# Patient Record
Sex: MALE | Race: WHITE | NOT HISPANIC OR LATINO | URBAN - METROPOLITAN AREA
[De-identification: names, ages, dates, MRNs, and addresses within clinical notes are randomized per-mention and may not be internally consistent; named-entity substitution may affect disease eponyms.]

---

## 2017-05-30 ENCOUNTER — OUTPATIENT (OUTPATIENT)
Dept: OUTPATIENT SERVICES | Facility: HOSPITAL | Age: 82
LOS: 1 days | Discharge: HOME | End: 2017-05-30

## 2017-06-28 DIAGNOSIS — E56.9 VITAMIN DEFICIENCY, UNSPECIFIED: ICD-10-CM

## 2017-06-28 DIAGNOSIS — R73.9 HYPERGLYCEMIA, UNSPECIFIED: ICD-10-CM

## 2017-06-28 DIAGNOSIS — E03.9 HYPOTHYROIDISM, UNSPECIFIED: ICD-10-CM

## 2017-06-28 DIAGNOSIS — E55.9 VITAMIN D DEFICIENCY, UNSPECIFIED: ICD-10-CM

## 2017-06-28 DIAGNOSIS — N18.2 CHRONIC KIDNEY DISEASE, STAGE 2 (MILD): ICD-10-CM

## 2017-06-28 DIAGNOSIS — D50.9 IRON DEFICIENCY ANEMIA, UNSPECIFIED: ICD-10-CM

## 2017-06-28 DIAGNOSIS — E53.8 DEFICIENCY OF OTHER SPECIFIED B GROUP VITAMINS: ICD-10-CM

## 2017-10-05 ENCOUNTER — OUTPATIENT (OUTPATIENT)
Dept: OUTPATIENT SERVICES | Facility: HOSPITAL | Age: 82
LOS: 1 days | Discharge: HOME | End: 2017-10-05

## 2017-10-05 DIAGNOSIS — I48.91 UNSPECIFIED ATRIAL FIBRILLATION: ICD-10-CM

## 2017-10-05 DIAGNOSIS — C61 MALIGNANT NEOPLASM OF PROSTATE: ICD-10-CM
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
## 2017-10-05 DIAGNOSIS — D07.5 CARCINOMA IN SITU OF PROSTATE: ICD-10-CM

## 2017-10-05 DIAGNOSIS — I10 ESSENTIAL (PRIMARY) HYPERTENSION: ICD-10-CM

## 2017-10-16 ENCOUNTER — OUTPATIENT (OUTPATIENT)
Dept: OUTPATIENT SERVICES | Facility: HOSPITAL | Age: 82
LOS: 1 days | Discharge: HOME | End: 2017-10-16

## 2017-10-16 DIAGNOSIS — C61 MALIGNANT NEOPLASM OF PROSTATE: ICD-10-CM
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
## 2017-10-16 DIAGNOSIS — I10 ESSENTIAL (PRIMARY) HYPERTENSION: ICD-10-CM

## 2017-10-16 DIAGNOSIS — J43.9 EMPHYSEMA, UNSPECIFIED: ICD-10-CM

## 2017-10-16 DIAGNOSIS — D07.5 CARCINOMA IN SITU OF PROSTATE: ICD-10-CM

## 2017-10-16 DIAGNOSIS — I48.91 UNSPECIFIED ATRIAL FIBRILLATION: ICD-10-CM

## 2017-10-16 DIAGNOSIS — Z00.00 ENCOUNTER FOR GENERAL ADULT MEDICAL EXAMINATION WITHOUT ABNORMAL FINDINGS: ICD-10-CM

## 2017-10-30 ENCOUNTER — OUTPATIENT (OUTPATIENT)
Dept: OUTPATIENT SERVICES | Facility: HOSPITAL | Age: 82
LOS: 1 days | Discharge: HOME | End: 2017-10-30

## 2017-10-30 DIAGNOSIS — I10 ESSENTIAL (PRIMARY) HYPERTENSION: ICD-10-CM

## 2017-10-30 DIAGNOSIS — C61 MALIGNANT NEOPLASM OF PROSTATE: ICD-10-CM
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
## 2017-10-30 DIAGNOSIS — I48.91 UNSPECIFIED ATRIAL FIBRILLATION: ICD-10-CM

## 2017-10-30 DIAGNOSIS — D07.5 CARCINOMA IN SITU OF PROSTATE: ICD-10-CM

## 2017-11-13 ENCOUNTER — OUTPATIENT (OUTPATIENT)
Dept: OUTPATIENT SERVICES | Facility: HOSPITAL | Age: 82
LOS: 1 days | Discharge: HOME | End: 2017-11-13

## 2017-11-13 DIAGNOSIS — I10 ESSENTIAL (PRIMARY) HYPERTENSION: ICD-10-CM

## 2017-11-13 DIAGNOSIS — D07.5 CARCINOMA IN SITU OF PROSTATE: ICD-10-CM
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
## 2017-11-13 DIAGNOSIS — I48.91 UNSPECIFIED ATRIAL FIBRILLATION: ICD-10-CM

## 2017-11-13 DIAGNOSIS — C61 MALIGNANT NEOPLASM OF PROSTATE: ICD-10-CM

## 2017-11-27 ENCOUNTER — OUTPATIENT (OUTPATIENT)
Dept: OUTPATIENT SERVICES | Facility: HOSPITAL | Age: 82
LOS: 1 days | Discharge: HOME | End: 2017-11-27

## 2017-11-27 DIAGNOSIS — I10 ESSENTIAL (PRIMARY) HYPERTENSION: ICD-10-CM

## 2017-11-27 DIAGNOSIS — R79.89 OTHER SPECIFIED ABNORMAL FINDINGS OF BLOOD CHEMISTRY: ICD-10-CM
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
## 2017-11-27 DIAGNOSIS — C61 MALIGNANT NEOPLASM OF PROSTATE: ICD-10-CM

## 2017-11-27 DIAGNOSIS — D07.5 CARCINOMA IN SITU OF PROSTATE: ICD-10-CM

## 2017-12-11 ENCOUNTER — OUTPATIENT (OUTPATIENT)
Dept: OUTPATIENT SERVICES | Facility: HOSPITAL | Age: 82
LOS: 1 days | Discharge: HOME | End: 2017-12-11

## 2017-12-11 DIAGNOSIS — C61 MALIGNANT NEOPLASM OF PROSTATE: ICD-10-CM

## 2017-12-11 DIAGNOSIS — D07.5 CARCINOMA IN SITU OF PROSTATE: ICD-10-CM

## 2017-12-11 DIAGNOSIS — I10 ESSENTIAL (PRIMARY) HYPERTENSION: ICD-10-CM

## 2017-12-11 DIAGNOSIS — R79.89 OTHER SPECIFIED ABNORMAL FINDINGS OF BLOOD CHEMISTRY: ICD-10-CM
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
## 2017-12-26 ENCOUNTER — OUTPATIENT (OUTPATIENT)
Dept: OUTPATIENT SERVICES | Facility: HOSPITAL | Age: 82
LOS: 1 days | Discharge: HOME | End: 2017-12-26

## 2017-12-26 DIAGNOSIS — I10 ESSENTIAL (PRIMARY) HYPERTENSION: ICD-10-CM

## 2017-12-26 DIAGNOSIS — C61 MALIGNANT NEOPLASM OF PROSTATE: ICD-10-CM

## 2017-12-26 DIAGNOSIS — R79.89 OTHER SPECIFIED ABNORMAL FINDINGS OF BLOOD CHEMISTRY: ICD-10-CM
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
## 2017-12-26 DIAGNOSIS — D07.5 CARCINOMA IN SITU OF PROSTATE: ICD-10-CM

## 2018-01-08 ENCOUNTER — OUTPATIENT (OUTPATIENT)
Dept: OUTPATIENT SERVICES | Facility: HOSPITAL | Age: 83
LOS: 1 days | Discharge: HOME | End: 2018-01-08

## 2018-01-08 DIAGNOSIS — R79.89 OTHER SPECIFIED ABNORMAL FINDINGS OF BLOOD CHEMISTRY: ICD-10-CM

## 2018-01-08 DIAGNOSIS — I10 ESSENTIAL (PRIMARY) HYPERTENSION: ICD-10-CM
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
## 2018-01-08 DIAGNOSIS — D07.5 CARCINOMA IN SITU OF PROSTATE: ICD-10-CM

## 2018-01-08 DIAGNOSIS — C61 MALIGNANT NEOPLASM OF PROSTATE: ICD-10-CM

## 2018-01-22 ENCOUNTER — OUTPATIENT (OUTPATIENT)
Dept: OUTPATIENT SERVICES | Facility: HOSPITAL | Age: 83
LOS: 1 days | Discharge: HOME | End: 2018-01-22

## 2018-01-22 DIAGNOSIS — R79.89 OTHER SPECIFIED ABNORMAL FINDINGS OF BLOOD CHEMISTRY: ICD-10-CM

## 2018-02-04 DIAGNOSIS — C61 MALIGNANT NEOPLASM OF PROSTATE: ICD-10-CM

## 2018-02-04 DIAGNOSIS — I10 ESSENTIAL (PRIMARY) HYPERTENSION: ICD-10-CM

## 2018-02-04 DIAGNOSIS — D07.5 CARCINOMA IN SITU OF PROSTATE: ICD-10-CM
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
## 2018-02-05 ENCOUNTER — OUTPATIENT (OUTPATIENT)
Dept: OUTPATIENT SERVICES | Facility: HOSPITAL | Age: 83
LOS: 1 days | Discharge: HOME | End: 2018-02-05

## 2018-02-05 DIAGNOSIS — R79.89 OTHER SPECIFIED ABNORMAL FINDINGS OF BLOOD CHEMISTRY: ICD-10-CM

## 2018-02-19 ENCOUNTER — OUTPATIENT (OUTPATIENT)
Dept: OUTPATIENT SERVICES | Facility: HOSPITAL | Age: 83
LOS: 1 days | Discharge: HOME | End: 2018-02-19

## 2018-02-19 DIAGNOSIS — R79.89 OTHER SPECIFIED ABNORMAL FINDINGS OF BLOOD CHEMISTRY: ICD-10-CM

## 2018-03-05 ENCOUNTER — OUTPATIENT (OUTPATIENT)
Dept: OUTPATIENT SERVICES | Facility: HOSPITAL | Age: 83
LOS: 1 days | Discharge: HOME | End: 2018-03-05

## 2018-03-05 DIAGNOSIS — R79.89 OTHER SPECIFIED ABNORMAL FINDINGS OF BLOOD CHEMISTRY: ICD-10-CM

## 2018-03-19 ENCOUNTER — OUTPATIENT (OUTPATIENT)
Dept: OUTPATIENT SERVICES | Facility: HOSPITAL | Age: 83
LOS: 1 days | Discharge: HOME | End: 2018-03-19

## 2018-03-19 DIAGNOSIS — R79.89 OTHER SPECIFIED ABNORMAL FINDINGS OF BLOOD CHEMISTRY: ICD-10-CM

## 2018-03-20 ENCOUNTER — INPATIENT (INPATIENT)
Facility: HOSPITAL | Age: 83
LOS: 8 days | Discharge: NURSING, REHAB OR LONG-TERM CARE | End: 2018-03-29
Attending: INTERNAL MEDICINE | Admitting: INTERNAL MEDICINE

## 2018-03-20 VITALS
HEART RATE: 76 BPM | RESPIRATION RATE: 22 BRPM | SYSTOLIC BLOOD PRESSURE: 170 MMHG | OXYGEN SATURATION: 93 % | TEMPERATURE: 96 F | DIASTOLIC BLOOD PRESSURE: 85 MMHG

## 2018-03-20 DIAGNOSIS — Z98.890 OTHER SPECIFIED POSTPROCEDURAL STATES: Chronic | ICD-10-CM

## 2018-03-20 LAB
ALBUMIN SERPL ELPH-MCNC: 4.1 G/DL — SIGNIFICANT CHANGE UP (ref 3.5–5.2)
ALP SERPL-CCNC: 120 U/L — HIGH (ref 30–115)
ALT FLD-CCNC: 31 U/L — SIGNIFICANT CHANGE UP (ref 0–41)
ANION GAP SERPL CALC-SCNC: 19 MMOL/L — HIGH (ref 7–14)
APPEARANCE UR: CLEAR — SIGNIFICANT CHANGE UP
APTT BLD: 25.9 SEC — LOW (ref 27–39.2)
AST SERPL-CCNC: 30 U/L — SIGNIFICANT CHANGE UP (ref 0–41)
BASOPHILS # BLD AUTO: 0.02 K/UL — SIGNIFICANT CHANGE UP (ref 0–0.2)
BASOPHILS NFR BLD AUTO: 0.1 % — SIGNIFICANT CHANGE UP (ref 0–1)
BILIRUB SERPL-MCNC: 0.8 MG/DL — SIGNIFICANT CHANGE UP (ref 0.2–1.2)
BILIRUB UR-MCNC: NEGATIVE — SIGNIFICANT CHANGE UP
BUN SERPL-MCNC: 48 MG/DL — HIGH (ref 10–20)
CALCIUM SERPL-MCNC: 9 MG/DL — SIGNIFICANT CHANGE UP (ref 8.5–10.1)
CHLORIDE SERPL-SCNC: 103 MMOL/L — SIGNIFICANT CHANGE UP (ref 98–110)
CK MB CFR SERPL CALC: 3.6 NG/ML — SIGNIFICANT CHANGE UP (ref 0.6–6.3)
CK MB CFR SERPL CALC: 5.3 NG/ML — SIGNIFICANT CHANGE UP (ref 0.6–6.3)
CK SERPL-CCNC: 88 U/L — SIGNIFICANT CHANGE UP (ref 0–225)
CO2 SERPL-SCNC: 20 MMOL/L — SIGNIFICANT CHANGE UP (ref 17–32)
COLOR SPEC: YELLOW — SIGNIFICANT CHANGE UP
CREAT SERPL-MCNC: 1.5 MG/DL — SIGNIFICANT CHANGE UP (ref 0.7–1.5)
DIFF PNL FLD: NEGATIVE — SIGNIFICANT CHANGE UP
EOSINOPHIL # BLD AUTO: 0.02 K/UL — SIGNIFICANT CHANGE UP (ref 0–0.7)
EOSINOPHIL NFR BLD AUTO: 0.1 % — SIGNIFICANT CHANGE UP (ref 0–8)
GLUCOSE SERPL-MCNC: 113 MG/DL — HIGH (ref 70–110)
GLUCOSE UR QL: NEGATIVE MG/DL — SIGNIFICANT CHANGE UP
HCT VFR BLD CALC: 33.8 % — LOW (ref 42–52)
HGB BLD-MCNC: 10.3 G/DL — LOW (ref 14–18)
IMM GRANULOCYTES NFR BLD AUTO: 0.7 % — HIGH (ref 0.1–0.3)
INR BLD: 2.21 RATIO — HIGH (ref 0.65–1.3)
KETONES UR-MCNC: NEGATIVE — SIGNIFICANT CHANGE UP
LACTATE SERPL-SCNC: 1.9 MMOL/L — SIGNIFICANT CHANGE UP (ref 0.5–2.2)
LEUKOCYTE ESTERASE UR-ACNC: NEGATIVE — SIGNIFICANT CHANGE UP
LYMPHOCYTES # BLD AUTO: 1.37 K/UL — SIGNIFICANT CHANGE UP (ref 1.2–3.4)
LYMPHOCYTES # BLD AUTO: 9 % — LOW (ref 20.5–51.1)
MCHC RBC-ENTMCNC: 18.8 PG — LOW (ref 27–31)
MCHC RBC-ENTMCNC: 30.5 G/DL — LOW (ref 32–37)
MCV RBC AUTO: 61.6 FL — LOW (ref 80–94)
MONOCYTES # BLD AUTO: 1.35 K/UL — HIGH (ref 0.1–0.6)
MONOCYTES NFR BLD AUTO: 8.9 % — SIGNIFICANT CHANGE UP (ref 1.7–9.3)
NEUTROPHILS # BLD AUTO: 12.33 K/UL — HIGH (ref 1.4–6.5)
NEUTROPHILS NFR BLD AUTO: 81.2 % — HIGH (ref 42.2–75.2)
NITRITE UR-MCNC: NEGATIVE — SIGNIFICANT CHANGE UP
NT-PROBNP SERPL-SCNC: 6288 PG/ML — HIGH (ref 0–300)
PH UR: 6 — SIGNIFICANT CHANGE UP (ref 5–8)
PLATELET # BLD AUTO: 166 K/UL — SIGNIFICANT CHANGE UP (ref 130–400)
POTASSIUM SERPL-MCNC: 5.1 MMOL/L — HIGH (ref 3.5–5)
POTASSIUM SERPL-SCNC: 5.1 MMOL/L — HIGH (ref 3.5–5)
PROT SERPL-MCNC: 7 G/DL — SIGNIFICANT CHANGE UP (ref 6–8)
PROT UR-MCNC: (no result) MG/DL
PROTHROM AB SERPL-ACNC: 24.3 SEC — HIGH (ref 9.95–12.87)
RBC # BLD: 5.49 M/UL — SIGNIFICANT CHANGE UP (ref 4.7–6.1)
RBC # FLD: 17.7 % — HIGH (ref 11.5–14.5)
SODIUM SERPL-SCNC: 142 MMOL/L — SIGNIFICANT CHANGE UP (ref 135–146)
SP GR SPEC: 1.02 — SIGNIFICANT CHANGE UP (ref 1.01–1.03)
TROPONIN T SERPL-MCNC: 0.01 NG/ML — SIGNIFICANT CHANGE UP
TROPONIN T SERPL-MCNC: <0.01 NG/ML — SIGNIFICANT CHANGE UP
TYPE + AB SCN PNL BLD: SIGNIFICANT CHANGE UP
UROBILINOGEN FLD QL: 0.2 MG/DL — SIGNIFICANT CHANGE UP (ref 0.2–0.2)
WBC # BLD: 15.2 K/UL — HIGH (ref 4.8–10.8)
WBC # FLD AUTO: 15.2 K/UL — HIGH (ref 4.8–10.8)

## 2018-03-20 RX ORDER — ALBUTEROL 90 UG/1
2.5 AEROSOL, METERED ORAL EVERY 6 HOURS
Qty: 0 | Refills: 0 | Status: DISCONTINUED | OUTPATIENT
Start: 2018-03-20 | End: 2018-03-29

## 2018-03-20 RX ORDER — PANTOPRAZOLE SODIUM 20 MG/1
40 TABLET, DELAYED RELEASE ORAL
Qty: 0 | Refills: 0 | Status: DISCONTINUED | OUTPATIENT
Start: 2018-03-20 | End: 2018-03-29

## 2018-03-20 RX ORDER — ISOSORBIDE MONONITRATE 60 MG/1
30 TABLET, EXTENDED RELEASE ORAL DAILY
Qty: 0 | Refills: 0 | Status: DISCONTINUED | OUTPATIENT
Start: 2018-03-20 | End: 2018-03-29

## 2018-03-20 RX ORDER — ATORVASTATIN CALCIUM 80 MG/1
10 TABLET, FILM COATED ORAL AT BEDTIME
Qty: 0 | Refills: 0 | Status: DISCONTINUED | OUTPATIENT
Start: 2018-03-20 | End: 2018-03-29

## 2018-03-20 RX ORDER — FAMOTIDINE 10 MG/ML
0 INJECTION INTRAVENOUS
Qty: 90 | Refills: 0 | COMMUNITY

## 2018-03-20 RX ORDER — FUROSEMIDE 40 MG
40 TABLET ORAL DAILY
Qty: 0 | Refills: 0 | Status: DISCONTINUED | OUTPATIENT
Start: 2018-03-20 | End: 2018-03-23

## 2018-03-20 RX ORDER — METOPROLOL TARTRATE 50 MG
50 TABLET ORAL
Qty: 0 | Refills: 0 | Status: DISCONTINUED | OUTPATIENT
Start: 2018-03-20 | End: 2018-03-29

## 2018-03-20 RX ORDER — FUROSEMIDE 40 MG
40 TABLET ORAL ONCE
Qty: 0 | Refills: 0 | Status: COMPLETED | OUTPATIENT
Start: 2018-03-20 | End: 2018-03-20

## 2018-03-20 RX ORDER — ACETAMINOPHEN 500 MG
650 TABLET ORAL ONCE
Qty: 0 | Refills: 0 | Status: COMPLETED | OUTPATIENT
Start: 2018-03-20 | End: 2018-03-20

## 2018-03-20 RX ORDER — WARFARIN SODIUM 2.5 MG/1
2 TABLET ORAL ONCE
Qty: 0 | Refills: 0 | Status: COMPLETED | OUTPATIENT
Start: 2018-03-20 | End: 2018-03-20

## 2018-03-20 RX ADMIN — WARFARIN SODIUM 2 MILLIGRAM(S): 2.5 TABLET ORAL at 22:51

## 2018-03-20 RX ADMIN — Medication 50 MILLIGRAM(S): at 17:10

## 2018-03-20 RX ADMIN — ATORVASTATIN CALCIUM 10 MILLIGRAM(S): 80 TABLET, FILM COATED ORAL at 22:51

## 2018-03-20 RX ADMIN — Medication 650 MILLIGRAM(S): at 22:51

## 2018-03-20 RX ADMIN — ISOSORBIDE MONONITRATE 30 MILLIGRAM(S): 60 TABLET, EXTENDED RELEASE ORAL at 22:51

## 2018-03-20 RX ADMIN — Medication 40 MILLIGRAM(S): at 16:14

## 2018-03-20 RX ADMIN — ALBUTEROL 2.5 MILLIGRAM(S): 90 AEROSOL, METERED ORAL at 19:48

## 2018-03-20 NOTE — H&P ADULT - NSHPLABSRESULTS_GEN_ALL_CORE
Labs:  CAPILLARY BLOOD GLUCOSE                10.3   15.20 )-----------( 166      ( 20 Mar 2018 08:24 )             33.8           142  |  103  |  48<H>  ----------------------------<  113<H>  5.1<H>   |  20  |  1.5    Ca    9.0      20 Mar 2018 08:24    TPro  7.0  /  Alb  4.1  /  TBili  0.8  /  DBili  x   /  AST  30  /  ALT  31  /  AlkPhos  120<H>  03      PT/INR - ( 20 Mar 2018 08:24 )   PT: 24.30 sec;   INR: 2.21 ratio         PTT - ( 20 Mar 2018 08:24 )  PTT:25.9 sec  CARDIAC MARKERS ( 20 Mar 2018 08:24 )  x     / <0.01 ng/mL / 88 U/L / x     / 5.3 ng/mL      Urinalysis Basic - ( 20 Mar 2018 08:24 )    Color: Yellow / Appearance: Clear / S.020 / pH: x  Gluc: x / Ketone: Negative  / Bili: Negative / Urobili: 0.2 mg/dL   Blood: x / Protein: Trace mg/dL / Nitrite: Negative   Leuk Esterase: Negative / RBC: x / WBC 1-2 /HPF   Sq Epi: x / Non Sq Epi: x / Bacteria: Few /HPF    < from: CT Chest No Cont (18 @ 11:08) >    IMPRESSION:   1.  Incompletely imaged fracture of the right distal clavicle and/or   acromion. Dedicated shoulder radiograph is recommended for further   evaluation.    2.  Nondisplaced left posterior 8-10th rib fractures. No pneumothorax.    3.  No CT evidence of additional intrathoracic or abdominopelvic   traumatic abnormality on this limited noncontrast enhanced examination.    < end of copied text >    < from: CT Head No Cont (18 @ 11:04) >    Impression:      No mass effect or intracranial hemorrhage. Stable exam.    Chronic microvascular ischemic changes.    < end of copied text >    < from: CT Cervical Spine No Cont (18 @ 11:04) >    Impression:    Osteopenia without definite acute fracture or prevertebral soft tissue   swelling.    < end of copied text >

## 2018-03-20 NOTE — PROGRESS NOTE ADULT - SUBJECTIVE AND OBJECTIVE BOX
89 yo M with PMHx of Afib on coumadin, CHF, HTN, HLD, presents after a mechanical fall at home. Patient slipped in the bathroom, landed on right side, now c/o right shoulder and leg pain. Pt has difficulty remembering the fall, but denies lightheadedness and palpitations prior to fall and denies n/v/d, chest pain, LOC (, hitting his head, blurred vision, headache, dizziness. Pt walks with cane at baseline and has not fallen in a few years.  Pt was found to have a R clavicular fx and L ribs 8-10 fxs, but pt is able to pull in 1000cc on incentive spirometry.  Pt being admitted for complaint of chronic shortness of breath, for pat 2 years, worse with exertion.  Pt denies orthopnea, but has PND, and uses albuterol nebs PRN which improve his sxs.  Pt follows with Dr. Davis of cardiology.     Medical History:  Afib    CHF (congestive heart failure)    Hyperlipidemia    Hypertension    No Known Allergies  former smoker (60 years ago), no etoh/illicit drugs    physical exam:  T(C): 35.8 (03-20-18 @ 08:34), Max: 35.8 (03-20-18 @ 06:59)  HR: 62 (-20-18 @ 08:34) (62 - 76)  BP: 143/63 (03-20-18 @ 08:34) (143/63 - 170/85)  RR: 20 (-20-18 @ 08:34) (20 - 22)  SpO2: 96% (-20-18 @ 08:34) (93% - 96%)    PHYSICAL EXAM:  GENERAL: NAD, well-developed  HEAD:  Atraumatic, Normocephalic  EYES: EOMI, PERRLA, conjunctiva and sclera clear  NECK: Supple, No JVD  CHEST/LUNG: decreased breath sounds bilaterally.  no wheezing  HEART: irregular  ABDOMEN: Soft, Nontender, Nondistended; Bowel sounds present  EXTREMITIES:  R LE 1+ pitting edema, unable to move RUE due to shoulder pain  NEUROLOGY: AAOx2-3 non-focal    labs:  Labs:                         10.3<L>  15.20<H>   )-----------(   166      ( 20 Mar 2018 08:24 )              33.8<L>    hgb at baseline  MCV 61.6  Neutro%  81.2<H>   Lympho%  9.0<L>   Mono%    8.9     Bands    x            142  |  103  |  48<H>  ----------------------------<  113<H>  5.1<H>   |  20  |  1.5    sCr at baseline    Ca    9.0      20 Mar 2018 08:24    TPro  7.0  /  Alb  4.1  /  TBili  0.8  /  DBili  x   /  AST  30  /  ALT  31  /  AlkPhos  120<H>        PT/INR - ( 20 Mar 2018 08:24 )   PT: 24.30 sec;   INR: 2.21 ratio    PTT - ( 20 Mar 2018 08:24 )  PTT:25.9 sec  CARDIAC MARKERS ( 20 Mar 2018 08:24 )  x     / <0.01 ng/mL / 88 U/L / x     / 5.3 ng/mL      LIVER FUNCTIONS - ( 20 Mar 2018 08:24 )  Alb: 4.1 g/dL / Pro: 7.0 g/dL / ALK PHOS: 120 U/L / ALT: 31 U/L / AST: 30 U/L / GGT: x           Lactate, Blood: 1.9 mmol/L (18 @ 08:24)    Urinalysis Basic - ( 20 Mar 2018 08:24 )  Color: Yellow / Appearance: Clear / S.020 / pH: x  Gluc: x / Ketone: Negative  / Bili: Negative / Urobili: 0.2 mg/dL   Blood: x / Protein: Trace mg/dL / Nitrite: Negative   Leuk Esterase: Negative / RBC: x / WBC 1-2 /HPF   Sq Epi: x / Non Sq Epi: x / Bacteria: Few /HPF      Radiology:    EKG: Afib, 84 BPM, no acute ischemic changes  < from: CT Chest No Cont (18 @ 11:08) >  LUNGS/PLEURA/AIRWAYS: No lobar consolidations or pneumothorax.  Trace   bilateral pleural effusions. Bibasilar bronchiolar wall thickening and  bilateral mosaic attenuation likely related to small airways disease.   Bibasilar dependent atelectasis noted.  MEDIASTINUM/THORACIC NODES: No lymphadenopathy. Few scattered calcified   mediastinal lymph nodes.  HEART/GREAT VESSELS: Cardiomegaly. No pericardial effusion.   Atherosclerotic vascular calcifications of the thoracic aorta and   coronary arteries noted.  HEPATOBILIARY: Unremarkable unenhanced liver.  SPLEEN: Unremarkable.  PANCREAS: Atrophic.   ADRENAL GLANDS: Unremarkable.  KIDNEYS: Unchanged multiple bilateral renal cysts. No hydronephrosis.  ABDOMINOPELVIC NODES: No lymphadenopathy.  PELVIC ORGANS: Enlarged prostate gland.  PERITONEUM/MESENTERY/BOWEL: No bowel obstruction, ascites or   pneumoperitoneum. Unremarkable appendix. Scattered colonic diverticulosis   without evidence for acute diverticulitis.  BONES/SOFT TISSUES: Incompletely imaged fracture of the right distal   clavicle and/or acromion (series 5, image 1). Cortical buckling of the   left posterior 8-10th ribs consistent nondisplaced rib fractures. Diffuse   osteopenia. Post T9 vertebroplasty. Multiple anterior thin syndesmophytes   seen within the thoracic spine, consistent with ankylosing spondylitis.    OTHER: Diffuse atherosclerotic vascular calcifications. Unchanged   infrarenal abdominal aortic stent. Surgical clips again noted in the   right groin.    < from: CT Head No Cont (18 @ 11:04) >  No mass effect or intracranial hemorrhage. Stable exam.  Chronic microvascular ischemic changes.     TTE:  Left ventricle: Systolic function was moderately reduced. Ejection fraction   was estimated in the range of 35 % to 40 %. There was moderate diffuse   hypokinesis with regional variations.   Aorta, systemic arteries: The root exhibited normal size and mild   fibrocalcific change.   Mitral valve: There was mild to moderate regurgitation.   Left atrium: The atrium was dilated.   Pulmonary arteries: Systolic pressure was mildly to moderately increased.   Tricuspid valve: There was mild regurgitation.     Assessment:  Patient is a 89y old  Male who presents after a fall (likely mechanical, but pt unsure) with a R clavicular fx and L rib fxs (20 Mar 2018 13:21)    PAST MEDICAL & SURGICAL HISTORY:  Hyperlipidemia  CHF (congestive heart failure)  Afib  Hypertension  S/P AAA (abdominal aortic aneurysm) repair    Plan:    #Fall  -keep pt on tele for 24 hrs, check cardiac enzymes/ekgs  -Pt with HFrEF, with mild decompensation, check TTE  -RLE pitting edema - check LE venous duplex    #SOB  -continue nebs PRN (pt is at his baseline)  -increase lasix to 40mg po daily  -continue coumadin/beta blocker/statin/imdur  -get baseline chest x ray

## 2018-03-20 NOTE — ED PROVIDER NOTE - PHYSICAL EXAMINATION
AOx4, Elderly male, Non toxic appearing, increased respiratory effort, speaking in full sentences. GCS 15. Skin  warm and dry, no acute rash. Head normocephalic, atraumatic. PERRLA/EOMI, conjunctiva and sclera clear. MM moist, no nasal discharge.  Pharynx and TM's unremarkable.  No mastoid or temporal ttp. Neck supple nt, palpable deformity to the right distal clavicle with tenderness. Heart RRR s1s2 nl, no rub/murmur. Lungs- No retractions, mild crackles in lower lobes b/l,  Abdomen soft ntnd no r/g. Extremities- moves all, +equal distal pulses, brisk cap refill, sensation wnl, normal ROM. No LE edema, calves nttp b/l. No hip tenderness, no tenderness with right leg log rolling.

## 2018-03-20 NOTE — H&P ADULT - HISTORY OF PRESENT ILLNESS
89 year male on coumadin for atrial fibrillation, s/p fall from standing in bathroom, landed on right shoulder, -HT, -LOC, c/o right shoulder pain and right leg stiffness. Patient also c/o difficulty breathing

## 2018-03-20 NOTE — ED ADULT NURSE REASSESSMENT NOTE - NS ED NURSE REASSESS COMMENT FT1
Pt had run of vtach on cardiac monitor. Pt aymptomatic. Pacer pads applied to pt. Will continue to monitor and assess.

## 2018-03-20 NOTE — ED PROVIDER NOTE - NS ED ROS FT
Constitutional: See HPI.  Eyes: No visual changes, eye pain or discharge.  ENMT:  No neck  stiffness.  Cardiac: No chest pain. +edema. No chest pain with exertion.  Respiratory: No cough.   GI: No nausea, vomiting, diarrhea or abdominal pain.  : No dysuria, frequency or burning.  MS: No myalgia, muscle weakness, back pain.  Neuro: No headache or weakness. No LOC.  Skin: No skin rash.

## 2018-03-20 NOTE — H&P ADULT - ATTENDING COMMENTS
s/p fall on coumadin   clavicular and ribs Fx   admit to trauma for 24 hours   will consult medicine for transfer due to extensive medical condition   pain control   IS

## 2018-03-20 NOTE — ED PROVIDER NOTE - CARE PLAN
Principal Discharge DX:	CHF (congestive heart failure)  Secondary Diagnosis:	Right clavicle fracture  Secondary Diagnosis:	Multiple rib fractures

## 2018-03-20 NOTE — ED PROVIDER NOTE - OBJECTIVE STATEMENT
89 yo F with a hx of ABlue faye on coumadin, CHF, HTN, HLD, presents after a mechanical trip and fall at home. Patient slipped in the bathroom, landed on right side, now c/o right shoulder and leg pain. Having SOB at baseline for a while now. Denies LOC, hitting head, nausea, vomiting, blurred vision, dizziness, palpations, abd pain, CP.

## 2018-03-20 NOTE — ED ADULT TRIAGE NOTE - CHIEF COMPLAINT QUOTE
Pt s/p slip and fall in a bathroom, unwitnessed, denies LOC, denies head trauma, c/o right shoulder and right leg pain, pt is on coumadin.

## 2018-03-20 NOTE — H&P ADULT - ASSESSMENT
89 year male s/p fall, 3 left rib fractures, right clavicular fracture, acute exacerbation of CHF  1. Due to medical comorbidity will consult medical service for admission  2. Patient is pulling >1000 on incentive spirometer, does not have pain in left chest. Encourage incentive spirometry for rib fractures, pain control as needed with non-narcotic medications  3. Orthopedic consult for clavicle fracture, no operative intervention at this time.

## 2018-03-20 NOTE — CONSULT NOTE ADULT - ATTENDING COMMENTS
pt has lateral right distal clavicle fx    no surgery    sling for comfort    nwb right UE until comfortable

## 2018-03-20 NOTE — ED PROVIDER NOTE - PROGRESS NOTE DETAILS
Trauma consulted Attending note:  89 y/o M to ED with shoulder and hip pain s/p fall. He woke at 4:30 am to go to the bathroom as his is usual practice, walks with a cane at baseline and states he slipped in the bathroom and fell onto his R side. No head trauma or LOC but pain and bony tenderness to R shoulder and R hip and unable to ambulate after fall.   AVSS; exam as noted. CTAB. RRR. Abdomen soft NTND, (+) bowel sounds. Tenderness over R clavicle, painful ROM to R shoulder, painful flexion/extension of R hip. Neuro nonfocal. DAVIN Vera of Ortho. Aware of clavicle fx. DAVIN trauma Dr. Christine.  Patient pulling >1000 cc on IS. No further trauma intervention. Recommends medicine admission for CHF exacerbation.

## 2018-03-20 NOTE — ED ADULT NURSE REASSESSMENT NOTE - NS ED NURSE REASSESS COMMENT FT1
Pt also c/o SOB and b/l feet swelling. Placed on continuous cardiac monitoring and pulse ox. Will continue to monitor and assess.

## 2018-03-21 LAB
ANION GAP SERPL CALC-SCNC: 13 MMOL/L — SIGNIFICANT CHANGE UP (ref 7–14)
BUN SERPL-MCNC: 47 MG/DL — HIGH (ref 10–20)
CALCIUM SERPL-MCNC: 8.4 MG/DL — LOW (ref 8.5–10.1)
CHLORIDE SERPL-SCNC: 99 MMOL/L — SIGNIFICANT CHANGE UP (ref 98–110)
CK MB CFR SERPL CALC: 2.6 NG/ML — SIGNIFICANT CHANGE UP (ref 0.6–6.3)
CK SERPL-CCNC: 96 U/L — SIGNIFICANT CHANGE UP (ref 0–225)
CO2 SERPL-SCNC: 23 MMOL/L — SIGNIFICANT CHANGE UP (ref 17–32)
CREAT SERPL-MCNC: 1.8 MG/DL — HIGH (ref 0.7–1.5)
GLUCOSE SERPL-MCNC: 122 MG/DL — HIGH (ref 70–110)
INR BLD: 2.36 RATIO — HIGH (ref 0.65–1.3)
MAGNESIUM SERPL-MCNC: 1.5 MG/DL — LOW (ref 1.8–2.4)
POTASSIUM SERPL-MCNC: 4.4 MMOL/L — SIGNIFICANT CHANGE UP (ref 3.5–5)
POTASSIUM SERPL-SCNC: 4.4 MMOL/L — SIGNIFICANT CHANGE UP (ref 3.5–5)
PROTHROM AB SERPL-ACNC: 25.9 SEC — HIGH (ref 9.95–12.87)
SODIUM SERPL-SCNC: 135 MMOL/L — SIGNIFICANT CHANGE UP (ref 135–146)
TROPONIN T SERPL-MCNC: 0.02 NG/ML — HIGH
TROPONIN T SERPL-MCNC: 0.03 NG/ML — HIGH

## 2018-03-21 RX ORDER — ACETAMINOPHEN 500 MG
650 TABLET ORAL EVERY 6 HOURS
Qty: 0 | Refills: 0 | Status: DISCONTINUED | OUTPATIENT
Start: 2018-03-21 | End: 2018-03-21

## 2018-03-21 RX ORDER — ACETAMINOPHEN 500 MG
650 TABLET ORAL EVERY 6 HOURS
Qty: 0 | Refills: 0 | Status: DISCONTINUED | OUTPATIENT
Start: 2018-03-21 | End: 2018-03-27

## 2018-03-21 RX ORDER — WARFARIN SODIUM 2.5 MG/1
2 TABLET ORAL AT BEDTIME
Qty: 0 | Refills: 0 | Status: COMPLETED | OUTPATIENT
Start: 2018-03-21 | End: 2018-03-21

## 2018-03-21 RX ORDER — MAGNESIUM SULFATE 500 MG/ML
2 VIAL (ML) INJECTION ONCE
Qty: 0 | Refills: 0 | Status: COMPLETED | OUTPATIENT
Start: 2018-03-21 | End: 2018-03-21

## 2018-03-21 RX ADMIN — Medication 650 MILLIGRAM(S): at 06:56

## 2018-03-21 RX ADMIN — Medication 650 MILLIGRAM(S): at 00:00

## 2018-03-21 RX ADMIN — ALBUTEROL 2.5 MILLIGRAM(S): 90 AEROSOL, METERED ORAL at 13:52

## 2018-03-21 RX ADMIN — Medication 50 MILLIGRAM(S): at 17:37

## 2018-03-21 RX ADMIN — ALBUTEROL 2.5 MILLIGRAM(S): 90 AEROSOL, METERED ORAL at 19:28

## 2018-03-21 RX ADMIN — PANTOPRAZOLE SODIUM 40 MILLIGRAM(S): 20 TABLET, DELAYED RELEASE ORAL at 05:48

## 2018-03-21 RX ADMIN — ATORVASTATIN CALCIUM 10 MILLIGRAM(S): 80 TABLET, FILM COATED ORAL at 21:47

## 2018-03-21 RX ADMIN — ISOSORBIDE MONONITRATE 30 MILLIGRAM(S): 60 TABLET, EXTENDED RELEASE ORAL at 12:14

## 2018-03-21 RX ADMIN — Medication 50 GRAM(S): at 12:13

## 2018-03-21 RX ADMIN — Medication 50 MILLIGRAM(S): at 05:48

## 2018-03-21 RX ADMIN — WARFARIN SODIUM 2 MILLIGRAM(S): 2.5 TABLET ORAL at 21:47

## 2018-03-21 RX ADMIN — Medication 40 MILLIGRAM(S): at 05:48

## 2018-03-21 NOTE — PROGRESS NOTE ADULT - ASSESSMENT
89 y/m presented with mechanical fall:      # Mechanical Fall:    -2 sets of Cardiac enzymes -ve  - Trauma workup: Right distal clavicle fracture and non displaced left postr 8-10 rib #  - Ortho consult: non surgical management, sling for clavicle fracture  - Pain control  - Also complaining of Right leg pain, will f/u imaging  - PT rehab    # Congestive heart failure exacerbation:  2D echo pending  continue with Lasix 40  continue nebs PRN (pt is at his baseline)  continue /beta blocker/statin/imdur      # A fib:  f/u inr and dose coumadin    # DVT ppx  warfarin    # GI ppx   protonix 89 y/m presented with mechanical fall:      # Mechanical Fall:    -2 sets of Cardiac enzymes -ve  - Trauma workup: Right distal clavicle fracture and non displaced left postr 8-10 rib #  - Ortho consult: non surgical management, sling for clavicle fracture  - Pain control  - Also complaining of Right leg pain, will f/u imaging  - PT rehab    # Suspected Acute on Chronic Systolic Congestive heart failure exacerbation:  2D echo pending  continue with Lasix 40  continue nebs PRN (pt is at his baseline)  continue /beta blocker/statin/imdur      # A fib:  f/u inr and dose coumadin    #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

## 2018-03-21 NOTE — PROGRESS NOTE ADULT - SUBJECTIVE AND OBJECTIVE BOX
Patient is a 89y old  Male who presents with a chief complaint of s/p fall from standing (20 Mar 2018 13:21)      PAST MEDICAL & SURGICAL HISTORY:  Hyperlipidemia  CHF (congestive heart failure)  Afib  Hypertension  S/P AAA (abdominal aortic aneurysm) repair      MEDICATIONS  (STANDING):  ALBUTerol    0.083% 2.5 milliGRAM(s) Nebulizer every 6 hours  atorvastatin Oral Tab/Cap - Peds 10 milliGRAM(s) Oral at bedtime  furosemide    Tablet 40 milliGRAM(s) Oral daily  isosorbide   mononitrate ER Tablet (IMDUR) 30 milliGRAM(s) Oral daily  metoprolol  tartrate Oral Tab/Cap - Peds 50 milliGRAM(s) Oral two times a day  pantoprazole    Tablet 40 milliGRAM(s) Oral before breakfast  warfarin 2 milliGRAM(s) Oral at bedtime    MEDICATIONS  (PRN):  acetaminophen   Tablet. 650 milliGRAM(s) Oral every 6 hours PRN Moderate Pain (4 - 6)      Overnight events:  No acute events    Vital Signs Last 24 Hrs  T(C): 36.6 (21 Mar 2018 07:18), Max: 36.7 (20 Mar 2018 22:37)  T(F): 97.9 (21 Mar 2018 07:18), Max: 98 (20 Mar 2018 22:37)  HR: 74 (21 Mar 2018 07:18) (63 - 88)  BP: 112/60 (21 Mar 2018 07:18) (112/60 - 146/76)  BP(mean): --  RR: 19 (21 Mar 2018 07:18) (18 - 19)  SpO2: 96% (21 Mar 2018 05:52) (96% - 98%)  CAPILLARY BLOOD GLUCOSE        I&O's Summary    20 Mar 2018 07:  -  21 Mar 2018 07:00  --------------------------------------------------------  IN: 0 mL / OUT: 550 mL / NET: -550 mL    21 Mar 2018 07:01  -  21 Mar 2018 15:21  --------------------------------------------------------  IN: 410 mL / OUT: 200 mL / NET: 210 mL        Physical Exam:    -     General : sitting on chair, no acute distress    -      Cardiac: Irregular    -      Pulm: minimal crackles b/l bases    -      GI: soft non tender    -      Musculoskeletal: no edema, flexes right upper extremity at elbow    -      Neuro: non focal        Labs:                        10.3   15.20 )-----------( 166      ( 20 Mar 2018 08:24 )             33.8             03    135  |  99  |  47<H>  ----------------------------<  122<H>  4.4   |  23  |  1.8<H>    Ca    8.4<L>      21 Mar 2018 06:47  Mg     1.5         TPro  7.0  /  Alb  4.1  /  TBili  0.8  /  DBili  x   /  AST  30  /  ALT  31  /  AlkPhos  120<H>  20    LIVER FUNCTIONS - ( 20 Mar 2018 08:24 )  Alb: 4.1 g/dL / Pro: 7.0 g/dL / ALK PHOS: 120 U/L / ALT: 31 U/L / AST: 30 U/L / GGT: x                 PT/INR - ( 21 Mar 2018 06:47 )   PT: 25.90 sec;   INR: 2.36 ratio         PTT - ( 20 Mar 2018 08:24 )  PTT:25.9 sec  CARDIAC MARKERS ( 21 Mar 2018 06:47 )  x     / 0.03 ng/mL / x     / x     / x      CARDIAC MARKERS ( 21 Mar 2018 00:31 )  x     / 0.02 ng/mL / 96 U/L / x     / 2.6 ng/mL  CARDIAC MARKERS ( 20 Mar 2018 16:48 )  x     / 0.01 ng/mL / x     / x     / 3.6 ng/mL  CARDIAC MARKERS ( 20 Mar 2018 08:24 )  x     / <0.01 ng/mL / 88 U/L / x     / 5.3 ng/mL        Urinalysis Basic - ( 20 Mar 2018 08:24 )    Color: Yellow / Appearance: Clear / S.020 / pH: x  Gluc: x / Ketone: Negative  / Bili: Negative / Urobili: 0.2 mg/dL   Blood: x / Protein: Trace mg/dL / Nitrite: Negative   Leuk Esterase: Negative / RBC: x / WBC 1-2 /HPF   Sq Epi: x / Non Sq Epi: x / Bacteria: Few /HPF          Imaging:    ECG: Patient is a 89y old  Male who presents with a chief complaint of s/p fall from standing (20 Mar 2018 13:21)      PAST MEDICAL & SURGICAL HISTORY:  Hyperlipidemia  CHF (congestive heart failure), suspected chronic systolic  Afib  Hypertension  S/P AAA (abdominal aortic aneurysm) repair      MEDICATIONS  (STANDING):  ALBUTerol    0.083% 2.5 milliGRAM(s) Nebulizer every 6 hours  atorvastatin Oral Tab/Cap - Peds 10 milliGRAM(s) Oral at bedtime  furosemide    Tablet 40 milliGRAM(s) Oral daily  isosorbide   mononitrate ER Tablet (IMDUR) 30 milliGRAM(s) Oral daily  metoprolol  tartrate Oral Tab/Cap - Peds 50 milliGRAM(s) Oral two times a day  pantoprazole    Tablet 40 milliGRAM(s) Oral before breakfast  warfarin 2 milliGRAM(s) Oral at bedtime    MEDICATIONS  (PRN):  acetaminophen   Tablet. 650 milliGRAM(s) Oral every 6 hours PRN Moderate Pain (4 - 6)      Overnight events:  No acute events    Vital Signs Last 24 Hrs  T(C): 36.6 (21 Mar 2018 07:18), Max: 36.7 (20 Mar 2018 22:37)  T(F): 97.9 (21 Mar 2018 07:18), Max: 98 (20 Mar 2018 22:37)  HR: 74 (21 Mar 2018 07:18) (63 - 88)  BP: 112/60 (21 Mar 2018 07:18) (112/60 - 146/76)  BP(mean): --  RR: 19 (21 Mar 2018 07:18) (18 - 19)  SpO2: 96% (21 Mar 2018 05:52) (96% - 98%)  CAPILLARY BLOOD GLUCOSE        I&O's Summary    20 Mar 2018 07:01  -  21 Mar 2018 07:00  --------------------------------------------------------  IN: 0 mL / OUT: 550 mL / NET: -550 mL    21 Mar 2018 07:01  -  21 Mar 2018 15:21  --------------------------------------------------------  IN: 410 mL / OUT: 200 mL / NET: 210 mL        Physical Exam:    -     General : sitting on chair, no acute distress    -      Cardiac: Irregular    -      Pulm: minimal crackles b/l bases    -      GI: soft non tender    -      Musculoskeletal: no edema, flexes right upper extremity at elbow    -      Neuro: non focal        Labs:                        10.3   15.20 )-----------( 166      ( 20 Mar 2018 08:24 )             33.8             03-21    135  |  99  |  47<H>  ----------------------------<  122<H>  4.4   |  23  |  1.8<H>    Ca    8.4<L>      21 Mar 2018 06:47  Mg     1.5         TPro  7.0  /  Alb  4.1  /  TBili  0.8  /  DBili  x   /  AST  30  /  ALT  31  /  AlkPhos  120<H>  0320    LIVER FUNCTIONS - ( 20 Mar 2018 08:24 )  Alb: 4.1 g/dL / Pro: 7.0 g/dL / ALK PHOS: 120 U/L / ALT: 31 U/L / AST: 30 U/L / GGT: x                 PT/INR - ( 21 Mar 2018 06:47 )   PT: 25.90 sec;   INR: 2.36 ratio         PTT - ( 20 Mar 2018 08:24 )  PTT:25.9 sec  CARDIAC MARKERS ( 21 Mar 2018 06:47 )  x     / 0.03 ng/mL / x     / x     / x      CARDIAC MARKERS ( 21 Mar 2018 00:31 )  x     / 0.02 ng/mL / 96 U/L / x     / 2.6 ng/mL  CARDIAC MARKERS ( 20 Mar 2018 16:48 )  x     / 0.01 ng/mL / x     / x     / 3.6 ng/mL  CARDIAC MARKERS ( 20 Mar 2018 08:24 )  x     / <0.01 ng/mL / 88 U/L / x     / 5.3 ng/mL        Urinalysis Basic - ( 20 Mar 2018 08:24 )    Color: Yellow / Appearance: Clear / S.020 / pH: x  Gluc: x / Ketone: Negative  / Bili: Negative / Urobili: 0.2 mg/dL   Blood: x / Protein: Trace mg/dL / Nitrite: Negative   Leuk Esterase: Negative / RBC: x / WBC 1-2 /HPF   Sq Epi: x / Non Sq Epi: x / Bacteria: Few /HPF          Imaging:    ECG: a. flutter (3/20)

## 2018-03-22 LAB
ANION GAP SERPL CALC-SCNC: 11 MMOL/L — SIGNIFICANT CHANGE UP (ref 7–14)
BUN SERPL-MCNC: 46 MG/DL — HIGH (ref 10–20)
CALCIUM SERPL-MCNC: 8.5 MG/DL — SIGNIFICANT CHANGE UP (ref 8.5–10.1)
CHLORIDE SERPL-SCNC: 98 MMOL/L — SIGNIFICANT CHANGE UP (ref 98–110)
CO2 SERPL-SCNC: 25 MMOL/L — SIGNIFICANT CHANGE UP (ref 17–32)
CREAT SERPL-MCNC: 1.7 MG/DL — HIGH (ref 0.7–1.5)
GLUCOSE SERPL-MCNC: 128 MG/DL — HIGH (ref 70–110)
HCT VFR BLD CALC: 31.6 % — LOW (ref 42–52)
HGB BLD-MCNC: 9.8 G/DL — LOW (ref 14–18)
INR BLD: 2.81 RATIO — HIGH (ref 0.65–1.3)
MAGNESIUM SERPL-MCNC: 2 MG/DL — SIGNIFICANT CHANGE UP (ref 1.8–2.4)
MCHC RBC-ENTMCNC: 18.9 PG — LOW (ref 27–31)
MCHC RBC-ENTMCNC: 31 G/DL — LOW (ref 32–37)
MCV RBC AUTO: 60.9 FL — LOW (ref 80–94)
NRBC # BLD: 0 /100 WBCS — SIGNIFICANT CHANGE UP (ref 0–0)
PLATELET # BLD AUTO: 130 K/UL — SIGNIFICANT CHANGE UP (ref 130–400)
POTASSIUM SERPL-MCNC: 4.3 MMOL/L — SIGNIFICANT CHANGE UP (ref 3.5–5)
POTASSIUM SERPL-SCNC: 4.3 MMOL/L — SIGNIFICANT CHANGE UP (ref 3.5–5)
PROTHROM AB SERPL-ACNC: 31 SEC — HIGH (ref 9.95–12.87)
RBC # BLD: 5.19 M/UL — SIGNIFICANT CHANGE UP (ref 4.7–6.1)
RBC # FLD: 18.1 % — HIGH (ref 11.5–14.5)
SODIUM SERPL-SCNC: 134 MMOL/L — LOW (ref 135–146)
WBC # BLD: 11.57 K/UL — HIGH (ref 4.8–10.8)
WBC # FLD AUTO: 11.57 K/UL — HIGH (ref 4.8–10.8)

## 2018-03-22 RX ORDER — OXYCODONE AND ACETAMINOPHEN 5; 325 MG/1; MG/1
1 TABLET ORAL EVERY 6 HOURS
Qty: 0 | Refills: 0 | Status: DISCONTINUED | OUTPATIENT
Start: 2018-03-22 | End: 2018-03-23

## 2018-03-22 RX ORDER — WARFARIN SODIUM 2.5 MG/1
2 TABLET ORAL AT BEDTIME
Qty: 0 | Refills: 0 | Status: COMPLETED | OUTPATIENT
Start: 2018-03-22 | End: 2018-03-22

## 2018-03-22 RX ADMIN — Medication 40 MILLIGRAM(S): at 06:23

## 2018-03-22 RX ADMIN — PANTOPRAZOLE SODIUM 40 MILLIGRAM(S): 20 TABLET, DELAYED RELEASE ORAL at 06:23

## 2018-03-22 RX ADMIN — OXYCODONE AND ACETAMINOPHEN 1 TABLET(S): 5; 325 TABLET ORAL at 22:30

## 2018-03-22 RX ADMIN — ALBUTEROL 2.5 MILLIGRAM(S): 90 AEROSOL, METERED ORAL at 19:53

## 2018-03-22 RX ADMIN — ATORVASTATIN CALCIUM 10 MILLIGRAM(S): 80 TABLET, FILM COATED ORAL at 21:31

## 2018-03-22 RX ADMIN — Medication 50 MILLIGRAM(S): at 06:23

## 2018-03-22 RX ADMIN — OXYCODONE AND ACETAMINOPHEN 1 TABLET(S): 5; 325 TABLET ORAL at 21:30

## 2018-03-22 RX ADMIN — ISOSORBIDE MONONITRATE 30 MILLIGRAM(S): 60 TABLET, EXTENDED RELEASE ORAL at 13:11

## 2018-03-22 RX ADMIN — Medication 50 MILLIGRAM(S): at 17:35

## 2018-03-22 RX ADMIN — ALBUTEROL 2.5 MILLIGRAM(S): 90 AEROSOL, METERED ORAL at 07:54

## 2018-03-22 RX ADMIN — WARFARIN SODIUM 2 MILLIGRAM(S): 2.5 TABLET ORAL at 21:31

## 2018-03-22 NOTE — PROGRESS NOTE ADULT - SUBJECTIVE AND OBJECTIVE BOX
Patient is a 89y old  Male who presents with a chief complaint of s/p fall from standing (20 Mar 2018 13:21)      PAST MEDICAL & SURGICAL HISTORY:  Hyperlipidemia  CHF (congestive heart failure)  Afib  Hypertension  S/P AAA (abdominal aortic aneurysm) repair      MEDICATIONS  (STANDING):  ALBUTerol    0.083% 2.5 milliGRAM(s) Nebulizer every 6 hours  atorvastatin Oral Tab/Cap - Peds 10 milliGRAM(s) Oral at bedtime  furosemide    Tablet 40 milliGRAM(s) Oral daily  isosorbide   mononitrate ER Tablet (IMDUR) 30 milliGRAM(s) Oral daily  metoprolol  tartrate Oral Tab/Cap - Peds 50 milliGRAM(s) Oral two times a day  pantoprazole    Tablet 40 milliGRAM(s) Oral before breakfast    MEDICATIONS  (PRN):  acetaminophen   Tablet. 650 milliGRAM(s) Oral every 6 hours PRN Moderate Pain (4 - 6)  oxyCODONE    5 mG/acetaminophen 325 mG 1 Tablet(s) Oral every 6 hours PRN Severe Pain (7 - 10)      Overnight events:  No acute events overnight    Vital Signs Last 24 Hrs  T(C): 35.7 (22 Mar 2018 07:15), Max: 35.7 (21 Mar 2018 23:28)  T(F): 96.2 (22 Mar 2018 07:15), Max: 96.2 (21 Mar 2018 23:28)  HR: 75 (22 Mar 2018 07:15) (71 - 77)  BP: 129/65 (22 Mar 2018 07:15) (117/75 - 134/70)  BP(mean): --  RR: 18 (22 Mar 2018 07:15) (18 - 18)  SpO2: --  CAPILLARY BLOOD GLUCOSE        I&O's Summary    21 Mar 2018 07:01  -  22 Mar 2018 07:00  --------------------------------------------------------  IN: 410 mL / OUT: 950 mL / NET: -540 mL    22 Mar 2018 07:01  -  22 Mar 2018 13:03  --------------------------------------------------------  IN: 0 mL / OUT: 500 mL / NET: -500 mL        Physical Exam:    -     General : Lying on bed, complains of mild shoulder pain    -      Cardiac: Irregular    -      Pulm: b/l clear    -      GI: soft non tender    -      Musculoskeletal: no edema    -      Neuro: AO x 2, non focal        Labs:                        9.8    11.57 )-----------( 130      ( 22 Mar 2018 06:32 )             31.6             03-22    134<L>  |  98  |  46<H>  ----------------------------<  128<H>  4.3   |  25  |  1.7<H>    Ca    8.5      22 Mar 2018 06:32  Mg     2.0     03-22              PT/INR - ( 22 Mar 2018 06:32 )   PT: 31.00 sec;   INR: 2.81 ratio           CARDIAC MARKERS ( 21 Mar 2018 06:47 )  x     / 0.03 ng/mL / x     / x     / x      CARDIAC MARKERS ( 21 Mar 2018 00:31 )  x     / 0.02 ng/mL / 96 U/L / x     / 2.6 ng/mL  CARDIAC MARKERS ( 20 Mar 2018 16:48 )  x     / 0.01 ng/mL / x     / x     / 3.6 ng/mL            Culture - Blood (collected 21 Mar 2018 06:47)  Source: .Blood None  Preliminary Report (22 Mar 2018 10:01):    No growth to date.    Culture - Blood (collected 20 Mar 2018 16:48)  Source: .Blood None  Preliminary Report (22 Mar 2018 01:02):    No growth to date.        Imaging:  < from: Xray Chest 1 View-PORTABLE IMMEDIATE (03.21.18 @ 15:53) >    No focal consolidation or pleural effusion.   Hypoinflated lungs with mild pulmonary edema.       < end of copied text >    < from: Xray Shoulder 2 Views, Right (03.20.18 @ 13:50) >  ngs/  impression:  Bones are diffusely demineralized. There is a distal clavicular displaced   fracture with displacement of the lateral segment inferiorly. No evidence   of shoulder dislocation. Degenerative changes are noted.        < end of copied text >    < from: Xray Pelvis AP only (03.20.18 @ 09:22) >    The bones are diffusely demineralized. Age-indeterminate left superior   pubic ramus fracture is noted, not present in 2015. No evidence of   dislocation. Generative changes are noted.        < end of copied text >      ECG: Patient is a 89y old  Male who presents with a chief complaint of s/p fall from standing (20 Mar 2018 13:21)      PAST MEDICAL & SURGICAL HISTORY:  Hyperlipidemia  Chronic Systolic CHF (congestive heart failure)  Afib  Hypertension  S/P AAA (abdominal aortic aneurysm) repair      MEDICATIONS  (STANDING):  ALBUTerol    0.083% 2.5 milliGRAM(s) Nebulizer every 6 hours  atorvastatin Oral Tab/Cap - Peds 10 milliGRAM(s) Oral at bedtime  furosemide    Tablet 40 milliGRAM(s) Oral daily  isosorbide   mononitrate ER Tablet (IMDUR) 30 milliGRAM(s) Oral daily  metoprolol  tartrate Oral Tab/Cap - Peds 50 milliGRAM(s) Oral two times a day  pantoprazole    Tablet 40 milliGRAM(s) Oral before breakfast    MEDICATIONS  (PRN):  acetaminophen   Tablet. 650 milliGRAM(s) Oral every 6 hours PRN Moderate Pain (4 - 6)  oxyCODONE    5 mG/acetaminophen 325 mG 1 Tablet(s) Oral every 6 hours PRN Severe Pain (7 - 10)      Overnight events:  No acute events overnight    Vital Signs Last 24 Hrs  T(C): 35.7 (22 Mar 2018 07:15), Max: 35.7 (21 Mar 2018 23:28)  T(F): 96.2 (22 Mar 2018 07:15), Max: 96.2 (21 Mar 2018 23:28)  HR: 75 (22 Mar 2018 07:15) (71 - 77)  BP: 129/65 (22 Mar 2018 07:15) (117/75 - 134/70)  RR: 18 (22 Mar 2018 07:15) (18 - 18)          I&O's Summary    21 Mar 2018 07:01  -  22 Mar 2018 07:00  --------------------------------------------------------  IN: 410 mL / OUT: 950 mL / NET: -540 mL    22 Mar 2018 07:01  -  22 Mar 2018 13:03  --------------------------------------------------------  IN: 0 mL / OUT: 500 mL / NET: -500 mL        Physical Exam:    -     General : Lying on bed, complains of mild shoulder pain    -      Cardiac: Irregular    -      Pulm: b/l clear    -      GI: soft non tender    -      Musculoskeletal: no edema    -      Neuro: AO x 2, non focal        Labs:                        9.8    11.57 )-----------( 130      ( 22 Mar 2018 06:32 )             31.6             03-22    134<L>  |  98  |  46<H>  ----------------------------<  128<H>  4.3   |  25  |  1.7<H>    Ca    8.5      22 Mar 2018 06:32  Mg     2.0     03-22              PT/INR - ( 22 Mar 2018 06:32 )   PT: 31.00 sec;   INR: 2.81 ratio           CARDIAC MARKERS ( 21 Mar 2018 06:47 )  x     / 0.03 ng/mL / x     / x     / x      CARDIAC MARKERS ( 21 Mar 2018 00:31 )  x     / 0.02 ng/mL / 96 U/L / x     / 2.6 ng/mL  CARDIAC MARKERS ( 20 Mar 2018 16:48 )  x     / 0.01 ng/mL / x     / x     / 3.6 ng/mL            Culture - Blood (collected 21 Mar 2018 06:47)  Source: .Blood None  Preliminary Report (22 Mar 2018 10:01):    No growth to date.    Culture - Blood (collected 20 Mar 2018 16:48)  Source: .Blood None  Preliminary Report (22 Mar 2018 01:02):    No growth to date.        Imaging:  < from: Xray Chest 1 View-PORTABLE IMMEDIATE (03.21.18 @ 15:53) >    No focal consolidation or pleural effusion.   Hypoinflated lungs with mild pulmonary edema.       < end of copied text >    < from: Xray Shoulder 2 Views, Right (03.20.18 @ 13:50) >  ngs/  impression:  Bones are diffusely demineralized. There is a distal clavicular displaced   fracture with displacement of the lateral segment inferiorly. No evidence   of shoulder dislocation. Degenerative changes are noted.        < end of copied text >    < from: Xray Pelvis AP only (03.20.18 @ 09:22) >    The bones are diffusely demineralized. Age-indeterminate left superior   pubic ramus fracture is noted, not present in 2015. No evidence of   dislocation. Generative changes are noted.        < end of copied text >      ECG:

## 2018-03-22 NOTE — PROGRESS NOTE ADULT - ASSESSMENT
89 y/m presented with mechanical fall:      # Mechanical Fall:    -2 sets of Cardiac enzymes -ve  - Trauma workup: Right distal clavicle fracture and non displaced left postr 8-10 rib #  - Pelvis Age indeterminate fracture of left pubis fracture  - Ortho consult: non surgical management, sling for clavicle fracture  - Pain control  - PT rehab    # Suspected Acute on Chronic Systolic Congestive heart failure exacerbation:  2D echo: EF 50-55, mild to mod MR, mod TR, mod Pulm HTN  continue with Lasix 40  continue nebs PRN   continue /beta blocker/statin/imdur    # A fib:  f/u inr and dose coumadin    #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 89 y/m presented with mechanical fall:      # Mechanical Fall:    -2 sets of Cardiac enzymes -ve  - Trauma workup: Right distal clavicle fracture and non displaced left postr 8-10 rib #  - Pelvis Age indeterminate fracture of left pubis fracture  - Ortho consult: non surgical management, sling for clavicle fracture  - Pain control  - PT rehab    # Suspected Acute on Chronic Systolic Congestive heart failure exacerbation:  2D echo: EF 50-55, mild to mod MR, mod TR, mod Pulm HTN  continue with Lasix 40, on 2 liters nasal canula, will wean off oxygen  continue nebs PRN   continue /beta blocker/statin/imdur    # A fib:  f/u inr and dose coumadin    #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

## 2018-03-23 LAB
INR BLD: 2.61 RATIO — HIGH (ref 0.65–1.3)
PROTHROM AB SERPL-ACNC: 28.8 SEC — HIGH (ref 9.95–12.87)

## 2018-03-23 RX ORDER — FUROSEMIDE 40 MG
60 TABLET ORAL ONCE
Qty: 0 | Refills: 0 | Status: COMPLETED | OUTPATIENT
Start: 2018-03-23 | End: 2018-03-23

## 2018-03-23 RX ORDER — TRAMADOL HYDROCHLORIDE 50 MG/1
25 TABLET ORAL EVERY 4 HOURS
Qty: 0 | Refills: 0 | Status: DISCONTINUED | OUTPATIENT
Start: 2018-03-23 | End: 2018-03-29

## 2018-03-23 RX ORDER — WARFARIN SODIUM 2.5 MG/1
2 TABLET ORAL AT BEDTIME
Qty: 0 | Refills: 0 | Status: COMPLETED | OUTPATIENT
Start: 2018-03-23 | End: 2018-03-23

## 2018-03-23 RX ORDER — MORPHINE SULFATE 50 MG/1
2 CAPSULE, EXTENDED RELEASE ORAL ONCE
Qty: 0 | Refills: 0 | Status: DISCONTINUED | OUTPATIENT
Start: 2018-03-23 | End: 2018-03-23

## 2018-03-23 RX ORDER — FUROSEMIDE 40 MG
40 TABLET ORAL
Qty: 0 | Refills: 0 | Status: DISCONTINUED | OUTPATIENT
Start: 2018-03-23 | End: 2018-03-24

## 2018-03-23 RX ORDER — METOCLOPRAMIDE HCL 10 MG
5 TABLET ORAL ONCE
Qty: 0 | Refills: 0 | Status: COMPLETED | OUTPATIENT
Start: 2018-03-23 | End: 2018-03-23

## 2018-03-23 RX ORDER — MAGNESIUM OXIDE 400 MG ORAL TABLET 241.3 MG
400 TABLET ORAL
Qty: 0 | Refills: 0 | Status: DISCONTINUED | OUTPATIENT
Start: 2018-03-23 | End: 2018-03-26

## 2018-03-23 RX ORDER — FUROSEMIDE 40 MG
40 TABLET ORAL ONCE
Qty: 0 | Refills: 0 | Status: DISCONTINUED | OUTPATIENT
Start: 2018-03-23 | End: 2018-03-23

## 2018-03-23 RX ORDER — ONDANSETRON 8 MG/1
4 TABLET, FILM COATED ORAL ONCE
Qty: 0 | Refills: 0 | Status: COMPLETED | OUTPATIENT
Start: 2018-03-23 | End: 2018-03-23

## 2018-03-23 RX ADMIN — WARFARIN SODIUM 2 MILLIGRAM(S): 2.5 TABLET ORAL at 22:08

## 2018-03-23 RX ADMIN — Medication 50 MILLIGRAM(S): at 06:24

## 2018-03-23 RX ADMIN — Medication 60 MILLIGRAM(S): at 10:37

## 2018-03-23 RX ADMIN — Medication 50 MILLIGRAM(S): at 19:05

## 2018-03-23 RX ADMIN — PANTOPRAZOLE SODIUM 40 MILLIGRAM(S): 20 TABLET, DELAYED RELEASE ORAL at 06:24

## 2018-03-23 RX ADMIN — Medication 40 MILLIGRAM(S): at 06:24

## 2018-03-23 RX ADMIN — ATORVASTATIN CALCIUM 10 MILLIGRAM(S): 80 TABLET, FILM COATED ORAL at 22:08

## 2018-03-23 RX ADMIN — ALBUTEROL 2.5 MILLIGRAM(S): 90 AEROSOL, METERED ORAL at 08:38

## 2018-03-23 RX ADMIN — MAGNESIUM OXIDE 400 MG ORAL TABLET 400 MILLIGRAM(S): 241.3 TABLET ORAL at 19:05

## 2018-03-23 RX ADMIN — OXYCODONE AND ACETAMINOPHEN 1 TABLET(S): 5; 325 TABLET ORAL at 06:23

## 2018-03-23 RX ADMIN — ONDANSETRON 4 MILLIGRAM(S): 8 TABLET, FILM COATED ORAL at 10:44

## 2018-03-23 RX ADMIN — ISOSORBIDE MONONITRATE 30 MILLIGRAM(S): 60 TABLET, EXTENDED RELEASE ORAL at 12:21

## 2018-03-23 RX ADMIN — Medication 5 MILLIGRAM(S): at 15:49

## 2018-03-23 NOTE — OCCUPATIONAL THERAPY INITIAL EVALUATION ADULT - SPECIFY REASON(S)
as per MARIBELL Soto, pt c/o nausea and has increased BP at this time, hold therapy until pt medically appropriate

## 2018-03-23 NOTE — CONSULT NOTE ADULT - ASSESSMENT
IMPRESSION: Rehab of fall, multitrauma    PRECAUTIONS: [ xx   ] Cardiac  [    ] Respiratory  [    ] Seizures [    ] Contact Isolation  [    ] Droplet Isolation  [    ] Other    Weight Bearing Status: WBAT , Sling RUE for comfort    RECOMMENDATION:    Out of Bed to Chair     DVT/Decubiti Prophylaxis    REHAB PLAN:     [ x    ] Bedside P/T 3-5 times a week   [  x   ] Bedside O/T  2-3 times a week   [     ] No Rehab Therapy Indicated   [     ]  Speech Therapy   Conditioning/ROM                                 ADL  Bed Mobility                                            Conditioning/ROM  Transfers                                                  Bed Mobility  Sitting /Standing Balance                      Transfers                                        Gait Training                                            Sitting/Standing Balance  Stair Training [   ]Applicable                 Home equipment Eval                                                                     Splinting  [   ] Only      GOALS:   ADL   [x    ]   Independent         Transfers  [   x ] Independent            Ambulation  [  x   ] Independent     [   x  ] With device                            [    ]  CG                                               [    ]  CG                                                    [     ] CG                            [    ] Min A                                          [    ] Min A                                                [     ] Min  A          DISCHARGE PLAN:   [  x   ]  Good candidate for Intensive Rehabilitation/Hospital based-[facility]                                             Will tolerate 3hrs Intensive Rehab Daily                                       [      ]  Short Term Rehab in Skilled Nursing Facility                                       [      ]  Home with Outpatient or VN services                                         [      ]  Possible Candidate for Intensive Hospital based Rehab

## 2018-03-23 NOTE — PROGRESS NOTE ADULT - SUBJECTIVE AND OBJECTIVE BOX
Patient is a 89y old  Male who presents with a chief complaint of s/p fall from standing (20 Mar 2018 13:21)      PAST MEDICAL & SURGICAL HISTORY:  Hyperlipidemia  CHF (congestive heart failure)  Afib  Hypertension  S/P AAA (abdominal aortic aneurysm) repair      MEDICATIONS  (STANDING):  ALBUTerol    0.083% 2.5 milliGRAM(s) Nebulizer every 6 hours  atorvastatin Oral Tab/Cap - Peds 10 milliGRAM(s) Oral at bedtime  furosemide    Tablet 40 milliGRAM(s) Oral daily  isosorbide   mononitrate ER Tablet (IMDUR) 30 milliGRAM(s) Oral daily  metoprolol  tartrate Oral Tab/Cap - Peds 50 milliGRAM(s) Oral two times a day  pantoprazole    Tablet 40 milliGRAM(s) Oral before breakfast    MEDICATIONS  (PRN):  acetaminophen   Tablet. 650 milliGRAM(s) Oral every 6 hours PRN Moderate Pain (4 - 6)  traMADol 25 milliGRAM(s) Oral every 4 hours PRN Severe Pain (7 - 10)      Overnight events:  Patient was tachpneic, O2 94/95 on 3 liters, c/o pain right chest. B/l basilar crackles on examination.  Given 60 mg iv lasix    Vital Signs Last 24 Hrs  T(C): 36.1 (23 Mar 2018 09:46), Max: 36.8 (23 Mar 2018 07:10)  T(F): 96.9 (23 Mar 2018 09:46), Max: 98.2 (23 Mar 2018 07:10)  HR: 68 (23 Mar 2018 12:07) (68 - 92)  BP: 144/81 (23 Mar 2018 12:07) (106/64 - 186/78)  BP(mean): --  RR: 18 (23 Mar 2018 12:07) (18 - 20)  SpO2: 94% (23 Mar 2018 12:07) (94% - 95%)  CAPILLARY BLOOD GLUCOSE  182 (23 Mar 2018 09:46)        I&O's Summary    22 Mar 2018 07:01  -  23 Mar 2018 07:00  --------------------------------------------------------  IN: 0 mL / OUT: 800 mL / NET: -800 mL    23 Mar 2018 07:01  -  23 Mar 2018 13:29  --------------------------------------------------------  IN: 0 mL / OUT: 520 mL / NET: -520 mL        Physical Exam:    -     General : sleepy but arousable,     -      Cardiac: Irregular    -      Pulm: b/l basilar crackles    -      GI: soft non tender    -      Musculoskeletal: tenderness on right lateral hip    -      Neuro: AO x 2, non focal        Labs:                        9.8    11.57 )-----------( 130      ( 22 Mar 2018 06:32 )             31.6             03-22    134<L>  |  98  |  46<H>  ----------------------------<  128<H>  4.3   |  25  |  1.7<H>    Ca    8.5      22 Mar 2018 06:32  Mg     2.0     03-22              PT/INR - ( 23 Mar 2018 06:42 )   PT: 28.80 sec;   INR: 2.61 ratio                     Culture - Blood (collected 21 Mar 2018 06:47)  Source: .Blood None  Preliminary Report (22 Mar 2018 10:01):    No growth to date.    Culture - Blood (collected 20 Mar 2018 16:48)  Source: .Blood None  Preliminary Report (22 Mar 2018 01:02):    No growth to date.        Imaging:    ECG:

## 2018-03-23 NOTE — PROGRESS NOTE ADULT - ASSESSMENT
89 y/m presented with mechanical fall:      # Mechanical Fall:    -2 sets of Cardiac enzymes -ve  - Trauma workup: Right distal clavicle fracture and non displaced left postr 8-10 rib #  - Ortho consult: non surgical management, sling for clavicle fracture  - Xray -ve for right lower extremity but still c/o pain and tenderness mainly rt lateral hip, CT scan hip ordered will f/u  - Pain control: Tramadol started  - PT rehab d/t pain not well controlled    # Suspected Acute on Chronic Systolic Congestive heart failure exacerbation:  2D echo: EF 50-55, mild to mod MR, mod TR, mod Pulm HTN  continue /beta blocker/statin/imdur  Today patient was tachypneic and sat. 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 89 y/m presented with mechanical fall:      # Mechanical Fall:    -2 sets of Cardiac enzymes -ve  - Trauma workup: Right distal clavicle fracture and non displaced left postr 8-10 rib #  - Ortho consult: non surgical management, sling for clavicle fracture  - Xray -ve for right lower extremity but still c/o pain and tenderness mainly rt lateral hip, CT scan hip ordered will f/u  - Pain control: Tramadol started  - PT rehab d/t pain not well controlled    # Suspected Acute on Chronic Systolic Congestive heart failure exacerbation:  2D echo: EF 50-55, mild to mod MR, mod TR, mod Pulm HTN  continue /beta blocker/statin/imdur  Today patient was tachypneic and sat. 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

## 2018-03-24 LAB
ANION GAP SERPL CALC-SCNC: 17 MMOL/L — HIGH (ref 7–14)
BUN SERPL-MCNC: 60 MG/DL — HIGH (ref 10–20)
CALCIUM SERPL-MCNC: 8.5 MG/DL — SIGNIFICANT CHANGE UP (ref 8.5–10.1)
CHLORIDE SERPL-SCNC: 96 MMOL/L — LOW (ref 98–110)
CO2 SERPL-SCNC: 25 MMOL/L — SIGNIFICANT CHANGE UP (ref 17–32)
CREAT SERPL-MCNC: 1.8 MG/DL — HIGH (ref 0.7–1.5)
GLUCOSE SERPL-MCNC: 122 MG/DL — HIGH (ref 70–99)
INR BLD: 3.28 RATIO — HIGH (ref 0.65–1.3)
MAGNESIUM SERPL-MCNC: 1.9 MG/DL — SIGNIFICANT CHANGE UP (ref 1.8–2.4)
NT-PROBNP SERPL-SCNC: HIGH PG/ML (ref 0–300)
POTASSIUM SERPL-MCNC: 4.9 MMOL/L — SIGNIFICANT CHANGE UP (ref 3.5–5)
POTASSIUM SERPL-SCNC: 4.9 MMOL/L — SIGNIFICANT CHANGE UP (ref 3.5–5)
PROTHROM AB SERPL-ACNC: 36.3 SEC — HIGH (ref 9.95–12.87)
SODIUM SERPL-SCNC: 138 MMOL/L — SIGNIFICANT CHANGE UP (ref 135–146)

## 2018-03-24 RX ORDER — WARFARIN SODIUM 2.5 MG/1
2 TABLET ORAL AT BEDTIME
Qty: 0 | Refills: 0 | Status: DISCONTINUED | OUTPATIENT
Start: 2018-03-24 | End: 2018-03-24

## 2018-03-24 RX ORDER — FUROSEMIDE 40 MG
40 TABLET ORAL EVERY 24 HOURS
Qty: 0 | Refills: 0 | Status: DISCONTINUED | OUTPATIENT
Start: 2018-03-25 | End: 2018-03-25

## 2018-03-24 RX ADMIN — Medication 50 MILLIGRAM(S): at 06:44

## 2018-03-24 RX ADMIN — ALBUTEROL 2.5 MILLIGRAM(S): 90 AEROSOL, METERED ORAL at 08:58

## 2018-03-24 RX ADMIN — ATORVASTATIN CALCIUM 10 MILLIGRAM(S): 80 TABLET, FILM COATED ORAL at 21:32

## 2018-03-24 RX ADMIN — Medication 40 MILLIGRAM(S): at 06:45

## 2018-03-24 RX ADMIN — PANTOPRAZOLE SODIUM 40 MILLIGRAM(S): 20 TABLET, DELAYED RELEASE ORAL at 06:44

## 2018-03-24 RX ADMIN — Medication 50 MILLIGRAM(S): at 19:28

## 2018-03-24 RX ADMIN — MAGNESIUM OXIDE 400 MG ORAL TABLET 400 MILLIGRAM(S): 241.3 TABLET ORAL at 19:28

## 2018-03-24 RX ADMIN — ISOSORBIDE MONONITRATE 30 MILLIGRAM(S): 60 TABLET, EXTENDED RELEASE ORAL at 11:56

## 2018-03-24 RX ADMIN — Medication 650 MILLIGRAM(S): at 10:51

## 2018-03-24 RX ADMIN — MAGNESIUM OXIDE 400 MG ORAL TABLET 400 MILLIGRAM(S): 241.3 TABLET ORAL at 14:45

## 2018-03-24 RX ADMIN — ALBUTEROL 2.5 MILLIGRAM(S): 90 AEROSOL, METERED ORAL at 14:51

## 2018-03-24 NOTE — PROGRESS NOTE ADULT - SUBJECTIVE AND OBJECTIVE BOX
Patient is a 89y old  Male who presents with a chief complaint of s/p fall from standing (20 Mar 2018 13:21)      PAST MEDICAL & SURGICAL HISTORY:  Hyperlipidemia  CHF (congestive heart failure)  Afib  Hypertension  S/P AAA (abdominal aortic aneurysm) repair      MEDICATIONS  (STANDING):  ALBUTerol    0.083% 2.5 milliGRAM(s) Nebulizer every 6 hours  atorvastatin Oral Tab/Cap - Peds 10 milliGRAM(s) Oral at bedtime  furosemide   Injectable 40 milliGRAM(s) IV Push two times a day  isosorbide   mononitrate ER Tablet (IMDUR) 30 milliGRAM(s) Oral daily  magnesium oxide 400 milliGRAM(s) Oral three times a day with meals  metoprolol  tartrate Oral Tab/Cap - Peds 50 milliGRAM(s) Oral two times a day  pantoprazole    Tablet 40 milliGRAM(s) Oral before breakfast    MEDICATIONS  (PRN):  acetaminophen   Tablet. 650 milliGRAM(s) Oral every 6 hours PRN Moderate Pain (4 - 6)  traMADol 25 milliGRAM(s) Oral every 4 hours PRN Severe Pain (7 - 10)      Overnight events:    Vital Signs Last 24 Hrs  T(C): 36.1 (24 Mar 2018 07:23), Max: 36.8 (23 Mar 2018 23:41)  T(F): 97 (24 Mar 2018 07:23), Max: 98.3 (23 Mar 2018 23:41)  HR: 62 (24 Mar 2018 07:23) (62 - 85)  BP: 109/58 (24 Mar 2018 07:23) (92/55 - 186/78)  BP(mean): --  RR: 18 (24 Mar 2018 07:23) (18 - 20)  SpO2: 94% (23 Mar 2018 12:07) (94% - 95%)  CAPILLARY BLOOD GLUCOSE  182 (23 Mar 2018 09:46)        I&O's Summary    23 Mar 2018 07:01  -  24 Mar 2018 07:00  --------------------------------------------------------  IN: 0 mL / OUT: 1070 mL / NET: -1070 mL        Physical Exam:    -     General :     -      Cardiac:    -      Pulm:    -      GI:    -      Musculoskeletal:    -      Neuro:        Labs:                          PT/INR - ( 23 Mar 2018 06:42 )   PT: 28.80 sec;   INR: 2.61 ratio                       Imaging:    < from: CT Pelvis No Cont (03.23.18 @ 15:48) >    Diffuse osteopenia without definite acute osseous abnormality.    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      < end of copied text >      ECG: Patient is a 89y old  Male who presents with a chief complaint of s/p fall from standing (20 Mar 2018 13:21)      PAST MEDICAL & SURGICAL HISTORY:  Hyperlipidemia  CHF (congestive heart failure)  Afib  Hypertension  S/P AAA (abdominal aortic aneurysm) repair      MEDICATIONS  (STANDING):  ALBUTerol    0.083% 2.5 milliGRAM(s) Nebulizer every 6 hours  atorvastatin Oral Tab/Cap - Peds 10 milliGRAM(s) Oral at bedtime  furosemide   Injectable 40 milliGRAM(s) IV Push two times a day  isosorbide   mononitrate ER Tablet (IMDUR) 30 milliGRAM(s) Oral daily  magnesium oxide 400 milliGRAM(s) Oral three times a day with meals  metoprolol  tartrate Oral Tab/Cap - Peds 50 milliGRAM(s) Oral two times a day  pantoprazole    Tablet 40 milliGRAM(s) Oral before breakfast    MEDICATIONS  (PRN):  acetaminophen   Tablet. 650 milliGRAM(s) Oral every 6 hours PRN Moderate Pain (4 - 6)  traMADol 25 milliGRAM(s) Oral every 4 hours PRN Severe Pain (7 - 10)      Overnight events:  no acute events overnight    Vital Signs Last 24 Hrs  T(C): 36.1 (24 Mar 2018 07:23), Max: 36.8 (23 Mar 2018 23:41)  T(F): 97 (24 Mar 2018 07:23), Max: 98.3 (23 Mar 2018 23:41)  HR: 62 (24 Mar 2018 07:23) (62 - 85)  BP: 109/58 (24 Mar 2018 07:23) (92/55 - 186/78)  BP(mean): --  RR: 18 (24 Mar 2018 07:23) (18 - 20)  SpO2: 94% (23 Mar 2018 12:07) (94% - 95%)  CAPILLARY BLOOD GLUCOSE  182 (23 Mar 2018 09:46)        I&O's Summary    23 Mar 2018 07:01  -  24 Mar 2018 07:00  --------------------------------------------------------  IN: 0 mL / OUT: 1070 mL / NET: -1070 mL        Physical Exam:    -     General : lying in bed, no acute distress    -      Cardiac: Irregular    -      Pulm: b/l clear anteriorly    -      GI: soft non tender    -      Musculoskeletal: no edema    -      Neuro: AO x 3, non focal        Labs:              03-24    138  |  96<L>  |  60<H>  ----------------------------<  122<H>  4.9   |  25  |  1.8<H>    Ca    8.5      24 Mar 2018 09:45  Mg     1.9     03-24                  PT/INR - ( 23 Mar 2018 06:42 )   PT: 28.80 sec;   INR: 2.61 ratio                       Imaging:    < from: CT Pelvis No Cont (03.23.18 @ 15:48) >    Diffuse osteopenia without definite acute osseous abnormality.    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      < end of copied text >      ECG:

## 2018-03-24 NOTE — PROGRESS NOTE ADULT - ATTENDING COMMENTS
Patient seen and examined independently. I agree with the resident's note, physical exam, and plan except as below.    Pt c/o inability to put weight on RLE and difficulty to take a deep breath due to rib pain. Check CT pelvis to r/o pelvic, hip fracture. Incentive spirometer. Pt with atelectasis on CXR and pleural effusions. WOuld increase Lasix. Cont PT. Not sure if a pt a good candidate for long-term COumadin use due to falls.
I agree with the resident's exam/assessment after my independent exam/assessment that I performed earlier this morning with my documented modifications.
I fully agree with the resident's excellent exam/assessment as outlined after my independent exam/assessment.  Patient's priority at this point is physical therapy and ambulation.
agree with above. Encouraged OOB, incentive spirometer. Pt with atelectasis on CXR and pleural effusions. Cont IV Lasix. Cont PT. Not sure if a pt a good candidate for long-term COumadin use due to falls. PT.

## 2018-03-24 NOTE — PROGRESS NOTE ADULT - ASSESSMENT
89 y/m presented with mechanical fall:      # Mechanical Fall:    -2 sets of Cardiac enzymes -ve  - Trauma workup: Right distal clavicle fracture and non displaced left postr 8-10 rib #  - Ortho consult: non surgical management, sling for clavicle fracture  -CT pelvis ordered for hip pain: No fracutes, b/l osteoarthritis of sacroilium  - Pain control  - PT rehab     # Suspected Acute on Chronic Systolic Congestive heart failure exacerbation:  2D echo: EF 50-55, mild to mod MR, mod TR, mod Pulm HTN  continue /beta blocker/statin/imdur  Hypoxia d/t atelactasis from hypoventilation d/t chest pain + mild CHF exacerbation: pain control, incentive spirometry, iv lasix      # A fib:  f/u inr and dose coumadin    #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 89 y/m presented with mechanical fall:      # Mechanical Fall:    -2 sets of Cardiac enzymes -ve  - Trauma workup: Right distal clavicle fracture and non displaced left postr 8-10 rib #  - Ortho consult: non surgical management, sling for clavicle fracture  -CT pelvis ordered for hip pain: No fracutes, b/l osteoarthritis of sacroilium  - Pain control  - PT rehab     # Suspected Acute on Chronic Systolic Congestive heart failure exacerbation:  2D echo: EF 50-55, mild to mod MR, mod TR, mod Pulm HTN  continue /beta blocker/statin/imdur  Hypoxia d/t atelactasis from hypoventilation d/t chest pain + mild CHF exacerbation: pain control, incentive spirometry, iv lasix  Creatinine trending up, will decrease lasix to 40 mg Q24      # A fib:  INR 3.2, will hold coumadin tonight    #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

## 2018-03-25 LAB
ANION GAP SERPL CALC-SCNC: 11 MMOL/L — SIGNIFICANT CHANGE UP (ref 7–14)
BUN SERPL-MCNC: 76 MG/DL — CRITICAL HIGH (ref 10–20)
CALCIUM SERPL-MCNC: 8.5 MG/DL — SIGNIFICANT CHANGE UP (ref 8.5–10.1)
CHLORIDE SERPL-SCNC: 98 MMOL/L — SIGNIFICANT CHANGE UP (ref 98–110)
CO2 SERPL-SCNC: 27 MMOL/L — SIGNIFICANT CHANGE UP (ref 17–32)
CREAT SERPL-MCNC: 1.9 MG/DL — HIGH (ref 0.7–1.5)
GLUCOSE SERPL-MCNC: 135 MG/DL — HIGH (ref 70–99)
INR BLD: 3.26 RATIO — HIGH (ref 0.65–1.3)
MAGNESIUM SERPL-MCNC: 2 MG/DL — SIGNIFICANT CHANGE UP (ref 1.8–2.4)
POTASSIUM SERPL-MCNC: 4.4 MMOL/L — SIGNIFICANT CHANGE UP (ref 3.5–5)
POTASSIUM SERPL-SCNC: 4.4 MMOL/L — SIGNIFICANT CHANGE UP (ref 3.5–5)
PROTHROM AB SERPL-ACNC: 36.1 SEC — HIGH (ref 9.95–12.87)
SODIUM SERPL-SCNC: 136 MMOL/L — SIGNIFICANT CHANGE UP (ref 135–146)

## 2018-03-25 RX ORDER — WARFARIN SODIUM 2.5 MG/1
1 TABLET ORAL ONCE
Qty: 0 | Refills: 0 | Status: COMPLETED | OUTPATIENT
Start: 2018-03-25 | End: 2018-03-25

## 2018-03-25 RX ORDER — SODIUM CHLORIDE 9 MG/ML
500 INJECTION INTRAMUSCULAR; INTRAVENOUS; SUBCUTANEOUS
Qty: 0 | Refills: 0 | Status: DISCONTINUED | OUTPATIENT
Start: 2018-03-25 | End: 2018-03-25

## 2018-03-25 RX ADMIN — ISOSORBIDE MONONITRATE 30 MILLIGRAM(S): 60 TABLET, EXTENDED RELEASE ORAL at 11:43

## 2018-03-25 RX ADMIN — MAGNESIUM OXIDE 400 MG ORAL TABLET 400 MILLIGRAM(S): 241.3 TABLET ORAL at 18:29

## 2018-03-25 RX ADMIN — Medication 650 MILLIGRAM(S): at 00:24

## 2018-03-25 RX ADMIN — Medication 650 MILLIGRAM(S): at 21:50

## 2018-03-25 RX ADMIN — Medication 50 MILLIGRAM(S): at 06:58

## 2018-03-25 RX ADMIN — ATORVASTATIN CALCIUM 10 MILLIGRAM(S): 80 TABLET, FILM COATED ORAL at 21:50

## 2018-03-25 RX ADMIN — Medication 650 MILLIGRAM(S): at 14:46

## 2018-03-25 RX ADMIN — PANTOPRAZOLE SODIUM 40 MILLIGRAM(S): 20 TABLET, DELAYED RELEASE ORAL at 06:58

## 2018-03-25 RX ADMIN — ALBUTEROL 2.5 MILLIGRAM(S): 90 AEROSOL, METERED ORAL at 20:58

## 2018-03-25 RX ADMIN — MAGNESIUM OXIDE 400 MG ORAL TABLET 400 MILLIGRAM(S): 241.3 TABLET ORAL at 11:43

## 2018-03-25 RX ADMIN — ALBUTEROL 2.5 MILLIGRAM(S): 90 AEROSOL, METERED ORAL at 14:44

## 2018-03-25 RX ADMIN — Medication 650 MILLIGRAM(S): at 22:41

## 2018-03-25 NOTE — CONSULT NOTE ADULT - SUBJECTIVE AND OBJECTIVE BOX
Orthopedics Consult Note:    This is a 89y Male who presents to the ED today with c/o right shoulder pain s/p fall at home. Denies any hip / pelvis or lower ext pain.     Past Medical & Surgical History:  CHF RIGHT CLAVICLE FX MULTIPLE RIB FX  ^CHF RIGHT CLAVICLE FX MULTIPLE RIB FX  No pertinent family history in first degree relatives  Handoff  MEWS Score  Hyperlipidemia  CHF (congestive heart failure)  Afib  Hypertension  CHF (congestive heart failure)  S/P AAA (abdominal aortic aneurysm) repair  FALL  90+  Multiple rib fractures  Right clavicle fracture      Allergies:  No Known Allergies      Vital Signs:  T(C): 35.8 (03-20-18 @ 08:34), Max: 35.8 (03-20-18 @ 06:59)  HR: 62 (03-20-18 @ 08:34) (62 - 76)  BP: 143/63 (03-20-18 @ 08:34) (143/63 - 170/85)  RR: 20 (03-20-18 @ 08:34) (20 - 22)  SpO2: 96% (03-20-18 @ 08:34) (93% - 96%)    Labs:                       10.3   15.20 )-----------( 166      ( 20 Mar 2018 08:24 )             33.8   03-20    142  |  103  |  48<H>  ----------------------------<  113<H>  5.1<H>   |  20  |  1.5    Ca    9.0      20 Mar 2018 08:24    TPro  7.0  /  Alb  4.1  /  TBili  0.8  /  DBili  x   /  AST  30  /  ALT  31  /  AlkPhos  120<H>  03-20        X-rays of the right shoulder show a distal clavicle fracture     PE right shoulder   mild ttp over distal clavicle , no ecchymosis, no erythema, skin intact, normothermic. ROM limited  2' pain.     Assessment:  89y Male with a right distal clavicle fracture     Plan:  sling for comfort   Pain control.  follow up as outpatient [phone]
89M s/p fall complaining of RLE and RUE pain. Patient seen and examined by orthopaedics for right distal clavicle fracture on 3/20, treated with a sling. Orthopaedics reconsulted for inability to ambulate and nonsurgical management recommendations. The patient is a household ambulator with a cane who fell on 3/20 sustaining a right distal clavicle fracture, and posterior 8-10th rib fractures. Patient reports his RLE pain has improved since admission. Denies any pain to the extremity, or numbness and tingling. Additionally, the patient reports feeling fatigue and less motivation to ambulate with physical therapy because of it. Otherwise, no active complaints at this time.     PMHx & PSx:  Hyperlipidemia  CHF (congestive heart failure)  Afib  Hypertension  S/P AAA (abdominal aortic aneurysm) repair    ALL :  NKDA    Social Hx:   Non drinker, non smoker     PE :  Right UE  No open skin or wounds, skin tenting  +Ecchmoysis posterior shoulder blade  TTP distal clavicle  NTTP rest of extremity or girdle  AIN PIN U motor intact  SILT R U M Ax  Hand warm and perfused    Bilateral LE:  Stable Pelvis to compression  No open skin or wounds  No pain with log roll or axial loading at the hip joint  NTTP extremity  Knee stable to A/P/V/V stress  Able to straight leg raise bilaterally  EHL TA GS motor intact  SILT distally  2+ PT and DP pulses    Imaging reviewed: Right distal clavicle fracture; no evidence of fracture / dislocation on RLE imaging; bilateral hip OA
Patient is a 89y old  Male who presents with a chief complaint of s/p fall from standing (20 Mar 2018 13:21)    HPI:  89 year male on coumadin for atrial fibrillation, s/p fall from standing in bathroom, landed on right shoulder, -HT, -LOC, c/o right shoulder pain and right leg stiffness. Patient also c/o difficulty breathing (20 Mar 2018 13:21)      PAST MEDICAL & SURGICAL HISTORY:  Hyperlipidemia  CHF (congestive heart failure)  Afib  Hypertension  S/P AAA (abdominal aortic aneurysm) repair      Hospital Course: N/V, SOB-X-rays of the right shoulder show a distal clavicle fracture and  Nondisplaced left posterior 8-10th rib fractures. No pneumothorax.     TODAY'S SUBJECTIVE & REVIEW OF SYMPTOMS:     Constitutional WNL   Cardio WNL   Resp sob   GI WNL  Heme WNL  Endo nausea  Skin WNL  MSK WNL  Neuro WNL  Cognitive WNL  Psych WNL      MEDICATIONS  (STANDING):  ALBUTerol    0.083% 2.5 milliGRAM(s) Nebulizer every 6 hours  atorvastatin Oral Tab/Cap - Peds 10 milliGRAM(s) Oral at bedtime  furosemide    Tablet 40 milliGRAM(s) Oral daily  isosorbide   mononitrate ER Tablet (IMDUR) 30 milliGRAM(s) Oral daily  metoprolol  tartrate Oral Tab/Cap - Peds 50 milliGRAM(s) Oral two times a day  morphine  - Injectable 2 milliGRAM(s) IV Push once  pantoprazole    Tablet 40 milliGRAM(s) Oral before breakfast    MEDICATIONS  (PRN):  acetaminophen   Tablet. 650 milliGRAM(s) Oral every 6 hours PRN Moderate Pain (4 - 6)      FAMILY HISTORY:  No pertinent family history in first degree relatives      Allergies    No Known Allergies    Intolerances        SOCIAL HISTORY:    [    ] Etoh  [    ] Smoking  [    ] Substance abuse     Home Environment:  [    ] Home Alone  [  x  ] Lives with Family  [    ] Home Health Aid    Dwelling:  [    ] Apartment  [ x   ] Private House  [    ] Adult Home  [    ] Skilled Nursing Facility      [    ] Short Term  [    ] Long Term  [    ] Stairs                           [    ] Elevator   FIRST FLOOR APT NO STEPS DAUGHTER UPSTAIRS    FUNCTIONAL STATUS PTA: (Check all that apply)  Ambulation: [  x   ]Independent    [    ] Dependent     [    ] Non-Ambulatory  Assistive Device: [    ] SA Cane  [    ]  Q Cane  [  x  ] Walker  [    ]  Wheelchair  ADL : [x    ] Independent  [    ]  Dependent       Vital Signs Last 24 Hrs  T(C): 36.1 (23 Mar 2018 09:46), Max: 36.8 (23 Mar 2018 07:10)  T(F): 96.9 (23 Mar 2018 09:46), Max: 98.2 (23 Mar 2018 07:10)  HR: 68 (23 Mar 2018 10:32) (68 - 92)  BP: 156/70 (23 Mar 2018 10:32) (106/64 - 186/78)  BP(mean): --  RR: 18 (23 Mar 2018 10:32) (18 - 20)  SpO2: 95% (23 Mar 2018 09:46) (95% - 95%)      PHYSICAL EXAM: Alert & Oriented X3  GENERAL: NAD, well-groomed, well-developed  HEAD:  Atraumatic, Normocephalic  EYES: EOMI, PERRLA, conjunctiva and sclera clear  NECK: Supple, No JVD, Normal thyroid  CHEST/LUNG: Clear to percussion bilaterally; No rales, rhonchi, wheezing, or rubs  HEART: Regular rate and rhythm; No murmurs, rubs, or gallops  ABDOMEN: Soft, Nontender, Nondistended; Bowel sounds present  EXTREMITIES:  2+ Peripheral Pulses, No clubbing, cyanosis, or edema  ecchymoses R shoulder  RLE ext rotated sl flexed in bed    NERVOUS SYSTEM:  Cranial Nerves 2-12 intact [ x   ] Abnormal  [    ]  ROM: WFL all extremities [x    ]  Abnormal [     ]pain R shoulder  Motor Strength: WFL all extremities  [    ]  Abnormal [    ]3-3/5 RUE/RLE  Sensation: intact to light touch [ x   ] Abnormal [    ]    FUNCTIONAL STATUS:  Bed Mobility: [   ]  Independent [    ]  Supervision [   x ]  Needs Assistance [  ]  N/A  Transfers: [    ]  Independent [    ]  Supervision [  x  ]  Needs Assistance [    ]  N/A    Ambulation:  [    ]  Independent [    ]  Supervision [    ]  Needs Assistance [x    ]  N/A   ADL:  [    ]   Independent [x    ] Requires Assistance [    ] N/A       LABS:                        9.8    11.57 )-----------( 130      ( 22 Mar 2018 06:32 )             31.6     03-22    134<L>  |  98  |  46<H>  ----------------------------<  128<H>  4.3   |  25  |  1.7<H>    Ca    8.5      22 Mar 2018 06:32  Mg     2.0     03-22      PT/INR - ( 23 Mar 2018 06:42 )   PT: 28.80 sec;   INR: 2.61 ratio               RADIOLOGY & ADDITIONAL STUDIES:

## 2018-03-25 NOTE — PROGRESS NOTE ADULT - ASSESSMENT
4 y/m presented with mechanical fall:      # Mechanical Fall:  -2 sets of Cardiac enzymes -ve  - Trauma workup: Right distal clavicle fracture and non displaced left postr 8-10 rib #  - Ortho consult: non surgical management, sling for clavicle fracture  -CT pelvis ordered for hip pain: No fracutes, b/l osteoarthritis of sacroilium  - Pain control  - PT rehab     # Suspected Acute on Chronic Systolic Congestive heart failure exacerbation:  2D echo: EF 50-55, mild to mod MR, mod TR, mod Pulm HTN  continue /beta blocker/statin/imdur  Hypoxia d/t atelactasis from hypoventilation d/t rib pain + mild CHF exacerbation: pain control, incentive spirometry,   hold Lasix as Cr up and BP down  Cont OOB and incentive spirometry    # A fib:  INR 3.2, will hold coumadin tonight    #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

## 2018-03-25 NOTE — PROGRESS NOTE ADULT - SUBJECTIVE AND OBJECTIVE BOX
Patient is a 89y old  Male who presents with a chief complaint of s/p fall from standing (20 Mar 2018 13:21)      INTERVAL HPI/OVERNIGHT EVENTS: still rib, shoulder pain but feels better.    HPI:  89 year male on coumadin for atrial fibrillation, s/p fall from standing in bathroom, landed on right shoulder, -HT, -LOC, c/o right shoulder pain and right leg stiffness.       PAST MEDICAL & SURGICAL HISTORY:    MEDICATIONS  (STANDING):  ALBUTerol    0.083% 2.5 milliGRAM(s) Nebulizer every 6 hours  atorvastatin Oral Tab/Cap - Peds 10 milliGRAM(s) Oral at bedtime  isosorbide   mononitrate ER Tablet (IMDUR) 30 milliGRAM(s) Oral daily  magnesium oxide 400 milliGRAM(s) Oral three times a day with meals  metoprolol  tartrate Oral Tab/Cap - Peds 50 milliGRAM(s) Oral two times a day  pantoprazole    Tablet 40 milliGRAM(s) Oral before breakfast  warfarin 1 milliGRAM(s) Oral once    MEDICATIONS  (PRN):  acetaminophen   Tablet. 650 milliGRAM(s) Oral every 6 hours PRN Moderate Pain (4 - 6)  traMADol 25 milliGRAM(s) Oral every 4 hours PRN Severe Pain (7 - 10)    Home Medications:  ALBUTEROL SUL 1.25 MG/3 ML SOL:  (20 Mar 2018 11:45)  ATORVASTATIN 10 MG TABLET:  (20 Mar 2018 11:45)  FUROSEMIDE 20 MG TABLET:  (20 Mar 2018 11:45)  ISOSORBIDE MONONIT ER 30 MG TB:  (20 Mar 2018 11:45)  METOPROLOL TARTRATE 50 MG TAB:  (20 Mar 2018 11:45)  OMEPRAZOLE DR 40 MG CAPSULE:  (20 Mar 2018 11:45)  WARFARIN SODIUM 2 MG TABLET:  (20 Mar 2018 11:45)      Vital Signs Last 24 Hrs  T(C): 35.6 (25 Mar 2018 07:31), Max: 35.9 (24 Mar 2018 23:09)  T(F): 96 (25 Mar 2018 07:31), Max: 96.6 (24 Mar 2018 23:09)  HR: 80 (25 Mar 2018 12:53) (71 - 83)  BP: 86/64 (25 Mar 2018 12:53) (86/64 - 116/57)  BP(mean): --  RR: 18 (25 Mar 2018 07:31) (18 - 18)  SpO2: 95% (25 Mar 2018 12:55) (89% - 95%)  CAPILLARY BLOOD GLUCOSE          General: NAD, AAO2  CV: S1 S2  Pulm: decreased breath sounds at base  Abd: NT/ND/S +BS  EXT: no clubbing/cyanosis/edema  Neuro: LANE    LABS:    03-25    136  |  98  |  76<HH>  ----------------------------<  135<H>  4.4   |  27  |  1.9<H>    Ca    8.5      25 Mar 2018 11:21  Mg     2.0     03-25            PT/INR - ( 25 Mar 2018 11:21 )   PT: 36.10 sec;   INR: 3.26 ratio                         Consultant(s) Notes Reviewed:  [x ] YES  [ ] NO    Care Discussed with Consultants/Other Providers/ Housestaff [ x] YES  [ ] NO

## 2018-03-25 NOTE — PHYSICAL THERAPY INITIAL EVALUATION ADULT - GENERAL OBSERVATIONS, REHAB EVAL
[id number] pm. RT hip X-ray "-". previously independent patient (amb with a SC) presents with RT clavicular and ribs 8-10 fx s/p a fall. [place]; patient received in bed, left in b/s chair, nad, + RT UE in sling/elevated on a pillow.

## 2018-03-25 NOTE — CONSULT NOTE ADULT - ASSESSMENT
89M with right clavicle fracture, and inability to ambulate:  - No acute orthopaedic interventions at this time  - Continue sling for comfort AAYUSH DIMAS  - WBAT B/L LE  - Continue with physiatry and physical therapy; dispo per physiatry to acute rehab  - Encourage patient to ambulate with assisted device and therapy  - Please re-consult if any changes occur in the patient's status or hospital course 89M with right clavicle fracture, and inability to ambulate:  - No acute orthopaedic interventions at this time  - Continue sling for comfort AAYUSH DIMAS  - WBAT B/L LE  - Continue with physiatry and physical therapy; dispo per physiatry to acute rehab  - Encourage patient to ambulate with assisted device and therapy  - Please re-consult if any changes occur in the patient's status or hospital course    pt seen and examined  agree with plan

## 2018-03-26 ENCOUNTER — TRANSCRIPTION ENCOUNTER (OUTPATIENT)
Age: 83
End: 2018-03-26

## 2018-03-26 LAB
ANION GAP SERPL CALC-SCNC: 11 MMOL/L — SIGNIFICANT CHANGE UP (ref 7–14)
APTT BLD: 34.2 SEC — SIGNIFICANT CHANGE UP (ref 27–39.2)
BASOPHILS # BLD AUTO: 0.04 K/UL — SIGNIFICANT CHANGE UP (ref 0–0.2)
BASOPHILS NFR BLD AUTO: 0.5 % — SIGNIFICANT CHANGE UP (ref 0–1)
BUN SERPL-MCNC: 83 MG/DL — CRITICAL HIGH (ref 10–20)
CALCIUM SERPL-MCNC: 8.6 MG/DL — SIGNIFICANT CHANGE UP (ref 8.5–10.1)
CHLORIDE SERPL-SCNC: 99 MMOL/L — SIGNIFICANT CHANGE UP (ref 98–110)
CO2 SERPL-SCNC: 28 MMOL/L — SIGNIFICANT CHANGE UP (ref 17–32)
CREAT SERPL-MCNC: 1.8 MG/DL — HIGH (ref 0.7–1.5)
CULTURE RESULTS: SIGNIFICANT CHANGE UP
CULTURE RESULTS: SIGNIFICANT CHANGE UP
EOSINOPHIL # BLD AUTO: 0.18 K/UL — SIGNIFICANT CHANGE UP (ref 0–0.7)
EOSINOPHIL NFR BLD AUTO: 2.4 % — SIGNIFICANT CHANGE UP (ref 0–8)
GLUCOSE SERPL-MCNC: 100 MG/DL — HIGH (ref 70–99)
HCT VFR BLD CALC: 30.3 % — LOW (ref 42–52)
HGB BLD-MCNC: 9.2 G/DL — LOW (ref 14–18)
IMM GRANULOCYTES NFR BLD AUTO: 0.4 % — HIGH (ref 0.1–0.3)
INR BLD: 2.67 RATIO — HIGH (ref 0.65–1.3)
LYMPHOCYTES # BLD AUTO: 1.57 K/UL — SIGNIFICANT CHANGE UP (ref 1.2–3.4)
LYMPHOCYTES # BLD AUTO: 20.5 % — SIGNIFICANT CHANGE UP (ref 20.5–51.1)
MAGNESIUM SERPL-MCNC: 2.1 MG/DL — SIGNIFICANT CHANGE UP (ref 1.8–2.4)
MCHC RBC-ENTMCNC: 18.7 PG — LOW (ref 27–31)
MCHC RBC-ENTMCNC: 30.4 G/DL — LOW (ref 32–37)
MCV RBC AUTO: 61.5 FL — LOW (ref 80–94)
MONOCYTES # BLD AUTO: 1.07 K/UL — HIGH (ref 0.1–0.6)
MONOCYTES NFR BLD AUTO: 14 % — HIGH (ref 1.7–9.3)
NEUTROPHILS # BLD AUTO: 4.76 K/UL — SIGNIFICANT CHANGE UP (ref 1.4–6.5)
NEUTROPHILS NFR BLD AUTO: 62.2 % — SIGNIFICANT CHANGE UP (ref 42.2–75.2)
NRBC # BLD: 0 /100 WBCS — SIGNIFICANT CHANGE UP (ref 0–0)
PLATELET # BLD AUTO: 177 K/UL — SIGNIFICANT CHANGE UP (ref 130–400)
POTASSIUM SERPL-MCNC: 5 MMOL/L — SIGNIFICANT CHANGE UP (ref 3.5–5)
POTASSIUM SERPL-SCNC: 5 MMOL/L — SIGNIFICANT CHANGE UP (ref 3.5–5)
PROTHROM AB SERPL-ACNC: 29.4 SEC — HIGH (ref 9.95–12.87)
RBC # BLD: 4.93 M/UL — SIGNIFICANT CHANGE UP (ref 4.7–6.1)
RBC # FLD: 16.7 % — HIGH (ref 11.5–14.5)
SODIUM SERPL-SCNC: 138 MMOL/L — SIGNIFICANT CHANGE UP (ref 135–146)
SPECIMEN SOURCE: SIGNIFICANT CHANGE UP
SPECIMEN SOURCE: SIGNIFICANT CHANGE UP
WBC # BLD: 7.65 K/UL — SIGNIFICANT CHANGE UP (ref 4.8–10.8)
WBC # FLD AUTO: 7.65 K/UL — SIGNIFICANT CHANGE UP (ref 4.8–10.8)

## 2018-03-26 RX ORDER — TRAMADOL HYDROCHLORIDE 50 MG/1
0.5 TABLET ORAL
Qty: 0 | Refills: 0 | COMMUNITY
Start: 2018-03-26

## 2018-03-26 RX ORDER — ISOSORBIDE MONONITRATE 60 MG/1
1 TABLET, EXTENDED RELEASE ORAL
Qty: 0 | Refills: 0 | COMMUNITY
Start: 2018-03-26

## 2018-03-26 RX ORDER — METOPROLOL TARTRATE 50 MG
0 TABLET ORAL
Qty: 180 | Refills: 0 | COMMUNITY

## 2018-03-26 RX ORDER — ACETAMINOPHEN 500 MG
2 TABLET ORAL
Qty: 0 | Refills: 0 | DISCHARGE
Start: 2018-03-26

## 2018-03-26 RX ORDER — WARFARIN SODIUM 2.5 MG/1
2 TABLET ORAL ONCE
Qty: 0 | Refills: 0 | Status: COMPLETED | OUTPATIENT
Start: 2018-03-26 | End: 2018-03-26

## 2018-03-26 RX ORDER — ACETAMINOPHEN 500 MG
2 TABLET ORAL
Qty: 0 | Refills: 0 | COMMUNITY
Start: 2018-03-26

## 2018-03-26 RX ORDER — METOPROLOL TARTRATE 50 MG
1 TABLET ORAL
Qty: 0 | Refills: 0 | COMMUNITY
Start: 2018-03-26

## 2018-03-26 RX ORDER — OMEPRAZOLE 10 MG/1
0 CAPSULE, DELAYED RELEASE ORAL
Qty: 30 | Refills: 0 | COMMUNITY

## 2018-03-26 RX ORDER — ATORVASTATIN CALCIUM 80 MG/1
1 TABLET, FILM COATED ORAL
Qty: 0 | Refills: 0 | COMMUNITY
Start: 2018-03-26

## 2018-03-26 RX ORDER — ISOSORBIDE MONONITRATE 60 MG/1
0 TABLET, EXTENDED RELEASE ORAL
Qty: 90 | Refills: 0 | COMMUNITY

## 2018-03-26 RX ORDER — METOPROLOL TARTRATE 50 MG
25 TABLET ORAL ONCE
Qty: 0 | Refills: 0 | Status: COMPLETED | OUTPATIENT
Start: 2018-03-26 | End: 2018-03-26

## 2018-03-26 RX ORDER — FUROSEMIDE 40 MG
0 TABLET ORAL
Qty: 30 | Refills: 0 | COMMUNITY

## 2018-03-26 RX ORDER — ATORVASTATIN CALCIUM 80 MG/1
0 TABLET, FILM COATED ORAL
Qty: 90 | Refills: 0 | COMMUNITY

## 2018-03-26 RX ORDER — WARFARIN SODIUM 2.5 MG/1
0 TABLET ORAL
Qty: 90 | Refills: 0 | COMMUNITY

## 2018-03-26 RX ORDER — ALBUTEROL 90 UG/1
0 AEROSOL, METERED ORAL
Qty: 75 | Refills: 0 | COMMUNITY

## 2018-03-26 RX ADMIN — Medication 50 MILLIGRAM(S): at 06:36

## 2018-03-26 RX ADMIN — MAGNESIUM OXIDE 400 MG ORAL TABLET 400 MILLIGRAM(S): 241.3 TABLET ORAL at 08:15

## 2018-03-26 RX ADMIN — Medication 25 MILLIGRAM(S): at 17:34

## 2018-03-26 RX ADMIN — PANTOPRAZOLE SODIUM 40 MILLIGRAM(S): 20 TABLET, DELAYED RELEASE ORAL at 06:36

## 2018-03-26 RX ADMIN — ALBUTEROL 2.5 MILLIGRAM(S): 90 AEROSOL, METERED ORAL at 13:48

## 2018-03-26 RX ADMIN — WARFARIN SODIUM 2 MILLIGRAM(S): 2.5 TABLET ORAL at 22:15

## 2018-03-26 RX ADMIN — MAGNESIUM OXIDE 400 MG ORAL TABLET 400 MILLIGRAM(S): 241.3 TABLET ORAL at 13:58

## 2018-03-26 RX ADMIN — ALBUTEROL 2.5 MILLIGRAM(S): 90 AEROSOL, METERED ORAL at 09:04

## 2018-03-26 RX ADMIN — ALBUTEROL 2.5 MILLIGRAM(S): 90 AEROSOL, METERED ORAL at 19:48

## 2018-03-26 RX ADMIN — ATORVASTATIN CALCIUM 10 MILLIGRAM(S): 80 TABLET, FILM COATED ORAL at 22:15

## 2018-03-26 NOTE — DISCHARGE NOTE ADULT - PLAN OF CARE
Resolution of symptoms and continued healing Continue pain management  Please follow up with orthopedics within one week of discharge  Continue physical therapy - Weight Bearing as tolerated to bilateral Lower Extremities. Non-weight bearing to Right Upper Extremity and continue sling for comfort. Continued heart rate control Continue metoprolol as prescribed Continue cholesterol control Continue statin Maintain fluid balance Continue furosemide as prescribed Please follow up with primary care doctor within one week of discharge  Continue metoprolol as prescribed  Continue coumadin as prescribed, continue to monitor INR and dose to keep INR 2-3.

## 2018-03-26 NOTE — PROGRESS NOTE ADULT - ASSESSMENT
89 yo M presented with mechanical fall:    IMPRESSION:  # Mechanical Fall:  2 sets of Cardiac enzymes -ve  Trauma workup: Right distal clavicle fracture and non displaced left postr 8-10 rib #  Ortho consult: non surgical management, sling for clavicle fracture  CT pelvis ordered for hip pain: No fractures b/l osteoarthritis of sacroiliacum  Pain control  PT rehab     # Suspected Acute on Chronic Systolic Congestive heart failure exacerbation:  2D echo: EF 50-55, mild to mod MR, mod TR, mod Pulm HTN  continue /beta blocker/statin/imdur  Hypoxia d/t atelactasis from hypoventilation d/t rib pain + mild CHF exacerbation: pain control, incentive spirometry,   hold Lasix as Cr up and BP down  Cont OOB and incentive spirometry    # A fib:  Monitor INR, dose coumadin if within therapeutic range (INR 2-3)    #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 WBAT to b/l LE  f/u as outpatient with Dr. Jose  CT pelvis ordered for hip pain: No fractures b/l osteoarthritis of sacroiliacum  Pain control  PT rehab daily  Physiatry recommended 4A placement    # Suspected Acute on Chronic Systolic Congestive heart failure exacerbation:  2D echo: EF 50-55, mild to mod MR, mod TR, mod Pulm HTN  continue /beta blocker/statin/imdur  Hypoxia d/t atelactasis from hypoventilation d/t rib pain + mild CHF exacerbation: pain control, incentive spirometry,   hold Lasix as Cr up and BP down  Cont OOB and incentive spirometry    # A fib:  Monitor INR, dose coumadin if within therapeutic range (INR 2-3)  Coumadin held yesterday because patient was supratherapeutic    #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 WBAT to b/l LE  f/u as outpatient with Dr. Jose  CT pelvis ordered for hip pain: No fractures b/l osteoarthritis of sacroiliacum  Pain control  PT rehab daily  Physiatry recommended 4A placement    # Suspected Acute on Chronic Systolic Congestive heart failure exacerbation:  2D echo: EF 50-55, mild to mod MR, mod TR, mod Pulm HTN  continue /beta blocker/statin/imdur  Hypoxia d/t atelactasis from hypoventilation d/t rib pain + mild CHF exacerbation: pain control, incentive spirometry,   hold Lasix as Cr up and BP down  Cont OOB and incentive spirometry    # A fib:  Monitor INR, dose coumadin if within therapeutic range (INR 2-3)  Coumadin held yesterday because patient was supratherapeutic  Therapeutic today. 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

## 2018-03-26 NOTE — DISCHARGE NOTE ADULT - CARE PROVIDERS DIRECT ADDRESSES
[email or web address]

## 2018-03-26 NOTE — PROGRESS NOTE ADULT - ASSESSMENT
89 y/m presented with mechanical fall:      # Mechanical Fall:  -2 sets of Cardiac enzymes -ve  - Trauma workup: Right distal clavicle fracture and non displaced left postr 8-10 rib #  - Ortho consult: non surgical management, sling for clavicle fracture  -CT pelvis ordered for hip pain: No fracutes, b/l osteoarthritis of sacroilium  - Pain control  - PT rehab     # Suspected Acute on Chronic Systolic Congestive heart failure exacerbation:  2D echo: EF 50-55, mild to mod MR, mod TR, mod Pulm HTN  continue /beta blocker/statin/imdur  Hypoxia d/t atelactasis from hypoventilation d/t rib pain + mild CHF exacerbation: pain control, incentive spirometry,   restart PO Lasix  Cont OOB and incentive spirometry    # A fib:  restart coumadin tonight    #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

## 2018-03-26 NOTE — DISCHARGE NOTE ADULT - MEDICATION SUMMARY - MEDICATIONS TO TAKE
I will START or STAY ON the medications listed below when I get home from the hospital:    traMADol 50 mg oral tablet  -- 0.5 tab(s) by mouth every 4 hours, As needed, Severe Pain (7 - 10)  -- Indication: For Pain    acetaminophen 325 mg oral tablet  -- 2 tab(s) by mouth every 6 hours, As needed, Moderate Pain (4 - 6)  -- Indication: For Pain    isosorbide mononitrate 30 mg oral tablet, extended release  -- 1 tab(s) by mouth once a day  -- Indication: For Chest Pain    WARFARIN SODIUM 2 MG TABLET  -- orally once a day  -- Indication: For Anticoagulation    Lipitor 10 mg oral tablet  -- 1 tab(s) by mouth once a day (at bedtime)  -- Indication: For Hyperlipidemia    metoprolol tartrate 50 mg oral tablet  -- 1 tab(s) by mouth 2 times a day  -- Indication: For Hypertension and Atrial Fibrillation    ALBUTEROL SUL 1.25 MG/3 ML SOL  -- inhaled every 4 hours, As Needed  -- Indication: For Shortness of breath    FUROSEMIDE 20 MG TABLET  -- orally once a day  -- Indication: For CHF (congestive heart failure)    OMEPRAZOLE DR 40 MG CAPSULE  -- orally once a day  -- Indication: For GERD I will START or STAY ON the medications listed below when I get home from the hospital:    traMADol 50 mg oral tablet  -- 0.5 tab(s) by mouth every 4 hours, As needed, Severe Pain (7 - 10)  -- Indication: For Pain    acetaminophen 325 mg oral tablet  -- 2 tab(s) by mouth every 6 hours, As needed, Moderate Pain (4 - 6)  -- Indication: For Pain    isosorbide mononitrate 30 mg oral tablet, extended release  -- 1 tab(s) by mouth once a day  -- Indication: For Chest Pain    WARFARIN SODIUM 2 MG TABLET  -- orally once a day  -- Indication: For Anticoagulation    Lipitor 10 mg oral tablet  -- 1 tab(s) by mouth once a day (at bedtime)  -- Indication: For Hyperlipidemia    metoprolol tartrate 50 mg oral tablet  -- 1 tab(s) by mouth 2 times a day  -- Indication: For Hypertension and Atrial Fibrillation    ALBUTEROL SUL 1.25 MG/3 ML SOL  -- inhaled every 4 hours, As Needed  -- Indication: For Shortness of breath    furosemide 20 mg oral tablet  -- 1 tab(s) by mouth once a day  -- Indication: For CHF (congestive heart failure)    OMEPRAZOLE DR 40 MG CAPSULE  -- orally once a day  -- Indication: For GERD I will START or STAY ON the medications listed below when I get home from the hospital:    acetaminophen 325 mg oral tablet  -- 2 tab(s) by mouth every 6 hours, As needed, Moderate Pain (4 - 6)  -- Indication: For Pain    traMADol 50 mg oral tablet  -- 0.5 tab(s) by mouth every 8 hours, As Needed - 10)  -- Indication: For pain    isosorbide mononitrate 30 mg oral tablet, extended release  -- 1 tab(s) by mouth once a day  -- Indication: For Chest Pain    WARFARIN SODIUM 2 MG TABLET  -- orally once a day  -- Indication: For Anticoagulation    Lipitor 10 mg oral tablet  -- 1 tab(s) by mouth once a day (at bedtime)  -- Indication: For Hyperlipidemia    metoprolol tartrate 50 mg oral tablet  -- 1 tab(s) by mouth 2 times a day  -- Indication: For Hypertension and Atrial Fibrillation    ALBUTEROL SUL 1.25 MG/3 ML SOL  -- inhaled every 4 hours, As Needed  -- Indication: For Shortness of breath    furosemide 20 mg oral tablet  -- 1 tab(s) by mouth once a day  -- Indication: For CHF (congestive heart failure)    OMEPRAZOLE DR 40 MG CAPSULE  -- orally once a day  -- Indication: For GERD

## 2018-03-26 NOTE — DISCHARGE NOTE ADULT - SECONDARY DIAGNOSIS.
Atrial fibrillation, unspecified type Hyperlipidemia, unspecified hyperlipidemia type Congestive heart failure, unspecified chronicity, unspecified heart failure type

## 2018-03-26 NOTE — DISCHARGE NOTE ADULT - CARE PLAN
Principal Discharge DX:	Open fracture of multiple ribs, unspecified laterality, sequela  Goal:	Resolution of symptoms and continued healing  Assessment and plan of treatment:	Continue pain management  Please follow up with orthopedics within one week of discharge  Continue physical therapy - Weight Bearing as tolerated to bilateral Lower Extremities. Non-weight bearing to Right Upper Extremity and continue sling for comfort.  Secondary Diagnosis:	Atrial fibrillation, unspecified type  Goal:	Continued heart rate control  Assessment and plan of treatment:	Continue metoprolol as prescribed  Secondary Diagnosis:	Hyperlipidemia, unspecified hyperlipidemia type  Goal:	Continue cholesterol control  Assessment and plan of treatment:	Continue statin  Secondary Diagnosis:	Congestive heart failure, unspecified chronicity, unspecified heart failure type  Goal:	Maintain fluid balance  Assessment and plan of treatment:	Continue furosemide as prescribed Principal Discharge DX:	Open fracture of multiple ribs, unspecified laterality, sequela  Goal:	Resolution of symptoms and continued healing  Assessment and plan of treatment:	Continue pain management  Please follow up with orthopedics within one week of discharge  Continue physical therapy - Weight Bearing as tolerated to bilateral Lower Extremities. Non-weight bearing to Right Upper Extremity and continue sling for comfort.  Secondary Diagnosis:	Atrial fibrillation, unspecified type  Goal:	Continued heart rate control  Assessment and plan of treatment:	Please follow up with primary care doctor within one week of discharge  Continue metoprolol as prescribed  Continue coumadin as prescribed, continue to monitor INR and dose to keep INR 2-3.  Secondary Diagnosis:	Hyperlipidemia, unspecified hyperlipidemia type  Goal:	Continue cholesterol control  Assessment and plan of treatment:	Continue statin  Secondary Diagnosis:	Congestive heart failure, unspecified chronicity, unspecified heart failure type  Goal:	Maintain fluid balance  Assessment and plan of treatment:	Continue furosemide as prescribed

## 2018-03-26 NOTE — DISCHARGE NOTE ADULT - CARE PROVIDER_API CALL
Jeremy Jose), Orthopaedic Surgery  [address] [place] [address]  Phone: [phone]  Fax: [phone]    Cassidy Breen), Internal Medicine  [address] [place] [address]  Phone: [phone]  Fax: [phone]

## 2018-03-26 NOTE — PROGRESS NOTE ADULT - SUBJECTIVE AND OBJECTIVE BOX
SUBJECTIVE:Patient is a 89y old  Male who presents with a chief complaint of s/p fall from standing (20 Mar 2018 13:21)  . Today is hospital day 6d     PAST MEDICAL & SURGICAL HISTORY  PAST MEDICAL & SURGICAL HISTORY:  Hyperlipidemia  CHF (congestive heart failure)  Afib  Hypertension  S/P AAA (abdominal aortic aneurysm) repair    SOCIAL HISTORY:    ALLERGIES:  No Known Allergies    MEDICATIONS:  STANDING MEDICATIONS  ALBUTerol    0.083% 2.5 milliGRAM(s) Nebulizer every 6 hours  atorvastatin Oral Tab/Cap - Peds 10 milliGRAM(s) Oral at bedtime  isosorbide   mononitrate ER Tablet (IMDUR) 30 milliGRAM(s) Oral daily  magnesium oxide 400 milliGRAM(s) Oral three times a day with meals  metoprolol  tartrate Oral Tab/Cap - Peds 50 milliGRAM(s) Oral two times a day  pantoprazole    Tablet 40 milliGRAM(s) Oral before breakfast    PRN MEDICATIONS  acetaminophen   Tablet. 650 milliGRAM(s) Oral every 6 hours PRN  traMADol 25 milliGRAM(s) Oral every 4 hours PRN    VITALS:   T(F): 97.8  HR: 79  BP: 130/60  RR: 20  SpO2: 95%    LABS:    03-25    136  |  98  |  76<HH>  ----------------------------<  135<H>  4.4   |  27  |  1.9<H>    Ca    8.5      25 Mar 2018 11:21  Mg     2.0     03-25      PT/INR - ( 25 Mar 2018 11:21 )   PT: 36.10 sec;   INR: 3.26 ratio      PHYSICAL EXAM:  GEN:   LUNGS: Clear to auscultation bilaterally   HEART: S1/S2 present. RRR.   ABD: Soft, non-tender, non-distended. Bowel sounds present  EXT: SUBJECTIVE:Patient is a 89y old  Male who presents with a chief complaint of s/p fall from standing (20 Mar 2018 13:21)  Today is hospital day 6d     PAST MEDICAL & SURGICAL HISTORY  PAST MEDICAL & SURGICAL HISTORY:  Hyperlipidemia  CHF (congestive heart failure)  Afib  Hypertension  S/P AAA (abdominal aortic aneurysm) repair    ALLERGIES:  No Known Allergies    MEDICATIONS:  STANDING MEDICATIONS  ALBUTerol    0.083% 2.5 milliGRAM(s) Nebulizer every 6 hours  atorvastatin Oral Tab/Cap - Peds 10 milliGRAM(s) Oral at bedtime  isosorbide   mononitrate ER Tablet (IMDUR) 30 milliGRAM(s) Oral daily  magnesium oxide 400 milliGRAM(s) Oral three times a day with meals  metoprolol  tartrate Oral Tab/Cap - Peds 50 milliGRAM(s) Oral two times a day  pantoprazole    Tablet 40 milliGRAM(s) Oral before breakfast    PRN MEDICATIONS  acetaminophen   Tablet. 650 milliGRAM(s) Oral every 6 hours PRN  traMADol 25 milliGRAM(s) Oral every 4 hours PRN    VITALS:   T(F): 97.8  HR: 79  BP: 130/60  RR: 20  SpO2: 95%    LABS:    03-25    136  |  98  |  76<HH>  ----------------------------<  135<H>  4.4   |  27  |  1.9<H>    Ca    8.5      25 Mar 2018 11:21  Mg     2.0     03-25    PT/INR - ( 25 Mar 2018 11:21 )   PT: 36.10 sec;   INR: 3.26 ratio      PHYSICAL EXAM:  GEN:   LUNGS: Clear to auscultation bilaterally   HEART: S1/S2 present. RRR.   ABD: Soft, non-tender, non-distended. Bowel sounds present  EXT: SUBJECTIVE:Patient is a 89y old  Male who presents with a chief complaint of s/p fall from standing (20 Mar 2018 13:21)  Today is hospital day 6d.     PAST MEDICAL & SURGICAL HISTORY  PAST MEDICAL & SURGICAL HISTORY:  Hyperlipidemia  CHF (congestive heart failure)  Afib  Hypertension  S/P AAA (abdominal aortic aneurysm) repair    ALLERGIES:  No Known Allergies    MEDICATIONS:  STANDING MEDICATIONS  ALBUTerol    0.083% 2.5 milliGRAM(s) Nebulizer every 6 hours  atorvastatin Oral Tab/Cap - Peds 10 milliGRAM(s) Oral at bedtime  isosorbide   mononitrate ER Tablet (IMDUR) 30 milliGRAM(s) Oral daily  magnesium oxide 400 milliGRAM(s) Oral three times a day with meals  metoprolol  tartrate Oral Tab/Cap - Peds 50 milliGRAM(s) Oral two times a day  pantoprazole    Tablet 40 milliGRAM(s) Oral before breakfast    PRN MEDICATIONS  acetaminophen   Tablet. 650 milliGRAM(s) Oral every 6 hours PRN  traMADol 25 milliGRAM(s) Oral every 4 hours PRN    VITALS:   T(F): 97.8  HR: 79  BP: 130/60  RR: 20  SpO2: 95%    LABS:    03-25    136  |  98  |  76<HH>  ----------------------------<  135<H>  4.4   |  27  |  1.9<H>    Ca    8.5      25 Mar 2018 11:21  Mg     2.0     03-25    PT/INR - ( 25 Mar 2018 11:21 )   PT: 36.10 sec;   INR: 3.26 ratio      PHYSICAL EXAM:  GEN: NAD  LUNGS: Clear to auscultation bilaterally   HEART: S1/S2 present. RRR.   ABD: Soft, non-tender, non-distended. Bowel sounds present  EXT: no b/l LE edema SUBJECTIVE:Patient is a 89y old  Male who presents with a chief complaint of s/p fall from standing (20 Mar 2018 13:21)  Today is hospital day 6d. Patient seen and examined. NAD. Pending placement    PAST MEDICAL & SURGICAL HISTORY  PAST MEDICAL & SURGICAL HISTORY:  Hyperlipidemia  CHF (congestive heart failure)  Afib  Hypertension  S/P AAA (abdominal aortic aneurysm) repair    ALLERGIES:  No Known Allergies    MEDICATIONS:  STANDING MEDICATIONS  ALBUTerol    0.083% 2.5 milliGRAM(s) Nebulizer every 6 hours  atorvastatin Oral Tab/Cap - Peds 10 milliGRAM(s) Oral at bedtime  isosorbide   mononitrate ER Tablet (IMDUR) 30 milliGRAM(s) Oral daily  magnesium oxide 400 milliGRAM(s) Oral three times a day with meals  metoprolol  tartrate Oral Tab/Cap - Peds 50 milliGRAM(s) Oral two times a day  pantoprazole    Tablet 40 milliGRAM(s) Oral before breakfast    PRN MEDICATIONS  acetaminophen   Tablet. 650 milliGRAM(s) Oral every 6 hours PRN  traMADol 25 milliGRAM(s) Oral every 4 hours PRN    VITALS:   T(F): 97.8  HR: 79  BP: 130/60  RR: 20  SpO2: 95%    LABS:    03-25    136  |  98  |  76<HH>  ----------------------------<  135<H>  4.4   |  27  |  1.9<H>    Ca    8.5      25 Mar 2018 11:21  Mg     2.0     03-25    PT/INR - ( 25 Mar 2018 11:21 )   PT: 36.10 sec;   INR: 3.26 ratio      PHYSICAL EXAM:  GEN: NAD  LUNGS: Clear to auscultation bilaterally   HEART: S1/S2 present. RRR.   ABD: Soft, non-tender, non-distended. Bowel sounds present  EXT: no b/l LE edema

## 2018-03-26 NOTE — PROGRESS NOTE ADULT - SUBJECTIVE AND OBJECTIVE BOX
Patient is a 89y old  Male who presents with a chief complaint of s/p fall from standing (20 Mar 2018 13:21)      INTERVAL HPI/OVERNIGHT EVENTS: still rib, shoulder pain but feels better. No other complaints    HPI:  89 year male on coumadin for atrial fibrillation, s/p fall from standing in bathroom, landed on right shoulder, -HT, -LOC, c/o right shoulder pain and right leg stiffness.       PAST MEDICAL & SURGICAL HISTORY:    MEDICATIONS  (STANDING):  ALBUTerol    0.083% 2.5 milliGRAM(s) Nebulizer every 6 hours  atorvastatin Oral Tab/Cap - Peds 10 milliGRAM(s) Oral at bedtime  isosorbide   mononitrate ER Tablet (IMDUR) 30 milliGRAM(s) Oral daily  magnesium oxide 400 milliGRAM(s) Oral three times a day with meals  metoprolol  tartrate Oral Tab/Cap - Peds 50 milliGRAM(s) Oral two times a day  pantoprazole    Tablet 40 milliGRAM(s) Oral before breakfast  warfarin 1 milliGRAM(s) Oral once    MEDICATIONS  (PRN):  acetaminophen   Tablet. 650 milliGRAM(s) Oral every 6 hours PRN Moderate Pain (4 - 6)  traMADol 25 milliGRAM(s) Oral every 4 hours PRN Severe Pain (7 - 10)    Home Medications:  ALBUTEROL SUL 1.25 MG/3 ML SOL:  (20 Mar 2018 11:45)  ATORVASTATIN 10 MG TABLET:  (20 Mar 2018 11:45)  FUROSEMIDE 20 MG TABLET:  (20 Mar 2018 11:45)  ISOSORBIDE MONONIT ER 30 MG TB:  (20 Mar 2018 11:45)  METOPROLOL TARTRATE 50 MG TAB:  (20 Mar 2018 11:45)  OMEPRAZOLE DR 40 MG CAPSULE:  (20 Mar 2018 11:45)  WARFARIN SODIUM 2 MG TABLET:  (20 Mar 2018 11:45)      Vital Signs Last 24 Hrs  T(C): 36.4 (26 Mar 2018 07:31), Max: 36.6 (25 Mar 2018 23:24)  T(F): 97.5 (26 Mar 2018 07:31), Max: 97.8 (25 Mar 2018 23:24)  HR: 75 (26 Mar 2018 11:16) (73 - 98)  BP: 95/64 (26 Mar 2018 11:16) (88/54 - 136/62)  BP(mean): --  RR: 20 (26 Mar 2018 07:31) (20 - 20)  SpO2: --        General: NAD, AAO2  CV: S1 S2  Pulm: decreased breath sounds at base  Abd: NT/ND/S +BS  EXT: no clubbing/cyanosis/edema  Neuro: LANE    LABS:    03-25    136  |  98  |  76<HH>  ----------------------------<  135<H>  4.4   |  27  |  1.9<H>    Ca    8.5      25 Mar 2018 11:21  Mg     2.0     03-25            PT/INR - ( 25 Mar 2018 11:21 )   PT: 36.10 sec;   INR: 3.26 ratio                         Consultant(s) Notes Reviewed:  [x ] YES  [ ] NO    Care Discussed with Consultants/Other Providers/ Housestaff [ x] YES  [ ] NO

## 2018-03-26 NOTE — DISCHARGE NOTE ADULT - HOSPITAL COURSE
89 yo M presented with mechanical fall. Two sets of cardiac enzymes were negative. Trauma work up revealed right distal clavicle fracture and non displaced left posterior 8-10 ribs. Ortho consulted. non surgical management. Sling for comfort for RUE. WBAT to b/l LE. f/u as outpatient with Dr. Jose. CT pelvis ordered for hip pain: No fractures b/l osteoarthritis of sacroiliacum. Pain control. PT rehab daily. Physiatry recommended [facility] placement. Suspected Acute on Chronic Systolic Congestive heart failure exacerbation was also treated. 2D echo: EF 50-55, mild to mod MR, mod TR, mod Pulm HTN. Continue current medications. Continue OOB activity and incentive spirometry.

## 2018-03-26 NOTE — DISCHARGE NOTE ADULT - PATIENT PORTAL LINK FT
You can access the [facility] Patient Portal, offered by [facility], by registering with the following website: [email or web address]

## 2018-03-27 LAB
ANION GAP SERPL CALC-SCNC: 12 MMOL/L — SIGNIFICANT CHANGE UP (ref 7–14)
APTT BLD: 31.2 SEC — SIGNIFICANT CHANGE UP (ref 27–39.2)
BUN SERPL-MCNC: 72 MG/DL — CRITICAL HIGH (ref 10–20)
CALCIUM SERPL-MCNC: 8.9 MG/DL — SIGNIFICANT CHANGE UP (ref 8.5–10.1)
CHLORIDE SERPL-SCNC: 97 MMOL/L — LOW (ref 98–110)
CO2 SERPL-SCNC: 25 MMOL/L — SIGNIFICANT CHANGE UP (ref 17–32)
CREAT SERPL-MCNC: 1.6 MG/DL — HIGH (ref 0.7–1.5)
GLUCOSE SERPL-MCNC: 126 MG/DL — HIGH (ref 70–99)
HCT VFR BLD CALC: 31.9 % — LOW (ref 42–52)
HGB BLD-MCNC: 9.7 G/DL — LOW (ref 14–18)
INR BLD: 1.95 RATIO — HIGH (ref 0.65–1.3)
MCHC RBC-ENTMCNC: 18.6 PG — LOW (ref 27–31)
MCHC RBC-ENTMCNC: 30.4 G/DL — LOW (ref 32–37)
MCV RBC AUTO: 61.1 FL — LOW (ref 80–94)
NRBC # BLD: 0 /100 WBCS — SIGNIFICANT CHANGE UP (ref 0–0)
PLATELET # BLD AUTO: 227 K/UL — SIGNIFICANT CHANGE UP (ref 130–400)
POTASSIUM SERPL-MCNC: 4.9 MMOL/L — SIGNIFICANT CHANGE UP (ref 3.5–5)
POTASSIUM SERPL-SCNC: 4.9 MMOL/L — SIGNIFICANT CHANGE UP (ref 3.5–5)
PROTHROM AB SERPL-ACNC: 21.4 SEC — HIGH (ref 9.95–12.87)
RBC # BLD: 5.22 M/UL — SIGNIFICANT CHANGE UP (ref 4.7–6.1)
RBC # FLD: 16.6 % — HIGH (ref 11.5–14.5)
SODIUM SERPL-SCNC: 134 MMOL/L — LOW (ref 135–146)
WBC # BLD: 9.24 K/UL — SIGNIFICANT CHANGE UP (ref 4.8–10.8)
WBC # FLD AUTO: 9.24 K/UL — SIGNIFICANT CHANGE UP (ref 4.8–10.8)

## 2018-03-27 RX ORDER — ACETAMINOPHEN 500 MG
650 TABLET ORAL EVERY 8 HOURS
Qty: 0 | Refills: 0 | Status: DISCONTINUED | OUTPATIENT
Start: 2018-03-27 | End: 2018-03-29

## 2018-03-27 RX ORDER — WARFARIN SODIUM 2.5 MG/1
2 TABLET ORAL ONCE
Qty: 0 | Refills: 0 | Status: COMPLETED | OUTPATIENT
Start: 2018-03-27 | End: 2018-03-27

## 2018-03-27 RX ADMIN — PANTOPRAZOLE SODIUM 40 MILLIGRAM(S): 20 TABLET, DELAYED RELEASE ORAL at 06:43

## 2018-03-27 RX ADMIN — Medication 650 MILLIGRAM(S): at 22:01

## 2018-03-27 RX ADMIN — ALBUTEROL 2.5 MILLIGRAM(S): 90 AEROSOL, METERED ORAL at 13:53

## 2018-03-27 RX ADMIN — ALBUTEROL 2.5 MILLIGRAM(S): 90 AEROSOL, METERED ORAL at 09:18

## 2018-03-27 RX ADMIN — ATORVASTATIN CALCIUM 10 MILLIGRAM(S): 80 TABLET, FILM COATED ORAL at 22:01

## 2018-03-27 RX ADMIN — Medication 50 MILLIGRAM(S): at 06:42

## 2018-03-27 RX ADMIN — ALBUTEROL 2.5 MILLIGRAM(S): 90 AEROSOL, METERED ORAL at 20:03

## 2018-03-27 RX ADMIN — Medication 650 MILLIGRAM(S): at 14:03

## 2018-03-27 RX ADMIN — WARFARIN SODIUM 2 MILLIGRAM(S): 2.5 TABLET ORAL at 23:20

## 2018-03-27 NOTE — PROGRESS NOTE ADULT - ASSESSMENT
89 yo M presented with mechanical fall:    IMPRESSION:    # Mechanical Fall:  Trauma workup: Right distal clavicle fracture and non displaced left postr 8-10 ribs  Ortho consult: non surgical management, sling for clavicle fracture  Sling for comfort for RUE. WBAT to b/l LE  f/u as outpatient with Dr. 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

## 2018-03-27 NOTE — PROGRESS NOTE ADULT - SUBJECTIVE AND OBJECTIVE BOX
Patient is a 89y old  Male who presents with a chief complaint of s/p fall from standing (20 Mar 2018 13:21)      INTERVAL HPI/OVERNIGHT EVENTS: still rib, shoulder pain. Denies SOB.    HPI:  89 year male on coumadin for atrial fibrillation, s/p fall from standing in bathroom, landed on right shoulder, -HT, -LOC, c/o right shoulder pain and right leg stiffness.       PAST MEDICAL & SURGICAL HISTORY:    MEDICATIONS  (STANDING):  ALBUTerol    0.083% 2.5 milliGRAM(s) Nebulizer every 6 hours  atorvastatin Oral Tab/Cap - Peds 10 milliGRAM(s) Oral at bedtime  isosorbide   mononitrate ER Tablet (IMDUR) 30 milliGRAM(s) Oral daily  magnesium oxide 400 milliGRAM(s) Oral three times a day with meals  metoprolol  tartrate Oral Tab/Cap - Peds 50 milliGRAM(s) Oral two times a day  pantoprazole    Tablet 40 milliGRAM(s) Oral before breakfast  warfarin 1 milliGRAM(s) Oral once    MEDICATIONS  (PRN):  acetaminophen   Tablet. 650 milliGRAM(s) Oral every 6 hours PRN Moderate Pain (4 - 6)  traMADol 25 milliGRAM(s) Oral every 4 hours PRN Severe Pain (7 - 10)    Home Medications:  ALBUTEROL SUL 1.25 MG/3 ML SOL:  (20 Mar 2018 11:45)  ATORVASTATIN 10 MG TABLET:  (20 Mar 2018 11:45)  FUROSEMIDE 20 MG TABLET:  (20 Mar 2018 11:45)  ISOSORBIDE MONONIT ER 30 MG TB:  (20 Mar 2018 11:45)  METOPROLOL TARTRATE 50 MG TAB:  (20 Mar 2018 11:45)  OMEPRAZOLE DR 40 MG CAPSULE:  (20 Mar 2018 11:45)  WARFARIN SODIUM 2 MG TABLET:  (20 Mar 2018 11:45)      ICU Vital Signs Last 24 Hrs  T(C): 36.7 (27 Mar 2018 15:05), Max: 36.7 (27 Mar 2018 15:05)  T(F): 98 (27 Mar 2018 15:05), Max: 98 (27 Mar 2018 15:05)  HR: 63 (27 Mar 2018 15:05) (63 - 82)  BP: 92/52 (27 Mar 2018 15:05) (92/52 - 120/56)  BP(mean): --  ABP: --  ABP(mean): --  RR: 18 (27 Mar 2018 15:05) (18 - 20)  SpO2: --      General: NAD, AAO2  CV: S1 S2  Pulm: decreased breath sounds at base  Abd: NT/ND/S +BS  EXT: no clubbing/cyanosis/edema  Neuro: WING LANE    LABS:    03-25    136  |  98  |  76<HH>  ----------------------------<  135<H>  4.4   |  27  |  1.9<H>    Ca    8.5      25 Mar 2018 11:21  Mg     2.0     03-25            PT/INR - ( 25 Mar 2018 11:21 )   PT: 36.10 sec;   INR: 1.9                         Consultant(s) Notes Reviewed:  [x ] YES  [ ] NO    Care Discussed with Consultants/Other Providers/ Housestaff [ x] YES  [ ] NO

## 2018-03-27 NOTE — PROGRESS NOTE ADULT - ASSESSMENT
89 y/m presented with mechanical fall:    # Mechanical Fall/Pain:  -2 sets of Cardiac enzymes -ve  - Trauma workup: Right distal clavicle fracture and non displaced left postr 8-10 rib #  - Ortho consult: non surgical management, sling for clavicle fracture  -CT pelvis ordered for hip pain: No fracutes, b/l osteoarthritis of sacroilium  - Pain control  - PT rehab     # Suspected Acute on Chronic Systolic Congestive heart failure exacerbation:  2D echo: EF 50-55, mild to mod MR, mod TR, mod Pulm HTN  continue /beta blocker/statin/imdur  Hypoxia d/t atelactasis from hypoventilation d/t rib pain + mild CHF exacerbation: pain control, incentive spirometry,   restart PO Lasix if BP stable  Cont OOB and incentive spirometry    # A fib:  restarted coumadin but not sure if pt is a good candidate for long term Coumadin use due to risk for falls.    #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  monitor creatinine    # Dispo: [facility] is willing to accept pt today but pt refused. Spoke to daughter Tabby over the phone who will try to convince the pt to come to [facility]. Otherwise, will need SNF

## 2018-03-27 NOTE — PROGRESS NOTE ADULT - SUBJECTIVE AND OBJECTIVE BOX
SUBJECTIVE:Patient is a 89y old  Male who presents with a chief complaint of s/p fall from standing (26 Mar 2018 10:51)  Today is hospital day 7d. Patient seen and examined. He is doing better but still states he is having rib pain. Instructed to continue with incentive spirometry.     PAST MEDICAL & SURGICAL HISTORY  PAST MEDICAL & SURGICAL HISTORY:  Hyperlipidemia  CHF (congestive heart failure)  Afib  Hypertension  S/P AAA (abdominal aortic aneurysm) repair    ALLERGIES:  No Known Allergies    MEDICATIONS:  STANDING MEDICATIONS  acetaminophen   Tablet 650 milliGRAM(s) Oral every 8 hours  ALBUTerol    0.083% 2.5 milliGRAM(s) Nebulizer every 6 hours  atorvastatin Oral Tab/Cap - Peds 10 milliGRAM(s) Oral at bedtime  isosorbide   mononitrate ER Tablet (IMDUR) 30 milliGRAM(s) Oral daily  metoprolol  tartrate Oral Tab/Cap - Peds 50 milliGRAM(s) Oral two times a day  pantoprazole    Tablet 40 milliGRAM(s) Oral before breakfast  warfarin 2 milliGRAM(s) Oral once    PRN MEDICATIONS  traMADol 25 milliGRAM(s) Oral every 4 hours PRN    VITALS:   T(F): 96  HR: 66  BP: 96/52  RR: 18  SpO2: --    LABS:                        9.7    9.24  )-----------( 227      ( 27 Mar 2018 09:59 )             31.9     03-27    134<L>  |  97<L>  |  72<HH>  ----------------------------<  126<H>  4.9   |  25  |  1.6<H>    Ca    8.9      27 Mar 2018 09:59  Mg     2.1     03-26    PT/INR - ( 27 Mar 2018 09:59 )   PT: 21.40 sec;   INR: 1.95 ratio      PTT - ( 27 Mar 2018 09:59 )  PTT:31.2 sec    PHYSICAL EXAM:  GEN: NAD  LUNGS: Clear to auscultation bilaterally   HEART: S1/S2 present. RRR.   ABD: Soft, non-tender, non-distended. Bowel sounds present  EXT: b/l LE edema

## 2018-03-27 NOTE — DIETITIAN INITIAL EVALUATION ADULT. - OTHER INFO
Initial assessment for LOS: p/w s/p fall from standing, Trauma workup: Right distal clavicle fracture and non displaced left postr 8-10 ribs, Ortho consult: non surgical management, sling for clavicle fracture, CT pelvis ordered for hip pain: No fractures b/l osteoarthritis of sacroiliacum, physiatry rec [facility] placement, Suspected Acute on Chronic Systolic Congestive heart failure exacerbation:2D echo: EF 50-55, mild to mod MR, mod TR, mod Pulm HTN, unable to obtain nutrition hx at this time, will attempt at follow up.

## 2018-03-27 NOTE — DIETITIAN INITIAL EVALUATION ADULT. - ENERGY NEEDS
calorie 1506-1631kcal (MSJ x 1.2-1.3 AF)  protein 68-81g (1-1.2g/kg ABW)   fluid 1ml/kcal or per LIP

## 2018-03-28 LAB
ANION GAP SERPL CALC-SCNC: 14 MMOL/L — SIGNIFICANT CHANGE UP (ref 7–14)
APTT BLD: 30.1 SEC — SIGNIFICANT CHANGE UP (ref 27–39.2)
BUN SERPL-MCNC: 71 MG/DL — CRITICAL HIGH (ref 10–20)
CALCIUM SERPL-MCNC: 8.7 MG/DL — SIGNIFICANT CHANGE UP (ref 8.5–10.1)
CHLORIDE SERPL-SCNC: 99 MMOL/L — SIGNIFICANT CHANGE UP (ref 98–110)
CO2 SERPL-SCNC: 25 MMOL/L — SIGNIFICANT CHANGE UP (ref 17–32)
CREAT SERPL-MCNC: 1.4 MG/DL — SIGNIFICANT CHANGE UP (ref 0.7–1.5)
GLUCOSE SERPL-MCNC: 95 MG/DL — SIGNIFICANT CHANGE UP (ref 70–99)
HCT VFR BLD CALC: 31.4 % — LOW (ref 42–52)
HGB BLD-MCNC: 9.5 G/DL — LOW (ref 14–18)
INR BLD: 2.17 RATIO — HIGH (ref 0.65–1.3)
MCHC RBC-ENTMCNC: 18.5 PG — LOW (ref 27–31)
MCHC RBC-ENTMCNC: 30.3 G/DL — LOW (ref 32–37)
MCV RBC AUTO: 61.1 FL — LOW (ref 80–94)
NRBC # BLD: 0 /100 WBCS — SIGNIFICANT CHANGE UP (ref 0–0)
PLATELET # BLD AUTO: 222 K/UL — SIGNIFICANT CHANGE UP (ref 130–400)
POTASSIUM SERPL-MCNC: 4.9 MMOL/L — SIGNIFICANT CHANGE UP (ref 3.5–5)
POTASSIUM SERPL-SCNC: 4.9 MMOL/L — SIGNIFICANT CHANGE UP (ref 3.5–5)
PROTHROM AB SERPL-ACNC: 23.8 SEC — HIGH (ref 9.95–12.87)
RBC # BLD: 5.14 M/UL — SIGNIFICANT CHANGE UP (ref 4.7–6.1)
RBC # FLD: 16.6 % — HIGH (ref 11.5–14.5)
SODIUM SERPL-SCNC: 138 MMOL/L — SIGNIFICANT CHANGE UP (ref 135–146)
WBC # BLD: 7.85 K/UL — SIGNIFICANT CHANGE UP (ref 4.8–10.8)
WBC # FLD AUTO: 7.85 K/UL — SIGNIFICANT CHANGE UP (ref 4.8–10.8)

## 2018-03-28 RX ORDER — WARFARIN SODIUM 2.5 MG/1
2 TABLET ORAL ONCE
Qty: 0 | Refills: 0 | Status: COMPLETED | OUTPATIENT
Start: 2018-03-28 | End: 2018-03-28

## 2018-03-28 RX ADMIN — Medication 50 MILLIGRAM(S): at 17:55

## 2018-03-28 RX ADMIN — ATORVASTATIN CALCIUM 10 MILLIGRAM(S): 80 TABLET, FILM COATED ORAL at 21:52

## 2018-03-28 RX ADMIN — Medication 650 MILLIGRAM(S): at 21:52

## 2018-03-28 RX ADMIN — Medication 650 MILLIGRAM(S): at 14:30

## 2018-03-28 RX ADMIN — PANTOPRAZOLE SODIUM 40 MILLIGRAM(S): 20 TABLET, DELAYED RELEASE ORAL at 12:12

## 2018-03-28 RX ADMIN — ISOSORBIDE MONONITRATE 30 MILLIGRAM(S): 60 TABLET, EXTENDED RELEASE ORAL at 12:14

## 2018-03-28 RX ADMIN — Medication 650 MILLIGRAM(S): at 05:54

## 2018-03-28 RX ADMIN — ALBUTEROL 2.5 MILLIGRAM(S): 90 AEROSOL, METERED ORAL at 19:48

## 2018-03-28 RX ADMIN — ALBUTEROL 2.5 MILLIGRAM(S): 90 AEROSOL, METERED ORAL at 09:29

## 2018-03-28 RX ADMIN — WARFARIN SODIUM 2 MILLIGRAM(S): 2.5 TABLET ORAL at 23:08

## 2018-03-28 RX ADMIN — ALBUTEROL 2.5 MILLIGRAM(S): 90 AEROSOL, METERED ORAL at 14:20

## 2018-03-28 NOTE — PROGRESS NOTE ADULT - ASSESSMENT
89 yo M presented with mechanical fall:    IMPRESSION:    # Mechanical Fall:  Trauma workup: Right distal clavicle fracture and non displaced left postr 8-10 ribs  Ortho consult: non surgical management, sling for clavicle fracture  Sling for comfort for RUE. WBAT to b/l LE  f/u as outpatient with Dr. 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 WBAT to b/l LE  f/u as outpatient with Dr. 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

## 2018-03-28 NOTE — PROGRESS NOTE ADULT - SUBJECTIVE AND OBJECTIVE BOX
SUBJECTIVE:Patient is a 89y old  Male who presents with a chief complaint of s/p fall from standing (26 Mar 2018 10:51)  Today is hospital day 8d. Patient seen and examined. NAD. Not a candidate for [facility] as per Physiatry. SNF vs home with home PT and services, lives alone with wife at home.     PAST MEDICAL & SURGICAL HISTORY  PAST MEDICAL & SURGICAL HISTORY:  Hyperlipidemia  CHF (congestive heart failure)  Afib  Hypertension  S/P AAA (abdominal aortic aneurysm) repair    ALLERGIES:  No Known Allergies    MEDICATIONS:  STANDING MEDICATIONS  acetaminophen   Tablet 650 milliGRAM(s) Oral every 8 hours  ALBUTerol    0.083% 2.5 milliGRAM(s) Nebulizer every 6 hours  atorvastatin Oral Tab/Cap - Peds 10 milliGRAM(s) Oral at bedtime  isosorbide   mononitrate ER Tablet (IMDUR) 30 milliGRAM(s) Oral daily  metoprolol  tartrate Oral Tab/Cap - Peds 50 milliGRAM(s) Oral two times a day  pantoprazole    Tablet 40 milliGRAM(s) Oral before breakfast    PRN MEDICATIONS  traMADol 25 milliGRAM(s) Oral every 4 hours PRN    VITALS:   T(F): 96.6  HR: 66  BP: 127/61  RR: 16  SpO2: --    LABS:                        9.5    7.85  )-----------( 222      ( 28 Mar 2018 07:14 )             31.4     03-28    138  |  99  |  71<HH>  ----------------------------<  95  4.9   |  25  |  1.4    Ca    8.7      28 Mar 2018 07:14    PT/INR - ( 27 Mar 2018 09:59 )   PT: 21.40 sec;   INR: 1.95 ratio      PTT - ( 27 Mar 2018 09:59 )  PTT:31.2 sec    PHYSICAL EXAM:  GEN: NAD  LUNGS: Clear to auscultation bilaterally   HEART: S1/S2 present. RRR.   ABD: Soft, non-tender, non-distended. Bowel sounds present  EXT: no b/l LE edema

## 2018-03-29 ENCOUNTER — INPATIENT (INPATIENT)
Facility: HOSPITAL | Age: 83
LOS: 20 days | Discharge: HOME HEALTH | End: 2018-04-19
Attending: PHYSICAL MEDICINE & REHABILITATION | Admitting: PHYSICAL MEDICINE & REHABILITATION

## 2018-03-29 ENCOUNTER — TRANSCRIPTION ENCOUNTER (OUTPATIENT)
Age: 83
End: 2018-03-29

## 2018-03-29 VITALS
HEART RATE: 73 BPM | SYSTOLIC BLOOD PRESSURE: 99 MMHG | DIASTOLIC BLOOD PRESSURE: 53 MMHG | RESPIRATION RATE: 16 BRPM | TEMPERATURE: 96 F

## 2018-03-29 VITALS — WEIGHT: 158.73 LBS

## 2018-03-29 DIAGNOSIS — Z98.890 OTHER SPECIFIED POSTPROCEDURAL STATES: Chronic | ICD-10-CM

## 2018-03-29 PROBLEM — Z00.00 ENCOUNTER FOR PREVENTIVE HEALTH EXAMINATION: Status: ACTIVE | Noted: 2018-03-29

## 2018-03-29 LAB
ANION GAP SERPL CALC-SCNC: 11 MMOL/L — SIGNIFICANT CHANGE UP (ref 7–14)
APPEARANCE UR: CLEAR — SIGNIFICANT CHANGE UP
APTT BLD: 31.5 SEC — SIGNIFICANT CHANGE UP (ref 27–39.2)
BILIRUB UR-MCNC: NEGATIVE — SIGNIFICANT CHANGE UP
BUN SERPL-MCNC: 62 MG/DL — CRITICAL HIGH (ref 10–20)
CALCIUM SERPL-MCNC: 8.7 MG/DL — SIGNIFICANT CHANGE UP (ref 8.5–10.1)
CHLORIDE SERPL-SCNC: 102 MMOL/L — SIGNIFICANT CHANGE UP (ref 98–110)
CO2 SERPL-SCNC: 25 MMOL/L — SIGNIFICANT CHANGE UP (ref 17–32)
COLOR SPEC: YELLOW — SIGNIFICANT CHANGE UP
CREAT SERPL-MCNC: 1.5 MG/DL — SIGNIFICANT CHANGE UP (ref 0.7–1.5)
DIFF PNL FLD: NEGATIVE — SIGNIFICANT CHANGE UP
GLUCOSE SERPL-MCNC: 149 MG/DL — HIGH (ref 70–99)
GLUCOSE UR QL: NEGATIVE MG/DL — SIGNIFICANT CHANGE UP
INR BLD: 2.04 RATIO — HIGH (ref 0.65–1.3)
KETONES UR-MCNC: NEGATIVE — SIGNIFICANT CHANGE UP
LEUKOCYTE ESTERASE UR-ACNC: (no result)
NITRITE UR-MCNC: NEGATIVE — SIGNIFICANT CHANGE UP
PH UR: 6 — SIGNIFICANT CHANGE UP (ref 5–8)
POTASSIUM SERPL-MCNC: 4.7 MMOL/L — SIGNIFICANT CHANGE UP (ref 3.5–5)
POTASSIUM SERPL-SCNC: 4.7 MMOL/L — SIGNIFICANT CHANGE UP (ref 3.5–5)
PROT UR-MCNC: NEGATIVE MG/DL — SIGNIFICANT CHANGE UP
PROTHROM AB SERPL-ACNC: 22.4 SEC — HIGH (ref 9.95–12.87)
SODIUM SERPL-SCNC: 138 MMOL/L — SIGNIFICANT CHANGE UP (ref 135–146)
SP GR SPEC: 1.01 — SIGNIFICANT CHANGE UP (ref 1.01–1.03)
UROBILINOGEN FLD QL: 1 MG/DL (ref 0.2–0.2)
WBC UR QL: (no result) /HPF

## 2018-03-29 RX ORDER — ALBUTEROL 90 UG/1
1.25 AEROSOL, METERED ORAL EVERY 6 HOURS
Qty: 0 | Refills: 0 | Status: DISCONTINUED | OUTPATIENT
Start: 2018-03-29 | End: 2018-03-30

## 2018-03-29 RX ORDER — FUROSEMIDE 40 MG
0 TABLET ORAL
Qty: 30 | Refills: 0 | COMMUNITY

## 2018-03-29 RX ORDER — ISOSORBIDE MONONITRATE 60 MG/1
30 TABLET, EXTENDED RELEASE ORAL DAILY
Qty: 0 | Refills: 0 | Status: DISCONTINUED | OUTPATIENT
Start: 2018-03-29 | End: 2018-04-19

## 2018-03-29 RX ORDER — WARFARIN SODIUM 2.5 MG/1
2 TABLET ORAL ONCE
Qty: 0 | Refills: 0 | Status: DISCONTINUED | OUTPATIENT
Start: 2018-03-29 | End: 2018-03-29

## 2018-03-29 RX ORDER — FUROSEMIDE 40 MG
20 TABLET ORAL DAILY
Qty: 0 | Refills: 0 | Status: DISCONTINUED | OUTPATIENT
Start: 2018-03-29 | End: 2018-03-29

## 2018-03-29 RX ORDER — TRAMADOL HYDROCHLORIDE 50 MG/1
25 TABLET ORAL EVERY 6 HOURS
Qty: 0 | Refills: 0 | Status: DISCONTINUED | OUTPATIENT
Start: 2018-03-29 | End: 2018-03-29

## 2018-03-29 RX ORDER — WARFARIN SODIUM 2.5 MG/1
2 TABLET ORAL ONCE
Qty: 0 | Refills: 0 | Status: COMPLETED | OUTPATIENT
Start: 2018-03-29 | End: 2018-03-29

## 2018-03-29 RX ORDER — FUROSEMIDE 40 MG
20 TABLET ORAL DAILY
Qty: 0 | Refills: 0 | Status: DISCONTINUED | OUTPATIENT
Start: 2018-03-29 | End: 2018-04-01

## 2018-03-29 RX ORDER — ATORVASTATIN CALCIUM 80 MG/1
10 TABLET, FILM COATED ORAL AT BEDTIME
Qty: 0 | Refills: 0 | Status: DISCONTINUED | OUTPATIENT
Start: 2018-03-29 | End: 2018-04-19

## 2018-03-29 RX ORDER — MAGNESIUM HYDROXIDE 400 MG/1
30 TABLET, CHEWABLE ORAL DAILY
Qty: 0 | Refills: 0 | Status: DISCONTINUED | OUTPATIENT
Start: 2018-03-29 | End: 2018-04-19

## 2018-03-29 RX ORDER — LANOLIN ALCOHOL/MO/W.PET/CERES
3 CREAM (GRAM) TOPICAL AT BEDTIME
Qty: 0 | Refills: 0 | Status: DISCONTINUED | OUTPATIENT
Start: 2018-03-29 | End: 2018-04-14

## 2018-03-29 RX ORDER — ACETAMINOPHEN 500 MG
650 TABLET ORAL EVERY 8 HOURS
Qty: 0 | Refills: 0 | Status: DISCONTINUED | OUTPATIENT
Start: 2018-03-29 | End: 2018-04-19

## 2018-03-29 RX ORDER — FUROSEMIDE 40 MG
1 TABLET ORAL
Qty: 0 | Refills: 0 | COMMUNITY
Start: 2018-03-29

## 2018-03-29 RX ORDER — METOPROLOL TARTRATE 50 MG
50 TABLET ORAL EVERY 12 HOURS
Qty: 0 | Refills: 0 | Status: DISCONTINUED | OUTPATIENT
Start: 2018-03-29 | End: 2018-04-02

## 2018-03-29 RX ORDER — PANTOPRAZOLE SODIUM 20 MG/1
40 TABLET, DELAYED RELEASE ORAL
Qty: 0 | Refills: 0 | Status: DISCONTINUED | OUTPATIENT
Start: 2018-03-29 | End: 2018-04-19

## 2018-03-29 RX ADMIN — Medication 650 MILLIGRAM(S): at 05:16

## 2018-03-29 RX ADMIN — ISOSORBIDE MONONITRATE 30 MILLIGRAM(S): 60 TABLET, EXTENDED RELEASE ORAL at 12:23

## 2018-03-29 RX ADMIN — Medication 650 MILLIGRAM(S): at 20:25

## 2018-03-29 RX ADMIN — Medication 50 MILLIGRAM(S): at 05:16

## 2018-03-29 RX ADMIN — ALBUTEROL 2.5 MILLIGRAM(S): 90 AEROSOL, METERED ORAL at 08:58

## 2018-03-29 RX ADMIN — ALBUTEROL 2.5 MILLIGRAM(S): 90 AEROSOL, METERED ORAL at 14:20

## 2018-03-29 RX ADMIN — Medication 20 MILLIGRAM(S): at 12:22

## 2018-03-29 RX ADMIN — WARFARIN SODIUM 2 MILLIGRAM(S): 2.5 TABLET ORAL at 21:46

## 2018-03-29 RX ADMIN — Medication 650 MILLIGRAM(S): at 21:55

## 2018-03-29 RX ADMIN — ATORVASTATIN CALCIUM 10 MILLIGRAM(S): 80 TABLET, FILM COATED ORAL at 21:46

## 2018-03-29 RX ADMIN — Medication 650 MILLIGRAM(S): at 13:26

## 2018-03-29 RX ADMIN — PANTOPRAZOLE SODIUM 40 MILLIGRAM(S): 20 TABLET, DELAYED RELEASE ORAL at 05:16

## 2018-03-29 NOTE — DISCHARGE NOTE ADULT - CARE PROVIDERS DIRECT ADDRESSES
,DirectAddress_Unknown ,DirectAddress_Unknown,[email or web address]

## 2018-03-29 NOTE — DISCHARGE NOTE ADULT - NSFOLLOWUPCOMMENTS_ALL_CORE_[facility]
please call and  make all medical appointments please call and  make all medical appointments and cardio with dr Mckeon and ortho with dr Jose

## 2018-03-29 NOTE — DISCHARGE NOTE ADULT - ADDITIONAL INSTRUCTIONS
home blood draw by dr Zelaya,s office  in next week , may call cardiologist's office for any questions , continue coumadin 1.5 mg daily

## 2018-03-29 NOTE — DISCHARGE NOTE ADULT - MEDICATION SUMMARY - MEDICATIONS TO TAKE
I will START or STAY ON the medications listed below when I get home from the hospital:    acetaminophen 325 mg oral tablet  -- 2 tab(s) by mouth every 8 hours -   -- Indication: For for pain  take as needed     isosorbide mononitrate 30 mg oral tablet, extended release  -- 1 tab(s) by mouth once a day  -- Indication: For Coronary artery discease     warfarin 1 mg oral tablet  -- 1.5 tab(s) by mouth once a day (at bedtime)   -- Indication: For blood thinner for afib     atorvastatin 10 mg oral tablet  -- 1 tab(s) by mouth once a day (at bedtime)  -- Indication: For for cholesterol    metoprolol succinate 50 mg oral tablet, extended release  -- 1 tab(s) by mouth once a day  -- Indication: For blood pressure and heart rate control    ALBUTEROL SUL 1.25 MG/3 ML SOL  -- inhaled every 4 hours, As Needed  -- Indication: For breathing difficulty    furosemide 20 mg oral tablet  -- 1 tab(s) by mouth once a day   -- Indication: For water pill    magnesium oxide 400 mg (241.3 mg elemental magnesium) oral tablet  -- 1 tab(s) by mouth 4 times a day (with meals and at bedtime)  -- Indication: For for low mag levels, needs to be on this 4 times a day , need to check levels in doctors office every few weeks     melatonin 3 mg oral tablet  -- 1 tab(s) by mouth once a day (at bedtime)  -- Indication: For natural sleep aid     ergocalciferol 50,000 intl units (1.25 mg) oral capsule  -- 1 cap(s) by mouth once a week  -- Indication: For vit d deficiency

## 2018-03-29 NOTE — DISCHARGE NOTE ADULT - MEDICATION SUMMARY - MEDICATIONS TO STOP TAKING
I will STOP taking the medications listed below when I get home from the hospital:    traMADol 50 mg oral tablet  -- 0.5 tab(s) by mouth every 8 hours, As Needed - 10)

## 2018-03-29 NOTE — DISCHARGE NOTE ADULT - PLAN OF CARE
no weight bearing to rt arm, allowed to do gentle pendulum exercises by pt, do not lift heart attack weight more than 1 lbs , follow with your ortho doctor to prevent stroke or heart attack continue coumadin and home Inr checks arranged by dr Valderrama's office prevent further decline in health monitor daily weights, limit oral fluid intake to 1.5 l per day. bring to medical attention if more than 3 lbs weight gain in a week continue meds and diet , eat a low sodium heart healthy diet be able to ambulate and do adls without much pain, has rt distal clavicle fracture, left rib fractures full healing no weight bearing to rt arm, allowed to do gentle pendulum exercises by pt, do not lift heart attack weight more than 1 lbs , follow with your ortho doctor casper in a week no weight bearing to rt arm, allowed to do gentle pendulum exercises by pt, do not lift heart attack weight more than 1 lbs , follow with your ortho doctor Damon in a week continue coumadin and home Inr checks arranged by dr Valderrama's office, first visit by  early next week next to keep condition under control to prevent further falls , be able to ambulate and do adls without much pain, has rt distal clavicle fracture, left rib fractures, please follow with your docotor

## 2018-03-29 NOTE — DISCHARGE NOTE ADULT - MEDICATION SUMMARY - MEDICATIONS TO CHANGE
I will SWITCH the dose or number of times a day I take the medications listed below when I get home from the hospital:    WARFARIN SODIUM 2 MG TABLET  -- orally once a day

## 2018-03-29 NOTE — DISCHARGE NOTE ADULT - REASON FOR ADMISSION
89 yo man with hx a fib, CHF, fell on 3/20/18 while walking to the bathroom, sustained right distal clavicle fx, left 8-10 nondisplaced rib fx, and right hip pain (CT scan showed muscle strain/contusion and DJD changes). He was treated for CHF exacerbation also. 2D Echo showed systolic LVEF 50 0 55%, mild-mod MR and moderate  TR, and moderate pulmonary HTN. RUE is being treated with sling and is NWB; he is WBAT b/l LE's.he also had omega on ckd , lasix was held for some time restarted as per cardiology  because of hx of PND and 3 pillow orthopnea , he is allowed to do gentle pendular exercises to rt arm, need to follow up with ortho doctor in 1 week   Additional hx: HTN

## 2018-03-29 NOTE — DISCHARGE NOTE ADULT - CARE PLAN
Principal Discharge DX:	Clavicle fracture  Secondary Diagnosis:	Afib  Secondary Diagnosis:	CHF (congestive heart failure)  Secondary Diagnosis:	Hypertension  Secondary Diagnosis:	Multiple trauma Principal Discharge DX:	Clavicle fracture  Goal:	full healing  Assessment and plan of treatment:	no weight bearing to rt arm, allowed to do gentle pendulum exercises by pt, do not lift heart attack weight more than 1 lbs , follow with your ortho doctor  Secondary Diagnosis:	Afib  Goal:	to prevent stroke or heart attack  Assessment and plan of treatment:	continue coumadin and home Inr checks arranged by dr Valderrama's office  Secondary Diagnosis:	CHF (congestive heart failure)  Goal:	prevent further decline in health  Assessment and plan of treatment:	monitor daily weights, limit oral fluid intake to 1.5 l per day. bring to medical attention if more than 3 lbs weight gain in a week  Secondary Diagnosis:	Hypertension  Assessment and plan of treatment:	continue meds and diet , eat a low sodium heart healthy diet  Secondary Diagnosis:	Multiple trauma  Assessment and plan of treatment:	be able to ambulate and do adls without much pain, has rt distal clavicle fracture, left rib fractures Principal Discharge DX:	Clavicle fracture  Goal:	full healing  Assessment and plan of treatment:	no weight bearing to rt arm, allowed to do gentle pendulum exercises by pt, do not lift heart attack weight more than 1 lbs , follow with your ortho doctor casper in a week  Secondary Diagnosis:	Afib  Goal:	to prevent stroke or heart attack  Assessment and plan of treatment:	continue coumadin and home Inr checks arranged by dr Valderrama's office  Secondary Diagnosis:	CHF (congestive heart failure)  Goal:	prevent further decline in health  Assessment and plan of treatment:	monitor daily weights, limit oral fluid intake to 1.5 l per day. bring to medical attention if more than 3 lbs weight gain in a week  Secondary Diagnosis:	Hypertension  Assessment and plan of treatment:	continue meds and diet , eat a low sodium heart healthy diet  Secondary Diagnosis:	Multiple trauma  Assessment and plan of treatment:	be able to ambulate and do adls without much pain, has rt distal clavicle fracture, left rib fractures Principal Discharge DX:	Clavicle fracture  Goal:	full healing  Assessment and plan of treatment:	no weight bearing to rt arm, allowed to do gentle pendulum exercises by pt, do not lift heart attack weight more than 1 lbs , follow with your ortho doctor Damon in a week  Secondary Diagnosis:	Afib  Goal:	to prevent stroke or heart attack  Assessment and plan of treatment:	continue coumadin and home Inr checks arranged by dr Valderrama's office, first visit by  early next week next  Secondary Diagnosis:	CHF (congestive heart failure)  Goal:	prevent further decline in health  Assessment and plan of treatment:	monitor daily weights, limit oral fluid intake to 1.5 l per day. bring to medical attention if more than 3 lbs weight gain in a week  Secondary Diagnosis:	Hypertension  Goal:	to keep condition under control  Assessment and plan of treatment:	continue meds and diet , eat a low sodium heart healthy diet  Secondary Diagnosis:	Multiple trauma  Goal:	to prevent further falls ,  Assessment and plan of treatment:	be able to ambulate and do adls without much pain, has rt distal clavicle fracture, left rib fractures, please follow with your docotor

## 2018-03-29 NOTE — DISCHARGE NOTE ADULT - PATIENT PORTAL LINK FT
You can access the [email or web address], offered by [facility], by registering with the following website: [email or web address]

## 2018-03-30 PROBLEM — I10 ESSENTIAL (PRIMARY) HYPERTENSION: Chronic | Status: ACTIVE | Noted: 2018-03-20

## 2018-03-30 PROBLEM — I50.9 HEART FAILURE, UNSPECIFIED: Chronic | Status: ACTIVE | Noted: 2018-03-20

## 2018-03-30 PROBLEM — E78.5 HYPERLIPIDEMIA, UNSPECIFIED: Chronic | Status: ACTIVE | Noted: 2018-03-20

## 2018-03-30 PROBLEM — I48.91 UNSPECIFIED ATRIAL FIBRILLATION: Chronic | Status: ACTIVE | Noted: 2018-03-20

## 2018-03-30 LAB
ALBUMIN SERPL ELPH-MCNC: 3.3 G/DL — LOW (ref 3.5–5.2)
ALP SERPL-CCNC: 142 U/L — HIGH (ref 30–115)
ALT FLD-CCNC: 53 U/L — HIGH (ref 0–41)
ANION GAP SERPL CALC-SCNC: 12 MMOL/L — SIGNIFICANT CHANGE UP (ref 7–14)
AST SERPL-CCNC: 47 U/L — HIGH (ref 0–41)
BASOPHILS # BLD AUTO: 0.03 K/UL — SIGNIFICANT CHANGE UP (ref 0–0.2)
BASOPHILS NFR BLD AUTO: 0.3 % — SIGNIFICANT CHANGE UP (ref 0–1)
BILIRUB SERPL-MCNC: 0.5 MG/DL — SIGNIFICANT CHANGE UP (ref 0.2–1.2)
BUN SERPL-MCNC: 61 MG/DL — CRITICAL HIGH (ref 10–20)
CALCIUM SERPL-MCNC: 8.9 MG/DL — SIGNIFICANT CHANGE UP (ref 8.5–10.1)
CHLORIDE SERPL-SCNC: 101 MMOL/L — SIGNIFICANT CHANGE UP (ref 98–110)
CO2 SERPL-SCNC: 27 MMOL/L — SIGNIFICANT CHANGE UP (ref 17–32)
CREAT SERPL-MCNC: 1.4 MG/DL — SIGNIFICANT CHANGE UP (ref 0.7–1.5)
EOSINOPHIL # BLD AUTO: 0.23 K/UL — SIGNIFICANT CHANGE UP (ref 0–0.7)
EOSINOPHIL NFR BLD AUTO: 2.5 % — SIGNIFICANT CHANGE UP (ref 0–8)
GLUCOSE SERPL-MCNC: 106 MG/DL — HIGH (ref 70–99)
HCT VFR BLD CALC: 30.8 % — LOW (ref 42–52)
HGB BLD-MCNC: 9.4 G/DL — LOW (ref 14–18)
IMM GRANULOCYTES NFR BLD AUTO: 0.4 % — HIGH (ref 0.1–0.3)
INR BLD: 2.69 RATIO — HIGH (ref 0.65–1.3)
LYMPHOCYTES # BLD AUTO: 1.81 K/UL — SIGNIFICANT CHANGE UP (ref 1.2–3.4)
LYMPHOCYTES # BLD AUTO: 20 % — LOW (ref 20.5–51.1)
MAGNESIUM SERPL-MCNC: 1.9 MG/DL — SIGNIFICANT CHANGE UP (ref 1.8–2.4)
MCHC RBC-ENTMCNC: 18.5 PG — LOW (ref 27–31)
MCHC RBC-ENTMCNC: 30.5 G/DL — LOW (ref 32–37)
MCV RBC AUTO: 60.7 FL — LOW (ref 80–94)
MONOCYTES # BLD AUTO: 1.13 K/UL — HIGH (ref 0.1–0.6)
MONOCYTES NFR BLD AUTO: 12.5 % — HIGH (ref 1.7–9.3)
NEUTROPHILS # BLD AUTO: 5.83 K/UL — SIGNIFICANT CHANGE UP (ref 1.4–6.5)
NEUTROPHILS NFR BLD AUTO: 64.3 % — SIGNIFICANT CHANGE UP (ref 42.2–75.2)
NRBC # BLD: 0 /100 WBCS — SIGNIFICANT CHANGE UP (ref 0–0)
PHOSPHATE SERPL-MCNC: 2.9 MG/DL — SIGNIFICANT CHANGE UP (ref 2.1–4.9)
PLATELET # BLD AUTO: 258 K/UL — SIGNIFICANT CHANGE UP (ref 130–400)
POTASSIUM SERPL-MCNC: 5.2 MMOL/L — HIGH (ref 3.5–5)
POTASSIUM SERPL-SCNC: 5.2 MMOL/L — HIGH (ref 3.5–5)
PROT SERPL-MCNC: 6 G/DL — SIGNIFICANT CHANGE UP (ref 6–8)
PROTHROM AB SERPL-ACNC: 29.6 SEC — HIGH (ref 9.95–12.87)
RBC # BLD: 5.07 M/UL — SIGNIFICANT CHANGE UP (ref 4.7–6.1)
RBC # FLD: 16.6 % — HIGH (ref 11.5–14.5)
SODIUM SERPL-SCNC: 140 MMOL/L — SIGNIFICANT CHANGE UP (ref 135–146)
WBC # BLD: 9.07 K/UL — SIGNIFICANT CHANGE UP (ref 4.8–10.8)
WBC # FLD AUTO: 9.07 K/UL — SIGNIFICANT CHANGE UP (ref 4.8–10.8)

## 2018-03-30 RX ORDER — ALBUTEROL 90 UG/1
2.5 AEROSOL, METERED ORAL EVERY 6 HOURS
Qty: 0 | Refills: 0 | Status: DISCONTINUED | OUTPATIENT
Start: 2018-03-30 | End: 2018-04-19

## 2018-03-30 RX ORDER — WARFARIN SODIUM 2.5 MG/1
2 TABLET ORAL ONCE
Qty: 0 | Refills: 0 | Status: COMPLETED | OUTPATIENT
Start: 2018-03-30 | End: 2018-03-30

## 2018-03-30 RX ADMIN — ISOSORBIDE MONONITRATE 30 MILLIGRAM(S): 60 TABLET, EXTENDED RELEASE ORAL at 12:15

## 2018-03-30 RX ADMIN — Medication 650 MILLIGRAM(S): at 06:13

## 2018-03-30 RX ADMIN — Medication 650 MILLIGRAM(S): at 21:15

## 2018-03-30 RX ADMIN — ALBUTEROL 2.5 MILLIGRAM(S): 90 AEROSOL, METERED ORAL at 07:46

## 2018-03-30 RX ADMIN — WARFARIN SODIUM 2 MILLIGRAM(S): 2.5 TABLET ORAL at 21:15

## 2018-03-30 RX ADMIN — Medication 650 MILLIGRAM(S): at 14:28

## 2018-03-30 RX ADMIN — ALBUTEROL 2.5 MILLIGRAM(S): 90 AEROSOL, METERED ORAL at 13:14

## 2018-03-30 RX ADMIN — Medication 50 MILLIGRAM(S): at 17:23

## 2018-03-30 RX ADMIN — PANTOPRAZOLE SODIUM 40 MILLIGRAM(S): 20 TABLET, DELAYED RELEASE ORAL at 07:34

## 2018-03-30 RX ADMIN — Medication 650 MILLIGRAM(S): at 12:18

## 2018-03-30 RX ADMIN — Medication 650 MILLIGRAM(S): at 06:32

## 2018-03-30 RX ADMIN — ATORVASTATIN CALCIUM 10 MILLIGRAM(S): 80 TABLET, FILM COATED ORAL at 21:15

## 2018-03-30 RX ADMIN — Medication 20 MILLIGRAM(S): at 06:13

## 2018-03-30 RX ADMIN — Medication 50 MILLIGRAM(S): at 06:13

## 2018-03-30 RX ADMIN — ALBUTEROL 2.5 MILLIGRAM(S): 90 AEROSOL, METERED ORAL at 20:47

## 2018-03-30 RX ADMIN — Medication 650 MILLIGRAM(S): at 21:45

## 2018-03-31 LAB
INR BLD: 2.48 RATIO — HIGH (ref 0.65–1.3)
PROTHROM AB SERPL-ACNC: 27.3 SEC — HIGH (ref 9.95–12.87)
VIT D25+D1,25 OH+D1,25 PNL SERPL-MCNC: 16.7 PG/ML — LOW (ref 19.9–79.3)

## 2018-03-31 RX ORDER — WARFARIN SODIUM 2.5 MG/1
2 TABLET ORAL ONCE
Qty: 0 | Refills: 0 | Status: COMPLETED | OUTPATIENT
Start: 2018-03-31 | End: 2018-03-31

## 2018-03-31 RX ADMIN — PANTOPRAZOLE SODIUM 40 MILLIGRAM(S): 20 TABLET, DELAYED RELEASE ORAL at 06:12

## 2018-03-31 RX ADMIN — ALBUTEROL 2.5 MILLIGRAM(S): 90 AEROSOL, METERED ORAL at 09:00

## 2018-03-31 RX ADMIN — Medication 650 MILLIGRAM(S): at 21:43

## 2018-03-31 RX ADMIN — ISOSORBIDE MONONITRATE 30 MILLIGRAM(S): 60 TABLET, EXTENDED RELEASE ORAL at 12:34

## 2018-03-31 RX ADMIN — ALBUTEROL 2.5 MILLIGRAM(S): 90 AEROSOL, METERED ORAL at 20:52

## 2018-03-31 RX ADMIN — Medication 650 MILLIGRAM(S): at 06:12

## 2018-03-31 RX ADMIN — Medication 50 MILLIGRAM(S): at 06:13

## 2018-03-31 RX ADMIN — ATORVASTATIN CALCIUM 10 MILLIGRAM(S): 80 TABLET, FILM COATED ORAL at 21:43

## 2018-03-31 RX ADMIN — Medication 20 MILLIGRAM(S): at 06:12

## 2018-03-31 RX ADMIN — Medication 650 MILLIGRAM(S): at 13:50

## 2018-03-31 RX ADMIN — WARFARIN SODIUM 2 MILLIGRAM(S): 2.5 TABLET ORAL at 21:42

## 2018-03-31 RX ADMIN — Medication 650 MILLIGRAM(S): at 12:34

## 2018-04-01 LAB
HCT VFR BLD CALC: 29.3 % — LOW (ref 42–52)
HGB BLD-MCNC: 9.1 G/DL — LOW (ref 14–18)
INR BLD: 2.5 RATIO — HIGH (ref 0.65–1.3)
MCHC RBC-ENTMCNC: 18.7 PG — LOW (ref 27–31)
MCHC RBC-ENTMCNC: 31.1 G/DL — LOW (ref 32–37)
MCV RBC AUTO: 60.3 FL — LOW (ref 80–94)
NRBC # BLD: 0 /100 WBCS — SIGNIFICANT CHANGE UP (ref 0–0)
PLATELET # BLD AUTO: 273 K/UL — SIGNIFICANT CHANGE UP (ref 130–400)
PROTHROM AB SERPL-ACNC: 27.5 SEC — HIGH (ref 9.95–12.87)
RBC # BLD: 4.86 M/UL — SIGNIFICANT CHANGE UP (ref 4.7–6.1)
RBC # FLD: 16.6 % — HIGH (ref 11.5–14.5)
WBC # BLD: 10.86 K/UL — HIGH (ref 4.8–10.8)
WBC # FLD AUTO: 10.86 K/UL — HIGH (ref 4.8–10.8)

## 2018-04-01 RX ORDER — WARFARIN SODIUM 2.5 MG/1
2 TABLET ORAL ONCE
Qty: 0 | Refills: 0 | Status: COMPLETED | OUTPATIENT
Start: 2018-04-01 | End: 2018-04-01

## 2018-04-01 RX ORDER — FUROSEMIDE 40 MG
20 TABLET ORAL DAILY
Qty: 0 | Refills: 0 | Status: DISCONTINUED | OUTPATIENT
Start: 2018-04-01 | End: 2018-04-19

## 2018-04-01 RX ADMIN — Medication 650 MILLIGRAM(S): at 21:41

## 2018-04-01 RX ADMIN — Medication 650 MILLIGRAM(S): at 06:03

## 2018-04-01 RX ADMIN — ISOSORBIDE MONONITRATE 30 MILLIGRAM(S): 60 TABLET, EXTENDED RELEASE ORAL at 13:08

## 2018-04-01 RX ADMIN — ATORVASTATIN CALCIUM 10 MILLIGRAM(S): 80 TABLET, FILM COATED ORAL at 21:41

## 2018-04-01 RX ADMIN — PANTOPRAZOLE SODIUM 40 MILLIGRAM(S): 20 TABLET, DELAYED RELEASE ORAL at 06:04

## 2018-04-01 RX ADMIN — Medication 650 MILLIGRAM(S): at 12:00

## 2018-04-01 RX ADMIN — ALBUTEROL 2.5 MILLIGRAM(S): 90 AEROSOL, METERED ORAL at 14:01

## 2018-04-01 RX ADMIN — Medication 20 MILLIGRAM(S): at 06:03

## 2018-04-01 RX ADMIN — Medication 650 MILLIGRAM(S): at 10:32

## 2018-04-01 RX ADMIN — ALBUTEROL 2.5 MILLIGRAM(S): 90 AEROSOL, METERED ORAL at 11:31

## 2018-04-01 RX ADMIN — WARFARIN SODIUM 2 MILLIGRAM(S): 2.5 TABLET ORAL at 21:41
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
## 2018-04-02 DIAGNOSIS — R09.02 HYPOXEMIA: ICD-10-CM
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
## 2018-04-02 DIAGNOSIS — I48.91 UNSPECIFIED ATRIAL FIBRILLATION: ICD-10-CM
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
## 2018-04-02 DIAGNOSIS — R40.2362 COMA SCALE, BEST MOTOR RESPONSE, OBEYS COMMANDS, AT ARRIVAL TO EMERGENCY DEPARTMENT: ICD-10-CM

## 2018-04-02 DIAGNOSIS — M25.511 PAIN IN RIGHT SHOULDER: ICD-10-CM

## 2018-04-02 DIAGNOSIS — D62 ACUTE POSTHEMORRHAGIC ANEMIA: ICD-10-CM

## 2018-04-02 DIAGNOSIS — M16.0 BILATERAL PRIMARY OSTEOARTHRITIS OF HIP: ICD-10-CM

## 2018-04-02 DIAGNOSIS — M80.852A OTHER OSTEOPOROSIS WITH CURRENT PATHOLOGICAL FRACTURE, LEFT FEMUR, INITIAL ENCOUNTER FOR FRACTURE: ICD-10-CM

## 2018-04-02 DIAGNOSIS — M80.811A: ICD-10-CM
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
## 2018-04-02 DIAGNOSIS — I50.9 HEART FAILURE, UNSPECIFIED: ICD-10-CM

## 2018-04-02 DIAGNOSIS — R40.2252 COMA SCALE, BEST VERBAL RESPONSE, ORIENTED, AT ARRIVAL TO EMERGENCY DEPARTMENT: ICD-10-CM

## 2018-04-02 DIAGNOSIS — I50.23 ACUTE ON CHRONIC SYSTOLIC (CONGESTIVE) HEART FAILURE: ICD-10-CM

## 2018-04-02 DIAGNOSIS — M47.898 OTHER SPONDYLOSIS, SACRAL AND SACROCOCCYGEAL REGION: ICD-10-CM

## 2018-04-02 DIAGNOSIS — I13.0 HYPERTENSIVE HEART AND CHRONIC KIDNEY DISEASE WITH HEART FAILURE AND STAGE 1 THROUGH STAGE 4 CHRONIC KIDNEY DISEASE, OR UNSPECIFIED CHRONIC KIDNEY DISEASE: ICD-10-CM

## 2018-04-02 DIAGNOSIS — E78.5 HYPERLIPIDEMIA, UNSPECIFIED: ICD-10-CM

## 2018-04-02 DIAGNOSIS — E83.42 HYPOMAGNESEMIA: ICD-10-CM

## 2018-04-02 DIAGNOSIS — Z87.891 PERSONAL HISTORY OF NICOTINE DEPENDENCE: ICD-10-CM

## 2018-04-02 DIAGNOSIS — Z79.01 LONG TERM (CURRENT) USE OF ANTICOAGULANTS: ICD-10-CM

## 2018-04-02 DIAGNOSIS — I11.0 HYPERTENSIVE HEART DISEASE WITH HEART FAILURE: ICD-10-CM

## 2018-04-02 DIAGNOSIS — M80.80XA OTHER OSTEOPOROSIS WITH CURRENT PATHOLOGICAL FRACTURE, UNSPECIFIED SITE, INITIAL ENCOUNTER FOR FRACTURE: ICD-10-CM

## 2018-04-02 DIAGNOSIS — R40.2142 COMA SCALE, EYES OPEN, SPONTANEOUS, AT ARRIVAL TO EMERGENCY DEPARTMENT: ICD-10-CM

## 2018-04-02 DIAGNOSIS — N18.4 CHRONIC KIDNEY DISEASE, STAGE 4 (SEVERE): ICD-10-CM

## 2018-04-02 LAB
INR BLD: 2.43 RATIO — HIGH (ref 0.65–1.3)
PROTHROM AB SERPL-ACNC: 26.7 SEC — HIGH (ref 9.95–12.87)

## 2018-04-02 RX ORDER — WARFARIN SODIUM 2.5 MG/1
2 TABLET ORAL ONCE
Qty: 0 | Refills: 0 | Status: COMPLETED | OUTPATIENT
Start: 2018-04-02 | End: 2018-04-02

## 2018-04-02 RX ORDER — METOPROLOL TARTRATE 50 MG
50 TABLET ORAL DAILY
Qty: 0 | Refills: 0 | Status: DISCONTINUED | OUTPATIENT
Start: 2018-04-02 | End: 2018-04-19

## 2018-04-02 RX ADMIN — WARFARIN SODIUM 2 MILLIGRAM(S): 2.5 TABLET ORAL at 21:39

## 2018-04-02 RX ADMIN — Medication 650 MILLIGRAM(S): at 06:08

## 2018-04-02 RX ADMIN — Medication 650 MILLIGRAM(S): at 15:00

## 2018-04-02 RX ADMIN — PANTOPRAZOLE SODIUM 40 MILLIGRAM(S): 20 TABLET, DELAYED RELEASE ORAL at 06:09

## 2018-04-02 RX ADMIN — Medication 650 MILLIGRAM(S): at 21:39

## 2018-04-02 RX ADMIN — Medication 650 MILLIGRAM(S): at 13:16

## 2018-04-02 RX ADMIN — ISOSORBIDE MONONITRATE 30 MILLIGRAM(S): 60 TABLET, EXTENDED RELEASE ORAL at 13:17

## 2018-04-02 RX ADMIN — ATORVASTATIN CALCIUM 10 MILLIGRAM(S): 80 TABLET, FILM COATED ORAL at 21:39

## 2018-04-02 RX ADMIN — Medication 650 MILLIGRAM(S): at 21:40

## 2018-04-02 RX ADMIN — ALBUTEROL 2.5 MILLIGRAM(S): 90 AEROSOL, METERED ORAL at 08:27

## 2018-04-02 RX ADMIN — Medication 20 MILLIGRAM(S): at 06:09

## 2018-04-02 RX ADMIN — Medication 650 MILLIGRAM(S): at 06:09

## 2018-04-03 LAB
24R-OH-CALCIDIOL SERPL-MCNC: 24 NG/ML — SIGNIFICANT CHANGE UP (ref 30–100)
INR BLD: 2.58 RATIO — HIGH (ref 0.65–1.3)
PROTHROM AB SERPL-ACNC: 28.4 SEC — HIGH (ref 9.95–12.87)

## 2018-04-03 RX ORDER — WARFARIN SODIUM 2.5 MG/1
1.5 TABLET ORAL ONCE
Qty: 0 | Refills: 0 | Status: COMPLETED | OUTPATIENT
Start: 2018-04-03 | End: 2018-04-03

## 2018-04-03 RX ORDER — WARFARIN SODIUM 2.5 MG/1
1.5 TABLET ORAL AT BEDTIME
Qty: 0 | Refills: 0 | Status: DISCONTINUED | OUTPATIENT
Start: 2018-04-03 | End: 2018-04-03

## 2018-04-03 RX ADMIN — WARFARIN SODIUM 1.5 MILLIGRAM(S): 2.5 TABLET ORAL at 21:33

## 2018-04-03 RX ADMIN — Medication 650 MILLIGRAM(S): at 14:46

## 2018-04-03 RX ADMIN — ATORVASTATIN CALCIUM 10 MILLIGRAM(S): 80 TABLET, FILM COATED ORAL at 21:33

## 2018-04-03 RX ADMIN — Medication 50 MILLIGRAM(S): at 05:57

## 2018-04-03 RX ADMIN — Medication 650 MILLIGRAM(S): at 05:57

## 2018-04-03 RX ADMIN — ISOSORBIDE MONONITRATE 30 MILLIGRAM(S): 60 TABLET, EXTENDED RELEASE ORAL at 12:26

## 2018-04-03 RX ADMIN — Medication 650 MILLIGRAM(S): at 12:26

## 2018-04-03 RX ADMIN — Medication 650 MILLIGRAM(S): at 21:32

## 2018-04-03 RX ADMIN — PANTOPRAZOLE SODIUM 40 MILLIGRAM(S): 20 TABLET, DELAYED RELEASE ORAL at 05:58

## 2018-04-03 RX ADMIN — Medication 20 MILLIGRAM(S): at 05:57

## 2018-04-03 RX ADMIN — ALBUTEROL 2.5 MILLIGRAM(S): 90 AEROSOL, METERED ORAL at 08:41

## 2018-04-03 RX ADMIN — ALBUTEROL 2.5 MILLIGRAM(S): 90 AEROSOL, METERED ORAL at 20:23

## 2018-04-03 RX ADMIN — Medication 650 MILLIGRAM(S): at 05:59

## 2018-04-04 LAB
INR BLD: 2.88 RATIO — HIGH (ref 0.65–1.3)
PROTHROM AB SERPL-ACNC: 31.8 SEC — HIGH (ref 9.95–12.87)

## 2018-04-04 RX ORDER — ERGOCALCIFEROL 1.25 MG/1
50000 CAPSULE ORAL
Qty: 0 | Refills: 0 | Status: DISCONTINUED | OUTPATIENT
Start: 2018-04-04 | End: 2018-04-19

## 2018-04-04 RX ORDER — WARFARIN SODIUM 2.5 MG/1
1 TABLET ORAL ONCE
Qty: 0 | Refills: 0 | Status: COMPLETED | OUTPATIENT
Start: 2018-04-04 | End: 2018-04-04

## 2018-04-04 RX ADMIN — WARFARIN SODIUM 1 MILLIGRAM(S): 2.5 TABLET ORAL at 21:39

## 2018-04-04 RX ADMIN — ALBUTEROL 2.5 MILLIGRAM(S): 90 AEROSOL, METERED ORAL at 08:26

## 2018-04-04 RX ADMIN — Medication 650 MILLIGRAM(S): at 07:58

## 2018-04-04 RX ADMIN — ATORVASTATIN CALCIUM 10 MILLIGRAM(S): 80 TABLET, FILM COATED ORAL at 21:39

## 2018-04-04 RX ADMIN — Medication 650 MILLIGRAM(S): at 21:38

## 2018-04-04 RX ADMIN — ERGOCALCIFEROL 50000 UNIT(S): 1.25 CAPSULE ORAL at 17:57

## 2018-04-04 RX ADMIN — Medication 50 MILLIGRAM(S): at 05:41

## 2018-04-04 RX ADMIN — ISOSORBIDE MONONITRATE 30 MILLIGRAM(S): 60 TABLET, EXTENDED RELEASE ORAL at 16:44

## 2018-04-04 RX ADMIN — Medication 20 MILLIGRAM(S): at 05:41

## 2018-04-04 RX ADMIN — ALBUTEROL 2.5 MILLIGRAM(S): 90 AEROSOL, METERED ORAL at 13:48

## 2018-04-04 RX ADMIN — PANTOPRAZOLE SODIUM 40 MILLIGRAM(S): 20 TABLET, DELAYED RELEASE ORAL at 05:42

## 2018-04-04 RX ADMIN — Medication 650 MILLIGRAM(S): at 23:12

## 2018-04-04 RX ADMIN — Medication 650 MILLIGRAM(S): at 05:41

## 2018-04-04 RX ADMIN — ALBUTEROL 2.5 MILLIGRAM(S): 90 AEROSOL, METERED ORAL at 20:09

## 2018-04-05 LAB
INR BLD: 2.9 RATIO — HIGH (ref 0.65–1.3)
PROTHROM AB SERPL-ACNC: 32 SEC — HIGH (ref 9.95–12.87)

## 2018-04-05 RX ORDER — WARFARIN SODIUM 2.5 MG/1
1 TABLET ORAL ONCE
Qty: 0 | Refills: 0 | Status: COMPLETED | OUTPATIENT
Start: 2018-04-05 | End: 2018-04-05

## 2018-04-05 RX ADMIN — Medication 650 MILLIGRAM(S): at 12:46

## 2018-04-05 RX ADMIN — WARFARIN SODIUM 1 MILLIGRAM(S): 2.5 TABLET ORAL at 21:23

## 2018-04-05 RX ADMIN — ATORVASTATIN CALCIUM 10 MILLIGRAM(S): 80 TABLET, FILM COATED ORAL at 21:23

## 2018-04-05 RX ADMIN — Medication 650 MILLIGRAM(S): at 21:22

## 2018-04-05 RX ADMIN — Medication 650 MILLIGRAM(S): at 13:31

## 2018-04-05 RX ADMIN — Medication 50 MILLIGRAM(S): at 05:47

## 2018-04-05 RX ADMIN — Medication 650 MILLIGRAM(S): at 05:47

## 2018-04-05 RX ADMIN — Medication 20 MILLIGRAM(S): at 05:46

## 2018-04-05 RX ADMIN — ISOSORBIDE MONONITRATE 30 MILLIGRAM(S): 60 TABLET, EXTENDED RELEASE ORAL at 12:46

## 2018-04-05 RX ADMIN — PANTOPRAZOLE SODIUM 40 MILLIGRAM(S): 20 TABLET, DELAYED RELEASE ORAL at 05:48

## 2018-04-06 LAB
INR BLD: 3.14 RATIO — HIGH (ref 0.65–1.3)
PROTHROM AB SERPL-ACNC: 34.7 SEC — HIGH (ref 9.95–12.87)

## 2018-04-06 RX ADMIN — Medication 50 MILLIGRAM(S): at 05:37

## 2018-04-06 RX ADMIN — Medication 650 MILLIGRAM(S): at 15:08

## 2018-04-06 RX ADMIN — Medication 650 MILLIGRAM(S): at 05:37

## 2018-04-06 RX ADMIN — Medication 650 MILLIGRAM(S): at 18:27

## 2018-04-06 RX ADMIN — Medication 650 MILLIGRAM(S): at 05:38

## 2018-04-06 RX ADMIN — PANTOPRAZOLE SODIUM 40 MILLIGRAM(S): 20 TABLET, DELAYED RELEASE ORAL at 05:38

## 2018-04-06 RX ADMIN — ATORVASTATIN CALCIUM 10 MILLIGRAM(S): 80 TABLET, FILM COATED ORAL at 21:36

## 2018-04-06 RX ADMIN — Medication 650 MILLIGRAM(S): at 21:36

## 2018-04-06 RX ADMIN — Medication 20 MILLIGRAM(S): at 05:37

## 2018-04-06 RX ADMIN — ALBUTEROL 2.5 MILLIGRAM(S): 90 AEROSOL, METERED ORAL at 07:36

## 2018-04-06 RX ADMIN — ISOSORBIDE MONONITRATE 30 MILLIGRAM(S): 60 TABLET, EXTENDED RELEASE ORAL at 12:19

## 2018-04-07 LAB
ALBUMIN SERPL ELPH-MCNC: 3.3 G/DL — LOW (ref 3.5–5.2)
ALP SERPL-CCNC: 203 U/L — HIGH (ref 30–115)
ALT FLD-CCNC: 23 U/L — SIGNIFICANT CHANGE UP (ref 0–41)
ANION GAP SERPL CALC-SCNC: 12 MMOL/L — SIGNIFICANT CHANGE UP (ref 7–14)
AST SERPL-CCNC: 19 U/L — SIGNIFICANT CHANGE UP (ref 0–41)
BILIRUB SERPL-MCNC: 0.6 MG/DL — SIGNIFICANT CHANGE UP (ref 0.2–1.2)
BUN SERPL-MCNC: 50 MG/DL — HIGH (ref 10–20)
CALCIUM SERPL-MCNC: 8.5 MG/DL — SIGNIFICANT CHANGE UP (ref 8.5–10.1)
CHLORIDE SERPL-SCNC: 105 MMOL/L — SIGNIFICANT CHANGE UP (ref 98–110)
CO2 SERPL-SCNC: 24 MMOL/L — SIGNIFICANT CHANGE UP (ref 17–32)
CREAT SERPL-MCNC: 1.5 MG/DL — SIGNIFICANT CHANGE UP (ref 0.7–1.5)
GLUCOSE SERPL-MCNC: 95 MG/DL — SIGNIFICANT CHANGE UP (ref 70–99)
HCT VFR BLD CALC: 29.4 % — LOW (ref 42–52)
HGB BLD-MCNC: 9.2 G/DL — LOW (ref 14–18)
INR BLD: 2.82 RATIO — HIGH (ref 0.65–1.3)
MAGNESIUM SERPL-MCNC: 1.5 MG/DL — LOW (ref 1.8–2.4)
MCHC RBC-ENTMCNC: 19 PG — LOW (ref 27–31)
MCHC RBC-ENTMCNC: 31.3 G/DL — LOW (ref 32–37)
MCV RBC AUTO: 60.9 FL — LOW (ref 80–94)
NRBC # BLD: 0 /100 WBCS — SIGNIFICANT CHANGE UP (ref 0–0)
PLATELET # BLD AUTO: 301 K/UL — SIGNIFICANT CHANGE UP (ref 130–400)
POTASSIUM SERPL-MCNC: 4.5 MMOL/L — SIGNIFICANT CHANGE UP (ref 3.5–5)
POTASSIUM SERPL-SCNC: 4.5 MMOL/L — SIGNIFICANT CHANGE UP (ref 3.5–5)
PROT SERPL-MCNC: 6 G/DL — SIGNIFICANT CHANGE UP (ref 6–8)
PROTHROM AB SERPL-ACNC: 31.1 SEC — HIGH (ref 9.95–12.87)
RBC # BLD: 4.83 M/UL — SIGNIFICANT CHANGE UP (ref 4.7–6.1)
RBC # FLD: 17.4 % — HIGH (ref 11.5–14.5)
SODIUM SERPL-SCNC: 141 MMOL/L — SIGNIFICANT CHANGE UP (ref 135–146)
WBC # BLD: 6.07 K/UL — SIGNIFICANT CHANGE UP (ref 4.8–10.8)
WBC # FLD AUTO: 6.07 K/UL — SIGNIFICANT CHANGE UP (ref 4.8–10.8)

## 2018-04-07 RX ORDER — MAGNESIUM OXIDE 400 MG ORAL TABLET 241.3 MG
400 TABLET ORAL
Qty: 0 | Refills: 0 | Status: DISCONTINUED | OUTPATIENT
Start: 2018-04-07 | End: 2018-04-16

## 2018-04-07 RX ORDER — WARFARIN SODIUM 2.5 MG/1
1 TABLET ORAL ONCE
Qty: 0 | Refills: 0 | Status: COMPLETED | OUTPATIENT
Start: 2018-04-07 | End: 2018-04-07

## 2018-04-07 RX ADMIN — Medication 650 MILLIGRAM(S): at 05:40

## 2018-04-07 RX ADMIN — Medication 650 MILLIGRAM(S): at 00:42

## 2018-04-07 RX ADMIN — Medication 20 MILLIGRAM(S): at 05:44

## 2018-04-07 RX ADMIN — MAGNESIUM OXIDE 400 MG ORAL TABLET 400 MILLIGRAM(S): 241.3 TABLET ORAL at 14:29

## 2018-04-07 RX ADMIN — Medication 650 MILLIGRAM(S): at 05:44

## 2018-04-07 RX ADMIN — Medication 650 MILLIGRAM(S): at 22:51

## 2018-04-07 RX ADMIN — ALBUTEROL 2.5 MILLIGRAM(S): 90 AEROSOL, METERED ORAL at 09:10

## 2018-04-07 RX ADMIN — MAGNESIUM OXIDE 400 MG ORAL TABLET 400 MILLIGRAM(S): 241.3 TABLET ORAL at 18:13

## 2018-04-07 RX ADMIN — PANTOPRAZOLE SODIUM 40 MILLIGRAM(S): 20 TABLET, DELAYED RELEASE ORAL at 08:03

## 2018-04-07 RX ADMIN — WARFARIN SODIUM 1 MILLIGRAM(S): 2.5 TABLET ORAL at 22:51

## 2018-04-07 RX ADMIN — Medication 50 MILLIGRAM(S): at 05:44

## 2018-04-07 RX ADMIN — ALBUTEROL 2.5 MILLIGRAM(S): 90 AEROSOL, METERED ORAL at 20:05

## 2018-04-07 RX ADMIN — ATORVASTATIN CALCIUM 10 MILLIGRAM(S): 80 TABLET, FILM COATED ORAL at 22:51

## 2018-04-07 RX ADMIN — ALBUTEROL 2.5 MILLIGRAM(S): 90 AEROSOL, METERED ORAL at 11:54

## 2018-04-07 RX ADMIN — ISOSORBIDE MONONITRATE 30 MILLIGRAM(S): 60 TABLET, EXTENDED RELEASE ORAL at 12:03

## 2018-04-08 LAB
INR BLD: 2.51 RATIO — HIGH (ref 0.65–1.3)
PROTHROM AB SERPL-ACNC: 27.6 SEC — HIGH (ref 9.95–12.87)

## 2018-04-08 RX ORDER — WARFARIN SODIUM 2.5 MG/1
1 TABLET ORAL ONCE
Qty: 0 | Refills: 0 | Status: COMPLETED | OUTPATIENT
Start: 2018-04-08 | End: 2018-04-08

## 2018-04-08 RX ADMIN — MAGNESIUM OXIDE 400 MG ORAL TABLET 400 MILLIGRAM(S): 241.3 TABLET ORAL at 17:37

## 2018-04-08 RX ADMIN — Medication 650 MILLIGRAM(S): at 06:07

## 2018-04-08 RX ADMIN — Medication 650 MILLIGRAM(S): at 21:24

## 2018-04-08 RX ADMIN — PANTOPRAZOLE SODIUM 40 MILLIGRAM(S): 20 TABLET, DELAYED RELEASE ORAL at 07:53

## 2018-04-08 RX ADMIN — Medication 650 MILLIGRAM(S): at 16:53

## 2018-04-08 RX ADMIN — ALBUTEROL 2.5 MILLIGRAM(S): 90 AEROSOL, METERED ORAL at 14:25

## 2018-04-08 RX ADMIN — ISOSORBIDE MONONITRATE 30 MILLIGRAM(S): 60 TABLET, EXTENDED RELEASE ORAL at 12:50

## 2018-04-08 RX ADMIN — Medication 20 MILLIGRAM(S): at 06:07

## 2018-04-08 RX ADMIN — WARFARIN SODIUM 1 MILLIGRAM(S): 2.5 TABLET ORAL at 21:24

## 2018-04-08 RX ADMIN — MAGNESIUM OXIDE 400 MG ORAL TABLET 400 MILLIGRAM(S): 241.3 TABLET ORAL at 12:51

## 2018-04-08 RX ADMIN — Medication 650 MILLIGRAM(S): at 12:55

## 2018-04-08 RX ADMIN — ALBUTEROL 2.5 MILLIGRAM(S): 90 AEROSOL, METERED ORAL at 08:31

## 2018-04-08 RX ADMIN — ATORVASTATIN CALCIUM 10 MILLIGRAM(S): 80 TABLET, FILM COATED ORAL at 21:24

## 2018-04-08 RX ADMIN — Medication 50 MILLIGRAM(S): at 06:07

## 2018-04-09 LAB
INR BLD: 2.2 RATIO — HIGH (ref 0.65–1.3)
MAGNESIUM SERPL-MCNC: 1.4 MG/DL — LOW (ref 1.8–2.4)
PROTHROM AB SERPL-ACNC: 24.1 SEC — HIGH (ref 9.95–12.87)

## 2018-04-09 RX ORDER — WARFARIN SODIUM 2.5 MG/1
1.5 TABLET ORAL ONCE
Qty: 0 | Refills: 0 | Status: COMPLETED | OUTPATIENT
Start: 2018-04-09 | End: 2018-04-09

## 2018-04-09 RX ADMIN — Medication 20 MILLIGRAM(S): at 05:45

## 2018-04-09 RX ADMIN — Medication 650 MILLIGRAM(S): at 05:44

## 2018-04-09 RX ADMIN — Medication 650 MILLIGRAM(S): at 22:02

## 2018-04-09 RX ADMIN — ISOSORBIDE MONONITRATE 30 MILLIGRAM(S): 60 TABLET, EXTENDED RELEASE ORAL at 13:12

## 2018-04-09 RX ADMIN — WARFARIN SODIUM 1.5 MILLIGRAM(S): 2.5 TABLET ORAL at 22:01

## 2018-04-09 RX ADMIN — PANTOPRAZOLE SODIUM 40 MILLIGRAM(S): 20 TABLET, DELAYED RELEASE ORAL at 12:58

## 2018-04-09 RX ADMIN — MAGNESIUM OXIDE 400 MG ORAL TABLET 400 MILLIGRAM(S): 241.3 TABLET ORAL at 12:59

## 2018-04-09 RX ADMIN — Medication 650 MILLIGRAM(S): at 14:44

## 2018-04-09 RX ADMIN — ALBUTEROL 2.5 MILLIGRAM(S): 90 AEROSOL, METERED ORAL at 21:36

## 2018-04-09 RX ADMIN — Medication 650 MILLIGRAM(S): at 05:45

## 2018-04-09 RX ADMIN — ATORVASTATIN CALCIUM 10 MILLIGRAM(S): 80 TABLET, FILM COATED ORAL at 22:02

## 2018-04-09 RX ADMIN — Medication 650 MILLIGRAM(S): at 13:12

## 2018-04-09 RX ADMIN — MAGNESIUM OXIDE 400 MG ORAL TABLET 400 MILLIGRAM(S): 241.3 TABLET ORAL at 18:03

## 2018-04-09 RX ADMIN — Medication 50 MILLIGRAM(S): at 05:45

## 2018-04-10 LAB
INR BLD: 2.13 RATIO — HIGH (ref 0.65–1.3)
PROTHROM AB SERPL-ACNC: 23.4 SEC — HIGH (ref 9.95–12.87)

## 2018-04-10 RX ORDER — WARFARIN SODIUM 2.5 MG/1
1.5 TABLET ORAL AT BEDTIME
Qty: 0 | Refills: 0 | Status: COMPLETED | OUTPATIENT
Start: 2018-04-10 | End: 2018-04-10

## 2018-04-10 RX ADMIN — ISOSORBIDE MONONITRATE 30 MILLIGRAM(S): 60 TABLET, EXTENDED RELEASE ORAL at 13:07

## 2018-04-10 RX ADMIN — Medication 20 MILLIGRAM(S): at 06:00

## 2018-04-10 RX ADMIN — MAGNESIUM OXIDE 400 MG ORAL TABLET 400 MILLIGRAM(S): 241.3 TABLET ORAL at 17:58

## 2018-04-10 RX ADMIN — ATORVASTATIN CALCIUM 10 MILLIGRAM(S): 80 TABLET, FILM COATED ORAL at 21:36

## 2018-04-10 RX ADMIN — Medication 650 MILLIGRAM(S): at 13:06

## 2018-04-10 RX ADMIN — MAGNESIUM OXIDE 400 MG ORAL TABLET 400 MILLIGRAM(S): 241.3 TABLET ORAL at 13:06

## 2018-04-10 RX ADMIN — PANTOPRAZOLE SODIUM 40 MILLIGRAM(S): 20 TABLET, DELAYED RELEASE ORAL at 06:01

## 2018-04-10 RX ADMIN — Medication 50 MILLIGRAM(S): at 06:01

## 2018-04-10 RX ADMIN — ALBUTEROL 2.5 MILLIGRAM(S): 90 AEROSOL, METERED ORAL at 16:35

## 2018-04-10 RX ADMIN — ALBUTEROL 2.5 MILLIGRAM(S): 90 AEROSOL, METERED ORAL at 07:24

## 2018-04-10 RX ADMIN — Medication 650 MILLIGRAM(S): at 06:00

## 2018-04-10 RX ADMIN — Medication 650 MILLIGRAM(S): at 21:39

## 2018-04-10 RX ADMIN — Medication 650 MILLIGRAM(S): at 14:00

## 2018-04-10 RX ADMIN — WARFARIN SODIUM 1.5 MILLIGRAM(S): 2.5 TABLET ORAL at 21:37

## 2018-04-10 RX ADMIN — ALBUTEROL 2.5 MILLIGRAM(S): 90 AEROSOL, METERED ORAL at 20:21

## 2018-04-11 LAB
INR BLD: 2.21 RATIO — HIGH (ref 0.65–1.3)
PROTHROM AB SERPL-ACNC: 24.3 SEC — HIGH (ref 9.95–12.87)

## 2018-04-11 RX ORDER — WARFARIN SODIUM 2.5 MG/1
1.5 TABLET ORAL AT BEDTIME
Qty: 0 | Refills: 0 | Status: COMPLETED | OUTPATIENT
Start: 2018-04-11 | End: 2018-04-11

## 2018-04-11 RX ADMIN — Medication 20 MILLIGRAM(S): at 05:59

## 2018-04-11 RX ADMIN — ALBUTEROL 2.5 MILLIGRAM(S): 90 AEROSOL, METERED ORAL at 21:26

## 2018-04-11 RX ADMIN — Medication 50 MILLIGRAM(S): at 06:00

## 2018-04-11 RX ADMIN — Medication 650 MILLIGRAM(S): at 13:11

## 2018-04-11 RX ADMIN — WARFARIN SODIUM 1.5 MILLIGRAM(S): 2.5 TABLET ORAL at 22:36

## 2018-04-11 RX ADMIN — ATORVASTATIN CALCIUM 10 MILLIGRAM(S): 80 TABLET, FILM COATED ORAL at 22:36

## 2018-04-11 RX ADMIN — Medication 650 MILLIGRAM(S): at 06:00

## 2018-04-11 RX ADMIN — Medication 650 MILLIGRAM(S): at 06:01

## 2018-04-11 RX ADMIN — Medication 650 MILLIGRAM(S): at 14:00

## 2018-04-11 RX ADMIN — ALBUTEROL 2.5 MILLIGRAM(S): 90 AEROSOL, METERED ORAL at 14:00

## 2018-04-11 RX ADMIN — ISOSORBIDE MONONITRATE 30 MILLIGRAM(S): 60 TABLET, EXTENDED RELEASE ORAL at 13:10

## 2018-04-11 RX ADMIN — PANTOPRAZOLE SODIUM 40 MILLIGRAM(S): 20 TABLET, DELAYED RELEASE ORAL at 06:00

## 2018-04-11 RX ADMIN — Medication 650 MILLIGRAM(S): at 22:39

## 2018-04-11 RX ADMIN — MAGNESIUM OXIDE 400 MG ORAL TABLET 400 MILLIGRAM(S): 241.3 TABLET ORAL at 13:10

## 2018-04-11 RX ADMIN — ERGOCALCIFEROL 50000 UNIT(S): 1.25 CAPSULE ORAL at 13:11

## 2018-04-11 RX ADMIN — Medication 650 MILLIGRAM(S): at 23:15

## 2018-04-12 LAB
INR BLD: 2.3 RATIO — HIGH (ref 0.65–1.3)
PROTHROM AB SERPL-ACNC: 25.3 SEC — HIGH (ref 9.95–12.87)

## 2018-04-12 RX ORDER — WARFARIN SODIUM 2.5 MG/1
1 TABLET ORAL AT BEDTIME
Qty: 0 | Refills: 0 | Status: COMPLETED | OUTPATIENT
Start: 2018-04-12 | End: 2018-04-12

## 2018-04-12 RX ADMIN — MAGNESIUM OXIDE 400 MG ORAL TABLET 400 MILLIGRAM(S): 241.3 TABLET ORAL at 12:38

## 2018-04-12 RX ADMIN — ISOSORBIDE MONONITRATE 30 MILLIGRAM(S): 60 TABLET, EXTENDED RELEASE ORAL at 12:38

## 2018-04-12 RX ADMIN — Medication 650 MILLIGRAM(S): at 21:45

## 2018-04-12 RX ADMIN — WARFARIN SODIUM 1 MILLIGRAM(S): 2.5 TABLET ORAL at 22:49

## 2018-04-12 RX ADMIN — ATORVASTATIN CALCIUM 10 MILLIGRAM(S): 80 TABLET, FILM COATED ORAL at 21:46

## 2018-04-12 RX ADMIN — Medication 650 MILLIGRAM(S): at 14:05

## 2018-04-12 RX ADMIN — ALBUTEROL 2.5 MILLIGRAM(S): 90 AEROSOL, METERED ORAL at 20:20

## 2018-04-12 RX ADMIN — MAGNESIUM OXIDE 400 MG ORAL TABLET 400 MILLIGRAM(S): 241.3 TABLET ORAL at 21:47

## 2018-04-12 RX ADMIN — Medication 50 MILLIGRAM(S): at 06:27

## 2018-04-12 RX ADMIN — Medication 650 MILLIGRAM(S): at 14:03

## 2018-04-12 RX ADMIN — Medication 650 MILLIGRAM(S): at 21:49

## 2018-04-12 RX ADMIN — Medication 650 MILLIGRAM(S): at 06:30

## 2018-04-12 RX ADMIN — Medication 650 MILLIGRAM(S): at 07:59

## 2018-04-12 RX ADMIN — Medication 20 MILLIGRAM(S): at 06:27

## 2018-04-12 RX ADMIN — PANTOPRAZOLE SODIUM 40 MILLIGRAM(S): 20 TABLET, DELAYED RELEASE ORAL at 06:27

## 2018-04-13 LAB
ALBUMIN SERPL ELPH-MCNC: 3.4 G/DL — LOW (ref 3.5–5.2)
ALP SERPL-CCNC: 204 U/L — HIGH (ref 30–115)
ALT FLD-CCNC: 29 U/L — SIGNIFICANT CHANGE UP (ref 0–41)
ANION GAP SERPL CALC-SCNC: 15 MMOL/L — HIGH (ref 7–14)
AST SERPL-CCNC: 27 U/L — SIGNIFICANT CHANGE UP (ref 0–41)
BILIRUB SERPL-MCNC: 0.4 MG/DL — SIGNIFICANT CHANGE UP (ref 0.2–1.2)
BUN SERPL-MCNC: 54 MG/DL — HIGH (ref 10–20)
CALCIUM SERPL-MCNC: 8.4 MG/DL — LOW (ref 8.5–10.1)
CHLORIDE SERPL-SCNC: 102 MMOL/L — SIGNIFICANT CHANGE UP (ref 98–110)
CO2 SERPL-SCNC: 21 MMOL/L — SIGNIFICANT CHANGE UP (ref 17–32)
CREAT SERPL-MCNC: 1.9 MG/DL — HIGH (ref 0.7–1.5)
GLUCOSE SERPL-MCNC: 143 MG/DL — HIGH (ref 70–99)
HCT VFR BLD CALC: 28.8 % — LOW (ref 42–52)
HGB BLD-MCNC: 8.8 G/DL — LOW (ref 14–18)
INR BLD: 2.29 RATIO — HIGH (ref 0.65–1.3)
MAGNESIUM SERPL-MCNC: 1.4 MG/DL — LOW (ref 1.8–2.4)
MCHC RBC-ENTMCNC: 18.6 PG — LOW (ref 27–31)
MCHC RBC-ENTMCNC: 30.6 G/DL — LOW (ref 32–37)
MCV RBC AUTO: 60.8 FL — LOW (ref 80–94)
NRBC # BLD: 0 /100 WBCS — SIGNIFICANT CHANGE UP (ref 0–0)
NT-PROBNP SERPL-SCNC: 5671 PG/ML — HIGH (ref 0–300)
PLATELET # BLD AUTO: 247 K/UL — SIGNIFICANT CHANGE UP (ref 130–400)
POTASSIUM SERPL-MCNC: 4.2 MMOL/L — SIGNIFICANT CHANGE UP (ref 3.5–5)
POTASSIUM SERPL-SCNC: 4.2 MMOL/L — SIGNIFICANT CHANGE UP (ref 3.5–5)
PROT SERPL-MCNC: 5.9 G/DL — LOW (ref 6–8)
PROTHROM AB SERPL-ACNC: 25.1 SEC — HIGH (ref 9.95–12.87)
RBC # BLD: 4.74 M/UL — SIGNIFICANT CHANGE UP (ref 4.7–6.1)
RBC # FLD: 17.7 % — HIGH (ref 11.5–14.5)
SODIUM SERPL-SCNC: 138 MMOL/L — SIGNIFICANT CHANGE UP (ref 135–146)
WBC # BLD: 7.82 K/UL — SIGNIFICANT CHANGE UP (ref 4.8–10.8)
WBC # FLD AUTO: 7.82 K/UL — SIGNIFICANT CHANGE UP (ref 4.8–10.8)

## 2018-04-13 RX ORDER — WARFARIN SODIUM 2.5 MG/1
1 TABLET ORAL AT BEDTIME
Qty: 0 | Refills: 0 | Status: COMPLETED | OUTPATIENT
Start: 2018-04-13 | End: 2018-04-13

## 2018-04-13 RX ORDER — POTASSIUM CHLORIDE 20 MEQ
20 PACKET (EA) ORAL DAILY
Qty: 0 | Refills: 0 | Status: DISCONTINUED | OUTPATIENT
Start: 2018-04-13 | End: 2018-04-16

## 2018-04-13 RX ORDER — MAGNESIUM SULFATE 500 MG/ML
2 VIAL (ML) INJECTION ONCE
Qty: 0 | Refills: 0 | Status: COMPLETED | OUTPATIENT
Start: 2018-04-13 | End: 2018-04-13

## 2018-04-13 RX ADMIN — Medication 650 MILLIGRAM(S): at 22:05

## 2018-04-13 RX ADMIN — MAGNESIUM OXIDE 400 MG ORAL TABLET 400 MILLIGRAM(S): 241.3 TABLET ORAL at 13:08

## 2018-04-13 RX ADMIN — Medication 50 GRAM(S): at 16:04

## 2018-04-13 RX ADMIN — ALBUTEROL 2.5 MILLIGRAM(S): 90 AEROSOL, METERED ORAL at 13:24

## 2018-04-13 RX ADMIN — PANTOPRAZOLE SODIUM 40 MILLIGRAM(S): 20 TABLET, DELAYED RELEASE ORAL at 07:48

## 2018-04-13 RX ADMIN — ALBUTEROL 2.5 MILLIGRAM(S): 90 AEROSOL, METERED ORAL at 21:01

## 2018-04-13 RX ADMIN — ISOSORBIDE MONONITRATE 30 MILLIGRAM(S): 60 TABLET, EXTENDED RELEASE ORAL at 13:14

## 2018-04-13 RX ADMIN — Medication 20 MILLIGRAM(S): at 05:46

## 2018-04-13 RX ADMIN — Medication 650 MILLIGRAM(S): at 13:08

## 2018-04-13 RX ADMIN — WARFARIN SODIUM 1 MILLIGRAM(S): 2.5 TABLET ORAL at 22:05

## 2018-04-13 RX ADMIN — Medication 650 MILLIGRAM(S): at 06:37

## 2018-04-13 RX ADMIN — ATORVASTATIN CALCIUM 10 MILLIGRAM(S): 80 TABLET, FILM COATED ORAL at 22:05

## 2018-04-13 RX ADMIN — MAGNESIUM OXIDE 400 MG ORAL TABLET 400 MILLIGRAM(S): 241.3 TABLET ORAL at 16:13

## 2018-04-13 RX ADMIN — Medication 50 MILLIGRAM(S): at 05:46

## 2018-04-13 RX ADMIN — Medication 20 MILLIEQUIVALENT(S): at 16:05

## 2018-04-13 RX ADMIN — Medication 650 MILLIGRAM(S): at 05:46

## 2018-04-13 RX ADMIN — Medication 650 MILLIGRAM(S): at 13:09

## 2018-04-14 LAB
INR BLD: 2.04 RATIO — HIGH (ref 0.65–1.3)
MAGNESIUM SERPL-MCNC: 1.9 MG/DL — SIGNIFICANT CHANGE UP (ref 1.8–2.4)
PROTHROM AB SERPL-ACNC: 22.4 SEC — HIGH (ref 9.95–12.87)

## 2018-04-14 RX ORDER — LANOLIN ALCOHOL/MO/W.PET/CERES
3 CREAM (GRAM) TOPICAL AT BEDTIME
Qty: 0 | Refills: 0 | Status: DISCONTINUED | OUTPATIENT
Start: 2018-04-14 | End: 2018-04-19

## 2018-04-14 RX ORDER — WARFARIN SODIUM 2.5 MG/1
1.5 TABLET ORAL AT BEDTIME
Qty: 0 | Refills: 0 | Status: COMPLETED | OUTPATIENT
Start: 2018-04-14 | End: 2018-04-14

## 2018-04-14 RX ADMIN — Medication 650 MILLIGRAM(S): at 12:19

## 2018-04-14 RX ADMIN — MAGNESIUM OXIDE 400 MG ORAL TABLET 400 MILLIGRAM(S): 241.3 TABLET ORAL at 12:20

## 2018-04-14 RX ADMIN — ALBUTEROL 2.5 MILLIGRAM(S): 90 AEROSOL, METERED ORAL at 20:50

## 2018-04-14 RX ADMIN — Medication 650 MILLIGRAM(S): at 13:28

## 2018-04-14 RX ADMIN — ALBUTEROL 2.5 MILLIGRAM(S): 90 AEROSOL, METERED ORAL at 09:19

## 2018-04-14 RX ADMIN — Medication 650 MILLIGRAM(S): at 22:03

## 2018-04-14 RX ADMIN — Medication 20 MILLIGRAM(S): at 05:57

## 2018-04-14 RX ADMIN — ISOSORBIDE MONONITRATE 30 MILLIGRAM(S): 60 TABLET, EXTENDED RELEASE ORAL at 12:20

## 2018-04-14 RX ADMIN — ATORVASTATIN CALCIUM 10 MILLIGRAM(S): 80 TABLET, FILM COATED ORAL at 22:03

## 2018-04-14 RX ADMIN — Medication 650 MILLIGRAM(S): at 05:57

## 2018-04-14 RX ADMIN — MAGNESIUM OXIDE 400 MG ORAL TABLET 400 MILLIGRAM(S): 241.3 TABLET ORAL at 16:34

## 2018-04-14 RX ADMIN — ALBUTEROL 2.5 MILLIGRAM(S): 90 AEROSOL, METERED ORAL at 14:39

## 2018-04-14 RX ADMIN — WARFARIN SODIUM 1.5 MILLIGRAM(S): 2.5 TABLET ORAL at 22:03

## 2018-04-14 RX ADMIN — Medication 50 MILLIGRAM(S): at 05:57

## 2018-04-14 RX ADMIN — Medication 20 MILLIEQUIVALENT(S): at 14:30

## 2018-04-14 RX ADMIN — Medication 3 MILLIGRAM(S): at 22:03

## 2018-04-14 RX ADMIN — PANTOPRAZOLE SODIUM 40 MILLIGRAM(S): 20 TABLET, DELAYED RELEASE ORAL at 08:40

## 2018-04-15 LAB
INR BLD: 1.89 RATIO — HIGH (ref 0.65–1.3)
PROTHROM AB SERPL-ACNC: 20.7 SEC — HIGH (ref 9.95–12.87)

## 2018-04-15 RX ORDER — WARFARIN SODIUM 2.5 MG/1
1.5 TABLET ORAL AT BEDTIME
Qty: 0 | Refills: 0 | Status: COMPLETED | OUTPATIENT
Start: 2018-04-15 | End: 2018-04-15

## 2018-04-15 RX ADMIN — PANTOPRAZOLE SODIUM 40 MILLIGRAM(S): 20 TABLET, DELAYED RELEASE ORAL at 06:17

## 2018-04-15 RX ADMIN — Medication 650 MILLIGRAM(S): at 21:39

## 2018-04-15 RX ADMIN — ALBUTEROL 2.5 MILLIGRAM(S): 90 AEROSOL, METERED ORAL at 20:48

## 2018-04-15 RX ADMIN — Medication 650 MILLIGRAM(S): at 08:00

## 2018-04-15 RX ADMIN — MAGNESIUM OXIDE 400 MG ORAL TABLET 400 MILLIGRAM(S): 241.3 TABLET ORAL at 12:20

## 2018-04-15 RX ADMIN — ALBUTEROL 2.5 MILLIGRAM(S): 90 AEROSOL, METERED ORAL at 09:22

## 2018-04-15 RX ADMIN — ISOSORBIDE MONONITRATE 30 MILLIGRAM(S): 60 TABLET, EXTENDED RELEASE ORAL at 12:22

## 2018-04-15 RX ADMIN — Medication 20 MILLIGRAM(S): at 06:17

## 2018-04-15 RX ADMIN — Medication 650 MILLIGRAM(S): at 06:18

## 2018-04-15 RX ADMIN — Medication 20 MILLIEQUIVALENT(S): at 12:20

## 2018-04-15 RX ADMIN — Medication 650 MILLIGRAM(S): at 12:19

## 2018-04-15 RX ADMIN — WARFARIN SODIUM 1.5 MILLIGRAM(S): 2.5 TABLET ORAL at 21:39

## 2018-04-15 RX ADMIN — ALBUTEROL 2.5 MILLIGRAM(S): 90 AEROSOL, METERED ORAL at 07:35

## 2018-04-15 RX ADMIN — Medication 50 MILLIGRAM(S): at 06:17

## 2018-04-15 RX ADMIN — Medication 650 MILLIGRAM(S): at 13:00

## 2018-04-15 RX ADMIN — ATORVASTATIN CALCIUM 10 MILLIGRAM(S): 80 TABLET, FILM COATED ORAL at 21:39

## 2018-04-15 RX ADMIN — Medication 3 MILLIGRAM(S): at 21:39

## 2018-04-16 LAB
INR BLD: 2.1 RATIO — HIGH (ref 0.65–1.3)
MAGNESIUM SERPL-MCNC: 1.7 MG/DL — LOW (ref 1.8–2.4)
PROTHROM AB SERPL-ACNC: 23 SEC — HIGH (ref 9.95–12.87)

## 2018-04-16 RX ORDER — MAGNESIUM OXIDE 400 MG ORAL TABLET 241.3 MG
400 TABLET ORAL
Qty: 0 | Refills: 0 | Status: DISCONTINUED | OUTPATIENT
Start: 2018-04-16 | End: 2018-04-18

## 2018-04-16 RX ORDER — WARFARIN SODIUM 2.5 MG/1
2 TABLET ORAL AT BEDTIME
Qty: 0 | Refills: 0 | Status: COMPLETED | OUTPATIENT
Start: 2018-04-16 | End: 2018-04-16

## 2018-04-16 RX ADMIN — Medication 650 MILLIGRAM(S): at 15:11

## 2018-04-16 RX ADMIN — Medication 650 MILLIGRAM(S): at 15:10

## 2018-04-16 RX ADMIN — Medication 20 MILLIEQUIVALENT(S): at 11:27

## 2018-04-16 RX ADMIN — ISOSORBIDE MONONITRATE 30 MILLIGRAM(S): 60 TABLET, EXTENDED RELEASE ORAL at 11:27

## 2018-04-16 RX ADMIN — MAGNESIUM OXIDE 400 MG ORAL TABLET 400 MILLIGRAM(S): 241.3 TABLET ORAL at 17:07

## 2018-04-16 RX ADMIN — MAGNESIUM OXIDE 400 MG ORAL TABLET 400 MILLIGRAM(S): 241.3 TABLET ORAL at 11:28

## 2018-04-16 RX ADMIN — MAGNESIUM OXIDE 400 MG ORAL TABLET 400 MILLIGRAM(S): 241.3 TABLET ORAL at 21:45

## 2018-04-16 RX ADMIN — Medication 20 MILLIGRAM(S): at 05:36

## 2018-04-16 RX ADMIN — Medication 50 MILLIGRAM(S): at 05:36

## 2018-04-16 RX ADMIN — Medication 3 MILLIGRAM(S): at 21:45

## 2018-04-16 RX ADMIN — ALBUTEROL 2.5 MILLIGRAM(S): 90 AEROSOL, METERED ORAL at 20:17

## 2018-04-16 RX ADMIN — Medication 650 MILLIGRAM(S): at 11:25

## 2018-04-16 RX ADMIN — ALBUTEROL 2.5 MILLIGRAM(S): 90 AEROSOL, METERED ORAL at 08:42

## 2018-04-16 RX ADMIN — Medication 650 MILLIGRAM(S): at 21:44

## 2018-04-16 RX ADMIN — PANTOPRAZOLE SODIUM 40 MILLIGRAM(S): 20 TABLET, DELAYED RELEASE ORAL at 05:36

## 2018-04-16 RX ADMIN — WARFARIN SODIUM 2 MILLIGRAM(S): 2.5 TABLET ORAL at 21:44

## 2018-04-16 RX ADMIN — ATORVASTATIN CALCIUM 10 MILLIGRAM(S): 80 TABLET, FILM COATED ORAL at 21:45

## 2018-04-16 RX ADMIN — Medication 650 MILLIGRAM(S): at 05:35

## 2018-04-17 LAB
INR BLD: 2.37 RATIO — HIGH (ref 0.65–1.3)
PROTHROM AB SERPL-ACNC: 26 SEC — HIGH (ref 9.95–12.87)

## 2018-04-17 RX ORDER — WARFARIN SODIUM 2.5 MG/1
1.5 TABLET ORAL ONCE
Qty: 0 | Refills: 0 | Status: COMPLETED | OUTPATIENT
Start: 2018-04-17 | End: 2018-04-17

## 2018-04-17 RX ADMIN — Medication 20 MILLIGRAM(S): at 06:05

## 2018-04-17 RX ADMIN — ALBUTEROL 2.5 MILLIGRAM(S): 90 AEROSOL, METERED ORAL at 21:17

## 2018-04-17 RX ADMIN — ISOSORBIDE MONONITRATE 30 MILLIGRAM(S): 60 TABLET, EXTENDED RELEASE ORAL at 12:50

## 2018-04-17 RX ADMIN — ATORVASTATIN CALCIUM 10 MILLIGRAM(S): 80 TABLET, FILM COATED ORAL at 21:33

## 2018-04-17 RX ADMIN — Medication 650 MILLIGRAM(S): at 06:05

## 2018-04-17 RX ADMIN — ALBUTEROL 2.5 MILLIGRAM(S): 90 AEROSOL, METERED ORAL at 08:55

## 2018-04-17 RX ADMIN — Medication 3 MILLIGRAM(S): at 21:33

## 2018-04-17 RX ADMIN — MAGNESIUM OXIDE 400 MG ORAL TABLET 400 MILLIGRAM(S): 241.3 TABLET ORAL at 12:54

## 2018-04-17 RX ADMIN — Medication 650 MILLIGRAM(S): at 21:33

## 2018-04-17 RX ADMIN — WARFARIN SODIUM 1.5 MILLIGRAM(S): 2.5 TABLET ORAL at 21:33

## 2018-04-17 RX ADMIN — MAGNESIUM OXIDE 400 MG ORAL TABLET 400 MILLIGRAM(S): 241.3 TABLET ORAL at 17:44

## 2018-04-17 RX ADMIN — Medication 50 MILLIGRAM(S): at 06:05

## 2018-04-17 RX ADMIN — MAGNESIUM OXIDE 400 MG ORAL TABLET 400 MILLIGRAM(S): 241.3 TABLET ORAL at 12:57

## 2018-04-17 RX ADMIN — PANTOPRAZOLE SODIUM 40 MILLIGRAM(S): 20 TABLET, DELAYED RELEASE ORAL at 06:05

## 2018-04-18 LAB
INR BLD: 2.63 RATIO — HIGH (ref 0.65–1.3)
MAGNESIUM SERPL-MCNC: 1.6 MG/DL — LOW (ref 1.8–2.4)
PROTHROM AB SERPL-ACNC: 29 SEC — HIGH (ref 9.95–12.87)

## 2018-04-18 RX ORDER — WARFARIN SODIUM 2.5 MG/1
1.5 TABLET ORAL
Qty: 45 | Refills: 0
Start: 2018-04-18 | End: 2018-05-17

## 2018-04-18 RX ORDER — LANOLIN ALCOHOL/MO/W.PET/CERES
1 CREAM (GRAM) TOPICAL
Qty: 0 | Refills: 0 | DISCHARGE
Start: 2018-04-18

## 2018-04-18 RX ORDER — ISOSORBIDE MONONITRATE 60 MG/1
1 TABLET, EXTENDED RELEASE ORAL
Qty: 30 | Refills: 0
Start: 2018-04-18 | End: 2018-05-17

## 2018-04-18 RX ORDER — ERGOCALCIFEROL 1.25 MG/1
1 CAPSULE ORAL
Qty: 6 | Refills: 0
Start: 2018-04-18 | End: 2018-06-01

## 2018-04-18 RX ORDER — MAGNESIUM OXIDE 400 MG ORAL TABLET 241.3 MG
400 TABLET ORAL
Qty: 0 | Refills: 0 | Status: DISCONTINUED | OUTPATIENT
Start: 2018-04-18 | End: 2018-04-19

## 2018-04-18 RX ORDER — METOPROLOL TARTRATE 50 MG
1 TABLET ORAL
Qty: 30 | Refills: 0
Start: 2018-04-18 | End: 2018-05-17

## 2018-04-18 RX ORDER — WARFARIN SODIUM 2.5 MG/1
1 TABLET ORAL
Qty: 0 | Refills: 0 | DISCHARGE
Start: 2018-04-18 | End: 2018-05-17

## 2018-04-18 RX ORDER — FUROSEMIDE 40 MG
1 TABLET ORAL
Qty: 0 | Refills: 0 | DISCHARGE
Start: 2018-04-18 | End: 2018-05-17

## 2018-04-18 RX ORDER — WARFARIN SODIUM 2.5 MG/1
0 TABLET ORAL
Qty: 90 | Refills: 0 | COMMUNITY

## 2018-04-18 RX ORDER — WARFARIN SODIUM 2.5 MG/1
1 TABLET ORAL AT BEDTIME
Qty: 0 | Refills: 0 | Status: COMPLETED | OUTPATIENT
Start: 2018-04-18 | End: 2018-04-18

## 2018-04-18 RX ORDER — FUROSEMIDE 40 MG
1 TABLET ORAL
Qty: 30 | Refills: 0
Start: 2018-04-18 | End: 2018-05-17

## 2018-04-18 RX ORDER — MAGNESIUM OXIDE 400 MG ORAL TABLET 241.3 MG
1 TABLET ORAL
Qty: 120 | Refills: 0
Start: 2018-04-18 | End: 2018-05-17

## 2018-04-18 RX ORDER — ATORVASTATIN CALCIUM 80 MG/1
1 TABLET, FILM COATED ORAL
Qty: 30 | Refills: 0
Start: 2018-04-18 | End: 2018-05-17

## 2018-04-18 RX ORDER — MAGNESIUM SULFATE 500 MG/ML
2 VIAL (ML) INJECTION ONCE
Qty: 0 | Refills: 0 | Status: COMPLETED | OUTPATIENT
Start: 2018-04-18 | End: 2018-04-18

## 2018-04-18 RX ADMIN — Medication 650 MILLIGRAM(S): at 14:00

## 2018-04-18 RX ADMIN — MAGNESIUM OXIDE 400 MG ORAL TABLET 400 MILLIGRAM(S): 241.3 TABLET ORAL at 12:49

## 2018-04-18 RX ADMIN — Medication 650 MILLIGRAM(S): at 06:11

## 2018-04-18 RX ADMIN — ATORVASTATIN CALCIUM 10 MILLIGRAM(S): 80 TABLET, FILM COATED ORAL at 21:09

## 2018-04-18 RX ADMIN — MAGNESIUM OXIDE 400 MG ORAL TABLET 400 MILLIGRAM(S): 241.3 TABLET ORAL at 21:09

## 2018-04-18 RX ADMIN — Medication 3 MILLIGRAM(S): at 21:09

## 2018-04-18 RX ADMIN — MAGNESIUM OXIDE 400 MG ORAL TABLET 400 MILLIGRAM(S): 241.3 TABLET ORAL at 08:35

## 2018-04-18 RX ADMIN — ALBUTEROL 2.5 MILLIGRAM(S): 90 AEROSOL, METERED ORAL at 20:57

## 2018-04-18 RX ADMIN — WARFARIN SODIUM 1 MILLIGRAM(S): 2.5 TABLET ORAL at 21:09

## 2018-04-18 RX ADMIN — Medication 20 MILLIGRAM(S): at 06:11

## 2018-04-18 RX ADMIN — Medication 650 MILLIGRAM(S): at 21:09

## 2018-04-18 RX ADMIN — Medication 650 MILLIGRAM(S): at 13:12

## 2018-04-18 RX ADMIN — ERGOCALCIFEROL 50000 UNIT(S): 1.25 CAPSULE ORAL at 12:49

## 2018-04-18 RX ADMIN — Medication 50 MILLIGRAM(S): at 06:11

## 2018-04-18 RX ADMIN — PANTOPRAZOLE SODIUM 40 MILLIGRAM(S): 20 TABLET, DELAYED RELEASE ORAL at 06:11

## 2018-04-18 RX ADMIN — ISOSORBIDE MONONITRATE 30 MILLIGRAM(S): 60 TABLET, EXTENDED RELEASE ORAL at 12:49

## 2018-04-19 RX ADMIN — Medication 650 MILLIGRAM(S): at 05:33

## 2018-04-19 RX ADMIN — Medication 20 MILLIGRAM(S): at 05:33

## 2018-04-19 RX ADMIN — ISOSORBIDE MONONITRATE 30 MILLIGRAM(S): 60 TABLET, EXTENDED RELEASE ORAL at 13:06

## 2018-04-19 RX ADMIN — Medication 50 MILLIGRAM(S): at 05:33

## 2018-04-19 RX ADMIN — Medication 650 MILLIGRAM(S): at 13:06

## 2018-04-19 RX ADMIN — MAGNESIUM OXIDE 400 MG ORAL TABLET 400 MILLIGRAM(S): 241.3 TABLET ORAL at 08:00

## 2018-04-19 RX ADMIN — ALBUTEROL 2.5 MILLIGRAM(S): 90 AEROSOL, METERED ORAL at 08:58

## 2018-04-19 RX ADMIN — Medication 650 MILLIGRAM(S): at 07:00

## 2018-04-19 RX ADMIN — PANTOPRAZOLE SODIUM 40 MILLIGRAM(S): 20 TABLET, DELAYED RELEASE ORAL at 05:34

## 2018-04-19 RX ADMIN — MAGNESIUM OXIDE 400 MG ORAL TABLET 400 MILLIGRAM(S): 241.3 TABLET ORAL at 13:06

## 2018-04-23 ENCOUNTER — OUTPATIENT (OUTPATIENT)
Dept: OUTPATIENT SERVICES | Facility: HOSPITAL | Age: 83
LOS: 1 days | Discharge: HOME | End: 2018-04-23

## 2018-04-23 DIAGNOSIS — I48.91 UNSPECIFIED ATRIAL FIBRILLATION: ICD-10-CM

## 2018-04-23 DIAGNOSIS — Z98.890 OTHER SPECIFIED POSTPROCEDURAL STATES: Chronic | ICD-10-CM

## 2018-04-30 ENCOUNTER — OUTPATIENT (OUTPATIENT)
Dept: OUTPATIENT SERVICES | Facility: HOSPITAL | Age: 83
LOS: 1 days | Discharge: HOME | End: 2018-04-30

## 2018-04-30 DIAGNOSIS — I48.91 UNSPECIFIED ATRIAL FIBRILLATION: ICD-10-CM

## 2018-04-30 DIAGNOSIS — Z98.890 OTHER SPECIFIED POSTPROCEDURAL STATES: Chronic | ICD-10-CM

## 2018-05-01 DIAGNOSIS — L60.3 NAIL DYSTROPHY: ICD-10-CM

## 2018-05-01 DIAGNOSIS — S70.01XD CONTUSION OF RIGHT HIP, SUBSEQUENT ENCOUNTER: ICD-10-CM

## 2018-05-01 DIAGNOSIS — I27.20 PULMONARY HYPERTENSION, UNSPECIFIED: ICD-10-CM

## 2018-05-01 DIAGNOSIS — S42.031D DISPLACED FRACTURE OF LATERAL END OF RIGHT CLAVICLE, SUBSEQUENT ENCOUNTER FOR FRACTURE WITH ROUTINE HEALING: ICD-10-CM

## 2018-05-01 DIAGNOSIS — E78.5 HYPERLIPIDEMIA, UNSPECIFIED: ICD-10-CM
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
## 2018-05-01 DIAGNOSIS — I50.9 HEART FAILURE, UNSPECIFIED: ICD-10-CM

## 2018-05-01 DIAGNOSIS — I11.0 HYPERTENSIVE HEART DISEASE WITH HEART FAILURE: ICD-10-CM

## 2018-05-01 DIAGNOSIS — I48.92 UNSPECIFIED ATRIAL FLUTTER: ICD-10-CM

## 2018-05-01 DIAGNOSIS — S42.001D FRACTURE OF UNSPECIFIED PART OF RIGHT CLAVICLE, SUBSEQUENT ENCOUNTER FOR FRACTURE WITH ROUTINE HEALING: ICD-10-CM

## 2018-05-01 DIAGNOSIS — I48.91 UNSPECIFIED ATRIAL FIBRILLATION: ICD-10-CM

## 2018-05-01 DIAGNOSIS — S76.011D STRAIN OF MUSCLE, FASCIA AND TENDON OF RIGHT HIP, SUBSEQUENT ENCOUNTER: ICD-10-CM

## 2018-05-01 DIAGNOSIS — B35.1 TINEA UNGUIUM: ICD-10-CM

## 2018-05-01 DIAGNOSIS — S22.41XD MULTIPLE FRACTURES OF RIBS, RIGHT SIDE, SUBSEQUENT ENCOUNTER FOR FRACTURE WITH ROUTINE HEALING: ICD-10-CM

## 2018-05-14 ENCOUNTER — OUTPATIENT (OUTPATIENT)
Dept: OUTPATIENT SERVICES | Facility: HOSPITAL | Age: 83
LOS: 1 days | Discharge: HOME | End: 2018-05-14

## 2018-05-14 DIAGNOSIS — Z98.890 OTHER SPECIFIED POSTPROCEDURAL STATES: Chronic | ICD-10-CM

## 2018-05-14 DIAGNOSIS — I48.91 UNSPECIFIED ATRIAL FIBRILLATION: ICD-10-CM

## 2018-05-14 DIAGNOSIS — E56.9 VITAMIN DEFICIENCY, UNSPECIFIED: ICD-10-CM

## 2018-05-14 DIAGNOSIS — E83.40 DISORDERS OF MAGNESIUM METABOLISM, UNSPECIFIED: ICD-10-CM

## 2018-05-14 DIAGNOSIS — N18.2 CHRONIC KIDNEY DISEASE, STAGE 2 (MILD): ICD-10-CM

## 2018-05-14 DIAGNOSIS — Z79.899 OTHER LONG TERM (CURRENT) DRUG THERAPY: ICD-10-CM

## 2018-05-14 DIAGNOSIS — E55.9 VITAMIN D DEFICIENCY, UNSPECIFIED: ICD-10-CM

## 2018-05-29 ENCOUNTER — OUTPATIENT (OUTPATIENT)
Dept: OUTPATIENT SERVICES | Facility: HOSPITAL | Age: 83
LOS: 1 days | Discharge: HOME | End: 2018-05-29

## 2018-05-29 DIAGNOSIS — E03.9 HYPOTHYROIDISM, UNSPECIFIED: ICD-10-CM

## 2018-05-29 DIAGNOSIS — I48.91 UNSPECIFIED ATRIAL FIBRILLATION: ICD-10-CM

## 2018-05-29 DIAGNOSIS — E53.8 DEFICIENCY OF OTHER SPECIFIED B GROUP VITAMINS: ICD-10-CM

## 2018-05-29 DIAGNOSIS — Z98.890 OTHER SPECIFIED POSTPROCEDURAL STATES: Chronic | ICD-10-CM

## 2018-05-29 DIAGNOSIS — E11.9 TYPE 2 DIABETES MELLITUS WITHOUT COMPLICATIONS: ICD-10-CM

## 2018-05-29 DIAGNOSIS — D64.9 ANEMIA, UNSPECIFIED: ICD-10-CM

## 2018-05-29 DIAGNOSIS — E78.2 MIXED HYPERLIPIDEMIA: ICD-10-CM

## 2018-05-29 DIAGNOSIS — E55.9 VITAMIN D DEFICIENCY, UNSPECIFIED: ICD-10-CM

## 2018-06-11 ENCOUNTER — OUTPATIENT (OUTPATIENT)
Dept: OUTPATIENT SERVICES | Facility: HOSPITAL | Age: 83
LOS: 1 days | Discharge: HOME | End: 2018-06-11

## 2018-06-11 DIAGNOSIS — Z98.890 OTHER SPECIFIED POSTPROCEDURAL STATES: Chronic | ICD-10-CM

## 2018-06-11 DIAGNOSIS — I48.91 UNSPECIFIED ATRIAL FIBRILLATION: ICD-10-CM

## 2018-06-11 DIAGNOSIS — E11.9 TYPE 2 DIABETES MELLITUS WITHOUT COMPLICATIONS: ICD-10-CM

## 2018-06-25 ENCOUNTER — OUTPATIENT (OUTPATIENT)
Dept: OUTPATIENT SERVICES | Facility: HOSPITAL | Age: 83
LOS: 1 days | Discharge: HOME | End: 2018-06-25

## 2018-06-25 DIAGNOSIS — Z98.890 OTHER SPECIFIED POSTPROCEDURAL STATES: Chronic | ICD-10-CM

## 2018-06-25 DIAGNOSIS — E11.9 TYPE 2 DIABETES MELLITUS WITHOUT COMPLICATIONS: ICD-10-CM

## 2018-06-25 DIAGNOSIS — I48.91 UNSPECIFIED ATRIAL FIBRILLATION: ICD-10-CM

## 2018-07-09 ENCOUNTER — OUTPATIENT (OUTPATIENT)
Dept: OUTPATIENT SERVICES | Facility: HOSPITAL | Age: 83
LOS: 1 days | Discharge: HOME | End: 2018-07-09

## 2018-07-09 DIAGNOSIS — Z98.890 OTHER SPECIFIED POSTPROCEDURAL STATES: Chronic | ICD-10-CM

## 2018-07-09 DIAGNOSIS — I48.91 UNSPECIFIED ATRIAL FIBRILLATION: ICD-10-CM

## 2018-07-23 ENCOUNTER — OUTPATIENT (OUTPATIENT)
Dept: OUTPATIENT SERVICES | Facility: HOSPITAL | Age: 83
LOS: 1 days | Discharge: HOME | End: 2018-07-23

## 2018-07-23 DIAGNOSIS — Z98.890 OTHER SPECIFIED POSTPROCEDURAL STATES: Chronic | ICD-10-CM

## 2018-07-23 DIAGNOSIS — I48.91 UNSPECIFIED ATRIAL FIBRILLATION: ICD-10-CM

## 2018-07-30 ENCOUNTER — OUTPATIENT (OUTPATIENT)
Dept: OUTPATIENT SERVICES | Facility: HOSPITAL | Age: 83
LOS: 1 days | Discharge: HOME | End: 2018-07-30

## 2018-07-30 DIAGNOSIS — I48.91 UNSPECIFIED ATRIAL FIBRILLATION: ICD-10-CM

## 2018-07-30 DIAGNOSIS — Z98.890 OTHER SPECIFIED POSTPROCEDURAL STATES: Chronic | ICD-10-CM

## 2018-08-06 ENCOUNTER — OUTPATIENT (OUTPATIENT)
Dept: OUTPATIENT SERVICES | Facility: HOSPITAL | Age: 83
LOS: 1 days | Discharge: HOME | End: 2018-08-06

## 2018-08-06 DIAGNOSIS — I48.91 UNSPECIFIED ATRIAL FIBRILLATION: ICD-10-CM

## 2018-08-06 DIAGNOSIS — Z98.890 OTHER SPECIFIED POSTPROCEDURAL STATES: Chronic | ICD-10-CM

## 2018-08-20 ENCOUNTER — OUTPATIENT (OUTPATIENT)
Dept: OUTPATIENT SERVICES | Facility: HOSPITAL | Age: 83
LOS: 1 days | Discharge: HOME | End: 2018-08-20

## 2018-08-20 DIAGNOSIS — I48.91 UNSPECIFIED ATRIAL FIBRILLATION: ICD-10-CM

## 2018-08-20 DIAGNOSIS — Z98.890 OTHER SPECIFIED POSTPROCEDURAL STATES: Chronic | ICD-10-CM

## 2018-09-04 ENCOUNTER — OUTPATIENT (OUTPATIENT)
Dept: OUTPATIENT SERVICES | Facility: HOSPITAL | Age: 83
LOS: 1 days | Discharge: HOME | End: 2018-09-04

## 2018-09-04 DIAGNOSIS — I48.91 UNSPECIFIED ATRIAL FIBRILLATION: ICD-10-CM

## 2018-09-04 DIAGNOSIS — Z98.890 OTHER SPECIFIED POSTPROCEDURAL STATES: Chronic | ICD-10-CM

## 2018-09-17 ENCOUNTER — OUTPATIENT (OUTPATIENT)
Dept: OUTPATIENT SERVICES | Facility: HOSPITAL | Age: 83
LOS: 1 days | Discharge: HOME | End: 2018-09-17

## 2018-09-17 DIAGNOSIS — E53.8 DEFICIENCY OF OTHER SPECIFIED B GROUP VITAMINS: ICD-10-CM

## 2018-09-17 DIAGNOSIS — Z98.890 OTHER SPECIFIED POSTPROCEDURAL STATES: Chronic | ICD-10-CM

## 2018-09-17 DIAGNOSIS — I48.91 UNSPECIFIED ATRIAL FIBRILLATION: ICD-10-CM

## 2018-09-17 DIAGNOSIS — E83.40 DISORDERS OF MAGNESIUM METABOLISM, UNSPECIFIED: ICD-10-CM

## 2018-09-17 DIAGNOSIS — Z79.899 OTHER LONG TERM (CURRENT) DRUG THERAPY: ICD-10-CM

## 2018-09-17 DIAGNOSIS — E11.9 TYPE 2 DIABETES MELLITUS WITHOUT COMPLICATIONS: ICD-10-CM

## 2018-09-17 DIAGNOSIS — E56.9 VITAMIN DEFICIENCY, UNSPECIFIED: ICD-10-CM

## 2018-09-17 DIAGNOSIS — Z13.9 ENCOUNTER FOR SCREENING, UNSPECIFIED: ICD-10-CM

## 2018-09-17 DIAGNOSIS — M12.9 ARTHROPATHY, UNSPECIFIED: ICD-10-CM

## 2018-09-17 DIAGNOSIS — N18.2 CHRONIC KIDNEY DISEASE, STAGE 2 (MILD): ICD-10-CM

## 2018-09-17 DIAGNOSIS — E03.9 HYPOTHYROIDISM, UNSPECIFIED: ICD-10-CM

## 2018-09-24 ENCOUNTER — OUTPATIENT (OUTPATIENT)
Dept: OUTPATIENT SERVICES | Facility: HOSPITAL | Age: 83
LOS: 1 days | Discharge: HOME | End: 2018-09-24

## 2018-09-24 DIAGNOSIS — Z98.890 OTHER SPECIFIED POSTPROCEDURAL STATES: Chronic | ICD-10-CM

## 2018-09-24 DIAGNOSIS — I48.91 UNSPECIFIED ATRIAL FIBRILLATION: ICD-10-CM

## 2018-10-01 ENCOUNTER — OUTPATIENT (OUTPATIENT)
Dept: OUTPATIENT SERVICES | Facility: HOSPITAL | Age: 83
LOS: 1 days | Discharge: HOME | End: 2018-10-01

## 2018-10-01 DIAGNOSIS — Z98.890 OTHER SPECIFIED POSTPROCEDURAL STATES: Chronic | ICD-10-CM

## 2018-10-01 DIAGNOSIS — I48.91 UNSPECIFIED ATRIAL FIBRILLATION: ICD-10-CM

## 2018-10-15 ENCOUNTER — OUTPATIENT (OUTPATIENT)
Dept: OUTPATIENT SERVICES | Facility: HOSPITAL | Age: 83
LOS: 1 days | Discharge: HOME | End: 2018-10-15

## 2018-10-15 DIAGNOSIS — I48.91 UNSPECIFIED ATRIAL FIBRILLATION: ICD-10-CM

## 2018-10-15 DIAGNOSIS — Z98.890 OTHER SPECIFIED POSTPROCEDURAL STATES: Chronic | ICD-10-CM

## 2018-10-15 LAB
ALBUMIN SERPL ELPH-MCNC: 3.9 G/DL — SIGNIFICANT CHANGE UP (ref 3.5–5.2)
ALP SERPL-CCNC: 125 U/L — HIGH (ref 30–115)
ALT FLD-CCNC: 30 U/L — SIGNIFICANT CHANGE UP (ref 0–41)
ANION GAP SERPL CALC-SCNC: 17 MMOL/L — HIGH (ref 7–14)
AST SERPL-CCNC: 31 U/L — SIGNIFICANT CHANGE UP (ref 0–41)
BASOPHILS # BLD AUTO: 0.03 K/UL — SIGNIFICANT CHANGE UP (ref 0–0.2)
BASOPHILS NFR BLD AUTO: 0.4 % — SIGNIFICANT CHANGE UP (ref 0–1)
BILIRUB SERPL-MCNC: 0.6 MG/DL — SIGNIFICANT CHANGE UP (ref 0.2–1.2)
BUN SERPL-MCNC: 48 MG/DL — HIGH (ref 10–20)
CALCIUM SERPL-MCNC: 6.6 MG/DL — LOW (ref 8.5–10.1)
CHLORIDE SERPL-SCNC: 102 MMOL/L — SIGNIFICANT CHANGE UP (ref 98–110)
CO2 SERPL-SCNC: 23 MMOL/L — SIGNIFICANT CHANGE UP (ref 17–32)
CREAT SERPL-MCNC: 1.6 MG/DL — HIGH (ref 0.7–1.5)
EOSINOPHIL # BLD AUTO: 0.51 K/UL — SIGNIFICANT CHANGE UP (ref 0–0.7)
EOSINOPHIL NFR BLD AUTO: 7.5 % — SIGNIFICANT CHANGE UP (ref 0–8)
GGT SERPL-CCNC: 25 U/L — SIGNIFICANT CHANGE UP (ref 1–40)
GLUCOSE SERPL-MCNC: 88 MG/DL — SIGNIFICANT CHANGE UP (ref 70–99)
HCT VFR BLD CALC: 33.4 % — LOW (ref 42–52)
HGB BLD-MCNC: 10 G/DL — LOW (ref 14–18)
IMM GRANULOCYTES NFR BLD AUTO: 0.1 % — SIGNIFICANT CHANGE UP (ref 0.1–0.3)
LYMPHOCYTES # BLD AUTO: 2.15 K/UL — SIGNIFICANT CHANGE UP (ref 1.2–3.4)
LYMPHOCYTES # BLD AUTO: 31.7 % — SIGNIFICANT CHANGE UP (ref 20.5–51.1)
MCHC RBC-ENTMCNC: 18.3 PG — LOW (ref 27–31)
MCHC RBC-ENTMCNC: 29.9 G/DL — LOW (ref 32–37)
MCV RBC AUTO: 61.3 FL — LOW (ref 80–94)
MONOCYTES # BLD AUTO: 0.63 K/UL — HIGH (ref 0.1–0.6)
MONOCYTES NFR BLD AUTO: 9.3 % — SIGNIFICANT CHANGE UP (ref 1.7–9.3)
NEUTROPHILS # BLD AUTO: 3.46 K/UL — SIGNIFICANT CHANGE UP (ref 1.4–6.5)
NEUTROPHILS NFR BLD AUTO: 51 % — SIGNIFICANT CHANGE UP (ref 42.2–75.2)
NRBC # BLD: 0 /100 WBCS — SIGNIFICANT CHANGE UP (ref 0–0)
PLATELET # BLD AUTO: 188 K/UL — SIGNIFICANT CHANGE UP (ref 130–400)
POTASSIUM SERPL-MCNC: 4.6 MMOL/L — SIGNIFICANT CHANGE UP (ref 3.5–5)
POTASSIUM SERPL-SCNC: 4.6 MMOL/L — SIGNIFICANT CHANGE UP (ref 3.5–5)
PROT SERPL-MCNC: 7 G/DL — SIGNIFICANT CHANGE UP (ref 6–8)
RBC # BLD: 5.45 M/UL — SIGNIFICANT CHANGE UP (ref 4.7–6.1)
RBC # FLD: 17.2 % — HIGH (ref 11.5–14.5)
SODIUM SERPL-SCNC: 142 MMOL/L — SIGNIFICANT CHANGE UP (ref 135–146)
TSH SERPL-MCNC: 8.33 UIU/ML — HIGH (ref 0.27–4.2)
WBC # BLD: 6.79 K/UL — SIGNIFICANT CHANGE UP (ref 4.8–10.8)
WBC # FLD AUTO: 6.79 K/UL — SIGNIFICANT CHANGE UP (ref 4.8–10.8)

## 2018-10-29 ENCOUNTER — OUTPATIENT (OUTPATIENT)
Dept: OUTPATIENT SERVICES | Facility: HOSPITAL | Age: 83
LOS: 1 days | Discharge: HOME | End: 2018-10-29

## 2018-10-29 DIAGNOSIS — I48.91 UNSPECIFIED ATRIAL FIBRILLATION: ICD-10-CM

## 2018-10-29 DIAGNOSIS — Z98.890 OTHER SPECIFIED POSTPROCEDURAL STATES: Chronic | ICD-10-CM

## 2018-11-12 ENCOUNTER — OUTPATIENT (OUTPATIENT)
Dept: OUTPATIENT SERVICES | Facility: HOSPITAL | Age: 83
LOS: 1 days | Discharge: HOME | End: 2018-11-12

## 2018-11-12 DIAGNOSIS — I48.91 UNSPECIFIED ATRIAL FIBRILLATION: ICD-10-CM

## 2018-11-12 DIAGNOSIS — Z98.890 OTHER SPECIFIED POSTPROCEDURAL STATES: Chronic | ICD-10-CM

## 2018-11-18 NOTE — DISCHARGE NOTE ADULT - INSTRUCTIONS
DATE OF SURGERY:  07/25/2018   SURGEON:  Chris Cruz MD      PREOPERATIVE DIAGNOSIS:  Left foot gangrene.      POSTOPERATIVE DIAGNOSIS:  Left foot gangrene.      PROCEDURE PERFORMED:  Left leg angiography with femoral popliteal   revascularization using stent.      ANESTHESIA:  I supervised administration of conscious sedation utilizing   intravenous Versed and fentanyl while nurse continued to monitor the patient.      TOTAL SEDATION TIME:  60 minutes.      INDICATION:  The patient is a 70-year-old woman, who has gangrene of the toes   on her left foot.  She presents now for diagnostic and therapeutic   intervention on the left leg.  After discussing risks, benefits, alternatives   consent was obtained.      DESCRIPTION OF PROCEDURE:  After the patient correctly identified and   consented, she was taken to the Interventional Radiology suite and placed in   supine position.  After the administration of sedation, the patient's right   groin was prepped and draped in usual sterile fashion and infiltrated with   local anesthetic.  Ultrasound was used to identify the right common femoral   artery, which was found to be patent with moderate to severe amount of   circumferential calcium and plaque.  An area that appeared to be with less   disease anteriorly was identified and the artery was punctured using   ultrasound guidance after a representative image had been saved.  Using the   Seldinger technique, a 5-Beninese sheath was then placed, through which an Omni   Flush catheter was advanced to come up and over the aortic bifurcation from   the right side to the left side.  The catheter was advanced to the level of   the left femoral head and multiple injections of contrast were given with   imaging obtained over the left leg.  The left distal external iliac common   femoral and profunda femoral arteries are widely patent.  There is   circumferential calcium noted in the external iliac.  However, there is no    evidence of stenosis.  The profunda femoral artery provided the main flow to   the remaining portion of the leg as the superficial femoral artery completely   occludes approximately 2 cm distal from its takeoff.  There is reconstitution   of the femoral artery at the level of Paulo's canal through profunda   collaterals.  The popliteal artery is moderately diseased to the level of the   knee, at which point it remains patent and is otherwise relatively healthy   appearing.  The anterior tibial artery occludes proximally and reconstitutes   at the ankle to collaterals.  The tibioperoneal trunk and the posterior tibial   artery and peroneal arteries are the main runoff to the foot.  The patient was   then systemically heparinized and a 6-Divehi __________ sheath was then placed   to support an angled Glidewire being manipulated through a crossing catheter   to cross the entire length of the occlusion using subintimal dissection   technique.  Once intraluminal re-entry was confirmed with angiography, the   entire treatment length was then ballooned with a 6 mm balloon.  This was to   allow stenting of this area, which was done with a bare metal stent.  First, a   6 mm x 200 mm followed by a 6 mm x 120 mm stent with maximal overlap were then   deployed.  The entire stented area as well as the popliteal artery that was   diseased was ballooned and was angioplastied and a followup angiogram   demonstrates an excellent result with brisk flow through this femoral artery   as well as the profunda femoral artery.  There is no evidence of dissection or   distal embolization.  The wire and sheath were then removed with a Vascade   closure device and 10 minutes of manual pressure utilized to obtain hemostasis   in the groin.  The wound was then cleaned, dried, and dressed using gauze and   Tegaderm.  The patient appeared to tolerate the procedure well.  There were no   immediate complications.  A total of 35 mL of contrast  was used for the case.   The patient was loaded with Plavix and then taken to the recovery room.                  MD TESHA Oscar/Anuj     DD:  07/26/2018 11:40:31 / DT:  07/26/2018 22:40:04     Job #:   [id number]         Right Shoulder sling on at all times heartt healthy, low sodium

## 2018-11-26 ENCOUNTER — OUTPATIENT (OUTPATIENT)
Dept: OUTPATIENT SERVICES | Facility: HOSPITAL | Age: 83
LOS: 1 days | Discharge: HOME | End: 2018-11-26

## 2018-11-26 DIAGNOSIS — Z98.890 OTHER SPECIFIED POSTPROCEDURAL STATES: Chronic | ICD-10-CM

## 2018-11-26 DIAGNOSIS — I48.91 UNSPECIFIED ATRIAL FIBRILLATION: ICD-10-CM

## 2018-11-29 ENCOUNTER — OUTPATIENT (OUTPATIENT)
Dept: OUTPATIENT SERVICES | Facility: HOSPITAL | Age: 83
LOS: 1 days | Discharge: HOME | End: 2018-11-29

## 2018-11-29 DIAGNOSIS — Z98.890 OTHER SPECIFIED POSTPROCEDURAL STATES: Chronic | ICD-10-CM

## 2018-11-29 DIAGNOSIS — I48.91 UNSPECIFIED ATRIAL FIBRILLATION: ICD-10-CM

## 2018-12-10 ENCOUNTER — OUTPATIENT (OUTPATIENT)
Dept: OUTPATIENT SERVICES | Facility: HOSPITAL | Age: 83
LOS: 1 days | Discharge: HOME | End: 2018-12-10

## 2018-12-10 DIAGNOSIS — I48.91 UNSPECIFIED ATRIAL FIBRILLATION: ICD-10-CM

## 2018-12-10 DIAGNOSIS — Z98.890 OTHER SPECIFIED POSTPROCEDURAL STATES: Chronic | ICD-10-CM

## 2018-12-24 ENCOUNTER — OUTPATIENT (OUTPATIENT)
Dept: OUTPATIENT SERVICES | Facility: HOSPITAL | Age: 83
LOS: 1 days | Discharge: HOME | End: 2018-12-24

## 2018-12-24 DIAGNOSIS — I48.91 UNSPECIFIED ATRIAL FIBRILLATION: ICD-10-CM

## 2018-12-24 DIAGNOSIS — Z98.890 OTHER SPECIFIED POSTPROCEDURAL STATES: Chronic | ICD-10-CM

## 2019-01-07 ENCOUNTER — OUTPATIENT (OUTPATIENT)
Dept: OUTPATIENT SERVICES | Facility: HOSPITAL | Age: 84
LOS: 1 days | Discharge: HOME | End: 2019-01-07

## 2019-01-07 DIAGNOSIS — Z98.890 OTHER SPECIFIED POSTPROCEDURAL STATES: Chronic | ICD-10-CM

## 2019-01-07 DIAGNOSIS — I48.91 UNSPECIFIED ATRIAL FIBRILLATION: ICD-10-CM

## 2019-01-21 ENCOUNTER — OUTPATIENT (OUTPATIENT)
Dept: OUTPATIENT SERVICES | Facility: HOSPITAL | Age: 84
LOS: 1 days | Discharge: HOME | End: 2019-01-21

## 2019-01-21 DIAGNOSIS — Z02.89 ENCOUNTER FOR OTHER ADMINISTRATIVE EXAMINATIONS: ICD-10-CM

## 2019-01-21 DIAGNOSIS — R68.89 OTHER GENERAL SYMPTOMS AND SIGNS: ICD-10-CM

## 2019-01-21 DIAGNOSIS — E75.6 LIPID STORAGE DISORDER, UNSPECIFIED: ICD-10-CM

## 2019-01-21 DIAGNOSIS — Z98.890 OTHER SPECIFIED POSTPROCEDURAL STATES: Chronic | ICD-10-CM

## 2019-01-21 DIAGNOSIS — I48.91 UNSPECIFIED ATRIAL FIBRILLATION: ICD-10-CM

## 2019-01-21 DIAGNOSIS — N25.9 DISORDER RESULTING FROM IMPAIRED RENAL TUBULAR FUNCTION, UNSPECIFIED: ICD-10-CM

## 2019-01-29 ENCOUNTER — INPATIENT (INPATIENT)
Facility: HOSPITAL | Age: 84
LOS: 7 days | Discharge: HOME HEALTH | End: 2019-02-06
Attending: HOSPITALIST | Admitting: HOSPITALIST

## 2019-01-29 VITALS
SYSTOLIC BLOOD PRESSURE: 160 MMHG | RESPIRATION RATE: 16 BRPM | OXYGEN SATURATION: 98 % | DIASTOLIC BLOOD PRESSURE: 92 MMHG | HEART RATE: 87 BPM | TEMPERATURE: 94 F

## 2019-01-29 DIAGNOSIS — Z79.01 LONG TERM (CURRENT) USE OF ANTICOAGULANTS: ICD-10-CM

## 2019-01-29 DIAGNOSIS — I48.91 UNSPECIFIED ATRIAL FIBRILLATION: ICD-10-CM

## 2019-01-29 DIAGNOSIS — E78.00 PURE HYPERCHOLESTEROLEMIA, UNSPECIFIED: ICD-10-CM

## 2019-01-29 DIAGNOSIS — I50.22 CHRONIC SYSTOLIC (CONGESTIVE) HEART FAILURE: ICD-10-CM

## 2019-01-29 DIAGNOSIS — N18.3 CHRONIC KIDNEY DISEASE, STAGE 3 (MODERATE): ICD-10-CM

## 2019-01-29 DIAGNOSIS — E87.5 HYPERKALEMIA: ICD-10-CM

## 2019-01-29 DIAGNOSIS — E43 UNSPECIFIED SEVERE PROTEIN-CALORIE MALNUTRITION: ICD-10-CM

## 2019-01-29 DIAGNOSIS — Z98.890 OTHER SPECIFIED POSTPROCEDURAL STATES: Chronic | ICD-10-CM

## 2019-01-29 DIAGNOSIS — Z28.21 IMMUNIZATION NOT CARRIED OUT BECAUSE OF PATIENT REFUSAL: ICD-10-CM

## 2019-01-29 DIAGNOSIS — T50.2X5A ADVERSE EFFECT OF CARBONIC-ANHYDRASE INHIBITORS, BENZOTHIADIAZIDES AND OTHER DIURETICS, INITIAL ENCOUNTER: ICD-10-CM

## 2019-01-29 DIAGNOSIS — E83.42 HYPOMAGNESEMIA: ICD-10-CM

## 2019-01-29 DIAGNOSIS — I45.81 LONG QT SYNDROME: ICD-10-CM

## 2019-01-29 DIAGNOSIS — R41.0 DISORIENTATION, UNSPECIFIED: ICD-10-CM

## 2019-01-29 DIAGNOSIS — I42.8 OTHER CARDIOMYOPATHIES: ICD-10-CM

## 2019-01-29 DIAGNOSIS — E83.51 HYPOCALCEMIA: ICD-10-CM

## 2019-01-29 DIAGNOSIS — I27.20 PULMONARY HYPERTENSION, UNSPECIFIED: ICD-10-CM

## 2019-01-29 DIAGNOSIS — I25.10 ATHEROSCLEROTIC HEART DISEASE OF NATIVE CORONARY ARTERY WITHOUT ANGINA PECTORIS: ICD-10-CM

## 2019-01-29 DIAGNOSIS — E03.9 HYPOTHYROIDISM, UNSPECIFIED: ICD-10-CM

## 2019-01-29 DIAGNOSIS — I48.92 UNSPECIFIED ATRIAL FLUTTER: ICD-10-CM

## 2019-01-29 DIAGNOSIS — Z98.890 OTHER SPECIFIED POSTPROCEDURAL STATES: ICD-10-CM

## 2019-01-29 DIAGNOSIS — I13.0 HYPERTENSIVE HEART AND CHRONIC KIDNEY DISEASE WITH HEART FAILURE AND STAGE 1 THROUGH STAGE 4 CHRONIC KIDNEY DISEASE, OR UNSPECIFIED CHRONIC KIDNEY DISEASE: ICD-10-CM

## 2019-01-29 DIAGNOSIS — R10.9 UNSPECIFIED ABDOMINAL PAIN: ICD-10-CM

## 2019-01-29 DIAGNOSIS — D50.9 IRON DEFICIENCY ANEMIA, UNSPECIFIED: ICD-10-CM

## 2019-01-29 LAB
ALBUMIN SERPL ELPH-MCNC: 3.4 G/DL — LOW (ref 3.5–5.2)
ALP SERPL-CCNC: 104 U/L — SIGNIFICANT CHANGE UP (ref 30–115)
ALT FLD-CCNC: 16 U/L — SIGNIFICANT CHANGE UP (ref 0–41)
ANION GAP SERPL CALC-SCNC: 24 MMOL/L — HIGH (ref 7–14)
APPEARANCE UR: CLEAR — SIGNIFICANT CHANGE UP
AST SERPL-CCNC: 28 U/L — SIGNIFICANT CHANGE UP (ref 0–41)
BASOPHILS # BLD AUTO: 0.01 K/UL — SIGNIFICANT CHANGE UP (ref 0–0.2)
BASOPHILS NFR BLD AUTO: 0.2 % — SIGNIFICANT CHANGE UP (ref 0–1)
BILIRUB SERPL-MCNC: 1 MG/DL — SIGNIFICANT CHANGE UP (ref 0.2–1.2)
BILIRUB UR-MCNC: NEGATIVE — SIGNIFICANT CHANGE UP
BUN SERPL-MCNC: 41 MG/DL — HIGH (ref 10–20)
CALCIUM SERPL-MCNC: 5.3 MG/DL — CRITICAL LOW (ref 8.5–10.1)
CHLORIDE SERPL-SCNC: 94 MMOL/L — LOW (ref 98–110)
CHOLEST SERPL-MCNC: 82 MG/DL — LOW (ref 100–200)
CO2 SERPL-SCNC: 23 MMOL/L — SIGNIFICANT CHANGE UP (ref 17–32)
COLOR SPEC: YELLOW — SIGNIFICANT CHANGE UP
CREAT SERPL-MCNC: 1.5 MG/DL — SIGNIFICANT CHANGE UP (ref 0.7–1.5)
DIFF PNL FLD: NEGATIVE — SIGNIFICANT CHANGE UP
EOSINOPHIL # BLD AUTO: 0.01 K/UL — SIGNIFICANT CHANGE UP (ref 0–0.7)
EOSINOPHIL NFR BLD AUTO: 0.2 % — SIGNIFICANT CHANGE UP (ref 0–8)
GLUCOSE SERPL-MCNC: 167 MG/DL — HIGH (ref 70–99)
GLUCOSE UR QL: NEGATIVE MG/DL — SIGNIFICANT CHANGE UP
HCT VFR BLD CALC: 32.5 % — LOW (ref 42–52)
HDLC SERPL-MCNC: 26 MG/DL — LOW
HGB BLD-MCNC: 10 G/DL — LOW (ref 14–18)
IMM GRANULOCYTES NFR BLD AUTO: 1.4 % — HIGH (ref 0.1–0.3)
KETONES UR-MCNC: NEGATIVE — SIGNIFICANT CHANGE UP
LEUKOCYTE ESTERASE UR-ACNC: ABNORMAL
LIDOCAIN IGE QN: 36 U/L — SIGNIFICANT CHANGE UP (ref 7–60)
LIPID PNL WITH DIRECT LDL SERPL: 45 MG/DL — SIGNIFICANT CHANGE UP (ref 4–129)
LYMPHOCYTES # BLD AUTO: 0.49 K/UL — LOW (ref 1.2–3.4)
LYMPHOCYTES # BLD AUTO: 8.4 % — LOW (ref 20.5–51.1)
MAGNESIUM SERPL-MCNC: 0.4 MG/DL — CRITICAL LOW (ref 1.8–2.4)
MCHC RBC-ENTMCNC: 18.3 PG — LOW (ref 27–31)
MCHC RBC-ENTMCNC: 30.8 G/DL — LOW (ref 32–37)
MCV RBC AUTO: 59.6 FL — LOW (ref 80–94)
MONOCYTES # BLD AUTO: 0.4 K/UL — SIGNIFICANT CHANGE UP (ref 0.1–0.6)
MONOCYTES NFR BLD AUTO: 6.8 % — SIGNIFICANT CHANGE UP (ref 1.7–9.3)
NEUTROPHILS # BLD AUTO: 4.86 K/UL — SIGNIFICANT CHANGE UP (ref 1.4–6.5)
NEUTROPHILS NFR BLD AUTO: 83 % — HIGH (ref 42.2–75.2)
NITRITE UR-MCNC: NEGATIVE — SIGNIFICANT CHANGE UP
NRBC # BLD: 0 /100 WBCS — SIGNIFICANT CHANGE UP (ref 0–0)
PH UR: 6 — SIGNIFICANT CHANGE UP (ref 5–8)
PHOSPHATE SERPL-MCNC: 5.5 MG/DL — HIGH (ref 2.1–4.9)
PLATELET # BLD AUTO: 290 K/UL — SIGNIFICANT CHANGE UP (ref 130–400)
POTASSIUM SERPL-MCNC: 3.6 MMOL/L — SIGNIFICANT CHANGE UP (ref 3.5–5)
POTASSIUM SERPL-SCNC: 3.6 MMOL/L — SIGNIFICANT CHANGE UP (ref 3.5–5)
PROT SERPL-MCNC: 6.7 G/DL — SIGNIFICANT CHANGE UP (ref 6–8)
PROT UR-MCNC: NEGATIVE MG/DL — SIGNIFICANT CHANGE UP
RBC # BLD: 5.45 M/UL — SIGNIFICANT CHANGE UP (ref 4.7–6.1)
RBC # FLD: 19.2 % — HIGH (ref 11.5–14.5)
SODIUM SERPL-SCNC: 141 MMOL/L — SIGNIFICANT CHANGE UP (ref 135–146)
SP GR SPEC: 1.01 — SIGNIFICANT CHANGE UP (ref 1.01–1.03)
TOTAL CHOLESTEROL/HDL RATIO MEASUREMENT: 3.2 RATIO — LOW (ref 4–5.5)
TRIGL SERPL-MCNC: 78 MG/DL — SIGNIFICANT CHANGE UP (ref 10–149)
TROPONIN T SERPL-MCNC: 0.02 NG/ML — HIGH
UROBILINOGEN FLD QL: 1 MG/DL (ref 0.2–0.2)
WBC # BLD: 5.85 K/UL — SIGNIFICANT CHANGE UP (ref 4.8–10.8)
WBC # FLD AUTO: 5.85 K/UL — SIGNIFICANT CHANGE UP (ref 4.8–10.8)
WBC UR QL: ABNORMAL /HPF

## 2019-01-29 RX ORDER — MAGNESIUM SULFATE 500 MG/ML
2 VIAL (ML) INJECTION ONCE
Qty: 0 | Refills: 0 | Status: COMPLETED | OUTPATIENT
Start: 2019-01-29 | End: 2019-01-29

## 2019-01-29 RX ORDER — CALCIUM GLUCONATE 100 MG/ML
2 VIAL (ML) INTRAVENOUS ONCE
Qty: 0 | Refills: 0 | Status: COMPLETED | OUTPATIENT
Start: 2019-01-29 | End: 2019-01-29

## 2019-01-29 RX ORDER — CALCIUM GLUCONATE 100 MG/ML
1 VIAL (ML) INTRAVENOUS ONCE
Qty: 0 | Refills: 0 | Status: COMPLETED | OUTPATIENT
Start: 2019-01-29 | End: 2019-01-29

## 2019-01-29 RX ADMIN — Medication 50 GRAM(S): at 22:44

## 2019-01-29 RX ADMIN — Medication 200 GRAM(S): at 20:31

## 2019-01-29 RX ADMIN — Medication 200 GRAM(S): at 18:14

## 2019-01-29 RX ADMIN — Medication 50 GRAM(S): at 20:45

## 2019-01-29 NOTE — ED PROVIDER NOTE - NS ED ROS FT
Eyes:  No visual changes, eye pain or discharge.  ENMT:  No hearing changes, pain, discharge or infections. No neck pain or stiffness.  Cardiac:  No chest pain, SOB or edema. No chest pain with exertion.  Respiratory:  No cough or respiratory distress. No hemoptysis. No history of asthma or RAD.  GI:  No nausea, vomiting, diarrhea. +abdominal pain  :  No dysuria, frequency or burning.  MS:  No myalgia, muscle weakness, joint pain or back pain.  Neuro:  No headache. +weakness. No LOC.  Skin:  No skin rash.   Endocrine: No history of thyroid disease or diabetes.

## 2019-01-29 NOTE — ED ADULT NURSE NOTE - NSIMPLEMENTINTERV_GEN_ALL_ED
Implemented All Fall with Harm Risk Interventions:  [place] to call system. Call bell, personal items and telephone within reach. Instruct patient to call for assistance. Room bathroom lighting operational. Non-slip footwear when patient is off stretcher. Physically safe environment: no spills, clutter or unnecessary equipment. Stretcher in lowest position, wheels locked, appropriate side rails in place. Provide visual cue, wrist band, yellow gown, etc. Monitor gait and stability. Monitor for mental status changes and reorient to person, place, and time. Review medications for side effects contributing to fall risk. Reinforce activity limits and safety measures with patient and family. Provide visual clues: red socks.

## 2019-01-29 NOTE — ED PROVIDER NOTE - PHYSICAL EXAMINATION
CONSTITUTIONAL: Well-developed; well-nourished; in no acute distress.   SKIN: warm, dry  HEAD: Normocephalic; atraumatic.  EYES: PERRL, EOMI, normal sclera and conjunctiva   ENT: No nasal discharge; airway clear.  NECK: Supple; non tender.  CARD: S1, S2 normal; no murmurs, gallops, or rubs. Regular rate and rhythm.   RESP: No wheezes, + crackles at bases b/l  ABD: soft ntnd  EXT: Normal ROM.  No clubbing, cyanosis or edema.   LYMPH: No acute cervical adenopathy.  NEURO: Alert, oriented, grossly unremarkable  PSYCH: Cooperative, appropriate.

## 2019-01-29 NOTE — ED PROVIDER NOTE - MEDICAL DECISION MAKING DETAILS
pt here for andominal pain, weakness. pt found to be hypocalcemia, hypomagnesemia, qtc prolonged. admit to tele

## 2019-01-29 NOTE — ED PROVIDER NOTE - ATTENDING CONTRIBUTION TO CARE
89 yo m hx afib on coumadin, chf, htn, s/p AAA repair x10 years here for feeling unwell/gen weak. + abdominal tightness and pain. no cp/sob.  family unsure if change in color of BMs. no vomiting, diarrhea, fever. + chest tightness- constant. no sob, syncope. no urinary complaints. no cough/myalgias/st.    vss  gen- NAD, aaox3  HENT- pail conjunctiva  card---  lungs-ctab, no wheezing or rhonchi  abd-mild diffuse tenderness, not distended/tympanic  neuro- full str/sensation, cn ii-xii grossly intact, normal coordination    will get basic labs, r/o anemia, lyte abnormality  trop/ekg r/o acs/cardiac ischaemic as culprit for weakness  assess for infection- cxr, ua  lfts, lipase, ctap for abdo pain

## 2019-01-30 LAB
ALBUMIN SERPL ELPH-MCNC: 3.1 G/DL — LOW (ref 3.5–5.2)
ALBUMIN SERPL ELPH-MCNC: 3.3 G/DL — LOW (ref 3.5–5.2)
ALP SERPL-CCNC: 96 U/L — SIGNIFICANT CHANGE UP (ref 30–115)
ALP SERPL-CCNC: 97 U/L — SIGNIFICANT CHANGE UP (ref 30–115)
ALT FLD-CCNC: 13 U/L — SIGNIFICANT CHANGE UP (ref 0–41)
ALT FLD-CCNC: 13 U/L — SIGNIFICANT CHANGE UP (ref 0–41)
ANION GAP SERPL CALC-SCNC: 18 MMOL/L — HIGH (ref 7–14)
ANION GAP SERPL CALC-SCNC: 20 MMOL/L — HIGH (ref 7–14)
ANION GAP SERPL CALC-SCNC: 21 MMOL/L — HIGH (ref 7–14)
AST SERPL-CCNC: 19 U/L — SIGNIFICANT CHANGE UP (ref 0–41)
AST SERPL-CCNC: 20 U/L — SIGNIFICANT CHANGE UP (ref 0–41)
BASOPHILS # BLD AUTO: 0.02 K/UL — SIGNIFICANT CHANGE UP (ref 0–0.2)
BASOPHILS NFR BLD AUTO: 0.3 % — SIGNIFICANT CHANGE UP (ref 0–1)
BILIRUB SERPL-MCNC: 0.8 MG/DL — SIGNIFICANT CHANGE UP (ref 0.2–1.2)
BILIRUB SERPL-MCNC: 0.8 MG/DL — SIGNIFICANT CHANGE UP (ref 0.2–1.2)
BUN SERPL-MCNC: 36 MG/DL — HIGH (ref 10–20)
BUN SERPL-MCNC: 37 MG/DL — HIGH (ref 10–20)
BUN SERPL-MCNC: 38 MG/DL — HIGH (ref 10–20)
CALCIUM SERPL-MCNC: 5.9 MG/DL — CRITICAL LOW (ref 8.5–10.1)
CALCIUM SERPL-MCNC: 5.9 MG/DL — CRITICAL LOW (ref 8.5–10.1)
CALCIUM SERPL-MCNC: 7.2 MG/DL — LOW (ref 8.5–10.1)
CHLORIDE SERPL-SCNC: 92 MMOL/L — LOW (ref 98–110)
CHLORIDE SERPL-SCNC: 95 MMOL/L — LOW (ref 98–110)
CHLORIDE SERPL-SCNC: 97 MMOL/L — LOW (ref 98–110)
CK MB CFR SERPL CALC: 4 NG/ML — SIGNIFICANT CHANGE UP (ref 0.6–6.3)
CK SERPL-CCNC: 220 U/L — SIGNIFICANT CHANGE UP (ref 0–225)
CO2 SERPL-SCNC: 25 MMOL/L — SIGNIFICANT CHANGE UP (ref 17–32)
CO2 SERPL-SCNC: 25 MMOL/L — SIGNIFICANT CHANGE UP (ref 17–32)
CO2 SERPL-SCNC: 27 MMOL/L — SIGNIFICANT CHANGE UP (ref 17–32)
CREAT SERPL-MCNC: 1.5 MG/DL — SIGNIFICANT CHANGE UP (ref 0.7–1.5)
CREAT SERPL-MCNC: 1.5 MG/DL — SIGNIFICANT CHANGE UP (ref 0.7–1.5)
CREAT SERPL-MCNC: 1.6 MG/DL — HIGH (ref 0.7–1.5)
CULTURE RESULTS: SIGNIFICANT CHANGE UP
EOSINOPHIL # BLD AUTO: 0.09 K/UL — SIGNIFICANT CHANGE UP (ref 0–0.7)
EOSINOPHIL NFR BLD AUTO: 1.1 % — SIGNIFICANT CHANGE UP (ref 0–8)
GLUCOSE SERPL-MCNC: 96 MG/DL — SIGNIFICANT CHANGE UP (ref 70–99)
GLUCOSE SERPL-MCNC: 97 MG/DL — SIGNIFICANT CHANGE UP (ref 70–99)
GLUCOSE SERPL-MCNC: 99 MG/DL — SIGNIFICANT CHANGE UP (ref 70–99)
HCT VFR BLD CALC: 30.3 % — LOW (ref 42–52)
HGB BLD-MCNC: 9.4 G/DL — LOW (ref 14–18)
IMM GRANULOCYTES NFR BLD AUTO: 0.6 % — HIGH (ref 0.1–0.3)
INR BLD: 5.12 RATIO — CRITICAL HIGH (ref 0.65–1.3)
IRON SATN MFR SERPL: 26 % — SIGNIFICANT CHANGE UP (ref 15–50)
IRON SATN MFR SERPL: 48 UG/DL — SIGNIFICANT CHANGE UP (ref 35–150)
LYMPHOCYTES # BLD AUTO: 0.92 K/UL — LOW (ref 1.2–3.4)
LYMPHOCYTES # BLD AUTO: 11.6 % — LOW (ref 20.5–51.1)
MAGNESIUM SERPL-MCNC: 1.5 MG/DL — LOW (ref 1.8–2.4)
MAGNESIUM SERPL-MCNC: 1.5 MG/DL — LOW (ref 1.8–2.4)
MAGNESIUM SERPL-MCNC: 2.5 MG/DL — HIGH (ref 1.8–2.4)
MCHC RBC-ENTMCNC: 18.4 PG — LOW (ref 27–31)
MCHC RBC-ENTMCNC: 31 G/DL — LOW (ref 32–37)
MCV RBC AUTO: 59.4 FL — LOW (ref 80–94)
MONOCYTES # BLD AUTO: 0.84 K/UL — HIGH (ref 0.1–0.6)
MONOCYTES NFR BLD AUTO: 10.6 % — HIGH (ref 1.7–9.3)
NEUTROPHILS # BLD AUTO: 6.04 K/UL — SIGNIFICANT CHANGE UP (ref 1.4–6.5)
NEUTROPHILS NFR BLD AUTO: 75.8 % — HIGH (ref 42.2–75.2)
NRBC # BLD: 0 /100 WBCS — SIGNIFICANT CHANGE UP (ref 0–0)
PLATELET # BLD AUTO: 258 K/UL — SIGNIFICANT CHANGE UP (ref 130–400)
POTASSIUM SERPL-MCNC: 3.6 MMOL/L — SIGNIFICANT CHANGE UP (ref 3.5–5)
POTASSIUM SERPL-MCNC: 4 MMOL/L — SIGNIFICANT CHANGE UP (ref 3.5–5)
POTASSIUM SERPL-MCNC: 4 MMOL/L — SIGNIFICANT CHANGE UP (ref 3.5–5)
POTASSIUM SERPL-SCNC: 3.6 MMOL/L — SIGNIFICANT CHANGE UP (ref 3.5–5)
POTASSIUM SERPL-SCNC: 4 MMOL/L — SIGNIFICANT CHANGE UP (ref 3.5–5)
POTASSIUM SERPL-SCNC: 4 MMOL/L — SIGNIFICANT CHANGE UP (ref 3.5–5)
PROT SERPL-MCNC: 6.2 G/DL — SIGNIFICANT CHANGE UP (ref 6–8)
PROT SERPL-MCNC: 6.2 G/DL — SIGNIFICANT CHANGE UP (ref 6–8)
PROTHROM AB SERPL-ACNC: >40 SEC — HIGH (ref 9.95–12.87)
RBC # BLD: 5.1 M/UL — SIGNIFICANT CHANGE UP (ref 4.7–6.1)
RBC # FLD: 19.3 % — HIGH (ref 11.5–14.5)
SODIUM SERPL-SCNC: 137 MMOL/L — SIGNIFICANT CHANGE UP (ref 135–146)
SODIUM SERPL-SCNC: 141 MMOL/L — SIGNIFICANT CHANGE UP (ref 135–146)
SODIUM SERPL-SCNC: 142 MMOL/L — SIGNIFICANT CHANGE UP (ref 135–146)
SPECIMEN SOURCE: SIGNIFICANT CHANGE UP
TIBC SERPL-MCNC: 184 UG/DL — LOW (ref 220–430)
TROPONIN T SERPL-MCNC: 0.02 NG/ML — HIGH
UIBC SERPL-MCNC: 136 UG/DL — SIGNIFICANT CHANGE UP (ref 110–370)
WBC # BLD: 7.96 K/UL — SIGNIFICANT CHANGE UP (ref 4.8–10.8)
WBC # FLD AUTO: 7.96 K/UL — SIGNIFICANT CHANGE UP (ref 4.8–10.8)

## 2019-01-30 RX ORDER — ERGOCALCIFEROL 1.25 MG/1
50000 CAPSULE ORAL
Qty: 0 | Refills: 0 | Status: DISCONTINUED | OUTPATIENT
Start: 2019-01-30 | End: 2019-02-01

## 2019-01-30 RX ORDER — MAGNESIUM OXIDE 400 MG ORAL TABLET 241.3 MG
400 TABLET ORAL
Qty: 0 | Refills: 0 | Status: DISCONTINUED | OUTPATIENT
Start: 2019-01-30 | End: 2019-02-01

## 2019-01-30 RX ORDER — ISOSORBIDE MONONITRATE 60 MG/1
30 TABLET, EXTENDED RELEASE ORAL DAILY
Qty: 0 | Refills: 0 | Status: DISCONTINUED | OUTPATIENT
Start: 2019-01-30 | End: 2019-02-06

## 2019-01-30 RX ORDER — ALBUTEROL 90 UG/1
1.25 AEROSOL, METERED ORAL EVERY 4 HOURS
Qty: 0 | Refills: 0 | Status: DISCONTINUED | OUTPATIENT
Start: 2019-01-30 | End: 2019-02-06

## 2019-01-30 RX ORDER — ATORVASTATIN CALCIUM 80 MG/1
10 TABLET, FILM COATED ORAL AT BEDTIME
Qty: 0 | Refills: 0 | Status: DISCONTINUED | OUTPATIENT
Start: 2019-01-30 | End: 2019-02-06

## 2019-01-30 RX ORDER — WARFARIN SODIUM 2.5 MG/1
1.5 TABLET ORAL ONCE
Qty: 0 | Refills: 0 | Status: COMPLETED | OUTPATIENT
Start: 2019-01-30 | End: 2019-01-30

## 2019-01-30 RX ORDER — CALCITRIOL 0.5 UG/1
0.5 CAPSULE ORAL DAILY
Qty: 0 | Refills: 0 | Status: DISCONTINUED | OUTPATIENT
Start: 2019-01-30 | End: 2019-02-01

## 2019-01-30 RX ORDER — CALCIUM GLUCONATE 100 MG/ML
2 VIAL (ML) INTRAVENOUS ONCE
Qty: 0 | Refills: 0 | Status: COMPLETED | OUTPATIENT
Start: 2019-01-30 | End: 2019-01-30

## 2019-01-30 RX ORDER — MAGNESIUM SULFATE 500 MG/ML
2 VIAL (ML) INJECTION
Qty: 0 | Refills: 0 | Status: COMPLETED | OUTPATIENT
Start: 2019-01-30 | End: 2019-01-30

## 2019-01-30 RX ORDER — FUROSEMIDE 40 MG
20 TABLET ORAL DAILY
Qty: 0 | Refills: 0 | Status: DISCONTINUED | OUTPATIENT
Start: 2019-01-30 | End: 2019-02-06

## 2019-01-30 RX ORDER — CALCIUM CARBONATE 500(1250)
1 TABLET ORAL THREE TIMES A DAY
Qty: 0 | Refills: 0 | Status: DISCONTINUED | OUTPATIENT
Start: 2019-01-30 | End: 2019-02-01

## 2019-01-30 RX ORDER — FERROUS SULFATE 325(65) MG
325 TABLET ORAL DAILY
Qty: 0 | Refills: 0 | Status: DISCONTINUED | OUTPATIENT
Start: 2019-01-30 | End: 2019-02-06

## 2019-01-30 RX ORDER — METOPROLOL TARTRATE 50 MG
50 TABLET ORAL DAILY
Qty: 0 | Refills: 0 | Status: DISCONTINUED | OUTPATIENT
Start: 2019-01-30 | End: 2019-02-06

## 2019-01-30 RX ORDER — LANOLIN ALCOHOL/MO/W.PET/CERES
3 CREAM (GRAM) TOPICAL AT BEDTIME
Qty: 0 | Refills: 0 | Status: DISCONTINUED | OUTPATIENT
Start: 2019-01-30 | End: 2019-02-02

## 2019-01-30 RX ADMIN — Medication 50 GRAM(S): at 13:30

## 2019-01-30 RX ADMIN — ATORVASTATIN CALCIUM 10 MILLIGRAM(S): 80 TABLET, FILM COATED ORAL at 22:41

## 2019-01-30 RX ADMIN — Medication 50 MILLIGRAM(S): at 05:04

## 2019-01-30 RX ADMIN — Medication 1 TABLET(S): at 22:41

## 2019-01-30 RX ADMIN — MAGNESIUM OXIDE 400 MG ORAL TABLET 400 MILLIGRAM(S): 241.3 TABLET ORAL at 17:50

## 2019-01-30 RX ADMIN — ISOSORBIDE MONONITRATE 30 MILLIGRAM(S): 60 TABLET, EXTENDED RELEASE ORAL at 11:37

## 2019-01-30 RX ADMIN — ERGOCALCIFEROL 50000 UNIT(S): 1.25 CAPSULE ORAL at 11:38

## 2019-01-30 RX ADMIN — MAGNESIUM OXIDE 400 MG ORAL TABLET 400 MILLIGRAM(S): 241.3 TABLET ORAL at 22:41

## 2019-01-30 RX ADMIN — WARFARIN SODIUM 1.5 MILLIGRAM(S): 2.5 TABLET ORAL at 02:26

## 2019-01-30 RX ADMIN — Medication 200 GRAM(S): at 14:47

## 2019-01-30 RX ADMIN — Medication 50 GRAM(S): at 11:36

## 2019-01-30 RX ADMIN — Medication 200 GRAM(S): at 12:34

## 2019-01-30 RX ADMIN — Medication 20 MILLIGRAM(S): at 05:04

## 2019-01-30 RX ADMIN — MAGNESIUM OXIDE 400 MG ORAL TABLET 400 MILLIGRAM(S): 241.3 TABLET ORAL at 11:38

## 2019-01-30 RX ADMIN — Medication 3 MILLIGRAM(S): at 22:41

## 2019-01-30 RX ADMIN — Medication 1 TABLET(S): at 05:19

## 2019-01-30 RX ADMIN — CALCITRIOL 0.5 MICROGRAM(S): 0.5 CAPSULE ORAL at 22:42

## 2019-01-30 RX ADMIN — Medication 1 TABLET(S): at 13:31

## 2019-01-30 RX ADMIN — MAGNESIUM OXIDE 400 MG ORAL TABLET 400 MILLIGRAM(S): 241.3 TABLET ORAL at 08:52

## 2019-01-30 NOTE — CONSULT NOTE ADULT - SUBJECTIVE AND OBJECTIVE BOX
HPI: 89y  Male admitted with weakness, lethargy        PAST MEDICAL & SURGICAL HISTORY:  Hyperlipidemia  CHF (congestive heart failure)  Afib  Hypertension  S/P AAA (abdominal aortic aneurysm) repair      FAMILY HISTORY:  No pertinent family history in first degree relatives      Home Medications:  acetaminophen 325 mg oral tablet: 2 tab(s) orally every 8 hours -  (29 Mar 2018 18:57)  ALBUTEROL SUL 1.25 MG/3 ML SOL: inhaled every 4 hours, As Needed (26 Mar 2018 10:55)  melatonin 3 mg oral tablet: 1 tab(s) orally once a day (at bedtime) (2018 18:15)      Current Meds:  ALBUTerol   0.042% 1.25 milliGRAM(s) Nebulizer every 4 hours PRN  atorvastatin 10 milliGRAM(s) Oral at bedtime  calcium carbonate   1250 mG (OsCal) 1 Tablet(s) Oral three times a day  calcium gluconate IVPB 2 Gram(s) IV Intermittent once  ergocalciferol 82588 Unit(s) Oral every week  ferrous    sulfate 325 milliGRAM(s) Oral daily  furosemide    Tablet 20 milliGRAM(s) Oral daily  isosorbide   mononitrate ER Tablet (IMDUR) 30 milliGRAM(s) Oral daily  magnesium oxide 400 milliGRAM(s) Oral four times a day with meals  melatonin 3 milliGRAM(s) Oral at bedtime  metoprolol succinate ER 50 milliGRAM(s) Oral daily      Allergies:  No Known Allergies          Vital Signs Last 24 Hrs  T(C): 36.4 (2019 08:15), Max: 36.6 (2019 05:14)  T(F): 97.5 (2019 08:15), Max: 97.8 (2019 05:14)  HR: 66 (2019 08:15) (66 - 87)  BP: 99/63 (2019 08:15) (99/63 - 160/92)  BP(mean): --  RR: 18 (2019 08:15) (16 - 18)  SpO2: 96% (2019 08:15) (96% - 99%)    Constitutional: WN/WD in NAD.   Neck: no thyromegaly or palpable thyroid nodules   Respiratory: lungs CTAB.  Cardiovascular: regular rate and rhythm, normal S1 and S2, no audible murmurs  GI: soft, NT/ND, no masses/HSM appreciated.  Ext: no edema  Psychiatric: A&O x 3, normal affect/mood.    LABS:                        9.4    7.96  )-----------( 258      ( 2019 06:40 )             30.3         142  |  97<L>  |  37<H>  ----------------------------<  96  4.0   |  27  |  1.5    Ca    5.9<LL>      2019 11:31  Phos  5.5       Mg     1.5         TPro  6.2  /  Alb  3.1<L>  /  TBili  0.8  /  DBili  x   /  AST  19  /  ALT  13  /  AlkPhos  97      PT/INR - ( 2019 06:40 )   PT: >40.00 sec;   INR: 5.12 ratio           Urinalysis Basic - ( 2019 15:52 )    Color: Yellow / Appearance: Clear / S.010 / pH: x  Gluc: x / Ketone: Negative  / Bili: Negative / Urobili: 1.0 mg/dL   Blood: x / Protein: Negative mg/dL / Nitrite: Negative   Leuk Esterase: Small / RBC: x / WBC 6-10 /HPF   Sq Epi: x / Non Sq Epi: x / Bacteria: x              Thyroid Stimulating Hormone, Serum: 8.33 (1015 @ 10:24)

## 2019-01-30 NOTE — CONSULT NOTE ADULT - SUBJECTIVE AND OBJECTIVE BOX
NEPHROLOGY CONSULTATION NOTE    Patient is a 89 y/o male with pmh CKD 3b (baseline cr. ~ 1.5, eGFR ~ 39), CHF, CAD, DLD, HTN, s/p AAA repair, presented to hospital with lethargy for past 1 week. Patient reports of abdominal pain since yesterday. This was associated with 1-2 episodes of nausea and vomiting (non bloody non bilious) Family reports that he was behaving differently too. Patient has not been eating well as per family. Patient also mentioned having loose stools for past 2-3 days.    Patient denies chest pain, shortness of breath, urinary symptoms, leg weakness, paresthesias weakness, headaches, recent trauma or rash. Patient is mostly wheelchair bound at home but does use walker at times.     In ED patient was found to have Mg of 0.4 along with Calcium of 5.3. S/P IV calcium gluconate 3gm and Mag 4gm which resolved patients symptoms     PAST MEDICAL & SURGICAL HISTORY:  Hyperlipidemia  CHF (congestive heart failure)  Afib  Hypertension  S/P AAA (abdominal aortic aneurysm) repair    Allergies:  No Known Allergies    Home Medications:  acetaminophen 325 mg oral tablet: 2 tab(s) orally every 8 hours -  (29 Mar 2018 18:57)  ALBUTEROL SUL 1.25 MG/3 ML SOL: inhaled every 4 hours, As Needed (26 Mar 2018 10:55)  melatonin 3 mg oral tablet: 1 tab(s) orally once a day (at bedtime) (2018 18:15)    Hospital Medications:   MEDICATIONS  (STANDING):  atorvastatin 10 milliGRAM(s) Oral at bedtime  calcium carbonate   1250 mG (OsCal) 1 Tablet(s) Oral three times a day  calcium gluconate IVPB 2 Gram(s) IV Intermittent once  ergocalciferol 87089 Unit(s) Oral every week  ferrous    sulfate 325 milliGRAM(s) Oral daily  furosemide    Tablet 20 milliGRAM(s) Oral daily  isosorbide   mononitrate ER Tablet (IMDUR) 30 milliGRAM(s) Oral daily  magnesium oxide 400 milliGRAM(s) Oral four times a day with meals  melatonin 3 milliGRAM(s) Oral at bedtime  metoprolol succinate ER 50 milliGRAM(s) Oral daily      SOCIAL HISTORY:  Denies ETOH,Smoking,   FAMILY HISTORY:  No pertinent family history in first degree relatives        REVIEW OF SYSTEMS:    All other review of systems is negative unless indicated above.    VITALS:  T(F): 97.5 (19 @ 08:15), Max: 97.8 (19 @ 05:14)  HR: 66 (19 @ 08:15)  BP: 99/63 (19 @ 08:15)  RR: 18 (19 @ 08:15)  SpO2: 96% (19 @ 08:15)        I&O's Detail        PHYSICAL EXAM:  Constitutional: NAD  Respiratory: CTAB, no wheezes, rales or rhonchi  Cardiovascular: S1, S2, RRR  Gastrointestinal: BS+, soft, NT/ND  Extremities: No peripheral edema  Neurological: A/O x 3  : No palomo.     Vascular Access:    LABS:      142  |  97<L>  |  37<H>  ----------------------------<  96  4.0   |  27  |  1.5    Ca    5.9<LL>      2019 11:31  Phos  5.5       Mg     1.5         TPro  6.2  /  Alb  3.1<L>  /  TBili  0.8  /  DBili      /  AST  19  /  ALT  13  /  AlkPhos  97      Creatinine Trend: 1.5 <--, 1.5 <--, 1.5 <--                        9.4    7.96  )-----------( 258      ( 2019 06:40 )             30.3       Iron with Total Binding Capacity (19 @ 13:20)    Iron - Total Binding Capacity.: 184 ug/dL    % Saturation, Iron: 26 %    Iron Total, Serum: 48 ug/dL    Unsaturated Iron Binding Capacity: 136 ug/dL      Urine Studies:  Urinalysis Basic - ( 2019 15:52 )    Color: Yellow / Appearance: Clear / S.010 / pH:   Gluc:  / Ketone: Negative  / Bili: Negative / Urobili: 1.0 mg/dL   Blood:  / Protein: Negative mg/dL / Nitrite: Negative   Leuk Esterase: Small / RBC:  / WBC 6-10 /HPF   Sq Epi:  / Non Sq Epi:  / Bacteria:         < from: 12 Lead ECG (19 @ 16:27) >  Diagnosis Line Atrial fibrillation  Inferior infarct , age undetermined  Cannot rule out Anterior infarct , age undetermined  Prolonged QT  Abnormal ECG    < end of copied text >      RADIOLOGY & ADDITIONAL STUDIES:  < from: US Abdomen Limited (19 @ 19:54) >  IMPRESSION:  Gallbladder unremarkable.  Right pleural effusion.  Right renal cysts.    < end of copied text >    < from: CT Abdomen and Pelvis w/ IV Cont (19 @ 18:12) >  IMPRESSION:   1. Mesenteric edema surrounding pancreas, nonspecific in setting of CHF.   Correlation with pancreatic enzymes recommended. Otherwise, no evidence   of acute abdominal pathology.   2. Cardiomegaly, reflux of contrast into IVC, small bilateral pleural   effusions with interstitial edema; correlate for CHF.    < end of copied text >    < from: Xray Chest 1 View AP/PA (19 @ 17:21) >  Impression:      Cardiomegaly with pulmonary vascular congestion, unchanged.    < end of copied text >

## 2019-01-30 NOTE — CONSULT NOTE ADULT - ASSESSMENT
Patient with hypocalcemia, hypomagnesemia presented to hospital with lethargy, abdominal pain, vomiting, loose stool.  PMH CKD 3b (baseline cr. ~ 1.5, eGFR ~ 39), CHF, CAD, DLD, HTN, s/p AAA repair    # CKD 3b/ Hypocalcemia / Hypomagnesemia   - serum cr. stable around baseline   - hypocalcemia likely due to CKD plus hypomagnesemia 2/2 GI losses plus ?lasix   - s/p IV MgSO4 and IV Ca gluconate   - Repeat Mg noted, improved. cont. oral Mg and Ca supplement. Ph elevated   - cont. CaCO3, Vitamin D   - check PTH, 25 OH-vitamin D, 1,25 OH-vitamin D   - check ionized Ca.   - Trend Ca and Mg at least q 12 hours   - EKG noted for prolonged QT, monitor EKG, avoid medications causing prolonged QT   - CXR noted for pulmonary congestion, cont. Lasix   - BP on lwer side of normal range, keep current meds   - Hb noted, no need for FARHAN, iron studies with adequate stores. can cont. oral FeSO4   - CT abdomen noted for Mesenteric edema surrounding pancreas. check serum amylase and lipase. consider GI eval.   - will follow

## 2019-01-30 NOTE — CONSULT NOTE ADULT - ATTENDING COMMENTS
Called for hypocalcemia and hypomagnesemia in this patient with known chronic kidney disease; nephrology has already seen patient; lab w/u in progress, but likely secondary hyperparathyroidism and would begin calcitriol 0.5 mcg daily.  Note mildly elevated TSH in OCT 2018-needs repeat TFTs
Pt seen and examined  Above note reviewed  CKD 3 stable and at baseline  Pt not interested in discussing his medical conditions "I'm 89 years old leave me alone"   hypoCa noted due to CKD Likely vit D deficient worsened by GI losses  workup and management as above

## 2019-01-30 NOTE — H&P ADULT - ATTENDING COMMENTS
HPI as above.  Interval history: Pt states that he has not been eating well for the past 2 months given that he had 2 family members die. He also says he feels he has lost weight. He only picks at food but does not eat his full meals. He denies any history of low calcium, cancer or thyroid problems. No CP or sob.   Vital Signs (24 Hrs):  T(C): 36.4 (01-30-19 @ 08:15), Max: 36.6 (01-30-19 @ 05:14)  HR: 66 (01-30-19 @ 08:15) (66 - 87)  BP: 99/63 (01-30-19 @ 08:15) (99/63 - 160/92)  RR: 18 (01-30-19 @ 08:15) (16 - 18)  SpO2: 96% (01-30-19 @ 08:15) (96% - 99%)  Wt(kg): --  Daily     Daily     I&O's Summary    Exam: stable, rrr, normal s1 s2, no edema, no abd pain, no thyroid enlargement or nodules on exam   Labs reviewed  Imaging reviewed  EKG reviewed afib with qtc 514    Plan  #Hypocalcemia- ddx malnutrition, hypomag, thyroid disease, hyperphosphatemia, CKD   -replete mg and calcium  - endo consult  - nephro consult  - nutrition consult   - consider thyroid us     #suspected CHF- check echo- pt not in exacerbation - continue meds    #Prolonged qt- recheck ekg     #Afib- on coumadin- hold today INR 5.0, pt RC     #chronic anemia- MCV 50s-60s- possible thalaesmia- will check iron studies, b12 and folate, send hgb electrophoresis       #Progress Note Handoff  Pending (specify):  Consults__nephro, PT, endo, nutrition_______, Tests_ ECHO_______, Test Results_______, Other_________  Family discussion: dw with pt at bedside AAox3   Disposition: Home___/SNF___/Other________/Unknown at this time__x pending PT consult______ HPI as above.  Interval history: Pt states that he has not been eating well for the past 2 months given that he had 2 family members die. He also says he feels he has lost weight. He only picks at food but does not eat his full meals. He denies any history of low calcium, cancer or thyroid problems. No CP or sob.   Vital Signs (24 Hrs):  T(C): 36.4 (01-30-19 @ 08:15), Max: 36.6 (01-30-19 @ 05:14)  HR: 66 (01-30-19 @ 08:15) (66 - 87)  BP: 99/63 (01-30-19 @ 08:15) (99/63 - 160/92)  RR: 18 (01-30-19 @ 08:15) (16 - 18)  SpO2: 96% (01-30-19 @ 08:15) (96% - 99%)  Wt(kg): --  Daily     Daily     I&O's Summary    Exam: stable, rrr, normal s1 s2, no edema, no abd pain, no thyroid enlargement or nodules on exam   Labs reviewed  Imaging reviewed  EKG reviewed afib with qtc 514    Plan  #Hypocalcemia- ddx malnutrition, hypomag, thyroid disease, hyperphosphatemia, CKD   -replete mg and calcium  - check Ionized ca  - endo consult  - nephro consult  - nutrition consult   - consider thyroid us     #suspected CHF- check echo- pt not in exacerbation - continue meds    #Prolonged qt- recheck ekg     #Afib- on coumadin- hold today INR 5.0, pt RC     #chronic anemia- MCV 50s-60s- possible thalaesmia- will check iron studies, b12 and folate, send hgb electrophoresis     #suspected malnuttion- nutrtion consult    #suspect magnesium deff- has been low on multiple admissions- replete     #Progress Note Handoff  Pending (specify):  Consults__nephro, PT, endo, nutrition_______, Tests_ ECHO_______, Test Results_______, Other_________  Family discussion: dw with pt at bedside AAox3   Disposition: Home___/SNF___/Other________/Unknown at this time__x pending PT consult______ HPI as above.  Interval history: Pt states that he has not been eating well for the past 2 months given that he had 2 family members die. He also says he feels he has lost weight. He only picks at food but does not eat his full meals. He denies any history of low calcium, cancer or thyroid problems. No CP or sob.   Vital Signs (24 Hrs):  T(C): 36.4 (01-30-19 @ 08:15), Max: 36.6 (01-30-19 @ 05:14)  HR: 66 (01-30-19 @ 08:15) (66 - 87)  BP: 99/63 (01-30-19 @ 08:15) (99/63 - 160/92)  RR: 18 (01-30-19 @ 08:15) (16 - 18)  SpO2: 96% (01-30-19 @ 08:15) (96% - 99%)  Wt(kg): --  Daily     Daily     I&O's Summary    Exam: stable, rrr, normal s1 s2, no edema, no abd pain, no thyroid enlargement or nodules on exam   Labs reviewed  Imaging reviewed  EKG reviewed afib with qtc 514    Plan  #Hypocalcemia- ddx malnutrition, hypomag, thyroid disease, hyperphosphatemia, CKD   -replete mg and calcium  - check Ionized ca  - endo consult  - nephro consult  - nutrition consult   - consider thyroid us     #suspected CHF- check echo- pt not in exacerbation - continue meds    #Prolonged qt- recheck ekg     #Afib- on coumadin- hold today INR 5.0, pt RC     #chronic anemia- MCV 50s-60s- possible thalaesmia- will check iron studies, b12 and folate, send hgb electrophoresis     #suspected malnuttion- nutrtion consult    #suspect magnesium deff- has been low on multiple admissions- replete     #Type 2 nstemi- possible underlying CKD vs chf- will trend trops, pt no cp    #Progress Note Handoff  Pending (specify):  Consults__nephro, PT, endo, nutrition_______, Tests_ ECHO_______, Test Results_______, Other_________  Family discussion: dw with pt at bedside AAox3   Disposition: Home___/SNF___/Other________/Unknown at this time__x pending PT consult______ HPI as above.  Interval history: Pt states that he has not been eating well for the past 2 months given that he had 2 family members die. He also says he feels he has lost weight. He only picks at food but does not eat his full meals. He denies any history of low calcium, cancer or thyroid problems. No CP or sob.   Vital Signs (24 Hrs):  T(C): 36.4 (01-30-19 @ 08:15), Max: 36.6 (01-30-19 @ 05:14)  HR: 66 (01-30-19 @ 08:15) (66 - 87)  BP: 99/63 (01-30-19 @ 08:15) (99/63 - 160/92)  RR: 18 (01-30-19 @ 08:15) (16 - 18)  SpO2: 96% (01-30-19 @ 08:15) (96% - 99%)  Wt(kg): --  Daily     Daily     I&O's Summary    Exam: stable, rrr, normal s1 s2, no edema, no abd pain, no thyroid enlargement or nodules on exam   Labs reviewed  Imaging reviewed  EKG reviewed afib with qtc 514    Plan  #Hypocalcemia- ddx malnutrition, hypomag, thyroid disease, hyperphosphatemia, CKD   -replete mg and calcium  - check Ionized ca  - endo consult  - nephro consult  - nutrition consult   - consider thyroid us     #suspected CHF- check echo- pt not in exacerbation - continue meds    #Prolonged qt- recheck ekg     #Afib- on coumadin- hold today INR 5.0, pt RC     #chronic anemia- MCV 50s-60s- possible thalaesmia- will check iron studies, b12 and folate, send hgb electrophoresis     #suspected malnutrition- nutrition consult    #suspect magnesium deficiency- has been low on multiple admissions- replete     #Type 2 nstemi- possible underlying CKD vs chf- will trend trops, pt no cp    #Progress Note Handoff  Pending (specify):  Consults__nephro, PT, endo, nutrition_______, Tests_ ECHO_______, Test Results_______, Other_________  Family discussion: dw with pt at bedside AAox3   Disposition: Home___/SNF___/Other________/Unknown at this time__x pending PT consult______ HPI as above.  Interval history: Pt states that he has not been eating well for the past 2 months given that he had 2 family members die. He also says he feels he has lost weight. He only picks at food but does not eat his full meals. He denies any history of low calcium, cancer or thyroid problems. No CP or sob.   Vital Signs (24 Hrs):  T(C): 36.4 (01-30-19 @ 08:15), Max: 36.6 (01-30-19 @ 05:14)  HR: 66 (01-30-19 @ 08:15) (66 - 87)  BP: 99/63 (01-30-19 @ 08:15) (99/63 - 160/92)  RR: 18 (01-30-19 @ 08:15) (16 - 18)  SpO2: 96% (01-30-19 @ 08:15) (96% - 99%)  Wt(kg): --  Daily     Daily     I&O's Summary    Exam: stable, rrr, normal s1 s2, no edema, no abd pain, no thyroid enlargement or nodules on exam   Labs reviewed  Imaging reviewed  EKG reviewed afib with qtc 514    Plan  #Hypocalcemia- ddx malnutrition, hypomag, thyroid disease, hyperphosphatemia, CKD   -replete mg and calcium  - check Ionized ca  - endo consult  - nephro consult  - nutrition consult   - consider thyroid us     #suspected chronic systolic CHF- check echo- pt not in exacerbation - continue meds    #CKD stage 3b- fu nepho    #Prolonged qt- recheck ekg     #Afib- on coumadin- hold today INR 5.0, pt RC     #chronic anemia- MCV 50s-60s- possible thalaesmia- will check iron studies, b12 and folate, send hgb electrophoresis     #suspected malnutrition- nutrition consult    #suspect magnesium deficiency- has been low on multiple admissions- replete     #Type 2 nstemi- possible underlying CKD vs chf- will trend trops, pt no cp    #Progress Note Handoff  Pending (specify):  Consults__nephro, PT, endo, nutrition_______, Tests_ ECHO_______, Test Results_______, Other_________  Family discussion: dw with pt at bedside AAox3   Disposition: Home___/SNF___/Other________/Unknown at this time__x pending PT consult______

## 2019-01-30 NOTE — H&P ADULT - HISTORY OF PRESENT ILLNESS
This patient is 89 y/o male with past medical history of CHF, CAD, DLD, HTN, AAA repair and anemia. He came to hospital with chief complaint of lethargy for past 1 week. Patient reports of abdominal pain since yesterday. This was associated with 1-2 episodes of vomiting (non bloody non billious). Family reports that he was behaving differently too. Patient has not been eating well as per family.   Patient denies chest pain, shortness of breath, urinary symptoms, leg weakness, paresthesias weakness, headaches, recent trauma or rash.   Patient is mostly wheelchair bound at home but does use walker at times.     In ED patient was found to have Mg of 0.4 along with Calcium of 5.3 S/P IV calcium gluconate 3gm and Mag 4gm which resolved patients symptoms

## 2019-01-30 NOTE — H&P ADULT - NSHPPHYSICALEXAM_GEN_ALL_CORE
ICU Vital Signs Last 24 Hrs  T(C): 35.6 (29 Jan 2019 19:40), Max: 35.6 (29 Jan 2019 19:40)  T(F): 96.1 (29 Jan 2019 19:40), Max: 96.1 (29 Jan 2019 19:40)  HR: 76 (29 Jan 2019 19:40) (76 - 87)  BP: 150/76 (29 Jan 2019 19:40) (150/76 - 160/92)  RR: 18 (29 Jan 2019 19:40) (16 - 18)  SpO2: 99% (29 Jan 2019 19:40) (98% - 99%)    AAOX3, Speaking in full sentences, no acute distress   Coarse breathing with crackles in bilateral lower lung fields, systolic murmur   Abdomen is soft and non tender   Neurologically patient looks intact   Extremities show no edema cyanosis

## 2019-01-30 NOTE — H&P ADULT - NSHPLABSRESULTS_GEN_ALL_CORE
10.0   5.85  )-----------( 290      ( 29 Jan 2019 15:52 )             32.5     01-29    141  |  94<L>  |  41<H>  ----------------------------<  167<H>  3.6   |  23  |  1.5    Ca    5.3<LL>      29 Jan 2019 15:52  Phos  5.5     01-29  Mg     0.4     01-29    TPro  6.7  /  Alb  3.4<L>  /  TBili  1.0  /  DBili  x   /  AST  28  /  ALT  16  /  AlkPhos  104  01-29      CT Abdomen and Pelvis w/ IV Cont (01.29.19 @ 18:12)     IMPRESSION:   1. Mesenteric edema surrounding pancreas, nonspecific in setting of CHF.   Correlation with pancreatic enzymes recommended. Otherwise, no evidence   of acute abdominal pathology.   2. Cardiomegaly, reflux of contrast into IVC, small bilateral pleural   effusions with interstitial edema; correlate for CHF.    AMERICA AC M.D., ATTENDING RADIOLOGIST  This document has been electronically signed. Jan 29 2019  6:16PM

## 2019-01-30 NOTE — H&P ADULT - ASSESSMENT
This patient is 89 y/o male with past medical history of CHF, CAD, DLD, HTN, AAA repair and anemia. He came to hospital with chief complaint of lethargy for past 1 week. Patient was found to have hypomagnesemia and hypocalcemia.     Assessment and plan     1- Symptomatic hypocalcemia secondary to hypomagnesemia   - S/P 3gm Calcium gluconate and 4gm Mg  - Patient asymptomatic now  - Nephrology consulted   - F/U AM electrolytes   - Continue oral MgSo4 and Calcium supplementation   - F/U urine magnesium level and PTH, Vitamin D levels     2- CHF/CAD/DLD/HTN  - Stable- Continue home medications     3- Anemia   - secondary to iron deficiency   - Continue iron supplementation     DVT prophylaxis This patient is 89 y/o male with past medical history of CHF, CAD, DLD, HTN, AAA repair and anemia. He came to hospital with chief complaint of lethargy for past 1 week. Patient was found to have hypomagnesemia and hypocalcemia.     Assessment and plan     1- Symptomatic hypocalcemia secondary to hypomagnesemia   - S/P 3gm Calcium gluconate and 4gm Mg  - Patient asymptomatic now  - Nephrology consulted   - F/U AM electrolytes   - Continue oral MgSo4 and Calcium supplementation   - F/U urine magnesium level and PTH, Vitamin D levels     2- CHF/CAD/DLD/HTN  - Stable- Continue home medications     3- Afib  - Rate controlled  - On coumadin   - Check INR in AM     4- Anemia   - secondary to iron deficiency   - Continue Feso4    DVT prophylaxis

## 2019-01-31 LAB
24R-OH-CALCIDIOL SERPL-MCNC: 44 NG/ML — SIGNIFICANT CHANGE UP (ref 30–80)
24R-OH-CALCIDIOL SERPL-MCNC: 51 NG/ML — SIGNIFICANT CHANGE UP (ref 30–80)
ALBUMIN SERPL ELPH-MCNC: 3.2 G/DL — LOW (ref 3.5–5.2)
ALBUMIN SERPL ELPH-MCNC: 3.3 G/DL — LOW (ref 3.5–5.2)
ALP SERPL-CCNC: 101 U/L — SIGNIFICANT CHANGE UP (ref 30–115)
ALP SERPL-CCNC: 106 U/L — SIGNIFICANT CHANGE UP (ref 30–115)
ALT FLD-CCNC: 13 U/L — SIGNIFICANT CHANGE UP (ref 0–41)
ALT FLD-CCNC: 14 U/L — SIGNIFICANT CHANGE UP (ref 0–41)
AMYLASE P1 CFR SERPL: 55 U/L — SIGNIFICANT CHANGE UP (ref 25–115)
ANION GAP SERPL CALC-SCNC: 19 MMOL/L — HIGH (ref 7–14)
ANION GAP SERPL CALC-SCNC: 20 MMOL/L — HIGH (ref 7–14)
APTT BLD: 51.6 SEC — HIGH (ref 27–39.2)
AST SERPL-CCNC: 22 U/L — SIGNIFICANT CHANGE UP (ref 0–41)
AST SERPL-CCNC: 22 U/L — SIGNIFICANT CHANGE UP (ref 0–41)
BASOPHILS # BLD AUTO: 0.03 K/UL — SIGNIFICANT CHANGE UP (ref 0–0.2)
BASOPHILS NFR BLD AUTO: 0.4 % — SIGNIFICANT CHANGE UP (ref 0–1)
BILIRUB SERPL-MCNC: 0.9 MG/DL — SIGNIFICANT CHANGE UP (ref 0.2–1.2)
BILIRUB SERPL-MCNC: 1.1 MG/DL — SIGNIFICANT CHANGE UP (ref 0.2–1.2)
BUN SERPL-MCNC: 33 MG/DL — HIGH (ref 10–20)
BUN SERPL-MCNC: 35 MG/DL — HIGH (ref 10–20)
CALCIUM SERPL-MCNC: 5.9 MG/DL — CRITICAL LOW (ref 8.4–10.5)
CALCIUM SERPL-MCNC: 7.2 MG/DL — LOW (ref 8.5–10.1)
CALCIUM SERPL-MCNC: 7.5 MG/DL — LOW (ref 8.5–10.1)
CHLORIDE SERPL-SCNC: 94 MMOL/L — LOW (ref 98–110)
CHLORIDE SERPL-SCNC: 96 MMOL/L — LOW (ref 98–110)
CO2 SERPL-SCNC: 25 MMOL/L — SIGNIFICANT CHANGE UP (ref 17–32)
CO2 SERPL-SCNC: 26 MMOL/L — SIGNIFICANT CHANGE UP (ref 17–32)
CREAT SERPL-MCNC: 1.4 MG/DL — SIGNIFICANT CHANGE UP (ref 0.7–1.5)
CREAT SERPL-MCNC: 1.4 MG/DL — SIGNIFICANT CHANGE UP (ref 0.7–1.5)
EOSINOPHIL # BLD AUTO: 0.24 K/UL — SIGNIFICANT CHANGE UP (ref 0–0.7)
EOSINOPHIL NFR BLD AUTO: 3.2 % — SIGNIFICANT CHANGE UP (ref 0–8)
FERRITIN SERPL-MCNC: 470 NG/ML — HIGH (ref 30–400)
FOLATE SERPL-MCNC: 14.2 NG/ML — SIGNIFICANT CHANGE UP
GLUCOSE SERPL-MCNC: 109 MG/DL — HIGH (ref 70–99)
GLUCOSE SERPL-MCNC: 86 MG/DL — SIGNIFICANT CHANGE UP (ref 70–99)
HCT VFR BLD CALC: 31.2 % — LOW (ref 42–52)
HGB BLD-MCNC: 9.6 G/DL — LOW (ref 14–18)
IMM GRANULOCYTES NFR BLD AUTO: 0.7 % — HIGH (ref 0.1–0.3)
INR BLD: 5.59 RATIO — CRITICAL HIGH (ref 0.65–1.3)
IRON SATN MFR SERPL: 28 % — SIGNIFICANT CHANGE UP (ref 15–50)
IRON SATN MFR SERPL: 53 UG/DL — SIGNIFICANT CHANGE UP (ref 35–150)
LIDOCAIN IGE QN: 37 U/L — SIGNIFICANT CHANGE UP (ref 7–60)
LYMPHOCYTES # BLD AUTO: 0.98 K/UL — LOW (ref 1.2–3.4)
LYMPHOCYTES # BLD AUTO: 12.9 % — LOW (ref 20.5–51.1)
MAGNESIUM SERPL-MCNC: 2.3 MG/DL — SIGNIFICANT CHANGE UP (ref 1.8–2.4)
MAGNESIUM SERPL-MCNC: 2.3 MG/DL — SIGNIFICANT CHANGE UP (ref 1.8–2.4)
MCHC RBC-ENTMCNC: 18.5 PG — LOW (ref 27–31)
MCHC RBC-ENTMCNC: 30.8 G/DL — LOW (ref 32–37)
MCV RBC AUTO: 60.2 FL — LOW (ref 80–94)
MONOCYTES # BLD AUTO: 0.6 K/UL — SIGNIFICANT CHANGE UP (ref 0.1–0.6)
MONOCYTES NFR BLD AUTO: 7.9 % — SIGNIFICANT CHANGE UP (ref 1.7–9.3)
NEUTROPHILS # BLD AUTO: 5.68 K/UL — SIGNIFICANT CHANGE UP (ref 1.4–6.5)
NEUTROPHILS NFR BLD AUTO: 74.9 % — SIGNIFICANT CHANGE UP (ref 42.2–75.2)
PLATELET # BLD AUTO: 247 K/UL — SIGNIFICANT CHANGE UP (ref 130–400)
POTASSIUM SERPL-MCNC: 4.2 MMOL/L — SIGNIFICANT CHANGE UP (ref 3.5–5)
POTASSIUM SERPL-MCNC: 4.2 MMOL/L — SIGNIFICANT CHANGE UP (ref 3.5–5)
POTASSIUM SERPL-SCNC: 4.2 MMOL/L — SIGNIFICANT CHANGE UP (ref 3.5–5)
POTASSIUM SERPL-SCNC: 4.2 MMOL/L — SIGNIFICANT CHANGE UP (ref 3.5–5)
PROT SERPL-MCNC: 6.4 G/DL — SIGNIFICANT CHANGE UP (ref 6–8)
PROT SERPL-MCNC: 6.6 G/DL — SIGNIFICANT CHANGE UP (ref 6–8)
PROTHROM AB SERPL-ACNC: >40 SEC — HIGH (ref 9.95–12.87)
PTH-INTACT FLD-MCNC: 217 PG/ML — HIGH (ref 15–65)
RBC # BLD: 5.18 M/UL — SIGNIFICANT CHANGE UP (ref 4.7–6.1)
RBC # FLD: 19.7 % — HIGH (ref 11.5–14.5)
SODIUM SERPL-SCNC: 139 MMOL/L — SIGNIFICANT CHANGE UP (ref 135–146)
SODIUM SERPL-SCNC: 141 MMOL/L — SIGNIFICANT CHANGE UP (ref 135–146)
TIBC SERPL-MCNC: 188 UG/DL — LOW (ref 220–430)
TSH SERPL-MCNC: 5.64 UIU/ML — HIGH (ref 0.27–4.2)
UIBC SERPL-MCNC: 135 UG/DL — SIGNIFICANT CHANGE UP (ref 110–370)
VIT B12 SERPL-MCNC: 549 PG/ML — SIGNIFICANT CHANGE UP (ref 232–1245)
VIT D25+D1,25 OH+D1,25 PNL SERPL-MCNC: 56.9 PG/ML — SIGNIFICANT CHANGE UP (ref 19.9–79.3)
VIT D25+D1,25 OH+D1,25 PNL SERPL-MCNC: 73.8 PG/ML — SIGNIFICANT CHANGE UP (ref 19.9–79.3)
WBC # BLD: 7.58 K/UL — SIGNIFICANT CHANGE UP (ref 4.8–10.8)
WBC # FLD AUTO: 7.58 K/UL — SIGNIFICANT CHANGE UP (ref 4.8–10.8)

## 2019-01-31 RX ORDER — CALCIUM GLUCONATE 100 MG/ML
2 VIAL (ML) INTRAVENOUS ONCE
Qty: 0 | Refills: 0 | Status: COMPLETED | OUTPATIENT
Start: 2019-01-31 | End: 2019-01-31

## 2019-01-31 RX ADMIN — Medication 1 TABLET(S): at 05:42

## 2019-01-31 RX ADMIN — Medication 325 MILLIGRAM(S): at 11:18

## 2019-01-31 RX ADMIN — MAGNESIUM OXIDE 400 MG ORAL TABLET 400 MILLIGRAM(S): 241.3 TABLET ORAL at 11:17

## 2019-01-31 RX ADMIN — Medication 20 MILLIGRAM(S): at 05:43

## 2019-01-31 RX ADMIN — MAGNESIUM OXIDE 400 MG ORAL TABLET 400 MILLIGRAM(S): 241.3 TABLET ORAL at 17:04

## 2019-01-31 RX ADMIN — Medication 1 TABLET(S): at 13:57

## 2019-01-31 RX ADMIN — MAGNESIUM OXIDE 400 MG ORAL TABLET 400 MILLIGRAM(S): 241.3 TABLET ORAL at 21:48

## 2019-01-31 RX ADMIN — ATORVASTATIN CALCIUM 10 MILLIGRAM(S): 80 TABLET, FILM COATED ORAL at 21:48

## 2019-01-31 RX ADMIN — Medication 3 MILLIGRAM(S): at 21:48

## 2019-01-31 RX ADMIN — Medication 200 GRAM(S): at 08:59

## 2019-01-31 RX ADMIN — Medication 50 MILLIGRAM(S): at 05:43

## 2019-01-31 RX ADMIN — Medication 1 TABLET(S): at 21:48

## 2019-01-31 RX ADMIN — CALCITRIOL 0.5 MICROGRAM(S): 0.5 CAPSULE ORAL at 11:18

## 2019-01-31 NOTE — CHART NOTE - NSCHARTNOTEFT_GEN_A_CORE
Upon Nutritional Assessment by the Registered Dietitian your patient was determined to meet criteria / has evidence of the following diagnosis/diagnoses:          [ ]  Mild Protein Calorie Malnutrition        [ ]  Moderate Protein Calorie Malnutrition        [x] Severe Protein Calorie Malnutrition        [ ] Unspecified Protein Calorie Malnutrition        [ ] Underweight / BMI <19        [ ] Morbid Obesity / BMI > 40    · Nutrient: Malnutrition; severe PCM in context of acute vs chronic illness  · Etiology: exact etiology unknown- decrease in appetite possibly related to fatigue/weakness 2/2 hypocalcemia vs CHF or CKD  · Signs/Symptoms: Pt w/ PO intake <50% of estimated needs x2mo, severe muscle wasting of temples, clavicles, shoulders    Findings as based on:  •  Comprehensive nutrition assessment and consultation X  •  Calorie counts (nutrient intake analysis)  •  Food acceptance and intake status from observations by staff  •  Follow up  •  Patient education  •  Intervention secondary to interdisciplinary rounds  •   concerns      Treatment:    The following diet has been recommended:  1. Continue w/ DASH/TLC diet modifier. Consistency and liquids per SLP. 2. Consider Ensure Compact BID and Ensure Clears QID. 3. Consider SLP consult as pt w/ difficulty chewing and sometimes swallowing.    PROVIDER Section:     By signing this assessment you are acknowledging and agree with the diagnosis/diagnoses assigned by the Registered Dietitian    Comments:

## 2019-01-31 NOTE — DIETITIAN INITIAL EVALUATION ADULT. - MD RECOMMEND
1. Continue w/ DASH/TLC diet modifier. Consistency and liquids per SLP. 2. Consider Ensure Compact BID and Ensure Clears QID. 3. Consider SLP consult as pt w/ difficulty chewing and sometimes swallowing.

## 2019-01-31 NOTE — DIETITIAN INITIAL EVALUATION ADULT. - SOURCE
Reason for assessment: Nutrition services kcal count, education, assessment. Spoke w/ pt daughter. Pt w/ decreased appetite and PO intake x2 months-exact etiology unknown. Pt w/ <25% PO intake this AM. Kcal count in progress. PTA pt supplements vit D and Ensure QID. Last BM unknown, no GI distress currently. Pt w/ difficulty chewing as w/ missing teeth, however, pt daughter desires regular diet consistency and will cut food for pt. Pt often feels like something is stuck in throat- will rec SLP c/s. Pt #, time frame unknown. Reports last known wt is around 140#, however, not certain. No updated wts at this time. Ht unknown, possibly around 67"./family/significant other

## 2019-01-31 NOTE — PROGRESS NOTE ADULT - SUBJECTIVE AND OBJECTIVE BOX
SUBJECTIVE:    Patient is a 89y old Male who presents with a chief complaint of Weakness and abdominal pain (2019 09:31)    Currently admitted to medicine with the primary diagnosis of Hypocalcemia     Today is hospital day 2d. This morning he is resting comfortably in bed and reports no new issues or overnight events.     PAST MEDICAL & SURGICAL HISTORY  Hyperlipidemia  CHF (congestive heart failure)  Afib  Hypertension  S/P AAA (abdominal aortic aneurysm) repair    SOCIAL HISTORY:  Negative for smoking/alcohol/drug use.     ALLERGIES:  No Known Allergies    MEDICATIONS:  STANDING MEDICATIONS  atorvastatin 10 milliGRAM(s) Oral at bedtime  calcitriol   Capsule 0.5 MICROGram(s) Oral daily  calcium carbonate   1250 mG (OsCal) 1 Tablet(s) Oral three times a day  ergocalciferol 56607 Unit(s) Oral every week  ferrous    sulfate 325 milliGRAM(s) Oral daily  furosemide    Tablet 20 milliGRAM(s) Oral daily  isosorbide   mononitrate ER Tablet (IMDUR) 30 milliGRAM(s) Oral daily  magnesium oxide 400 milliGRAM(s) Oral four times a day with meals  melatonin 3 milliGRAM(s) Oral at bedtime  metoprolol succinate ER 50 milliGRAM(s) Oral daily    PRN MEDICATIONS  ALBUTerol   0.042% 1.25 milliGRAM(s) Nebulizer every 4 hours PRN    VITALS:   T(F): 94.2  HR: 65  BP: 99/68  RR: 18  SpO2: 95%    LABS:                        9.6    7.58  )-----------( 247      ( 2019 07:25 )             31.2         141  |  96<L>  |  35<H>  ----------------------------<  86  4.2   |  26  |  1.4    Ca    7.2<L>      2019 07:25  Phos  5.5       Mg     2.3         TPro  6.4  /  Alb  3.3<L>  /  TBili  1.1  /  DBili  x   /  AST  22  /  ALT  14  /  AlkPhos  101      PT/INR - ( 2019 06:40 )   PT: >40.00 sec;   INR: 5.12 ratio           Urinalysis Basic - ( 2019 15:52 )    Color: Yellow / Appearance: Clear / S.010 / pH: x  Gluc: x / Ketone: Negative  / Bili: Negative / Urobili: 1.0 mg/dL   Blood: x / Protein: Negative mg/dL / Nitrite: Negative   Leuk Esterase: Small / RBC: x / WBC 6-10 /HPF   Sq Epi: x / Non Sq Epi: x / Bacteria: x        Creatine Kinase, Serum: 220 U/L (19 @ 16:17)  Troponin T, Serum: 0.02 ng/mL <H> (19 @ 16:17)      Culture - Urine (collected 2019 16:30)  Source: .Urine Clean Catch (Midstream)  Final Report (2019 21:22):    <10,000 CFU/ml    Normal Urogenital nitesh present      CARDIAC MARKERS ( 2019 16:17 )  x     / 0.02 ng/mL / 220 U/L / x     / 4.0 ng/mL  CARDIAC MARKERS ( 2019 15:52 )  x     / 0.02 ng/mL / x     / x     / x          RADIOLOGY:    PHYSICAL EXAM:  GEN: No acute distress  LUNGS: Clear to auscultation bilaterally   HEART: S1/S2 present. RRR.   ABD: Soft, non-tender, non-distended. Bowel sounds present  EXT: NC/NC/NE/2+PP/LANE  NEURO: AAOX3 SUBJECTIVE:    Patient is a 89y old Male who presents with a chief complaint of Weakness    Currently admitted to medicine with the primary diagnosis of Hypocalcemia     Today is hospital day 2d.    PAST MEDICAL & SURGICAL HISTORY  Hyperlipidemia  CHF (congestive heart failure)  Afib  Hypertension  S/P AAA (abdominal aortic aneurysm) repair    MEDICATIONS:  STANDING MEDICATIONS  atorvastatin 10 milliGRAM(s) Oral at bedtime  calcitriol   Capsule 0.5 MICROGram(s) Oral daily  calcium carbonate   1250 mG (OsCal) 1 Tablet(s) Oral three times a day  ergocalciferol 31806 Unit(s) Oral every week  ferrous    sulfate 325 milliGRAM(s) Oral daily  furosemide    Tablet 20 milliGRAM(s) Oral daily  isosorbide   mononitrate ER Tablet (IMDUR) 30 milliGRAM(s) Oral daily  magnesium oxide 400 milliGRAM(s) Oral four times a day with meals  melatonin 3 milliGRAM(s) Oral at bedtime  metoprolol succinate ER 50 milliGRAM(s) Oral daily    PRN MEDICATIONS  ALBUTerol   0.042% 1.25 milliGRAM(s) Nebulizer every 4 hours PRN    VITALS:   T(F): 94.2  HR: 65  BP: 99/68  RR: 18  SpO2: 95%    LABS:                        9.6    7.58  )-----------( 247      ( 2019 07:25 )             31.2         141  |  96<L>  |  35<H>  ----------------------------<  86  4.2   |  26  |  1.4    Ca    7.2<L>      2019 07:25  Phos  5.5       Mg     2.3         TPro  6.4  /  Alb  3.3<L>  /  TBili  1.1  /  DBili  x   /  AST  22  /  ALT  14  /  AlkPhos  101      PT/INR - ( 2019 06:40 )   PT: >40.00 sec;   INR: 5.12 ratio           Urinalysis Basic - ( 2019 15:52 )    Color: Yellow / Appearance: Clear / S.010 / pH: x  Gluc: x / Ketone: Negative  / Bili: Negative / Urobili: 1.0 mg/dL   Blood: x / Protein: Negative mg/dL / Nitrite: Negative   Leuk Esterase: Small / RBC: x / WBC 6-10 /HPF   Sq Epi: x / Non Sq Epi: x / Bacteria: x        Creatine Kinase, Serum: 220 U/L (19 @ 16:17)  Troponin T, Serum: 0.02 ng/mL <H> (19 @ 16:17)      Culture - Urine (collected 2019 16:30)  Source: .Urine Clean Catch (Midstream)  Final Report (2019 21:22):    <10,000 CFU/ml    Normal Urogenital nitesh present      CARDIAC MARKERS ( 2019 16:17 )  x     / 0.02 ng/mL / 220 U/L / x     / 4.0 ng/mL  CARDIAC MARKERS ( 2019 15:52 )  x     / 0.02 ng/mL / x     / x     / x        Vitamin D, 1, 25-Dihydroxy: 56.9 pg/mL (19 @ 06:40)    Vitamin D, 25-Hydroxy: 44 ng/mL    Intact PTH: 217 pg/mL (19 @ 06:40)    Amylase, Serum Total: 55 U/L (19 @ 07:25)    Lipase, Serum: 37 U/L (19 @ 07:25)    RADIOLOGY:    PHYSICAL EXAM:  GEN: No acute distress  LUNGS: Clear to auscultation bilaterally   HEART: S1/S2 present. RRR.   ABD: Soft, non-tender, non-distended. Bowel sounds present  EXT: NC/NC/NE/2+PP/LANE  NEURO: AAOX3 SUBJECTIVE:    Patient is a 89y old Male who presents with a chief complaint of Weakness    Currently admitted to medicine with the primary diagnosis of Hypocalcemia     Today is hospital day 2d.    PAST MEDICAL & SURGICAL HISTORY  Hyperlipidemia  CHF (congestive heart failure)  Afib  Hypertension  S/P AAA (abdominal aortic aneurysm) repair    MEDICATIONS:  STANDING MEDICATIONS  atorvastatin 10 milliGRAM(s) Oral at bedtime  calcitriol   Capsule 0.5 MICROGram(s) Oral daily  calcium carbonate   1250 mG (OsCal) 1 Tablet(s) Oral three times a day  ergocalciferol 13622 Unit(s) Oral every week  ferrous    sulfate 325 milliGRAM(s) Oral daily  furosemide    Tablet 20 milliGRAM(s) Oral daily  isosorbide   mononitrate ER Tablet (IMDUR) 30 milliGRAM(s) Oral daily  magnesium oxide 400 milliGRAM(s) Oral four times a day with meals  melatonin 3 milliGRAM(s) Oral at bedtime  metoprolol succinate ER 50 milliGRAM(s) Oral daily    PRN MEDICATIONS  ALBUTerol   0.042% 1.25 milliGRAM(s) Nebulizer every 4 hours PRN    VITALS:   T(F): 94.2  HR: 65  BP: 99/68  RR: 18  SpO2: 95%    LABS:                        9.6    7.58  )-----------( 247      ( 2019 07:25 )             31.2         141  |  96<L>  |  35<H>  ----------------------------<  86  4.2   |  26  |  1.4    Ca    7.2<L>      2019 07:25  Phos  5.5       Mg     2.3         TPro  6.4  /  Alb  3.3<L>  /  TBili  1.1  /  DBili  x   /  AST  22  /  ALT  14  /  AlkPhos  101      PT/INR - ( 2019 06:40 )   PT: >40.00 sec;   INR: 5.12 ratio           Urinalysis Basic - ( 2019 15:52 )    Color: Yellow / Appearance: Clear / S.010 / pH: x  Gluc: x / Ketone: Negative  / Bili: Negative / Urobili: 1.0 mg/dL   Blood: x / Protein: Negative mg/dL / Nitrite: Negative   Leuk Esterase: Small / RBC: x / WBC 6-10 /HPF   Sq Epi: x / Non Sq Epi: x / Bacteria: x        Creatine Kinase, Serum: 220 U/L (19 @ 16:17)  Troponin T, Serum: 0.02 ng/mL <H> (19 @ 16:17)      Culture - Urine (collected 2019 16:30)  Source: .Urine Clean Catch (Midstream)  Final Report (2019 21:22):    <10,000 CFU/ml    Normal Urogenital nitesh present      CARDIAC MARKERS ( 2019 16:17 )  x     / 0.02 ng/mL / 220 U/L / x     / 4.0 ng/mL  CARDIAC MARKERS ( 2019 15:52 )  x     / 0.02 ng/mL / x     / x     / x        Vitamin D, 1, 25-Dihydroxy: 56.9 pg/mL (19 @ 06:40)    Vitamin D, 25-Hydroxy: 44 ng/mL    Intact PTH: 217 pg/mL (19 @ 06:40)    Amylase, Serum Total: 55 U/L (19 @ 07:25)    Lipase, Serum: 37 U/L (19 @ 07:25)    RADIOLOGY:  US Abdomen Limited (19 @ 19:54) >  Gallbladder unremarkable.  Right pleural effusion.  Right renal cysts.      CT Abdomen and Pelvis w/ IV Cont (19 @ 18:12) >  1. Mesenteric edema surrounding pancreas, nonspecific in setting of CHF.   Correlation with pancreatic enzymes recommended. Otherwise, no evidence   of acute abdominal pathology.   2. Cardiomegaly, reflux of contrast into IVC, small bilateral pleural   effusions with interstitial edema; correlate for CHF.        PHYSICAL EXAM:  GEN: confused  LUNGS: breath sounds bilaterally   HEART: S1/S2 present. RRR.   ABD: Soft, non-tender, non-distended.   EXT: ecchymosis at some places, moving all extremities  NEURO: AAOX2, confused and disoriented for now

## 2019-01-31 NOTE — DIETITIAN INITIAL EVALUATION ADULT. - SIGNS/SYMPTOMS
Pt w/ PO intake <50% of estimated needs x2mo, severe muscle wasting of temples, clavicles, shoulders

## 2019-01-31 NOTE — DIETITIAN INITIAL EVALUATION ADULT. - PERTINENT LABORATORY DATA
(1/31) hgb 9.6, hct 31.2, Cl 96, BUN 35, Corrected Ca 7.8, Iron . (1/29) Cholesterol 82, GDL Cholesterol 26, Phos 5.5, Total Shania/HDL ratio 3.2

## 2019-01-31 NOTE — DIETITIAN INITIAL EVALUATION ADULT. - OTHER INFO
Pt presented w/ chief complaint of lethargy x1 week, abdominal pain and vomiting x1 day PTA. Pmdx hypocalcemia, likely 2/2 to malnutrition and hypomagnesemia and CKD3b. Pt w/ mesenteric edema surrounding pancreas. Suspected chronic systolic HF. Suspected malnutrition.

## 2019-01-31 NOTE — PROGRESS NOTE ADULT - SUBJECTIVE AND OBJECTIVE BOX
Nephrology progress note    Patient is seen and examined, events over the last 24 h noted.  No new complaints.  Pt mentions that his diarrhea has resolved.    Allergies:  No Known Allergies    Hospital Medications:   MEDICATIONS  (STANDING):  atorvastatin 10 milliGRAM(s) Oral at bedtime  calcitriol   Capsule 0.5 MICROGram(s) Oral daily  calcium carbonate   1250 mG (OsCal) 1 Tablet(s) Oral three times a day  ergocalciferol 50412 Unit(s) Oral every week  ferrous    sulfate 325 milliGRAM(s) Oral daily  furosemide    Tablet 20 milliGRAM(s) Oral daily  isosorbide   mononitrate ER Tablet (IMDUR) 30 milliGRAM(s) Oral daily  magnesium oxide 400 milliGRAM(s) Oral four times a day with meals  melatonin 3 milliGRAM(s) Oral at bedtime  metoprolol succinate ER 50 milliGRAM(s) Oral daily        VITALS:  T(F): 94.2 (19 @ 07:31), Max: 97.5 (19 @ 19:25)  HR: 65 (19 @ 07:31)  BP: 99/68 (19 @ 07:31)  RR: 18 (19 @ 07:31)  SpO2: 95% (19 @ 07:31)          PHYSICAL EXAM:  Constitutional: NAD  Respiratory: CTAB, no wheezes, rales or rhonchi  Cardiovascular: S1, S2, RRR  Gastrointestinal: BS+, soft, NT/ND  Extremities: No peripheral edema  :  No palomo.       LABS:      141  |  96<L>  |  35<H>  ----------------------------<  86  4.2   |  26  |  1.4    Ca    7.2<L>      2019 07:25  Phos  5.5       Mg     2.3         TPro  6.4  /  Alb  3.3<L>  /  TBili  1.1  /  DBili      /  AST  22  /  ALT  14  /  AlkPhos  101                            9.6    7.58  )-----------( 247      ( 2019 07:25 )             31.2     Vitamin D, 1, 25-Dihydroxy: 56.9 pg/mL (19 @ 06:40)    Vitamin D, 25-Hydroxy: 44 ng/mL    Intact PTH: 217 pg/mL (19 @ 06:40)    Amylase, Serum Total: 55 U/L (19 @ 07:25)    Lipase, Serum: 37 U/L (19 @ 07:25)      Urine Studies:  Urinalysis Basic - ( 2019 15:52 )    Color: Yellow / Appearance: Clear / S.010 / pH:   Gluc:  / Ketone: Negative  / Bili: Negative / Urobili: 1.0 mg/dL   Blood:  / Protein: Negative mg/dL / Nitrite: Negative   Leuk Esterase: Small / RBC:  / WBC 6-10 /HPF   Sq Epi:  / Non Sq Epi:  / Bacteria:       RADIOLOGY & ADDITIONAL STUDIES:

## 2019-01-31 NOTE — PROGRESS NOTE ADULT - ASSESSMENT
Patient with hypocalcemia, hypomagnesemia presented to hospital with lethargy, abdominal pain, vomiting, loose stool.  PMH CKD 3b (baseline cr. ~ 1.5, eGFR ~ 39), CHF, CAD, DLD, HTN, s/p AAA repair    # CKD 3b/ Hypocalcemia / Hypomagnesemia   - serum cr. stable around baseline   - hypocalcemia likely due to CKD plus hypomagnesemia 2/2 GI losses plus ?lasix. Improving on repeat labs   - s/p IV MgSO4 and IV Ca gluconate   - Repeat Mg elevated. cont. oral Mg and Ca supplement for now. Ph elevated   - cont. CaCO3, Vitamin D   - 25 OH-vitamin D, 1,25 OH-vitamin D noted, WNL.    - PTH elevated, secondary hyperparathyroidism    - Trend Ca and Mg at least q 24 hours. If Mg elevated on repeat labs, hold oral Mg oxide    - consider stopping calcitriol after corrected Ca is WNL.   - EKG noted for prolonged QT, monitor EKG, avoid medications causing prolonged QT   - CXR noted for pulmonary congestion, cont. Lasix   - BP on lower side of normal range, keep current meds   - Hb noted, no need for FARHAN, iron studies with adequate stores. can cont. oral FeSO4 for now.   - CT abdomen noted for Mesenteric edema surrounding pancreas. serum amylase and lipase WNL.    - seen by endo, notes appreciated.   - will follow

## 2019-01-31 NOTE — DIETITIAN INITIAL EVALUATION ADULT. - ENERGY NEEDS
energy needs: 1590-1910kcal (25-30kcal/kg) unknown ht, PCM, advance age considered  protein needs: 57-64g (0.9-1.0g/kg) CKD considered- will monitor need to protein restriction. PCM and advanced age considered  Fluid needs: 1 ml/kcal or per LIP

## 2019-01-31 NOTE — PROGRESS NOTE ADULT - ASSESSMENT
Hypocalcemia   ddx malnutrition, hypomag, thyroid disease, hyperphosphatemia, CKD   -replete mg and calcium  - check Ionized ca  - endo consult  - nephro consult  - nutrition consult   - consider thyroid us     #suspected chronic systolic CHF- check echo- pt not in exacerbation - continue meds    #CKD stage 3b- fu nepho    #Prolonged qt- recheck ekg     #Afib- on coumadin- hold today INR 5.0, pt RC     #chronic anemia- MCV 50s-60s- possible thalaesmia- will check iron studies, b12 and folate, send hgb electrophoresis     #suspected malnutrition- nutrition consult    #suspect magnesium deficiency- has been low on multiple admissions- replete     #Type 2 nstemi- possible underlying CKD vs chf- will trend trops, pt no cp Hypocalcemia  -secondary to malnutrition and hypomagnesemia   -replete mg and calcium  - check Ionized ca  - endo consult  - nephro consult  - nutrition consult   - consider thyroid us     #suspected chronic systolic CHF- check echo- pt not in exacerbation - continue meds    #CKD stage 3b- fu nepho    #Prolonged qt- recheck ekg     #Afib- on coumadin- hold today INR 5.0, pt RC     #chronic anemia- MCV 50s-60s- possible thalaesmia- will check iron studies, b12 and folate, send hgb electrophoresis     #suspected malnutrition- nutrition consult    #suspect magnesium deficiency- has been low on multiple admissions- replete     #Type 2 nstemi- possible underlying CKD vs chf- will trend trops, pt no cp Hypocalcemia   - secondary likely to malnutrition and hypomagnesemia and CKD-3 b   - s/p IV MgSO4 and IV Ca gluconate supplementation. Calcium 7.2 and Magnesium    - continue CaCO3 and Vitamin D   - 25 OH-vitamin D, 1,25 OH-vitamin D noted to be normal    - PTH elevated, secondary hyperparathyroidism likely   - Continue to trend Ca and Mg at least every 24 hours. If Mg elevated on repeat labs, will hold oral Mg oxide    - consider stopping calcitriol after corrected Ca is normal     Prolonged QT Interval  - EKG noted for prolonged QT on repeat EKG, monitor repeat EKG in the morning  - avoid medications causing prolonged QT  - Keep Magnesium above 2 and Potassium above 4    Mesenteric Edema   - CT abdomen noted for Mesenteric edema surrounding pancreas.    - serum amylase and lipase normal   - no active pain currently    CHF  - will check echo  - not in exacerbation  - continue Lasix 20 mg for now    Atrial fibrillation  - on coumadin at home, off it here  - will check repeat INR today  - INR 5.12 with no signs of bleed  - currently Rate Controlled     Chronic Microcytic Anemia  - MCV 50s-60s  - possible thalassemia trait, high ferritin levels  - will check iron studies, b12 and folate, send hgb electrophoresis     Suspected malnutrition  - nutrition requested to follow Hypocalcemia   - secondary likely to malnutrition and hypomagnesemia and CKD-3 b   - s/p IV MgSO4 and IV Ca gluconate supplementation. Calcium 7.2 and Magnesium    - continue CaCO3 and Vitamin D   - 25 OH-vitamin D, 1,25 OH-vitamin D noted to be normal    - PTH elevated, secondary hyperparathyroidism likely   - Continue to trend Ca and Mg at least every 24 hours. If Mg elevated on repeat labs, will hold oral Mg oxide    - consider stopping calcitriol after corrected Ca is normal     Prolonged QT Interval  - EKG noted for prolonged QT on repeat EKG, monitor repeat EKG in the morning  - avoid medications causing prolonged QT  - Keep Magnesium above 2 and Potassium above 4    Mesenteric Edema   - CT abdomen noted for Mesenteric edema surrounding pancreas.    - serum amylase and lipase normal   - no active pain currently    Suspected Chronic Systolic HF  - will check echo  - not in exacerbation  - continue Lasix 20 mg for now    Atrial fibrillation  - on coumadin at home, off it here  - will check repeat INR today  - currently Rate Controlled     Chronic Microcytic Anemia  - MCV 50s-60s  - possible thalassemia trait, high ferritin levels  - will check iron studies, b12 and folate, send hgb electrophoresis     Suspected malnutrition  - nutrition requested to follow

## 2019-01-31 NOTE — DIETITIAN INITIAL EVALUATION ADULT. - FACTORS AFF FOOD INTAKE
persistent lack of appetite/change in mental status/pt w/ poor appetite x2 months, attributes to poor appetite. Complicated w/ recent confusion.

## 2019-01-31 NOTE — PROGRESS NOTE ADULT - ATTENDING COMMENTS
Pt seen examined  Pt with CKD 3, 2HPT, HTN, admitted with change in MS, found to have hypocalcemia  calcium improving on Vit D analogs, Ergocalciferol and CaCo3  Mag repleted  Will sarah
Pt seen and examined at bedside.  Pt was confused to place this am but aware of name and president and date   Vital Signs (24 Hrs):  T(C): 34.6 (01-31-19 @ 07:31), Max: 36.4 (01-30-19 @ 19:25)  HR: 65 (01-31-19 @ 07:31) (65 - 76)  BP: 99/68 (01-31-19 @ 07:31) (99/68 - 129/68)  RR: 18 (01-31-19 @ 07:31) (18 - 19)  SpO2: 95% (01-31-19 @ 07:31) (95% - 96%)  Wt(kg): --  Daily     Daily     I&O's Summary    Exam stable  Labs reviewed    Plan  #Hypocalcemia- ddx malnutrition, hypomag, thyroid disease, hyperphosphatemia, CKD   -replete mg and calcium  - Calcium 7.2 today repleted  - endo consult appreciated started on calcitriol  - nephro consult appreciated will follow  - nutrition consult   - TSH 5.64 endo aware.       #suspected chronic systolic CHF- check echo- pt not in exacerbation - continue meds    #CKD stage 3b- fu nephro    #Prolonged qt- repeat ekg showed prolonged QT likely 2/2 to hypoca- magnesium repleted- daily  ekg     #Afib- on coumadin- INR pending today, follow up labs    #chronic anemia- MCV 50s-60s- possible thalaesmia- iron studies wnl, fu b12 and folate, fu hgb electrophoresis- c/w Iron     #suspected malnutrition- nutrition consult    #suspect magnesium deficiency- has been low on multiple admissions- replete     #Type 2 nstemi- possible underlying CKD vs chf- ckmb flat and Trops 0.02 x 2, no chest pain    #Progress Note Handoff  Pending (specify):  Consults_PT, nutrition_______, Tests_ ECHO_______, Test Results_______, Other_________  Family discussion: dw with pt, pt does not want family involved   Disposition: Home___/SNF___/Other________/Unknown at this time__x pending PT consult______

## 2019-02-01 LAB
ALBUMIN SERPL ELPH-MCNC: 3.6 G/DL — SIGNIFICANT CHANGE UP (ref 3.5–5.2)
ALP SERPL-CCNC: 116 U/L — HIGH (ref 30–115)
ALT FLD-CCNC: 17 U/L — SIGNIFICANT CHANGE UP (ref 0–41)
ANION GAP SERPL CALC-SCNC: 14 MMOL/L — SIGNIFICANT CHANGE UP (ref 7–14)
ANION GAP SERPL CALC-SCNC: 17 MMOL/L — HIGH (ref 7–14)
APTT BLD: 55.3 SEC — HIGH (ref 27–39.2)
AST SERPL-CCNC: 52 U/L — HIGH (ref 0–41)
BILIRUB SERPL-MCNC: 1 MG/DL — SIGNIFICANT CHANGE UP (ref 0.2–1.2)
BUN SERPL-MCNC: 31 MG/DL — HIGH (ref 10–20)
BUN SERPL-MCNC: 32 MG/DL — HIGH (ref 10–20)
CALCIUM SERPL-MCNC: 7.3 MG/DL — LOW (ref 8.4–10.5)
CALCIUM SERPL-MCNC: 7.6 MG/DL — LOW (ref 8.5–10.1)
CALCIUM SERPL-MCNC: 8.1 MG/DL — LOW (ref 8.5–10.1)
CHLORIDE SERPL-SCNC: 94 MMOL/L — LOW (ref 98–110)
CHLORIDE SERPL-SCNC: 98 MMOL/L — SIGNIFICANT CHANGE UP (ref 98–110)
CO2 SERPL-SCNC: 25 MMOL/L — SIGNIFICANT CHANGE UP (ref 17–32)
CO2 SERPL-SCNC: 29 MMOL/L — SIGNIFICANT CHANGE UP (ref 17–32)
CREAT SERPL-MCNC: 1.3 MG/DL — SIGNIFICANT CHANGE UP (ref 0.7–1.5)
CREAT SERPL-MCNC: 1.4 MG/DL — SIGNIFICANT CHANGE UP (ref 0.7–1.5)
FERRITIN SERPL-MCNC: 530 NG/ML — HIGH (ref 30–400)
GLUCOSE SERPL-MCNC: 128 MG/DL — HIGH (ref 70–99)
GLUCOSE SERPL-MCNC: 95 MG/DL — SIGNIFICANT CHANGE UP (ref 70–99)
HEMOGLOBIN INTERPRETATION: SIGNIFICANT CHANGE UP
HGB A MFR BLD: 94.1 % — LOW (ref 95.8–98)
HGB A2 MFR BLD: 5.4 % — HIGH (ref 2–3.2)
HGB F MFR BLD: 0.5 % — SIGNIFICANT CHANGE UP (ref 0–1)
INR BLD: 4.92 RATIO — HIGH (ref 0.65–1.3)
MAGNESIUM SERPL-MCNC: 2.4 MG/DL — SIGNIFICANT CHANGE UP (ref 1.8–2.4)
MAGNESIUM SERPL-MCNC: 2.4 MG/DL — SIGNIFICANT CHANGE UP (ref 1.8–2.4)
POTASSIUM SERPL-MCNC: 4.5 MMOL/L — SIGNIFICANT CHANGE UP (ref 3.5–5)
POTASSIUM SERPL-MCNC: 5.4 MMOL/L — HIGH (ref 3.5–5)
POTASSIUM SERPL-MCNC: 8.5 MMOL/L — CRITICAL HIGH (ref 3.5–5)
POTASSIUM SERPL-SCNC: 4.5 MMOL/L — SIGNIFICANT CHANGE UP (ref 3.5–5)
POTASSIUM SERPL-SCNC: 5.4 MMOL/L — HIGH (ref 3.5–5)
POTASSIUM SERPL-SCNC: 8.5 MMOL/L — CRITICAL HIGH (ref 3.5–5)
PROT SERPL-MCNC: 7.4 G/DL — SIGNIFICANT CHANGE UP (ref 6–8)
PROTHROM AB SERPL-ACNC: >40 SEC — HIGH (ref 9.95–12.87)
PTH-INTACT FLD-MCNC: 261 PG/ML — HIGH (ref 15–65)
SODIUM SERPL-SCNC: 136 MMOL/L — SIGNIFICANT CHANGE UP (ref 135–146)
SODIUM SERPL-SCNC: 141 MMOL/L — SIGNIFICANT CHANGE UP (ref 135–146)
T4 FREE SERPL-MCNC: 1 NG/DL — SIGNIFICANT CHANGE UP (ref 0.9–1.8)
TSH SERPL-MCNC: 7.41 UIU/ML — HIGH (ref 0.27–4.2)

## 2019-02-01 RX ORDER — LANOLIN ALCOHOL/MO/W.PET/CERES
5 CREAM (GRAM) TOPICAL AT BEDTIME
Qty: 0 | Refills: 0 | Status: DISCONTINUED | OUTPATIENT
Start: 2019-02-01 | End: 2019-02-01

## 2019-02-01 RX ADMIN — CALCITRIOL 0.5 MICROGRAM(S): 0.5 CAPSULE ORAL at 12:52

## 2019-02-01 RX ADMIN — MAGNESIUM OXIDE 400 MG ORAL TABLET 400 MILLIGRAM(S): 241.3 TABLET ORAL at 08:15

## 2019-02-01 RX ADMIN — Medication 20 MILLIGRAM(S): at 05:19

## 2019-02-01 RX ADMIN — Medication 325 MILLIGRAM(S): at 12:52

## 2019-02-01 RX ADMIN — Medication 50 MILLIGRAM(S): at 05:19

## 2019-02-01 RX ADMIN — ISOSORBIDE MONONITRATE 30 MILLIGRAM(S): 60 TABLET, EXTENDED RELEASE ORAL at 12:52

## 2019-02-01 RX ADMIN — Medication 1 TABLET(S): at 05:19

## 2019-02-01 RX ADMIN — Medication 3 MILLIGRAM(S): at 21:48

## 2019-02-01 RX ADMIN — Medication 1 TABLET(S): at 13:26

## 2019-02-01 RX ADMIN — ATORVASTATIN CALCIUM 10 MILLIGRAM(S): 80 TABLET, FILM COATED ORAL at 21:48

## 2019-02-01 RX ADMIN — Medication 5 MILLIGRAM(S): at 04:08

## 2019-02-01 NOTE — CONSULT NOTE ADULT - ASSESSMENT

## 2019-02-01 NOTE — PROGRESS NOTE ADULT - ASSESSMENT
Generalized weakness from electrolyte abnormalities plan as follow:     Hypocalcemia with secondary hyperparathyroidism : corrected calcium 8.4   on calcitriol and calcium carbonate   D/C calcium carbonate   and recheck calcium tomorrow if corrected above 8.5 consider stopping calcitriol     hypomagnesemia: resolved d/c magnesium     Prolonged QTC secondary to above resolving EKG today showed      Echo was ordered previously went down for echo today follow on echo.     hyperkalemia 5.4 recheck tonight at 8 pm if above 5.5 will treat medically     TSH 7.41 and T4 1 would repeat TFT's in 2-3 weeks and assess the need for levothyroxine     DVT PPX     needs PT/Rehab eval and likely SNF placement per  will likely not happen over weekend needs PT eval and Auth from insurance Generalized weakness from electrolyte abnormalities plan as follow:     Hypocalcemia with secondary hyperparathyroidism : corrected calcium 8.4   on calcitriol and calcium carbonate   D/C calcium carbonate   and recheck calcium tomorrow if corrected above 8.5 consider stopping calcitriol     hypomagnesemia: resolved d/c magnesium     Prolonged QTC secondary to above resolving EKG today showed      Afib/Flutter:   rate controlled   INR 4.92 continue to hold coumadin target INR 2-3     Echo was ordered previously went down for echo today follow on echo.     hyperkalemia 5.4 recheck tonight at 8 pm if above 5.5 will treat medically     TSH 7.41 and T4 1 would repeat TFT's in 2-3 weeks and assess the need for levothyroxine     DVT PPX     needs PT/Rehab eval and likely SNF placement. per  will likely not happen over weekend needs PT eval and Auth from insurance

## 2019-02-01 NOTE — PROGRESS NOTE ADULT - SUBJECTIVE AND OBJECTIVE BOX
no chest pain   no sob   patient feels well     Vital Signs Last 24 Hrs  T(C): 35.7 (01 Feb 2019 01:37), Max: 36.1 (31 Jan 2019 23:17)  T(F): 96.3 (01 Feb 2019 01:37), Max: 97 (31 Jan 2019 23:17)  HR: 66 (01 Feb 2019 14:05) (61 - 79)  BP: 112/62 (01 Feb 2019 14:05) (101/58 - 139/70)  BP(mean): --  RR: 18 (01 Feb 2019 14:05) (18 - 18)  SpO2: 96% (31 Jan 2019 23:17) (96% - 96%)    PHYSICAL EXAM:  GENERAL: NAD, well-developed  HEAD:  Atraumatic, Normocephalic  EYES: EOMI, PERRLA, conjunctiva and sclera clear  NECK: Supple, No JVD  Pulm: Clear to auscultation bilaterally; No wheeze  CV Regular rate and rhythm; No murmurs, rubs, or gallops  GI: Soft, Nontender, Nondistended; Bowel sounds present  EXTREMITIES:  2+ Peripheral Pulses, No clubbing, cyanosis, or edema  PSYCH: AAOx3  NEUROLOGY: non-focal  SKIN: No rashes or lesions                          9.6    7.58  )-----------( 247      ( 31 Jan 2019 07:25 )             31.2     02-01    141  |  98  |  32<H>  ----------------------------<  128<H>  5.4<H>   |  29  |  1.3    Ca    8.1<L>      01 Feb 2019 11:09  Mg     2.4     02-01    TPro  7.4  /  Alb  3.6  /  TBili  1.0  /  DBili  x   /  AST  52<H>  /  ALT  17  /  AlkPhos  116<H>  02-01    LIVER FUNCTIONS - ( 01 Feb 2019 05:41 )  Alb: 3.6 g/dL / Pro: 7.4 g/dL / ALK PHOS: 116 U/L / ALT: 17 U/L / AST: 52 U/L / GGT: x           PT/INR - ( 01 Feb 2019 05:41 )   PT: >40.00 sec;   INR: 4.92 ratio         PTT - ( 01 Feb 2019 05:41 )  PTT:55.3 sec  CARDIAC MARKERS ( 30 Jan 2019 16:17 )  x     / 0.02 ng/mL / 220 U/L / x     / 4.0 ng/mL        Culture - Urine (collected 29 Jan 2019 16:30)  Source: .Urine Clean Catch (Midstream)  Final Report (30 Jan 2019 21:22):    <10,000 CFU/ml    Normal Urogenital nitesh present    MEDICATIONS  (STANDING):  atorvastatin 10 milliGRAM(s) Oral at bedtime  calcitriol   Capsule 0.5 MICROGram(s) Oral daily  calcium carbonate   1250 mG (OsCal) 1 Tablet(s) Oral three times a day  ergocalciferol 06158 Unit(s) Oral every week  ferrous    sulfate 325 milliGRAM(s) Oral daily  furosemide    Tablet 20 milliGRAM(s) Oral daily  isosorbide   mononitrate ER Tablet (IMDUR) 30 milliGRAM(s) Oral daily  melatonin 3 milliGRAM(s) Oral at bedtime  metoprolol succinate ER 50 milliGRAM(s) Oral daily    MEDICATIONS  (PRN):  ALBUTerol   0.042% 1.25 milliGRAM(s) Nebulizer every 4 hours PRN Respirations Above___

## 2019-02-01 NOTE — MEDICAL STUDENT PROGRESS NOTE(EDUCATION) - SUBJECTIVE AND OBJECTIVE BOX
Patient is a 89y old Male who presents with a chief complaint of Weakness    Currently admitted to medicine with the primary diagnosis of Hypocalcemia     Today is hospital day 3d. Hypocalcemia is improving. Patient still has AMS. Patient is comfortable sitting in chair. Denies abdominal pain, Chest pain.    PAST MEDICAL & SURGICAL HISTORY  Hyperlipidemia  CHF (congestive heart failure)  Afib  Hypertension  S/P AAA (abdominal aortic aneurysm) repair    MEDICATIONS:  STANDING MEDICATIONS  atorvastatin 10 milliGRAM(s) Oral at bedtime  calcitriol   Capsule 0.5 MICROGram(s) Oral daily  calcium carbonate   1250 mG (OsCal) 1 Tablet(s) Oral three times a day  ergocalciferol 90961 Unit(s) Oral every week  ferrous    sulfate 325 milliGRAM(s) Oral daily  furosemide    Tablet 20 milliGRAM(s) Oral daily  isosorbide   mononitrate ER Tablet (IMDUR) 30 milliGRAM(s) Oral daily  magnesium oxide 400 milliGRAM(s) Oral four times a day with meals  melatonin 3 milliGRAM(s) Oral at bedtime  metoprolol succinate ER 50 milliGRAM(s) Oral daily    PRN MEDICATIONS  ALBUTerol   0.042% 1.25 milliGRAM(s) Nebulizer every 4 hours PRN    VITALS:   T(F): 94.2  HR: 96.3  BP: 139/70  RR: 18  SpO2: 96%    LABS:                        9.6    7.58  )-----------( 247      ( 2019 07:25 )             31.2     02-    136     |  94<L>  |  31 <H>  ----------------------------<  86  8.5<H>|  25  |  1.4    Ca    7.6 <L>      2019 07:25  Phos  5.5       Mg     2.4           TPro  7.4  /  Alb  3.6  /  TBili  1.0  /  DBili  x   /  AST  52  /  ALT  17  /  AlkPhos  116  02    PT/INR - ( 2019 05:41 )   PT: >40.00 sec;   INR: 4.92 ratio           Urinalysis Basic - ( 2019 15:52 )    Color: Yellow / Appearance: Clear / S.010 / pH: x  Gluc: x / Ketone: Negative  / Bili: Negative / Urobili: 1.0 mg/dL   Blood: x / Protein: Negative mg/dL / Nitrite: Negative   Leuk Esterase: Small / RBC: x / WBC 6-10 /HPF   Sq Epi: x / Non Sq Epi: x / Bacteria: x        Creatine Kinase, Serum: 220 U/L (19 @ 16:17)  Troponin T, Serum: 0.02 ng/mL <H> (19 @ 16:17)      Culture - Urine (collected 2019 16:30)  Source: .Urine Clean Catch (Midstream)  Final Report (2019 21:22):    <10,000 CFU/ml    Normal Urogenital nitesh present      CARDIAC MARKERS ( 2019 16:17 )  x     / 0.02 ng/mL / 220 U/L / x     / 4.0 ng/mL  CARDIAC MARKERS ( 2019 15:52 )  x     / 0.02 ng/mL / x     / x     / x        Vitamin D, 1, 25-Dihydroxy: 56.9 pg/mL (19 @ 06:40)    Vitamin D, 25-Hydroxy: 44 ng/mL    Intact PTH: 217 pg/mL (19 @ 06:40)    Amylase, Serum Total: 55 U/L (19 @ 07:25)    Lipase, Serum: 37 U/L (19 @ 07:25)    TSH: 7.41 <H> [id number]    12-LEAD EC2019 17:04  Ventricular Rate 61 BPM  Atrial Rate 326 BPM  QRS Duration 110 ms  Q-T Interval 518 ms  QTC Calculation(Bezet) 521 ms  R Axis -6 degrees  T Axis -50 degrees  Diagnosis Line Atrial flutter with variable A-V block  Nonspecific T wave abnormality  Abnormal ECG  Confirmed by Jr Antonio ([id number]) on 2019 2:48:45 PM      RADIOLOGY:  US Abdomen Limited (19 @ 19:54) >  Gallbladder unremarkable.  Right pleural effusion.  Right renal cysts.      CT Abdomen and Pelvis w/ IV Cont (19 @ 18:12) >  1. Mesenteric edema surrounding pancreas, nonspecific in setting of CHF.   Correlation with pancreatic enzymes recommended. Otherwise, no evidence   of acute abdominal pathology.   2. Cardiomegaly, reflux of contrast into IVC, small bilateral pleural   effusions with interstitial edema; correlate for CHF.        PHYSICAL EXAM:  GEN: confused, NAD  LUNGS: breath sounds bilaterally   HEART: S1/S2 present. RRR  ABD: Soft, non-tender, non-distended.   NEURO: confused and disoriented for now Patient is a 89 year old Male who presents with a chief complaint of Weakness    Currently admitted to medicine with the primary diagnosis of Hypocalcemia and hypomagnesemia associated with QT prolongation.     Today is hospital day 3d. Electrolyte disturbances are resolving. Patient still has AMS. Patient is comfortable sitting in chair. Denies abdominal pain, Chest pain.    PAST MEDICAL & SURGICAL HISTORY  Hyperlipidemia  CHF (congestive heart failure)  Afib  Hypertension  S/P AAA (abdominal aortic aneurysm) repair    MEDICATIONS:  STANDING MEDICATIONS  atorvastatin 10 milliGRAM(s) Oral at bedtime  calcitriol   Capsule 0.5 MICROGram(s) Oral daily  calcium carbonate   1250 mG (OsCal) 1 Tablet(s) Oral three times a day  ergocalciferol 78781 Unit(s) Oral every week  ferrous    sulfate 325 milliGRAM(s) Oral daily  furosemide    Tablet 20 milliGRAM(s) Oral daily  isosorbide   mononitrate ER Tablet (IMDUR) 30 milliGRAM(s) Oral daily  magnesium oxide 400 milliGRAM(s) Oral four times a day with meals  melatonin 3 milliGRAM(s) Oral at bedtime  metoprolol succinate ER 50 milliGRAM(s) Oral daily    PRN MEDICATIONS  ALBUTerol   0.042% 1.25 milliGRAM(s) Nebulizer every 4 hours PRN    VITALS:   T(F): 94.2  HR: 96.3  BP: 139/70  RR: 18  SpO2: 96%    LABS:                        9.6    7.58  )-----------( 247      ( 2019 07:25 )             31.2     02-01    136     |  94<L>  |  31 <H>  ----------------------------<  86  8.5<H>|  25  |  1.4    Ca    7.6 <L>      2019 07:25  Phos  5.5     -  Mg     2.4     -      TPro  7.4  /  Alb  3.6  /  TBili  1.0  /  DBili  x   /  AST  52  /  ALT  17  /  AlkPhos  116  02-    PT/INR - ( 2019 05:41 )   PT: >40.00 sec;   INR: 4.92 ratio           Urinalysis Basic - ( 2019 15:52 )    Color: Yellow / Appearance: Clear / S.010 / pH: x  Gluc: x / Ketone: Negative  / Bili: Negative / Urobili: 1.0 mg/dL   Blood: x / Protein: Negative mg/dL / Nitrite: Negative   Leuk Esterase: Small / RBC: x / WBC 6-10 /HPF   Sq Epi: x / Non Sq Epi: x / Bacteria: x        Creatine Kinase, Serum: 220 U/L (19 @ 16:17)  Troponin T, Serum: 0.02 ng/mL <H> (19 @ 16:17)      Culture - Urine (collected 2019 16:30)  Source: .Urine Clean Catch (Midstream)  Final Report (2019 21:22):    <10,000 CFU/ml    Normal Urogenital nitesh present      CARDIAC MARKERS ( 2019 16:17 )  x     / 0.02 ng/mL / 220 U/L / x     / 4.0 ng/mL  CARDIAC MARKERS ( 2019 15:52 )  x     / 0.02 ng/mL / x     / x     / x        Vitamin D, 1, 25-Dihydroxy: 56.9 pg/mL (19 @ 06:40)    Vitamin D, 25-Hydroxy: 44 ng/mL    Intact PTH: 217 pg/mL (19 @ 06:40)    Amylase, Serum Total: 55 U/L (19 @ 07:25)    Lipase, Serum: 37 U/L (19 @ 07:25)    TSH: 7.41 <H> [id number]    12-LEAD EC2019 17:04  Ventricular Rate 61 BPM  Atrial Rate 326 BPM  QRS Duration 110 ms  Q-T Interval 518 ms  QTC Calculation(Bezet) 521 ms  R Axis -6 degrees  T Axis -50 degrees  Diagnosis Line Atrial flutter with variable A-V block  Nonspecific T wave abnormality  Abnormal ECG  Confirmed by Jr Antonio ([id number]) on 2019 2:48:45 PM      RADIOLOGY:  US Abdomen Limited (19 @ 19:54) >  Gallbladder unremarkable.  Right pleural effusion.  Right renal cysts.      CT Abdomen and Pelvis w/ IV Cont (19 @ 18:12) >  1. Mesenteric edema surrounding pancreas, nonspecific in setting of CHF.   Correlation with pancreatic enzymes recommended. Otherwise, no evidence   of acute abdominal pathology.   2. Cardiomegaly, reflux of contrast into IVC, small bilateral pleural   effusions with interstitial edema; correlate for CHF.        PHYSICAL EXAM:  GEN: confused, NAD  LUNGS: breath sounds bilaterally   HEART: S1/S2 present. Irregular  ABD: Soft, non-tender, non-distended.   NEURO: confused and disoriented for now

## 2019-02-01 NOTE — MEDICAL STUDENT PROGRESS NOTE(EDUCATION) - NS MD HP STUD ASPLAN PLAN FT
Hypocalcemia   - secondary likely to malnutrition, hypomagnesemia and CKD-3 b, and lasix   - s/p IV MgSO4 and IV Ca gluconate supplementation. Calcium 7.2 and Magnesium    - continue CaCO3 and Vitamin D   - 25 OH-vitamin D, 1,25 OH-vitamin D noted to be normal    - PTH elevated, secondary hyperparathyroidism likely   - Continue to trend Ca and Mg at least every 24 hours. If Mg elevated on repeat labs, will hold oral Mg oxide    - consider stopping calcitriol after corrected Ca is normal     Prolonged QT Interval  - EKG noted for prolonged QT on repeat EKG, monitor repeat EKG in the morning  - avoid medications causing prolonged QT  - Keep Magnesium above 2 and Potassium above 4    AMS  - TSH elevated  - obtain T3, T4, Thyroid antibodies    Mesenteric Edema   - CT abdomen noted for Mesenteric edema surrounding pancreas.    - serum amylase and lipase normal   - no active pain currently    Suspected Chronic Systolic HF  - will check echo  - not in exacerbation  - continue Lasix 20 mg for now    Atrial fibrillation  - on coumadin at home, off it here  - will check repeat INR today  - currently Rate Controlled     Chronic Microcytic Anemia  - MCV 50s-60s  - possible thalassemia trait, high ferritin levels  - will check iron studies, b12 and folate, send hgb electrophoresis     Suspected malnutrition  - nutrition requested to follow Hypocalcemia  - Today corrected C=8.1   - most probably secondary to Hypomagnesemia and diuresis  - now on oral CaCO3    - 25 OH-vitamin D, 1,25 OH-vitamin D noted to be normal --> will DC supplements   - PTH elevated (secondary hyperparathyroidism likely)   - Continue to trend Ca and Mg at least every 24 hours    #Hypomagnesemia  -Secondary to malnutrition and loop diuresis  - Today Mg = 2.4   - keep MgOx    Prolonged QT Interval  - Today Qtc= 495  - keep on tele  -repeat ECG tomorrow  - avoid medications causing prolonged QT    High TSH  - TSH elevated, Ft3 =1  - repeat studies in one month    Mesenteric Edema   - CT abdomen noted for Mesenteric edema surrounding pancreas.    - serum amylase and lipase normal   - no active pain currently    Suspected Chronic Systolic HF  - will check echo  - not in exacerbation  - continue Lasix 20 mg for now    Atrial fibrillation  - on coumadin at home, off it here  - INR still supratherpaeutic    Chronic Microcytic Anemia  - MCV 50s-60s  - Iron studies: anemia of chronic disease  - possible thalassemia trait  - will check iron studies, b12 and folate, send hgb electrophoresis     Suspected malnutrition  - nutrition requested to follow Hypocalcemia  - Today corrected C=8.1   - most probably secondary to Hypomagnesemia and diuresis  - will hold oral CaCO3   - 25 OH-vitamin D, 1,25 OH-vitamin D noted to be normal --> will DC supplements   - PTH elevated (secondary hyperparathyroidism likely)   - Continue to trend Ca and Mg at least every 24 hours    #Hypomagnesemia  -Secondary to malnutrition and loop diuresis  - Today Mg = 2.4   - Hold MgOx    Prolonged QT Interval  - Today Qtc= 495  - keep on tele  -repeat ECG tomorrow  - avoid medications causing prolonged QT    High TSH  - TSH elevated, Ft3 =1  - repeat studies in one month    Mesenteric Edema   - CT abdomen noted for Mesenteric edema surrounding pancreas.    - serum amylase and lipase normal   - no active pain currently    Suspected Chronic Systolic HF  - will check echo  - not in exacerbation  - continue Lasix 20 mg for now    Atrial fibrillation  - on coumadin at home, off it here  - INR still supratherpaeutic    Chronic Microcytic Anemia  - MCV 50s-60s  - Iron studies: anemia of chronic disease  - possible thalassemia trait  - will check iron studies, b12 and folate, send hgb electrophoresis     Suspected malnutrition  - nutrition requested to follow

## 2019-02-01 NOTE — CONSULT NOTE ADULT - SUBJECTIVE AND OBJECTIVE BOX
HPI:  This patient is 89 y/o male with past medical history of CHF, CAD, DLD, HTN, AAA repair and anemia. He came to hospital with chief complaint of lethargy for past 1 week. Patient reports of abdominal pain since yesterday. This was associated with 1-2 episodes of vomiting (non bloody non billious). Family reports that he was behaving differently too. Patient has not been eating well as per family.   Patient denies chest pain, shortness of breath, urinary symptoms, leg weakness, paresthesias weakness, headaches, recent trauma or rash.   Patient is mostly wheelchair bound at home but does use walker at times.     In ED patient was found to have Mg of 0.4 along with Calcium of 5.3 S/P IV calcium gluconate 3gm and Mag 4gm which resolved patients symptoms (30 Jan 2019 00:36)      PAST MEDICAL & SURGICAL HISTORY:  Hyperlipidemia  CHF (congestive heart failure)  Afib  Hypertension  S/P AAA (abdominal aortic aneurysm) repair      Hospital Course:    TODAY'S SUBJECTIVE & REVIEW OF SYMPTOMS:     Constitutional WNL   Cardio WNL   Resp WNL   GI WNL  Heme WNL  Endo WNL  Skin WNL  MSK Weakness  Cognitive lethargic      MEDICATIONS  (STANDING):  atorvastatin 10 milliGRAM(s) Oral at bedtime  calcitriol   Capsule 0.5 MICROGram(s) Oral daily  ergocalciferol 08140 Unit(s) Oral every week  ferrous    sulfate 325 milliGRAM(s) Oral daily  furosemide    Tablet 20 milliGRAM(s) Oral daily  isosorbide   mononitrate ER Tablet (IMDUR) 30 milliGRAM(s) Oral daily  melatonin 3 milliGRAM(s) Oral at bedtime  metoprolol succinate ER 50 milliGRAM(s) Oral daily    MEDICATIONS  (PRN):  ALBUTerol   0.042% 1.25 milliGRAM(s) Nebulizer every 4 hours PRN Respirations Above___      FAMILY HISTORY:  No pertinent family history in first degree relatives      Allergies    No Known Allergies    Intolerances        SOCIAL HISTORY:    [  ] Etoh  [  ] Smoking  [  ] Substance abuse     Home Environment:  [  ] Home Alone  [x  ] Lives with Family  [  ] Home Health Aid    Dwelling:  [  ] Apartment  [x  ] Private House  [  ] Adult Home  [  ] Skilled Nursing Facility      [  ] Short Term  [  ] Long Term  [ x ] Stairs       Elevator [  ]    FUNCTIONAL STATUS PTA: (Check all that apply)  Ambulation: [ x  ]Independent    [  ] Dependent     [  ] Non-Ambulatory  Assistive Device: [ x ] SA Cane  [  ]  Q Cane  [  ] Walker  [  ]  Wheelchair  ADL : [  ] Independent  [ x ]  Dependent       Vital Signs Last 24 Hrs  T(C): 35.7 (01 Feb 2019 01:37), Max: 36.1 (31 Jan 2019 23:17)  T(F): 96.3 (01 Feb 2019 01:37), Max: 97 (31 Jan 2019 23:17)  HR: 66 (01 Feb 2019 14:05) (61 - 79)  BP: 112/62 (01 Feb 2019 14:05) (101/58 - 139/70)  BP(mean): --  RR: 18 (01 Feb 2019 14:05) (18 - 18)  SpO2: 96% (31 Jan 2019 23:17) (96% - 96%)      PHYSICAL EXAM: lethargic  GENERAL: NAD, well-groomed, well-developed  HEAD:  Atraumatic, Normocephalic  CHEST/LUNG: Clear   HEART: S1S2+  ABDOMEN: Soft, Nontender  EXTREMITIES:  no calf tenderness    NERVOUS SYSTEM:  Cranial Nerves 2-12 intact [  ] Abnormal  [  ]  ROM: WFL all extremities [  ]  Abnormal [  ]able to move all ext  Motor Strength: WFL all extremities  [  ]  Abnormal [  ]  Sensation: intact to light touch [  ] Abnormal [  ]  Reflexes: Symmetric [  ]  Abnormal [  ]    FUNCTIONAL STATUS:  Bed Mobility: Independent [  ]  Supervision [  ]  Needs Assistance [ x ]  N/A [  ]  Transfers: Independent [  ]  Supervision [  ]  Needs Assistance [x  ]  N/A [  ]   Ambulation: Independent [  ]  Supervision [  ]  Needs Assistance [  ]  N/A [  ]  ADL: Independent [  ] Requires Assistance [  ] N/A [  ]      LABS:                        9.6    7.58  )-----------( 247      ( 31 Jan 2019 07:25 )             31.2     02-01    141  |  98  |  32<H>  ----------------------------<  128<H>  5.4<H>   |  29  |  1.3    Ca    8.1<L>      01 Feb 2019 11:09  Mg     2.4     02-01    TPro  7.4  /  Alb  3.6  /  TBili  1.0  /  DBili  x   /  AST  52<H>  /  ALT  17  /  AlkPhos  116<H>  02-01    PT/INR - ( 01 Feb 2019 05:41 )   PT: >40.00 sec;   INR: 4.92 ratio         PTT - ( 01 Feb 2019 05:41 )  PTT:55.3 sec      RADIOLOGY & ADDITIONAL STUDIES:    Assesment:

## 2019-02-01 NOTE — CONSULT NOTE ADULT - SUBJECTIVE AND OBJECTIVE BOX
Patient is a 89y old  Male who presents with a chief complaint of Weakness and abdominal pain (01 Feb 2019 16:01)      HPI:  This patient is 89 y/o male with past medical history of CHF, CAD, DLD, HTN, AAA repair and anemia. He came to hospital with chief complaint of lethargy for past 1 week. Patient reports of abdominal pain since yesterday. This was associated with 1-2 episodes of vomiting (non bloody non billious). Family reports that he was behaving differently too. Patient has not been eating well as per family.   Patient denies chest pain, shortness of breath, urinary symptoms, leg weakness, paresthesias weakness, headaches, recent trauma or rash.   Patient is mostly wheelchair bound at home but does use walker at times.     In ED patient was found to have Mg of 0.4 along with Calcium of 5.3 S/P IV calcium gluconate 3gm and Mag 4gm which resolved patients symptoms (30 Jan 2019 00:36)      PAST MEDICAL & SURGICAL HISTORY:  Hyperlipidemia  CHF (congestive heart failure)  Afib  Hypertension  S/P AAA (abdominal aortic aneurysm) repair      PREVIOUS DIAGNOSTIC TESTING:      ECHO  FINDINGS:    STRESS  FINDINGS:    CATHETERIZATION  FINDINGS:    MEDICATIONS  (STANDING):  atorvastatin 10 milliGRAM(s) Oral at bedtime  ferrous    sulfate 325 milliGRAM(s) Oral daily  furosemide    Tablet 20 milliGRAM(s) Oral daily  isosorbide   mononitrate ER Tablet (IMDUR) 30 milliGRAM(s) Oral daily  melatonin 3 milliGRAM(s) Oral at bedtime  metoprolol succinate ER 50 milliGRAM(s) Oral daily    MEDICATIONS  (PRN):  ALBUTerol   0.042% 1.25 milliGRAM(s) Nebulizer every 4 hours PRN Respirations Above___      FAMILY HISTORY:  No pertinent family history in first degree relatives      SOCIAL HISTORY:  CIGARETTES:    ALCOHOL:    REVIEW OF SYSTEMS:  CONSTITUTIONAL: No fever, weight loss, or fatigue  NECK: No pain or stiffness  RESPIRATORY: No cough, wheezing, chills or hemoptysis; No shortness of breath  CARDIOVASCULAR: No chest pain, palpitations, dizziness, or leg swelling  GASTROINTESTINAL: No abdominal or epigastric pain. No nausea, vomiting, or hematemesis; No diarrhea or constipation. No melena or hematochezia.  GENITOURINARY: No dysuria, frequency, hematuria, or incontinence  NEUROLOGICAL: No headaches, memory loss, loss of strength, numbness, or tremors  SKIN: No itching, burning, rashes, or lesions   ENDOCRINE: No heat or cold intolerance; No hair loss  MUSCULOSKELETAL: No joint pain or swelling; No muscle, back, or extremity pain  HEME/LYMPH: No easy bruising, or bleeding gums          Vital Signs Last 24 Hrs  T(C): 35.7 (01 Feb 2019 01:37), Max: 36.1 (31 Jan 2019 23:17)  T(F): 96.3 (01 Feb 2019 01:37), Max: 97 (31 Jan 2019 23:17)  HR: 66 (01 Feb 2019 14:05) (61 - 79)  BP: 112/62 (01 Feb 2019 14:05) (101/58 - 139/70)  BP(mean): --  RR: 18 (01 Feb 2019 14:05) (18 - 18)  SpO2: 96% (31 Jan 2019 23:17) (96% - 96%)        PHYSICAL EXAM:  GENERAL: NAD, well-groomed, well-developed  HEAD:  Atraumatic, Normocephalic  NECK: Supple, No JVD, Normal thyroid  NERVOUS SYSTEM:  Alert & Oriented X3, Good concentration  CHEST/LUNG: Clear to percussion bilaterally; No rales, rhonchi, wheezing, or rubs  HEART: Regular rate and rhythm; No murmurs, rubs, or gallops  ABDOMEN: Soft, Nontender, Nondistended; Bowel sounds present  EXTREMITIES:  2+ Peripheral Pulses, No clubbing, cyanosis, or edema  SKIN: No rashes or lesions    INTERPRETATION OF TELEMETRY:    ECG:    I&O's Detail    31 Jan 2019 07:01  -  01 Feb 2019 07:00  --------------------------------------------------------  IN:  Total IN: 0 mL    OUT:    Voided: 175 mL  Total OUT: 175 mL    Total NET: -175 mL          LABS:                        9.6    7.58  )-----------( 247      ( 31 Jan 2019 07:25 )             31.2     02-01    141  |  98  |  32<H>  ----------------------------<  128<H>  5.4<H>   |  29  |  1.3    Ca    8.1<L>      01 Feb 2019 11:09  Mg     2.4     02-01    TPro  7.4  /  Alb  3.6  /  TBili  1.0  /  DBili  x   /  AST  52<H>  /  ALT  17  /  AlkPhos  116<H>  02-01        PT/INR - ( 01 Feb 2019 05:41 )   PT: >40.00 sec;   INR: 4.92 ratio         PTT - ( 01 Feb 2019 05:41 )  PTT:55.3 sec    I&O's Summary    31 Jan 2019 07:01  -  01 Feb 2019 07:00  --------------------------------------------------------  IN: 0 mL / OUT: 175 mL / NET: -175 mL        RADIOLOGY & ADDITIONAL STUDIES:

## 2019-02-02 LAB
ANION GAP SERPL CALC-SCNC: 16 MMOL/L — HIGH (ref 7–14)
APTT BLD: 48.5 SEC — HIGH (ref 27–39.2)
BASOPHILS # BLD AUTO: 0.03 K/UL — SIGNIFICANT CHANGE UP (ref 0–0.2)
BASOPHILS NFR BLD AUTO: 0.5 % — SIGNIFICANT CHANGE UP (ref 0–1)
BUN SERPL-MCNC: 30 MG/DL — HIGH (ref 10–20)
CALCIUM SERPL-MCNC: 8.4 MG/DL — LOW (ref 8.5–10.1)
CHLORIDE SERPL-SCNC: 100 MMOL/L — SIGNIFICANT CHANGE UP (ref 98–110)
CO2 SERPL-SCNC: 25 MMOL/L — SIGNIFICANT CHANGE UP (ref 17–32)
CREAT SERPL-MCNC: 1.2 MG/DL — SIGNIFICANT CHANGE UP (ref 0.7–1.5)
EOSINOPHIL # BLD AUTO: 0.24 K/UL — SIGNIFICANT CHANGE UP (ref 0–0.7)
EOSINOPHIL NFR BLD AUTO: 3.7 % — SIGNIFICANT CHANGE UP (ref 0–8)
GLUCOSE SERPL-MCNC: 98 MG/DL — SIGNIFICANT CHANGE UP (ref 70–99)
HCT VFR BLD CALC: 31 % — LOW (ref 42–52)
HGB BLD-MCNC: 9.4 G/DL — LOW (ref 14–18)
IMM GRANULOCYTES NFR BLD AUTO: 0.3 % — SIGNIFICANT CHANGE UP (ref 0.1–0.3)
INR BLD: 3.56 RATIO — HIGH (ref 0.65–1.3)
LYMPHOCYTES # BLD AUTO: 1.05 K/UL — LOW (ref 1.2–3.4)
LYMPHOCYTES # BLD AUTO: 16 % — LOW (ref 20.5–51.1)
MAGNESIUM SERPL-MCNC: 2.2 MG/DL — SIGNIFICANT CHANGE UP (ref 1.8–2.4)
MCHC RBC-ENTMCNC: 18.8 PG — LOW (ref 27–31)
MCHC RBC-ENTMCNC: 30.3 G/DL — LOW (ref 32–37)
MCV RBC AUTO: 61.9 FL — LOW (ref 80–94)
MONOCYTES # BLD AUTO: 0.67 K/UL — HIGH (ref 0.1–0.6)
MONOCYTES NFR BLD AUTO: 10.2 % — HIGH (ref 1.7–9.3)
NEUTROPHILS # BLD AUTO: 4.54 K/UL — SIGNIFICANT CHANGE UP (ref 1.4–6.5)
NEUTROPHILS NFR BLD AUTO: 69.3 % — SIGNIFICANT CHANGE UP (ref 42.2–75.2)
NRBC # BLD: 0 /100 WBCS — SIGNIFICANT CHANGE UP (ref 0–0)
PHOSPHATE SERPL-MCNC: 2.9 MG/DL — SIGNIFICANT CHANGE UP (ref 2.1–4.9)
PLATELET # BLD AUTO: 255 K/UL — SIGNIFICANT CHANGE UP (ref 130–400)
POTASSIUM SERPL-MCNC: 4.6 MMOL/L — SIGNIFICANT CHANGE UP (ref 3.5–5)
POTASSIUM SERPL-SCNC: 4.6 MMOL/L — SIGNIFICANT CHANGE UP (ref 3.5–5)
PROTHROM AB SERPL-ACNC: >40 SEC — HIGH (ref 9.95–12.87)
RBC # BLD: 5.01 M/UL — SIGNIFICANT CHANGE UP (ref 4.7–6.1)
RBC # FLD: 19.8 % — HIGH (ref 11.5–14.5)
SODIUM SERPL-SCNC: 141 MMOL/L — SIGNIFICANT CHANGE UP (ref 135–146)
T3FREE SERPL-MCNC: 1.99 PG/ML — SIGNIFICANT CHANGE UP (ref 1.8–4.6)
T4 FREE SERPL-MCNC: 1 NG/DL — SIGNIFICANT CHANGE UP (ref 0.9–1.8)
WBC # BLD: 6.55 K/UL — SIGNIFICANT CHANGE UP (ref 4.8–10.8)
WBC # FLD AUTO: 6.55 K/UL — SIGNIFICANT CHANGE UP (ref 4.8–10.8)

## 2019-02-02 RX ADMIN — Medication 20 MILLIGRAM(S): at 06:12

## 2019-02-02 RX ADMIN — Medication 50 MILLIGRAM(S): at 06:12

## 2019-02-02 RX ADMIN — ATORVASTATIN CALCIUM 10 MILLIGRAM(S): 80 TABLET, FILM COATED ORAL at 22:24

## 2019-02-02 NOTE — PROGRESS NOTE ADULT - SUBJECTIVE AND OBJECTIVE BOX
KUSH TONG 89y Male  MRN#: [id number]     SUBJECTIVE  Patient is a 89y old Male who presents with a chief complaint of Weakness and abdominal pain (02 Feb 2019 13:51)  Currently admitted to medicine with the primary diagnosis of Hypocalcemia.    OBJECTIVE  PAST MEDICAL & SURGICAL HISTORY  Hyperlipidemia  CHF (congestive heart failure)  Afib  Hypertension  S/P AAA (abdominal aortic aneurysm) repair    ALLERGIES:  No Known Allergies    MEDICATIONS:  STANDING MEDICATIONS  atorvastatin 10 milliGRAM(s) Oral at bedtime  ferrous    sulfate 325 milliGRAM(s) Oral daily  furosemide    Tablet 20 milliGRAM(s) Oral daily  isosorbide   mononitrate ER Tablet (IMDUR) 30 milliGRAM(s) Oral daily  metoprolol succinate ER 50 milliGRAM(s) Oral daily    PRN MEDICATIONS  ALBUTerol   0.042% 1.25 milliGRAM(s) Nebulizer every 4 hours PRN      VITAL SIGNS: Last 24 Hours  T(C): 35.7 (02 Feb 2019 05:53), Max: 35.7 (02 Feb 2019 05:53)  T(F): 96.2 (02 Feb 2019 05:53), Max: 96.2 (02 Feb 2019 05:53)  HR: 87 (02 Feb 2019 17:55) (78 - 104)  BP: 141/61 (02 Feb 2019 17:55) (135/85 - 153/81)  BP(mean): --  RR: 18 (02 Feb 2019 17:55) (18 - 18)  SpO2: 94% (02 Feb 2019 12:22) (94% - 94%)    LABS:                        9.4    6.55  )-----------( 255      ( 02 Feb 2019 04:30 )             31.0     02-02    141  |  100  |  30<H>  ----------------------------<  98  4.6   |  25  |  1.2    Ca    8.4<L>      02 Feb 2019 04:30  Phos  2.9     02-02  Mg     2.2     02-02    TPro  7.4  /  Alb  3.6  /  TBili  1.0  /  DBili  x   /  AST  52<H>  /  ALT  17  /  AlkPhos  116<H>  02-01    PT/INR - ( 02 Feb 2019 04:30 )   PT: >40.00 sec;   INR: 3.56 ratio         PTT - ( 02 Feb 2019 04:30 )  PTT:48.5 sec      PHYSICAL EXAM:  General: awake, alert , not very communicative,  NAD.  HEENT: PERRLA, EOMI. NO JVD.  CVS:  S1 & S2 appreciated, regular rate and rhythm  Lungs: clear to auscultation, no wheezing, no rales.   Abdominal Examination: soft, nontender, nondistended  Neuro:  no focal deficits.   Extremity Examination: No edema peripherally.    ASSESSMENT & PLAN  Patient is a 89 year old Male with PMHX of Hyperlipidemia, CHF (congestive heart failure),Afib, Hypertension, S/P AAA (abdominal aortic aneurysm) repair admitted with chief complaint of generalized weakness.     Assessment:   Generalized weakness from electrolyte abnormalities   Encephalopathy possibly metabolic due to electrolyte abnormality with possibility of underlying dementia component  Hypocalcemia with secondary hyperparathyroidism-improving   hypomagnesemia: resolved   HFrEF/global Cardiomopathy  severe pulmonary HTN on Echo  HTN  Prolonged QTC secondary to above- resolving   Afib/Flutter- rate controlled   hyperkalemia - resolved    Plan:   c/w telemonitoring  continues to be confused although electrolyte abnormality is improving/unlikely infectious etiology or acute CVA/ seems to be some underlying dementia component.  CT head from 1/29 reviewed- no acute abnormality.   corrected Ca level >8.5- off cacium carbonate and calcitriol.   borderline tachycardia - c/w metoprolol  Echo report noted- EF: 40-45% with moderately decreased LVSF, severe pulm. HTN  Cardio eval appreciated.   hold coumadin for tonight, INR supratherapeutic, 3.56, goal INR: 2-3.   TSH 7.41 and T4 1 would repeat TFT's in 2-3 weeks and assess the need for levothyroxine   physiatry eval noted- SNF vs home with services  PT eval requested.     DVT PPX     dispo:  needs PT eval and likely SNF placement. per  will likely not happen over weekend needs PT eval and Auth from insurance for SNF.

## 2019-02-02 NOTE — PROGRESS NOTE ADULT - ASSESSMENT
Patient is a 89 year old Male with PMHX of Hyperlipidemia, CHF (congestive heart failure),Afib, Hypertension, S/P AAA (abdominal aortic aneurysm) repair admitted with chief complaint of generalized weakness.     Assessment:   Generalized weakness from electrolyte abnormalities   Encephalopathy possibly metabolic due to electrolyte abnormality with possibility of underlying dementia component  Hypocalcemia with secondary hyperparathyroidism-improving   hypomagnesemia: resolved   HFrEF/global Cardiomopathy  severe pulmonary HTN on Echo  HTN  Prolonged QTC secondary to above- resolving   Afib/Flutter- rate controlled   hyperkalemia - resolved    Plan:   c/w telemonitoring  continues to be confused although electrolyte abnormality is improving/unlikely infectious etiology or acute CVA/ seems to be some underlying dementia component.  CT head from 1/29 reviewed- no acute abnormality.   corrected Ca level >8.5- off cacium carbonate and calcitriol.   borderline tachycardia - c/w metoprolol  Echo report noted- EF: 40-45% with moderately decreased LVSF, severe pulm. HTN  Cardio eval appreciated.   hold coumadin for tonight, INR supratherapeutic, 3.56, goal INR: 2-3.   TSH 7.41 and T4 1 would repeat TFT's in 2-3 weeks and assess the need for levothyroxine   physiatry eval noted- SNF vs home with services  PT eval requested.     DVT PPX     dispo:  needs PT eval and likely SNF placement. per  will likely not happen over weekend needs PT eval and Auth from insurance for SNF.

## 2019-02-02 NOTE — PROGRESS NOTE ADULT - SUBJECTIVE AND OBJECTIVE BOX
KUSH TONG MRN-[id number]    Hospitalist Note    Overnight events/Updates: Patient lying comfortably in bed with waist restraint on, 1:1 sitter on bedside, as per daughter, patient is very confused since hospitalization, was not like this at home, able to make his own coffee at home.     Vital Signs Last 24 Hrs  T(C): 35.7 (02 Feb 2019 05:53), Max: 35.7 (02 Feb 2019 05:53)  T(F): 96.2 (02 Feb 2019 05:53), Max: 96.2 (02 Feb 2019 05:53)  HR: 104 (02 Feb 2019 12:22) (66 - 104)  BP: 153/81 (02 Feb 2019 12:22) (112/62 - 153/81)  BP(mean): --  RR: 18 (02 Feb 2019 12:22) (18 - 18)  SpO2: 94% (02 Feb 2019 12:22) (94% - 94%)    Physical Examination:  General: awake, alert , not very communicative,  NAD.  HEENT: PERRLA, EOMI. NO JVD.  CVS:  S1 & S2 appreciated, regular rate and rhythm  Lungs: clear to auscultation, no wheezing, no rales.   Abdominal Examination: soft, nontender, nondistended  Neuro:  no focal deficits.   Extremity Examination: No edema peripherally.      ROS:   No chest pain, no shortness of breath.  All other systems reviewed and are within normal limits except for the complaints in the HPI.    MEDICATIONS  (STANDING):  atorvastatin 10 milliGRAM(s) Oral at bedtime  ferrous    sulfate 325 milliGRAM(s) Oral daily  furosemide    Tablet 20 milliGRAM(s) Oral daily  isosorbide   mononitrate ER Tablet (IMDUR) 30 milliGRAM(s) Oral daily  melatonin 3 milliGRAM(s) Oral at bedtime  metoprolol succinate ER 50 milliGRAM(s) Oral daily    MEDICATIONS  (PRN):  ALBUTerol   0.042% 1.25 milliGRAM(s) Nebulizer every 4 hours PRN Respirations Above___                            9.4    6.55  )-----------( 255      ( 02 Feb 2019 04:30 )             31.0     02-02    141  |  100  |  30<H>  ----------------------------<  98  4.6   |  25  |  1.2    Ca    8.4<L>      02 Feb 2019 04:30  Phos  2.9     02-02  Mg     2.2     02-02    TPro  7.4  /  Alb  3.6  /  TBili  1.0  /  DBili  x   /  AST  52<H>  /  ALT  17  /  AlkPhos  116<H>  02-01      Case discussed with housestaff & family  ELISABETH Acosta [id number]

## 2019-02-03 LAB
ANION GAP SERPL CALC-SCNC: 18 MMOL/L — HIGH (ref 7–14)
APTT BLD: 41 SEC — HIGH (ref 27–39.2)
BASOPHILS # BLD AUTO: 0.02 K/UL — SIGNIFICANT CHANGE UP (ref 0–0.2)
BASOPHILS NFR BLD AUTO: 0.3 % — SIGNIFICANT CHANGE UP (ref 0–1)
BUN SERPL-MCNC: 27 MG/DL — HIGH (ref 10–20)
CALCIUM SERPL-MCNC: 8.9 MG/DL — SIGNIFICANT CHANGE UP (ref 8.5–10.1)
CHLORIDE SERPL-SCNC: 98 MMOL/L — SIGNIFICANT CHANGE UP (ref 98–110)
CO2 SERPL-SCNC: 24 MMOL/L — SIGNIFICANT CHANGE UP (ref 17–32)
CREAT SERPL-MCNC: 1.1 MG/DL — SIGNIFICANT CHANGE UP (ref 0.7–1.5)
EOSINOPHIL # BLD AUTO: 0.07 K/UL — SIGNIFICANT CHANGE UP (ref 0–0.7)
EOSINOPHIL NFR BLD AUTO: 1 % — SIGNIFICANT CHANGE UP (ref 0–8)
GLUCOSE SERPL-MCNC: 114 MG/DL — HIGH (ref 70–99)
HCT VFR BLD CALC: 32.7 % — LOW (ref 42–52)
HGB BLD-MCNC: 9.8 G/DL — LOW (ref 14–18)
IMM GRANULOCYTES NFR BLD AUTO: 0.6 % — HIGH (ref 0.1–0.3)
INR BLD: 2.19 RATIO — HIGH (ref 0.65–1.3)
LYMPHOCYTES # BLD AUTO: 0.76 K/UL — LOW (ref 1.2–3.4)
LYMPHOCYTES # BLD AUTO: 10.8 % — LOW (ref 20.5–51.1)
MAGNESIUM SERPL-MCNC: 2.2 MG/DL — SIGNIFICANT CHANGE UP (ref 1.8–2.4)
MCHC RBC-ENTMCNC: 18.6 PG — LOW (ref 27–31)
MCHC RBC-ENTMCNC: 30 G/DL — LOW (ref 32–37)
MCV RBC AUTO: 62.2 FL — LOW (ref 80–94)
MONOCYTES # BLD AUTO: 0.71 K/UL — HIGH (ref 0.1–0.6)
MONOCYTES NFR BLD AUTO: 10.1 % — HIGH (ref 1.7–9.3)
NEUTROPHILS # BLD AUTO: 5.44 K/UL — SIGNIFICANT CHANGE UP (ref 1.4–6.5)
NEUTROPHILS NFR BLD AUTO: 77.2 % — HIGH (ref 42.2–75.2)
NRBC # BLD: 0 /100 WBCS — SIGNIFICANT CHANGE UP (ref 0–0)
PHOSPHATE SERPL-MCNC: 2.7 MG/DL — SIGNIFICANT CHANGE UP (ref 2.1–4.9)
PLATELET # BLD AUTO: 291 K/UL — SIGNIFICANT CHANGE UP (ref 130–400)
POTASSIUM SERPL-MCNC: 4.8 MMOL/L — SIGNIFICANT CHANGE UP (ref 3.5–5)
POTASSIUM SERPL-SCNC: 4.8 MMOL/L — SIGNIFICANT CHANGE UP (ref 3.5–5)
PROTHROM AB SERPL-ACNC: 25 SEC — HIGH (ref 9.95–12.87)
RBC # BLD: 5.26 M/UL — SIGNIFICANT CHANGE UP (ref 4.7–6.1)
RBC # FLD: 20.4 % — HIGH (ref 11.5–14.5)
SODIUM SERPL-SCNC: 140 MMOL/L — SIGNIFICANT CHANGE UP (ref 135–146)
WBC # BLD: 7.04 K/UL — SIGNIFICANT CHANGE UP (ref 4.8–10.8)
WBC # FLD AUTO: 7.04 K/UL — SIGNIFICANT CHANGE UP (ref 4.8–10.8)

## 2019-02-03 RX ORDER — WARFARIN SODIUM 2.5 MG/1
2.5 TABLET ORAL ONCE
Qty: 0 | Refills: 0 | Status: COMPLETED | OUTPATIENT
Start: 2019-02-03 | End: 2019-02-03

## 2019-02-03 RX ADMIN — ISOSORBIDE MONONITRATE 30 MILLIGRAM(S): 60 TABLET, EXTENDED RELEASE ORAL at 12:55

## 2019-02-03 RX ADMIN — WARFARIN SODIUM 2.5 MILLIGRAM(S): 2.5 TABLET ORAL at 21:41

## 2019-02-03 RX ADMIN — Medication 20 MILLIGRAM(S): at 05:15

## 2019-02-03 RX ADMIN — Medication 50 MILLIGRAM(S): at 05:15

## 2019-02-03 RX ADMIN — Medication 325 MILLIGRAM(S): at 12:55

## 2019-02-03 RX ADMIN — ATORVASTATIN CALCIUM 10 MILLIGRAM(S): 80 TABLET, FILM COATED ORAL at 21:39

## 2019-02-03 NOTE — PROGRESS NOTE ADULT - SUBJECTIVE AND OBJECTIVE BOX
KUSH TONG MRN-[id number]    Hospitalist Note    Overnight events/Updates:  patient remains confused overnight as per sitter at bedside. trying to climb out of bed in the morning , wants to sit in chair.     Vital Signs Last 24 Hrs  T(C): 36.1 (03 Feb 2019 12:06), Max: 36.1 (03 Feb 2019 05:24)  T(F): 97 (03 Feb 2019 12:06), Max: 97 (03 Feb 2019 12:06)  HR: 78 (03 Feb 2019 12:06) (76 - 87)  BP: 129/73 (03 Feb 2019 12:06) (129/73 - 141/61)  BP(mean): --  RR: 18 (03 Feb 2019 12:06) (18 - 18)  SpO2: 94% (03 Feb 2019 10:04) (94% - 97%)    Physical Examination:  General: awake, alert , confused.  NAD.  HEENT: PERRLA, EOMI. NO JVD.  CVS:  S1 & S2 appreciated, regular rate and rhythm  Lungs: clear to auscultation, no wheezing, no rales.   Abdominal Examination: soft, nontender, nondistended  Neuro: not able to follow command for complete assessment although moves all extremities spontaneously    Extremity Examination: No edema peripherally.        ROS:  limited due to confusion, does not reply to many questions.     MEDICATIONS  (STANDING):  atorvastatin 10 milliGRAM(s) Oral at bedtime  ferrous    sulfate 325 milliGRAM(s) Oral daily  furosemide    Tablet 20 milliGRAM(s) Oral daily  isosorbide   mononitrate ER Tablet (IMDUR) 30 milliGRAM(s) Oral daily  metoprolol succinate ER 50 milliGRAM(s) Oral daily    MEDICATIONS  (PRN):  ALBUTerol   0.042% 1.25 milliGRAM(s) Nebulizer every 4 hours PRN Respirations Above___                            9.8    7.04  )-----------( 291      ( 03 Feb 2019 06:02 )             32.7     02-03    140  |  98  |  27<H>  ----------------------------<  114<H>  4.8   |  24  |  1.1    Ca    8.9      03 Feb 2019 06:02  Phos  2.7     02-03  Mg     2.2     02-03        Case discussed with housestaff & family  ELISABETH Acosta [id number]

## 2019-02-03 NOTE — PROGRESS NOTE ADULT - ASSESSMENT
Patient is a 89 year old Male with PMHX of Hyperlipidemia, CHF (congestive heart failure),Afib, Hypertension, S/P AAA (abdominal aortic aneurysm) repair admitted with chief complaint of generalized weakness.     Assessment:   Generalized weakness from electrolyte abnormalities   Encephalopathy possibly metabolic due to electrolyte abnormality /family denies any dementia before this hospitalization  Hypocalcemia with secondary hyperparathyroidism-improving   hypomagnesemia: resolved   HFrEF/global Cardiomopathy  severe pulmonary HTN on Echo  HTN  Prolonged QTC secondary to above- resolving   Afib/Flutter- rate controlled   hyperkalemia - resolved    Plan:   c/w telemonitoring  continues to be confused although electrolyte abnormality is improving/unlikely infectious etiology or acute CVA/ seems to have some underlying dementia component given old age but family refuse it persistently and report current mentation is all acute since hospitalization.    continue 1: 1  CT head from 1/29 reviewed- no acute abnormality.   check vitamin B12, folate level  Neurology consult for any further neurological workup/  calcium level improved.   Echo report noted- EF: 40-45% with moderately decreased LVSF, severe pulm. HTN  fu cardiology.   INR <3, coumadin 2.5mg tonight.  goal INR: 2-3.   TSH 7.41 and T4 1 would repeat TFT's in 2-3 weeks and assess the need for levothyroxine   physiatry eval noted- SNF vs home with services  fu PT eval     DVT PPX     dispo:  SNF possibly next week if able to remove 1:1 once confusion improves .

## 2019-02-03 NOTE — PHYSICAL THERAPY INITIAL EVALUATION ADULT - GENERAL OBSERVATIONS, REHAB EVAL
11:15-12:00 45 min   pt rec OOB in chair in NAD, pt's great niece at the b/s who provided social hx, pt appears to be confused, opens eyes when asked but does not keep them open, sitter at the b/s, pt did not have any c/o

## 2019-02-03 NOTE — PHYSICAL THERAPY INITIAL EVALUATION ADULT - DIAGNOSIS, PT EVAL
TRANSFER - OUT REPORT:    Verbal report given to [facility], RN(name) on Simone Vicente  being transferred to [facility](unit) for routine progression of care       Report consisted of patients Situation, Background, Assessment and   Recommendations(SBAR). Information from the following report(s) SBAR, ED Summary, [facility] and Recent Results was reviewed with the receiving nurse. Lines:   Peripheral IV 08/22/17 Right Antecubital (Active)   Site Assessment Clean, dry, & intact 8/22/2017  5:39 PM   Phlebitis Assessment 0 8/22/2017  5:39 PM   Infiltration Assessment 0 8/22/2017  5:39 PM   Dressing Status Clean, dry, & intact 8/22/2017  5:39 PM   Dressing Type Transparent 8/22/2017  5:39 PM   Hub Color/Line Status Pink;Capped;Flushed;Patent 8/22/2017  5:39 PM   Action Taken Blood drawn 8/22/2017  5:39 PM        Opportunity for questions and clarification was provided.       Patient transported with:  Transport Gait Dysfunction

## 2019-02-04 LAB
ANION GAP SERPL CALC-SCNC: 16 MMOL/L — HIGH (ref 7–14)
APTT BLD: 37.5 SEC — SIGNIFICANT CHANGE UP (ref 27–39.2)
BASOPHILS # BLD AUTO: 0.03 K/UL — SIGNIFICANT CHANGE UP (ref 0–0.2)
BASOPHILS NFR BLD AUTO: 0.4 % — SIGNIFICANT CHANGE UP (ref 0–1)
BUN SERPL-MCNC: 30 MG/DL — HIGH (ref 10–20)
CALCIUM SERPL-MCNC: 9 MG/DL — SIGNIFICANT CHANGE UP (ref 8.5–10.1)
CHLORIDE SERPL-SCNC: 98 MMOL/L — SIGNIFICANT CHANGE UP (ref 98–110)
CO2 SERPL-SCNC: 25 MMOL/L — SIGNIFICANT CHANGE UP (ref 17–32)
CREAT SERPL-MCNC: 1.2 MG/DL — SIGNIFICANT CHANGE UP (ref 0.7–1.5)
EOSINOPHIL # BLD AUTO: 0.15 K/UL — SIGNIFICANT CHANGE UP (ref 0–0.7)
EOSINOPHIL NFR BLD AUTO: 1.9 % — SIGNIFICANT CHANGE UP (ref 0–8)
GLUCOSE SERPL-MCNC: 93 MG/DL — SIGNIFICANT CHANGE UP (ref 70–99)
HCT VFR BLD CALC: 30.1 % — LOW (ref 42–52)
HGB BLD-MCNC: 9.1 G/DL — LOW (ref 14–18)
IMM GRANULOCYTES NFR BLD AUTO: 0.6 % — HIGH (ref 0.1–0.3)
INR BLD: 1.95 RATIO — HIGH (ref 0.65–1.3)
LYMPHOCYTES # BLD AUTO: 1.1 K/UL — LOW (ref 1.2–3.4)
LYMPHOCYTES # BLD AUTO: 14 % — LOW (ref 20.5–51.1)
MAGNESIUM SERPL-MCNC: 2.1 MG/DL — SIGNIFICANT CHANGE UP (ref 1.8–2.4)
MCHC RBC-ENTMCNC: 18.6 PG — LOW (ref 27–31)
MCHC RBC-ENTMCNC: 30.2 G/DL — LOW (ref 32–37)
MCV RBC AUTO: 61.4 FL — LOW (ref 80–94)
MONOCYTES # BLD AUTO: 0.84 K/UL — HIGH (ref 0.1–0.6)
MONOCYTES NFR BLD AUTO: 10.7 % — HIGH (ref 1.7–9.3)
NEUTROPHILS # BLD AUTO: 5.66 K/UL — SIGNIFICANT CHANGE UP (ref 1.4–6.5)
NEUTROPHILS NFR BLD AUTO: 72.4 % — SIGNIFICANT CHANGE UP (ref 42.2–75.2)
NRBC # BLD: 0 /100 WBCS — SIGNIFICANT CHANGE UP (ref 0–0)
PHOSPHATE SERPL-MCNC: 3 MG/DL — SIGNIFICANT CHANGE UP (ref 2.1–4.9)
PLATELET # BLD AUTO: 255 K/UL — SIGNIFICANT CHANGE UP (ref 130–400)
POTASSIUM SERPL-MCNC: 4.9 MMOL/L — SIGNIFICANT CHANGE UP (ref 3.5–5)
POTASSIUM SERPL-SCNC: 4.9 MMOL/L — SIGNIFICANT CHANGE UP (ref 3.5–5)
PROTHROM AB SERPL-ACNC: 22.3 SEC — HIGH (ref 9.95–12.87)
RBC # BLD: 4.9 M/UL — SIGNIFICANT CHANGE UP (ref 4.7–6.1)
RBC # FLD: 20.1 % — HIGH (ref 11.5–14.5)
SODIUM SERPL-SCNC: 139 MMOL/L — SIGNIFICANT CHANGE UP (ref 135–146)
WBC # BLD: 7.83 K/UL — SIGNIFICANT CHANGE UP (ref 4.8–10.8)
WBC # FLD AUTO: 7.83 K/UL — SIGNIFICANT CHANGE UP (ref 4.8–10.8)

## 2019-02-04 RX ORDER — ACETAMINOPHEN 500 MG
650 TABLET ORAL ONCE
Qty: 0 | Refills: 0 | Status: COMPLETED | OUTPATIENT
Start: 2019-02-04 | End: 2019-02-04

## 2019-02-04 RX ORDER — WARFARIN SODIUM 2.5 MG/1
2.5 TABLET ORAL ONCE
Qty: 0 | Refills: 0 | Status: COMPLETED | OUTPATIENT
Start: 2019-02-04 | End: 2019-02-04

## 2019-02-04 RX ADMIN — Medication 20 MILLIGRAM(S): at 05:31

## 2019-02-04 RX ADMIN — Medication 50 MILLIGRAM(S): at 05:31

## 2019-02-04 RX ADMIN — WARFARIN SODIUM 2.5 MILLIGRAM(S): 2.5 TABLET ORAL at 21:53

## 2019-02-04 RX ADMIN — ATORVASTATIN CALCIUM 10 MILLIGRAM(S): 80 TABLET, FILM COATED ORAL at 21:52

## 2019-02-04 RX ADMIN — Medication 325 MILLIGRAM(S): at 12:26

## 2019-02-04 RX ADMIN — ISOSORBIDE MONONITRATE 30 MILLIGRAM(S): 60 TABLET, EXTENDED RELEASE ORAL at 12:26

## 2019-02-04 NOTE — CONSULT NOTE ADULT - CONSULT REASON
hypocalcemia
CKD 3bCKD 3b (baseline cr. ~ 1.5, eGFR ~ 39)  Hypocalcemia, hypomagnesemia
afib
confusion
gait dysfunction

## 2019-02-04 NOTE — PROGRESS NOTE ADULT - SUBJECTIVE AND OBJECTIVE BOX
Patient is a 89y old Male who presents with a chief complaint of Weakness and abdominal pain (04 Feb 2019 11:24)  Currently admitted to medicine with the primary diagnosis of Hypocalcemia/Hypomagnesemia     Today is hospital day 6d.       PAST MEDICAL & SURGICAL HISTORY  Hyperlipidemia  CHF (congestive heart failure)  Afib  Hypertension  S/P AAA (abdominal aortic aneurysm) repair    SOCIAL HISTORY:  Negative for smoking/alcohol/drug use.     ALLERGIES:  No Known Allergies    MEDICATIONS:  STANDING MEDICATIONS  acetaminophen   Tablet .. 650 milliGRAM(s) Oral once  atorvastatin 10 milliGRAM(s) Oral at bedtime  ferrous    sulfate 325 milliGRAM(s) Oral daily  furosemide    Tablet 20 milliGRAM(s) Oral daily  isosorbide   mononitrate ER Tablet (IMDUR) 30 milliGRAM(s) Oral daily  metoprolol succinate ER 50 milliGRAM(s) Oral daily  warfarin 2.5 milliGRAM(s) Oral once    PRN MEDICATIONS  ALBUTerol   0.042% 1.25 milliGRAM(s) Nebulizer every 4 hours PRN    Home Medications:  acetaminophen 325 mg oral tablet: 2 tab(s) orally every 8 hours -  (29 Mar 2018 18:57)  ALBUTEROL SUL 1.25 MG/3 ML SOL: inhaled every 4 hours, As Needed (26 Mar 2018 10:55)  melatonin 3 mg oral tablet: 1 tab(s) orally once a day (at bedtime) (18 Apr 2018 18:15)    Vital Signs Last 24 Hrs  T(C): 36.7 (04 Feb 2019 12:25), Max: 37 (04 Feb 2019 05:27)  T(F): 98 (04 Feb 2019 12:25), Max: 98.6 (04 Feb 2019 05:27)  HR: 78 (04 Feb 2019 12:25) (71 - 88)  BP: 125/69 (04 Feb 2019 12:25) (125/69 - 154/79)  BP(mean): --  RR: 20 (04 Feb 2019 12:25) (20 - 20)  SpO2: --      LABS:                        9.1    7.83  )-----------( 255      ( 04 Feb 2019 05:22 )             30.1     02-04    139  |  98  |  30<H>  ----------------------------<  93  4.9   |  25  |  1.2    Ca    9.0      04 Feb 2019 05:22  Phos  3.0     02-04  Mg     2.1     02-04      PT/INR - ( 04 Feb 2019 05:22 )   PT: 22.30 sec;   INR: 1.95 ratio         PTT - ( 04 Feb 2019 05:22 )  PTT:37.5 sec      RADIOLOGY:  < from: US Abdomen Limited (01.29.19 @ 19:54) >  IMPRESSION:  Gallbladder unremarkable.  Right pleural effusion.  Right renal cysts.    < from: CT Abdomen and Pelvis w/ IV Cont (01.29.19 @ 18:12) >  IMPRESSION:   1. Mesenteric edema surrounding pancreas, nonspecific in setting of CHF.   Correlation with pancreatic enzymes recommended. Otherwise, no evidence   of acute abdominal pathology.   2. Cardiomegaly, reflux of contrast into IVC, small bilateral pleural   effusions with interstitial edema; correlate for CHF.    < from: CT Head No Cont (01.29.19 @ 18:06) >  Impression:  No evidence of acute intracranial abnormality.    < from: Xray Chest 1 View AP/PA (01.29.19 @ 17:21) >  Impression:    Cardiomegaly with pulmonary vascular congestion, unchanged.      < from: Transthoracic Echocardiogram (02.01.19 @ 14:23) >  Summary:   1. Left ventricular ejection fraction, by visual estimation, is 40 to   45%.   2. Moderately decreased global left ventricular systolic function.   3. Global cardiomyopathy.   4. Normal left ventricular internal cavity size.   5. The mean global longitudinal strain by speckle tracking is -12.0%   which is reduced.   6. Moderate tricuspid regurgitation.   7. Mild aortic regurgitation.   8. Pulmonic valve regurgitation.   9. Estimated pulmonary artery systolic pressure is 60.3 mmHg assuming a   right atrial pressure of 8 mmHg, which is consistent with severe   pulmonary hypertension.  10. Pulmonary hypertension is present.  11. LA volume Index is 29.0 ml/m² ml/m2.        PHYSICAL EXAM:  GEN: No acute distress  LUNGS: Clear to auscultation bilaterally   HEART: S1/S2 present. RRR.   ABD: Soft, non-tender, non-distended. Bowel sounds present  EXT: NC/NC/NE/2+PP/LANE/Skin Intact.   NEURO: AAOX3

## 2019-02-04 NOTE — CONSULT NOTE ADULT - REASON FOR ADMISSION
Weakness and abdominal pain

## 2019-02-04 NOTE — PROGRESS NOTE ADULT - SUBJECTIVE AND OBJECTIVE BOX
no chest pain and no sob   doing ok   answers questions appropriately     Vital Signs Last 24 Hrs  T(C): 37 (04 Feb 2019 05:27), Max: 37 (04 Feb 2019 05:27)  T(F): 98.6 (04 Feb 2019 05:27), Max: 98.6 (04 Feb 2019 05:27)  HR: 88 (04 Feb 2019 05:27) (71 - 88)  BP: 154/79 (04 Feb 2019 05:27) (129/73 - 154/79)  BP(mean): --  RR: 20 (04 Feb 2019 05:27) (18 - 20)  SpO2: --    PHYSICAL EXAM:  GENERAL: NAD, well-developed  HEAD:  Atraumatic, Normocephalic  EYES: EOMI, PERRLA, conjunctiva and sclera clear  NECK: Supple, No JVD  Pulm: Clear to auscultation bilaterally; No wheeze  CV irregular   GI: Soft, Nontender, Nondistended; Bowel sounds present  EXTREMITIES:  2+ Peripheral Pulses, No clubbing, cyanosis, or edema  PSYCH: AAOx3  NEUROLOGY: non-focal  SKIN: No rashes or lesions                          9.1    7.83  )-----------( 255      ( 04 Feb 2019 05:22 )             30.1     02-04    139  |  98  |  30<H>  ----------------------------<  93  4.9   |  25  |  1.2    Ca    9.0      04 Feb 2019 05:22  Phos  3.0     02-04  Mg     2.1     02-04        PT/INR - ( 04 Feb 2019 05:22 )   PT: 22.30 sec;   INR: 1.95 ratio         PTT - ( 04 Feb 2019 05:22 )  PTT:37.5 sec

## 2019-02-04 NOTE — PROGRESS NOTE ADULT - ASSESSMENT
89 y/o male with past medical history of CHF, CAD, DLD, HTN, AAA repair and anemia. He came to hospital with chief complaint of lethargy for past 1 week. Patient was found to have hypomagnesemia and hypocalcemia.     Assessment and plan     1- CKD3b/ Symptomatic hypocalcemia secondary to hypomagnesemia: improving  - S/P 3gm Calcium gluconate and 4gm Mg  - Patient asymptomatic now  - Nephrology on board, rec > hypocalcemia likely due to CKD plus hypomagnesemia 2/2 GI losses plus ?lasix. Improving on repeat labs  - serum cr. stable around baseline  - 25 OH-vitamin D 51, 1,25 OH-vitamin D 73.8 1/31  - PTH elevated 261, secondary hyperparathyroidism    2- CHF/CAD/DLD/HTN  - TTE 2/1 EF 40-45%  - Stable- Continue home medications     3- Afib  - Rate controlled  - On coumadin   - Check INR in AM     4- Anemia (Microcytic anemia)  - secondary to iron deficiency   - iron 52, 5 saturation 28, TIBC 188 1/31  - Continue Feso4      5- TSH 7.41 and T4 1 would repeat TFT's in 2-3 weeks and assess the need for levothyroxine       DVT prophylaxis; coumadin 2.5 mg, fu INR AM   GI ppx: not indicated  Diet: DASH  aCTIVITY: as tolerated  waiting placement

## 2019-02-04 NOTE — CONSULT NOTE ADULT - ASSESSMENT
Assessment:  This is a 89y Male with h/o CHF, hypothyroidism etc p/w confusion most likely Acute delerium due to hospitalization    Plan: recommend continue 1:1  Pt/OT eval - encourage safe mobilization  -   02-04-19 @ 05:24

## 2019-02-04 NOTE — CONSULT NOTE ADULT - SUBJECTIVE AND OBJECTIVE BOX
Neurology Consult    Patient is a 89y old  Male who presents with a chief complaint of Weakness and abdominal pain (03 Feb 2019 14:46)      HPI:  This patient is 89 y/o male with past medical history of CHF, CAD, DLD, HTN, AAA repair and anemia. He came to hospital with chief complaint of lethargy for past 1 week. Patient reports of abdominal pain since yesterday. This was associated with 1-2 episodes of vomiting (non bloody non billious). Family reports that he was behaving differently too. Patient has not been eating well as per family.   Patient denies chest pain, shortness of breath, urinary symptoms, leg weakness, paresthesias weakness, headaches, recent trauma or rash.   Patient is mostly wheelchair bound at home but does use walker at times.   In ED patient was found to have Mg of 0.4 along with Calcium of 5.3 S/P IV calcium gluconate 3gm and Mag 4gm which resolved patients symptoms (30 Jan 2019 00:36)  Continues to be confused although electrolyte abnormality is improving/unlikely infectious etiology or acute CVA/ seems to be some underlying dementia component.  CT head from 1/29 reviewed- no acute abnormality. Corrected Ca level >8.5- off calcium carbonate and calcitriol. Borderline tachycardia. TSH 7.41 and T4 1 would repeat TFT's in 2-3 weeks and assess the need for levothyroxine. Physiatry eval noted- SNF vs home with services. PT eval requested. Overnight events/Updates:  Patient remains confused overnight as per sitter at bedside. Trying to climb out of bed in the morning  because he wants to sit in chair.   SNF possibly next week if able to remove 1:1 once confusion improves .                PAST MEDICAL & SURGICAL HISTORY:  Hyperlipidemia  CHF (congestive heart failure)  Afib  Hypertension  S/P AAA (abdominal aortic aneurysm) repair      FAMILY HISTORY:  No pertinent family history in first degree relatives      Social History: (-) x 3    Allergies    No Known Allergies    Intolerances        MEDICATIONS  (STANDING):  atorvastatin 10 milliGRAM(s) Oral at bedtime  ferrous    sulfate 325 milliGRAM(s) Oral daily  furosemide    Tablet 20 milliGRAM(s) Oral daily  isosorbide   mononitrate ER Tablet (IMDUR) 30 milliGRAM(s) Oral daily  metoprolol succinate ER 50 milliGRAM(s) Oral daily    MEDICATIONS  (PRN):  ALBUTerol   0.042% 1.25 milliGRAM(s) Nebulizer every 4 hours PRN Respirations Above___      Review of systems:    Constitutional: No fever, weight loss or fatigue    Eyes: No eye pain or discharge  ENMT:  No difficulty hearing; No sinus or throat pain  Neck: No pain or stiffness  Respiratory: No cough, wheezing, chills or hemoptysis  Cardiovascular: No chest pain, palpitations, shortness of breath, dyspnea on exertion  Gastrointestinal: No abdominal pain, nausea, vomiting or hematemesis; No diarrhea or constipation.   Genitourinary: No dysuria, frequency, hematuria or incontinence  Neurological: As per HPI  Skin: No rashes or lesions   Endocrine: No heat or cold intolerance; No hair loss  Musculoskeletal: No joint pain or swelling  Psychiatric: No depression, anxiety, mood swings  Heme/Lymph: No easy bruising or bleeding gums    Vital Signs Last 24 Hrs  T(C): 36.2 (03 Feb 2019 21:00), Max: 36.2 (03 Feb 2019 21:00)  T(F): 97.1 (03 Feb 2019 21:00), Max: 97.1 (03 Feb 2019 21:00)  HR: 71 (03 Feb 2019 21:00) (71 - 78)  BP: 139/69 (03 Feb 2019 21:00) (129/73 - 139/69)  BP(mean): --  RR: 20 (03 Feb 2019 21:00) (18 - 20)  SpO2: 94% (03 Feb 2019 10:04) (94% - 94%)    Neurologic Examination: On 1:1  Awake alert Non cooperative or compliant with exam  PERRLA EOMI  LANE X 4  Sensory: grossly intact to light touch              Labs:   CBC Full  -  ( 03 Feb 2019 06:02 )  WBC Count : 7.04 K/uL  Hemoglobin : 9.8 g/dL  Hematocrit : 32.7 %  Platelet Count - Automated : 291 K/uL  Mean Cell Volume : 62.2 fL  Mean Cell Hemoglobin : 18.6 pg  Mean Cell Hemoglobin Concentration : 30.0 g/dL  Auto Neutrophil # : 5.44 K/uL  Auto Lymphocyte # : 0.76 K/uL  Auto Monocyte # : 0.71 K/uL  Auto Eosinophil # : 0.07 K/uL  Auto Basophil # : 0.02 K/uL  Auto Neutrophil % : 77.2 %  Auto Lymphocyte % : 10.8 %  Auto Monocyte % : 10.1 %  Auto Eosinophil % : 1.0 %  Auto Basophil % : 0.3 %    02-03    140  |  98  |  27<H>  ----------------------------<  114<H>  4.8   |  24  |  1.1    Ca    8.9      03 Feb 2019 06:02  Phos  2.7     02-03  Mg     2.2     02-03        PT/INR - ( 03 Feb 2019 06:02 )   PT: 25.00 sec;   INR: 2.19 ratio         PTT - ( 03 Feb 2019 06:02 )  PTT:41.0 sec        Neuroimaging:  NCHCT:   CT Angiography/Perfusion:  MRI Brain NC:  MRA Head/Neck:  EEG: Neurology Consult    Patient is a 89y old  Male who presents with a chief complaint of Weakness and abdominal pain (03 Feb 2019 14:46)    HPI:  This patient is 89 y/o male with past medical history of CHF, CAD, DLD, HTN, AAA repair and anemia. He came to hospital with chief complaint of lethargy for past 1 week. Patient reports of abdominal pain since yesterday. This was associated with 1-2 episodes of vomiting (non bloody non billious). Family reports that he was behaving differently too. Patient has not been eating well as per family.  Patient denies chest pain, shortness of breath, urinary symptoms, leg weakness, paresthesias weakness, headaches, recent trauma or rash.   Patient is mostly wheelchair bound at home but does use walker at times.   In ED patient was found to have Mg of 0.4 along with Calcium of 5.3 S/P IV calcium gluconate 3gm and Mag 4gm which resolved patients symptoms (30 Jan 2019 00:36)  Continues to be confused although electrolyte abnormality is improving/unlikely infectious etiology or acute CVA/ seems to be some underlying dementia component.  CT head from 1/29 reviewed- no acute abnormality. Corrected Ca level >8.5- off calcium carbonate and calcitriol. Borderline tachycardia. TSH 7.41 and T4 1 would repeat TFT's in 2-3 weeks and assess the need for levothyroxine. Physiatry eval noted- SNF vs home with services. PT eval requested. Overnight events/Updates:  Patient remains confused overnight as per sitter at bedside. Trying to climb out of bed in the morning  because he wants to sit in chair.   SNF possibly next week if able to remove 1:1 once confusion improves .      PAST MEDICAL & SURGICAL HISTORY:  Hyperlipidemia  CHF (congestive heart failure)  Afib  Hypertension  S/P AAA (abdominal aortic aneurysm) repair    FAMILY HISTORY:  No pertinent family history in first degree relatives    Social History: (-) x 3    Allergies    No Known Allergies    Intolerances    MEDICATIONS  (STANDING):  atorvastatin 10 milliGRAM(s) Oral at bedtime  ferrous    sulfate 325 milliGRAM(s) Oral daily  furosemide    Tablet 20 milliGRAM(s) Oral daily  isosorbide   mononitrate ER Tablet (IMDUR) 30 milliGRAM(s) Oral daily  metoprolol succinate ER 50 milliGRAM(s) Oral daily    MEDICATIONS  (PRN):  ALBUTerol   0.042% 1.25 milliGRAM(s) Nebulizer every 4 hours PRN Respirations Above___  Review of systems:    Constitutional: No fever, weight loss or fatigue    Eyes: No eye pain or discharge  ENMT:  No difficulty hearing; No sinus or throat pain  Neck: No pain or stiffness  Respiratory: No cough, wheezing, chills or hemoptysis  Cardiovascular: No chest pain, palpitations, shortness of breath, dyspnea on exertion  Gastrointestinal: No abdominal pain, nausea, vomiting or hematemesis; No diarrhea or constipation.   Genitourinary: No dysuria, frequency, hematuria or incontinence  Neurological: As per HPI  Skin: No rashes or lesions   Endocrine: No heat or cold intolerance; No hair loss  Musculoskeletal: No joint pain or swelling  Psychiatric: No depression, anxiety, mood swings  Heme/Lymph: No easy bruising or bleeding gums    Vital Signs Last 24 Hrs  T(C): 36.2 (03 Feb 2019 21:00), Max: 36.2 (03 Feb 2019 21:00)  T(F): 97.1 (03 Feb 2019 21:00), Max: 97.1 (03 Feb 2019 21:00)  HR: 71 (03 Feb 2019 21:00) (71 - 78)  BP: 139/69 (03 Feb 2019 21:00) (129/73 - 139/69)  BP(mean): --  RR: 20 (03 Feb 2019 21:00) (18 - 20)  SpO2: 94% (03 Feb 2019 10:04) (94% - 94%)    Neurologic Examination: On 1:1  Awake alert, AOX 2 person and place, knows age.    PERRLA EOMI  LANE X 4  Sensory: grossly intact to light touch    Labs:   CBC Full  -  ( 03 Feb 2019 06:02 )  WBC Count : 7.04 K/uL  Hemoglobin : 9.8 g/dL  Hematocrit : 32.7 %  Platelet Count - Automated : 291 K/uL  Mean Cell Volume : 62.2 fL  Mean Cell Hemoglobin : 18.6 pg  Mean Cell Hemoglobin Concentration : 30.0 g/dL  Auto Neutrophil # : 5.44 K/uL  Auto Lymphocyte # : 0.76 K/uL  Auto Monocyte # : 0.71 K/uL  Auto Eosinophil # : 0.07 K/uL  Auto Basophil # : 0.02 K/uL  Auto Neutrophil % : 77.2 %  Auto Lymphocyte % : 10.8 %  Auto Monocyte % : 10.1 %  Auto Eosinophil % : 1.0 %  Auto Basophil % : 0.3 %    02-03    140  |  98  |  27<H>  ----------------------------<  114<H>  4.8   |  24  |  1.1    Ca    8.9      03 Feb 2019 06:02  Phos  2.7     02-03  Mg     2.2     02-03    PT/INR - ( 03 Feb 2019 06:02 )   PT: 25.00 sec;   INR: 2.19 ratio       PTT - ( 03 Feb 2019 06:02 )  PTT:41.0 sec

## 2019-02-04 NOTE — PROGRESS NOTE ADULT - ASSESSMENT
Generalized weakness from electrolyte abnormalities plan as follow: improved     Hypocalcemia resolved     hypomagnesemia: resolved     Prolonged QTC resolving     Afib/Flutter:   rate controlled   INR 1.95 today resume coumadin 2.5 tonight and monitor INR was high initially high     hyperkalemia resolved     TSH 7.41 and T4 1 would repeat TFT's in 2-3 weeks and assess the need for levothyroxine     there is no evidence of encephalopathy Generalized weakness from electrolyte abnormalities plan as follow: improved     Hypocalcemia resolved     hypomagnesemia: resolved     Prolonged QTC resolving     Afib/Flutter:   rate controlled   INR 1.95 today resume coumadin 2.5 tonight and monitor INR was high initially high     hyperkalemia resolved     TSH 7.41 and T4 1 would repeat TFT's in 2-3 weeks and assess the need for levothyroxine     there is no evidence of encephalopathy     SNF on discharge

## 2019-02-04 NOTE — CHART NOTE - NSCHARTNOTEFT_GEN_A_CORE
Registered Dietitian Follow-Up     Patient Profile Reviewed                           Yes [x]   No []     Nutrition History Previously Obtained        Yes [x]  No []       Pertinent Subjective Information:      Pertinent Medical Interventions: Generalized weakness from electrolyte abnormalities. Encephalopathy possibly metabolic due to electrolyte abnormality /family denies any dementia before this hospitalization. Prolonged QTC secondary to above- resolving. PT/OT, pending SNF.        Diet order: DASH/TLC< Ensure clear daily, Ensure compact BID     Anthropometrics:  - Ht. 67"  - Wt. 83.4kg on 2/3 vs. 84.7kg on 2/1- bed scale discrepancy vs. weight trending down   - %wt change  - BMI 28.8 (using yesterday's weight)   - IBW 142lbs      Pertinent Lab Data: (2/4) Hg 9.1, Hct 30.1, BUN 30, eGFR 51      Pertinent Meds: Atorvastatin, Ferrous sulfate, Lasix, Metoprolol      Physical Findings:  - Appearance:   - GI function: fecal incontinence  - Tubes: no tubes noted  - Oral/Mouth cavity:   - Skin: ecchymosis (BS 15)      Nutrition Requirements (from RD note on 1/31)   Weight Used:     Estimated Energy Needs    Continue [x]  Adjust [] 1590-1910kcal   Adjusted Energy Recommendations:   kcal/day        Estimated Protein Needs    Continue [x]  Adjust [] 57-64g   Adjusted Protein Recommendations:   gm/day        Estimated Fluid Needs        Continue [x]  Adjust [] 1ml/kcal or per LIP   Adjusted Fluid Recommendations:   mL/day     Nutrient Intake:         [] Previous Nutrition Diagnosis: Malnutrition             [x] Ongoing          [] Resolved       Nutrition Intervention: meals and snacks, medical food supplement    Rec: Continue DASH/TLC diet order with Ensure clear daily, Ensure compact BID     Goal/Expected Outcome: In 3 days pt. to consume at least 50-75% PO and supplement     Indicator/Monitoring: energy intake, body composition, NFPF Registered Dietitian Follow-Up     Patient Profile Reviewed                           Yes [x]   No []     Nutrition History Previously Obtained        Yes [x]  No []       Pertinent Subjective Information: Pt, resting in bed, confused. 1:1 sit at bedside, reports pt. reported difficulty swallowing this morning, had <50% of his breakfast. Pt. was able to drink all of his liquids at breakfast, per PCA no coughing/chocking during meal today. SLP recommended during initial assessment, however has not been ordered. Will communicate with resident.      Pertinent Medical Interventions: Generalized weakness from electrolyte abnormalities. Encephalopathy possibly metabolic due to electrolyte abnormality /family denies any dementia before this hospitalization. Prolonged QTC secondary to above- resolving. PT/OT, pending SNF.        Diet order: DASH/TLC< Ensure clear daily, Ensure compact BID     Anthropometrics:  - Ht. 67"  - Wt. 83.4kg on 2/3 vs. 84.7kg on 2/1- bed scale discrepancy vs. weight trending down   - %wt change  - BMI 28.8 (using yesterday's weight)   - IBW 142lbs      Pertinent Lab Data: (2/4) Hg 9.1, Hct 30.1, BUN 30, eGFR 51      Pertinent Meds: Atorvastatin, Ferrous sulfate, Lasix, Metoprolol      Physical Findings:  - Appearance:   - GI function: fecal incontinence  - Tubes: no tubes noted  - Oral/Mouth cavity:   - Skin: ecchymosis (BS 15)      Nutrition Requirements (from RD note on 1/31)   Weight Used:     Estimated Energy Needs    Continue [x]  Adjust [] 1590-1910kcal   Adjusted Energy Recommendations:   kcal/day        Estimated Protein Needs    Continue [x]  Adjust [] 57-64g   Adjusted Protein Recommendations:   gm/day        Estimated Fluid Needs        Continue [x]  Adjust [] 1ml/kcal or per LIP   Adjusted Fluid Recommendations:   mL/day     Nutrient Intake: <50% PO at meals + some Ensure daily, not meeting estimated energy needs        [] Previous Nutrition Diagnosis: Malnutrition             [x] Ongoing          [] Resolved       Nutrition Intervention: meals and snacks, medical food supplement, coordination of nutrition care, vitamin and mineral supplements    Rec: Continue DASH/TLC diet order with Ensure clear daily, Ensure compact BID. Consider SLP eval as pt. reports difficulty swallowing. Order MVI daily.      Goal/Expected Outcome: In 3 days pt. to consume at least 50-75% PO and supplement.     Indicator/Monitoring: energy intake, body composition, NFPF Registered Dietitian Follow-Up     Patient Profile Reviewed                           Yes [x]   No []     Nutrition History Previously Obtained        Yes [x]  No []       Pertinent Subjective Information: Pt, resting in bed, confused. 1:1 sit at bedside, reports pt. reported difficulty swallowing this morning, had <50% of his breakfast. Pt. was able to drink all of his liquids at breakfast, per PCA no coughing/chocking during meal today. SLP recommended during initial assessment, however has not been ordered. Will communicate with resident.      Pertinent Medical Interventions: Generalized weakness from electrolyte abnormalities. Encephalopathy possibly metabolic due to electrolyte abnormality /family denies any dementia before this hospitalization. Prolonged QTC secondary to above- resolving. PT/OT, pending SNF.        Diet order: DASH/TLC< Ensure clear daily, Ensure compact BID     Anthropometrics:  - Ht. 67"  - Wt. 83.4kg on 2/3 vs. 84.7kg on 2/1- bed scale discrepancy vs. weight trending down, will continue to monitor wt trends  - %wt change  - BMI 28.8 (using yesterday's weight)   - IBW 142lbs      Pertinent Lab Data: (2/4) Hg 9.1, Hct 30.1, BUN 30, eGFR 51      Pertinent Meds: Atorvastatin, Ferrous sulfate, Lasix, Metoprolol      Physical Findings:  - Appearance: confused  - GI function: abd noted rounded, fecal incontinence  - Tubes: no tubes noted  - Oral/Mouth cavity: reports difficulty swallowing  - Skin: ecchymosis (BS 15)      Nutrition Requirements (from RD note on 1/31)   Weight Used:     Estimated Energy Needs    Continue [x]  Adjust [] 1590-1910kcal   Adjusted Energy Recommendations:   kcal/day        Estimated Protein Needs    Continue [x]  Adjust [] 57-64g   Adjusted Protein Recommendations:   gm/day        Estimated Fluid Needs        Continue [x]  Adjust [] 1ml/kcal or per LIP   Adjusted Fluid Recommendations:   mL/day     Nutrient Intake: <50% PO at meals + some Ensure daily, not meeting estimated energy needs        [] Previous Nutrition Diagnosis: Malnutrition             [x] Ongoing          [] Resolved       Nutrition Intervention: meals and snacks, medical food supplement, coordination of nutrition care, vitamin and mineral supplements    Rec: Continue DASH/TLC diet order with Ensure clear daily, Ensure compact BID. Consider SLP eval as pt. reports difficulty swallowing. Order MVI daily.      Goal/Expected Outcome: In 3 days pt. to consume at least 50-75% PO and supplement.     Indicator/Monitoring: energy intake, body composition, NFPF

## 2019-02-05 ENCOUNTER — TRANSCRIPTION ENCOUNTER (OUTPATIENT)
Age: 84
End: 2019-02-05

## 2019-02-05 LAB
ANION GAP SERPL CALC-SCNC: 13 MMOL/L — SIGNIFICANT CHANGE UP (ref 7–14)
APTT BLD: 37.5 SEC — SIGNIFICANT CHANGE UP (ref 27–39.2)
BASOPHILS # BLD AUTO: 0.03 K/UL — SIGNIFICANT CHANGE UP (ref 0–0.2)
BASOPHILS NFR BLD AUTO: 0.4 % — SIGNIFICANT CHANGE UP (ref 0–1)
BUN SERPL-MCNC: 30 MG/DL — HIGH (ref 10–20)
CALCIUM SERPL-MCNC: 9 MG/DL — SIGNIFICANT CHANGE UP (ref 8.5–10.1)
CHLORIDE SERPL-SCNC: 99 MMOL/L — SIGNIFICANT CHANGE UP (ref 98–110)
CO2 SERPL-SCNC: 25 MMOL/L — SIGNIFICANT CHANGE UP (ref 17–32)
CREAT SERPL-MCNC: 1.2 MG/DL — SIGNIFICANT CHANGE UP (ref 0.7–1.5)
EOSINOPHIL # BLD AUTO: 0.16 K/UL — SIGNIFICANT CHANGE UP (ref 0–0.7)
EOSINOPHIL NFR BLD AUTO: 1.9 % — SIGNIFICANT CHANGE UP (ref 0–8)
GLUCOSE SERPL-MCNC: 95 MG/DL — SIGNIFICANT CHANGE UP (ref 70–99)
HCT VFR BLD CALC: 28.2 % — LOW (ref 42–52)
HGB BLD-MCNC: 8.8 G/DL — LOW (ref 14–18)
IMM GRANULOCYTES NFR BLD AUTO: 0.6 % — HIGH (ref 0.1–0.3)
INR BLD: 2.4 RATIO — HIGH (ref 0.65–1.3)
LYMPHOCYTES # BLD AUTO: 1.4 K/UL — SIGNIFICANT CHANGE UP (ref 1.2–3.4)
LYMPHOCYTES # BLD AUTO: 16.9 % — LOW (ref 20.5–51.1)
MCHC RBC-ENTMCNC: 19.3 PG — LOW (ref 27–31)
MCHC RBC-ENTMCNC: 31.2 G/DL — LOW (ref 32–37)
MCV RBC AUTO: 61.8 FL — LOW (ref 80–94)
MONOCYTES # BLD AUTO: 0.94 K/UL — HIGH (ref 0.1–0.6)
MONOCYTES NFR BLD AUTO: 11.4 % — HIGH (ref 1.7–9.3)
NEUTROPHILS # BLD AUTO: 5.7 K/UL — SIGNIFICANT CHANGE UP (ref 1.4–6.5)
NEUTROPHILS NFR BLD AUTO: 68.8 % — SIGNIFICANT CHANGE UP (ref 42.2–75.2)
NRBC # BLD: 0 /100 WBCS — SIGNIFICANT CHANGE UP (ref 0–0)
PLATELET # BLD AUTO: 199 K/UL — SIGNIFICANT CHANGE UP (ref 130–400)
POTASSIUM SERPL-MCNC: 5.4 MMOL/L — HIGH (ref 3.5–5)
POTASSIUM SERPL-SCNC: 5.4 MMOL/L — HIGH (ref 3.5–5)
PROTHROM AB SERPL-ACNC: 27.4 SEC — HIGH (ref 9.95–12.87)
RBC # BLD: 4.56 M/UL — LOW (ref 4.7–6.1)
RBC # FLD: 19.7 % — HIGH (ref 11.5–14.5)
SODIUM SERPL-SCNC: 137 MMOL/L — SIGNIFICANT CHANGE UP (ref 135–146)
WBC # BLD: 8.28 K/UL — SIGNIFICANT CHANGE UP (ref 4.8–10.8)
WBC # FLD AUTO: 8.28 K/UL — SIGNIFICANT CHANGE UP (ref 4.8–10.8)

## 2019-02-05 RX ORDER — ACETAMINOPHEN 500 MG
650 TABLET ORAL EVERY 6 HOURS
Qty: 0 | Refills: 0 | Status: DISCONTINUED | OUTPATIENT
Start: 2019-02-05 | End: 2019-02-06

## 2019-02-05 RX ORDER — WARFARIN SODIUM 2.5 MG/1
2.5 TABLET ORAL ONCE
Qty: 0 | Refills: 0 | Status: COMPLETED | OUTPATIENT
Start: 2019-02-05 | End: 2019-02-05

## 2019-02-05 RX ADMIN — WARFARIN SODIUM 2.5 MILLIGRAM(S): 2.5 TABLET ORAL at 22:08

## 2019-02-05 RX ADMIN — Medication 20 MILLIGRAM(S): at 06:25

## 2019-02-05 RX ADMIN — Medication 325 MILLIGRAM(S): at 11:21

## 2019-02-05 RX ADMIN — Medication 50 MILLIGRAM(S): at 06:25

## 2019-02-05 RX ADMIN — ATORVASTATIN CALCIUM 10 MILLIGRAM(S): 80 TABLET, FILM COATED ORAL at 22:08

## 2019-02-05 RX ADMIN — ISOSORBIDE MONONITRATE 30 MILLIGRAM(S): 60 TABLET, EXTENDED RELEASE ORAL at 11:21

## 2019-02-05 NOTE — DISCHARGE NOTE ADULT - CARE PROVIDER_API CALL
Cassidy Breen)  Internal Medicine  [address] [place] [address]  Phone: [phone]  Fax: [phone]  Follow Up Time:

## 2019-02-05 NOTE — DISCHARGE NOTE ADULT - HOSPITAL COURSE
This patient is 89 y/o male with past medical history of CHF, CAD, DLD, HTN, AAA repair and anemia. He came to hospital with chief complaint of lethargy for past 1 week. Patient reports of abdominal pain since yesterday. This was associated with 1-2 episodes of vomiting (non bloody non billious). Family reports that he was behaving differently too. Patient has not been eating well as per family.   Patient denies chest pain, shortness of breath, urinary symptoms, leg weakness, paresthesias weakness, headaches, recent trauma or rash.   Patient is mostly wheelchair bound at home but does use walker at times.   In ED patient was found to have Mg of 0.4 along with Calcium of 5.3 S/P IV calcium gluconate 3gm and Mag 4gm which resolved patients symptoms. Pt was seen by nephrology and endocrine. Pt was found to be symptomatic due to low Ca and Mg levels which were repleted. CHF was managed with diuresis and oxygen supplementation.  EF was 40-45 on echo. Pt tsh levels were low. pt was advised to have repeat TSH levels in 2-3 weeks. Home oxygen was arranged as pt was having low oxygenation.

## 2019-02-05 NOTE — DISCHARGE NOTE ADULT - CARE PLAN
Principal Discharge DX:	Hypocalcemia  Goal:	resolution  Assessment and plan of treatment:	Continue to take supplements as prescribed. Have a balanced diet. Follow up with your PMD in 1- 2 weeks for blood work.  Secondary Diagnosis:	Hypomagnesemia  Goal:	resolution  Assessment and plan of treatment:	Continue to take magnesium supplements as prescribed.  Secondary Diagnosis:	Hypothyroid  Goal:	monitor  Assessment and plan of treatment:	you had low TSH levels. Repeat TSH levels with your PMD in 2-3 weeks for dose adjustment.

## 2019-02-05 NOTE — DISCHARGE NOTE ADULT - MEDICATION SUMMARY - MEDICATIONS TO TAKE
I will START or STAY ON the medications listed below when I get home from the hospital:    acetaminophen 325 mg oral tablet  -- 2 tab(s) by mouth every 8 hours -   -- Indication: For pain     isosorbide mononitrate 30 mg oral tablet, extended release  -- 1 tab(s) by mouth once a day  -- Indication: For angina    warfarin 1 mg oral tablet  -- 1.5 tab(s) by mouth once a day (at bedtime)   -- Indication: For anti coagulation    atorvastatin 10 mg oral tablet  -- 1 tab(s) by mouth once a day (at bedtime)  -- Indication: For High cholesterol    metoprolol succinate 50 mg oral tablet, extended release  -- 1 tab(s) by mouth once a day  -- Indication: For CHF    ALBUTEROL SUL 1.25 MG/3 ML SOL  -- inhaled every 4 hours, As Needed  -- Indication: For wheezing, shortness of breath    furosemide 20 mg oral tablet  -- 1 tab(s) by mouth once a day   -- Indication: For CHF    magnesium oxide 400 mg (241.3 mg elemental magnesium) oral tablet  -- 1 tab(s) by mouth 4 times a day (with meals and at bedtime)  -- Indication: For magnesium supplementaion    melatonin 3 mg oral tablet  -- 1 tab(s) by mouth once a day (at bedtime)  -- Indication: For insomnia    ergocalciferol 50,000 intl units (1.25 mg) oral capsule  -- 1 cap(s) by mouth once a week  -- Indication: For vitamin d supplementaion

## 2019-02-05 NOTE — DISCHARGE NOTE ADULT - PATIENT PORTAL LINK FT
You can access the [facility] Patient Portal, offered by [facility], by registering with the following website: [email or web address]

## 2019-02-05 NOTE — PROGRESS NOTE ADULT - ASSESSMENT
89yo M with Past Medical History CHF, CAD, DLD, HTN, AAA repair and anemia admitted with weakness and lethargy secondary to symptomatic hypomagnesemia/hypocalcemia due to excess diuretics/poor oral intake.    Weakness/lethargy secondary to symptomatic hypocalcemia/hypomagnesemia: electrolytes have corrected with aggressive repletion.  Abnormal lab findings may have been due to excess GI losses combined with diuretics.  The patient appears asymptomatic at present.  Consider adding low dose MgOxide to home medications.   Off Calcitriol.  Chronic Systolic CHF/Atrial Fibrillation/Hypercholesteremia: INR within therapeutic range (2.4).  Continue Coumadin at bedtime.  Continue Toprol XL for rate control.  Continue Lasix 20mg PO qHS.    Hypothyroidism: mildly elevated TSH.  Repeat thyroid function panel as outpatient within 2-3 weeks of discharge  GI prophylaxis  Anticipate discharge to SNF in AM

## 2019-02-05 NOTE — PROGRESS NOTE ADULT - SUBJECTIVE AND OBJECTIVE BOX
KUSH TONG [id number]    Hospitalist Note  89yo M with Past Medical History CHF, CAD, DLD, HTN, AAA repair and anemia admitted with weakness and lethargy secondary to symptomatic hypomagnesemia/hypocalcemia due to excess diuretics/poor oral intake.    Overnight events/Updates: Electrolytes have normalized with conservative management.  Nursing reports less agitation over the past 24 hours.  1:1 observation was discontinued this morning.    Vital Signs Last 24 Hrs  T(C): 35.9 (05 Feb 2019 12:44), Max: 35.9 (05 Feb 2019 12:44)  T(F): 96.6 (05 Feb 2019 12:44), Max: 96.6 (05 Feb 2019 12:44)  HR: 70 (05 Feb 2019 12:44) (68 - 74)  BP: 109/66 (05 Feb 2019 12:44) (109/66 - 141/76)  BP(mean): --  RR: 20 (05 Feb 2019 12:44) (20 - 24)  SpO2: 86% (05 Feb 2019 15:23) (86% - 97%)    Physical Examination:  General: AAO x 2  HEENT: PERRLA, EOMI  CV= S1 & S2 appreciated  Lungs=CTA BL  Abdominal Examination= + BS, Soft, NT/ND  Extremity Examination= No C/C/E    ROS: No chest pain, no shortness of breath.  All other systems reviewed and are within normal limits except for the complaints in the HPI.    MEDICATIONS  (STANDING):  atorvastatin 10 milliGRAM(s) Oral at bedtime  ferrous    sulfate 325 milliGRAM(s) Oral daily  furosemide    Tablet 20 milliGRAM(s) Oral daily  isosorbide   mononitrate ER Tablet (IMDUR) 30 milliGRAM(s) Oral daily  metoprolol succinate ER 50 milliGRAM(s) Oral daily    MEDICATIONS  (PRN):  ALBUTerol   0.042% 1.25 milliGRAM(s) Nebulizer every 4 hours PRN Respirations Above___                            8.8    8.28  )-----------( 199      ( 05 Feb 2019 05:09 )             28.2     02-05    137  |  99  |  30<H>  ----------------------------<  95  5.4<H>   |  25  |  1.2    Ca    9.0      05 Feb 2019 05:09  Phos  3.0     02-04  Mg     2.1     02-04        Case discussed with housestaff & family  OLIVIA Lundy [id number]

## 2019-02-05 NOTE — DISCHARGE NOTE ADULT - PLAN OF CARE
resolution Continue to take supplements as prescribed. Have a balanced diet. Follow up with your PMD in 1- 2 weeks for blood work. Continue to take magnesium supplements as prescribed. monitor you had low TSH levels. Repeat TSH levels with your PMD in 2-3 weeks for dose adjustment.

## 2019-02-06 VITALS — OXYGEN SATURATION: 97 %

## 2019-02-06 LAB
ANION GAP SERPL CALC-SCNC: 15 MMOL/L — HIGH (ref 7–14)
BUN SERPL-MCNC: 30 MG/DL — HIGH (ref 10–20)
CA-I BLD-SCNC: 1.19 MMOL/L — SIGNIFICANT CHANGE UP (ref 1.12–1.3)
CALCIUM SERPL-MCNC: 9 MG/DL — SIGNIFICANT CHANGE UP (ref 8.5–10.1)
CHLORIDE SERPL-SCNC: 96 MMOL/L — LOW (ref 98–110)
CO2 SERPL-SCNC: 25 MMOL/L — SIGNIFICANT CHANGE UP (ref 17–32)
CREAT SERPL-MCNC: 1.1 MG/DL — SIGNIFICANT CHANGE UP (ref 0.7–1.5)
GLUCOSE BLDC GLUCOMTR-MCNC: 127 MG/DL — HIGH (ref 70–99)
GLUCOSE SERPL-MCNC: 97 MG/DL — SIGNIFICANT CHANGE UP (ref 70–99)
HCT VFR BLD CALC: 29.3 % — LOW (ref 42–52)
HGB BLD-MCNC: 8.7 G/DL — LOW (ref 14–18)
MAGNESIUM SERPL-MCNC: 2 MG/DL — SIGNIFICANT CHANGE UP (ref 1.8–2.4)
MCHC RBC-ENTMCNC: 18.7 PG — LOW (ref 27–31)
MCHC RBC-ENTMCNC: 29.7 G/DL — LOW (ref 32–37)
MCV RBC AUTO: 62.9 FL — LOW (ref 80–94)
NRBC # BLD: 0 /100 WBCS — SIGNIFICANT CHANGE UP (ref 0–0)
PLATELET # BLD AUTO: 205 K/UL — SIGNIFICANT CHANGE UP (ref 130–400)
POTASSIUM SERPL-MCNC: 5 MMOL/L — SIGNIFICANT CHANGE UP (ref 3.5–5)
POTASSIUM SERPL-SCNC: 5 MMOL/L — SIGNIFICANT CHANGE UP (ref 3.5–5)
RBC # BLD: 4.66 M/UL — LOW (ref 4.7–6.1)
RBC # FLD: 20 % — HIGH (ref 11.5–14.5)
SODIUM SERPL-SCNC: 136 MMOL/L — SIGNIFICANT CHANGE UP (ref 135–146)
WBC # BLD: 7.49 K/UL — SIGNIFICANT CHANGE UP (ref 4.8–10.8)
WBC # FLD AUTO: 7.49 K/UL — SIGNIFICANT CHANGE UP (ref 4.8–10.8)

## 2019-02-06 RX ADMIN — Medication 20 MILLIGRAM(S): at 05:42

## 2019-02-06 RX ADMIN — Medication 50 MILLIGRAM(S): at 05:44

## 2019-02-06 RX ADMIN — ISOSORBIDE MONONITRATE 30 MILLIGRAM(S): 60 TABLET, EXTENDED RELEASE ORAL at 17:41

## 2019-02-06 RX ADMIN — Medication 325 MILLIGRAM(S): at 17:41

## 2019-02-06 RX ADMIN — ATORVASTATIN CALCIUM 10 MILLIGRAM(S): 80 TABLET, FILM COATED ORAL at 21:11

## 2019-02-06 NOTE — PROGRESS NOTE ADULT - SUBJECTIVE AND OBJECTIVE BOX
<<<RESIDENT DISCHARGE NOTE>>>     KUSH TONG  MRN-[id number]    VITAL SIGNS:  T(F): 95.8 (02-06-19 @ 13:36), Max: 96.9 (02-06-19 @ 05:40)  HR: 64 (02-06-19 @ 13:36)  BP: 155/76 (02-06-19 @ 13:36)  SpO2: 97% (02-06-19 @ 14:41)      PHYSICAL EXAMINATION:  General: NAd, pleasant male.   Head & Neck: normocephalic, no lAD.   Pulmonary: clear to auscultation  Cardiovascular: regular rate rhythm  Gastrointestinal/Abdomen & Pelvis: soft non tender.   Neurologic/Motor: grossly normal. answers questions appropriately.     TEST RESULTS:                        8.7    7.49  )-----------( 205      ( 06 Feb 2019 05:40 )             29.3       02-06    136  |  96<L>  |  30<H>  ----------------------------<  97  5.0   |  25  |  1.1    Ca    9.0      06 Feb 2019 05:40  Mg     2.0     02-06        FINAL DISCHARGE INTERVIEW:  Resident(s) Present: (Name:______Anastasiia etienne_______), RN Present: (Name:  _____otoniel______)    DISCHARGE MEDICATION RECONCILIATION  reviewed with Attending (Name:_____Dr gant______)    DISPOSITION:   [ x ] Home,    [  ] Home with Visiting Nursing Services,   [    ]  SNF/ NH,    [   ] Acute Rehab (4A),   [   ] Other (Specify:_________)

## 2019-02-06 NOTE — PROGRESS NOTE ADULT - ASSESSMENT
89yo M with Past Medical History CHF, CAD, DLD, HTN, AAA repair and anemia admitted with weakness and lethargy secondary to symptomatic hypomagnesemia/hypocalcemia due to excess diuretics/poor oral intake.    Weakness/lethargy secondary to symptomatic hypocalcemia/hypomagnesemia: calcium and magnesium levels have normalized with supplementaion.  Abnormal findings may have been due to excess GI losses combined with diuretics.  The patient appears asymptomatic at present.  Off Calcitriol.  Continue Lasix 20mg PO q24  Chronic Systolic CHF/Atrial Fibrillation/Hypercholesteremia: INR within therapeutic range (2.4).  Continue Coumadin at bedtime.  Continue Toprol XL for rate control.  Continue Lasix 20mg PO qHS.    Hypothyroidism: mildly elevated TSH.  Repeat thyroid function panel as outpatient within 2-3 weeks of discharge  GI prophylaxis  Medically stable for discharge home once O2 available

## 2019-02-06 NOTE — PROGRESS NOTE ADULT - PROVIDER SPECIALTY LIST ADULT
Hospitalist
Internal Medicine
Nephrology
Hospitalist

## 2019-02-06 NOTE — PROGRESS NOTE ADULT - REASON FOR ADMISSION
Weakness and abdominal pain
Weakness
Weakness and abdominal pain

## 2019-02-06 NOTE — PROGRESS NOTE ADULT - SUBJECTIVE AND OBJECTIVE BOX
KUSH TONG [id number]    Hospitalist Note  89yo M with Past Medical History CHF, CAD, DLD, HTN, AAA repair and anemia admitted with weakness and lethargy secondary to symptomatic hypomagnesemia/hypocalcemia due to excess diuretics/poor oral intake.    Overnight events/Updates: no acute events over night off 1:1 observation.  Family requests that the patient return home with supplemental oxygen.    Vital Signs Last 24 Hrs  T(C): 36.1 (06 Feb 2019 05:40), Max: 36.1 (06 Feb 2019 05:40)  T(F): 96.9 (06 Feb 2019 05:40), Max: 96.9 (06 Feb 2019 05:40)  HR: 56 (06 Feb 2019 09:51) (56 - 72)  BP: 139/80 (06 Feb 2019 05:40) (107/60 - 147/70)  BP(mean): --  RR: 16 (06 Feb 2019 09:51) (16 - 20)  SpO2: 91% (06 Feb 2019 09:51) (86% - 91%)    Physical Examination:  General: AAO x 3  HEENT: PERRLA, EOMI, NC  CV= S1 & S2 appreciated  Lungs= no wheezes or rales  Abdominal Examination= + BS, Soft, NT/ND  Extremity Examination= No C/C/E    ROS: No chest pain, no shortness of breath.  All other systems reviewed and are within normal limits except for the complaints in the HPI.    MEDICATIONS  (STANDING):  atorvastatin 10 milliGRAM(s) Oral at bedtime  ferrous    sulfate 325 milliGRAM(s) Oral daily  furosemide    Tablet 20 milliGRAM(s) Oral daily  isosorbide   mononitrate ER Tablet (IMDUR) 30 milliGRAM(s) Oral daily  metoprolol succinate ER 50 milliGRAM(s) Oral daily    MEDICATIONS  (PRN):  acetaminophen  Suppository .. 650 milliGRAM(s) Rectal every 6 hours PRN Temp greater or equal to 38C (100.4F), Mild Pain (1 - 3)  ALBUTerol   0.042% 1.25 milliGRAM(s) Nebulizer every 4 hours PRN Respirations Above___                            8.7    7.49  )-----------( 205      ( 06 Feb 2019 05:40 )             29.3     02-06    136  |  96<L>  |  30<H>  ----------------------------<  97  5.0   |  25  |  1.1    Ca    9.0      06 Feb 2019 05:40  Mg     2.0     02-06        Case discussed with housestaff & family  OLIVIA Lundy [phone]

## 2019-02-19 ENCOUNTER — OUTPATIENT (OUTPATIENT)
Dept: OUTPATIENT SERVICES | Facility: HOSPITAL | Age: 84
LOS: 1 days | Discharge: HOME | End: 2019-02-19

## 2019-02-19 DIAGNOSIS — I48.91 UNSPECIFIED ATRIAL FIBRILLATION: ICD-10-CM

## 2019-02-19 DIAGNOSIS — Z98.890 OTHER SPECIFIED POSTPROCEDURAL STATES: Chronic | ICD-10-CM

## 2019-03-04 ENCOUNTER — OUTPATIENT (OUTPATIENT)
Dept: OUTPATIENT SERVICES | Facility: HOSPITAL | Age: 84
LOS: 1 days | Discharge: HOME | End: 2019-03-04

## 2019-03-04 DIAGNOSIS — M81.0 AGE-RELATED OSTEOPOROSIS WITHOUT CURRENT PATHOLOGICAL FRACTURE: ICD-10-CM

## 2019-03-04 DIAGNOSIS — E03.9 HYPOTHYROIDISM, UNSPECIFIED: ICD-10-CM

## 2019-03-04 DIAGNOSIS — E53.8 DEFICIENCY OF OTHER SPECIFIED B GROUP VITAMINS: ICD-10-CM

## 2019-03-04 DIAGNOSIS — I48.91 UNSPECIFIED ATRIAL FIBRILLATION: ICD-10-CM

## 2019-03-04 DIAGNOSIS — E83.39 OTHER DISORDERS OF PHOSPHORUS METABOLISM: ICD-10-CM

## 2019-03-04 DIAGNOSIS — Z98.890 OTHER SPECIFIED POSTPROCEDURAL STATES: Chronic | ICD-10-CM

## 2019-03-04 DIAGNOSIS — Z79.899 OTHER LONG TERM (CURRENT) DRUG THERAPY: ICD-10-CM

## 2019-03-04 DIAGNOSIS — N18.2 CHRONIC KIDNEY DISEASE, STAGE 2 (MILD): ICD-10-CM

## 2019-03-04 DIAGNOSIS — R73.9 HYPERGLYCEMIA, UNSPECIFIED: ICD-10-CM

## 2019-03-18 ENCOUNTER — OUTPATIENT (OUTPATIENT)
Dept: OUTPATIENT SERVICES | Facility: HOSPITAL | Age: 84
LOS: 1 days | Discharge: HOME | End: 2019-03-18

## 2019-03-18 DIAGNOSIS — Z98.890 OTHER SPECIFIED POSTPROCEDURAL STATES: Chronic | ICD-10-CM

## 2019-03-18 DIAGNOSIS — I48.91 UNSPECIFIED ATRIAL FIBRILLATION: ICD-10-CM

## 2019-04-01 ENCOUNTER — OUTPATIENT (OUTPATIENT)
Dept: OUTPATIENT SERVICES | Facility: HOSPITAL | Age: 84
LOS: 1 days | Discharge: HOME | End: 2019-04-01

## 2019-04-01 DIAGNOSIS — Z98.890 OTHER SPECIFIED POSTPROCEDURAL STATES: Chronic | ICD-10-CM

## 2019-04-01 DIAGNOSIS — I48.91 UNSPECIFIED ATRIAL FIBRILLATION: ICD-10-CM

## 2019-04-15 ENCOUNTER — OUTPATIENT (OUTPATIENT)
Dept: OUTPATIENT SERVICES | Facility: HOSPITAL | Age: 84
LOS: 1 days | Discharge: HOME | End: 2019-04-15

## 2019-04-15 DIAGNOSIS — E83.40 DISORDERS OF MAGNESIUM METABOLISM, UNSPECIFIED: ICD-10-CM

## 2019-04-15 DIAGNOSIS — Z79.899 OTHER LONG TERM (CURRENT) DRUG THERAPY: ICD-10-CM

## 2019-04-15 DIAGNOSIS — E03.9 HYPOTHYROIDISM, UNSPECIFIED: ICD-10-CM

## 2019-04-15 DIAGNOSIS — Z98.890 OTHER SPECIFIED POSTPROCEDURAL STATES: Chronic | ICD-10-CM

## 2019-04-15 DIAGNOSIS — D50.9 IRON DEFICIENCY ANEMIA, UNSPECIFIED: ICD-10-CM

## 2019-04-15 DIAGNOSIS — N18.2 CHRONIC KIDNEY DISEASE, STAGE 2 (MILD): ICD-10-CM

## 2019-04-15 DIAGNOSIS — I48.91 UNSPECIFIED ATRIAL FIBRILLATION: ICD-10-CM

## 2019-04-29 ENCOUNTER — OUTPATIENT (OUTPATIENT)
Dept: OUTPATIENT SERVICES | Facility: HOSPITAL | Age: 84
LOS: 1 days | Discharge: HOME | End: 2019-04-29

## 2019-04-29 DIAGNOSIS — I48.91 UNSPECIFIED ATRIAL FIBRILLATION: ICD-10-CM

## 2019-04-29 DIAGNOSIS — Z98.890 OTHER SPECIFIED POSTPROCEDURAL STATES: Chronic | ICD-10-CM

## 2019-05-13 ENCOUNTER — OUTPATIENT (OUTPATIENT)
Dept: OUTPATIENT SERVICES | Facility: HOSPITAL | Age: 84
LOS: 1 days | Discharge: HOME | End: 2019-05-13

## 2019-05-13 DIAGNOSIS — Z98.890 OTHER SPECIFIED POSTPROCEDURAL STATES: Chronic | ICD-10-CM

## 2019-05-13 DIAGNOSIS — I48.91 UNSPECIFIED ATRIAL FIBRILLATION: ICD-10-CM

## 2019-05-23 NOTE — ED PROVIDER NOTE - DATE/TIME 4
[FreeTextEntry1] : trial Tessalon pearls\par Continue present bronchodilator therapy\par Pulm toilet discussed.\par Close observation.  20-Mar-2018 13:30

## 2019-05-28 ENCOUNTER — OUTPATIENT (OUTPATIENT)
Dept: OUTPATIENT SERVICES | Facility: HOSPITAL | Age: 84
LOS: 1 days | Discharge: HOME | End: 2019-05-28

## 2019-05-28 DIAGNOSIS — I48.91 UNSPECIFIED ATRIAL FIBRILLATION: ICD-10-CM

## 2019-05-28 DIAGNOSIS — Z98.890 OTHER SPECIFIED POSTPROCEDURAL STATES: Chronic | ICD-10-CM

## 2019-06-10 ENCOUNTER — OUTPATIENT (OUTPATIENT)
Dept: OUTPATIENT SERVICES | Facility: HOSPITAL | Age: 84
LOS: 1 days | Discharge: HOME | End: 2019-06-10

## 2019-06-10 DIAGNOSIS — Z98.890 OTHER SPECIFIED POSTPROCEDURAL STATES: Chronic | ICD-10-CM

## 2019-06-10 DIAGNOSIS — I48.91 UNSPECIFIED ATRIAL FIBRILLATION: ICD-10-CM

## 2019-06-24 ENCOUNTER — OUTPATIENT (OUTPATIENT)
Dept: OUTPATIENT SERVICES | Facility: HOSPITAL | Age: 84
LOS: 1 days | Discharge: HOME | End: 2019-06-24

## 2019-06-24 DIAGNOSIS — Z98.890 OTHER SPECIFIED POSTPROCEDURAL STATES: Chronic | ICD-10-CM

## 2019-06-24 DIAGNOSIS — I48.91 UNSPECIFIED ATRIAL FIBRILLATION: ICD-10-CM

## 2019-06-27 ENCOUNTER — INPATIENT (INPATIENT)
Facility: HOSPITAL | Age: 84
LOS: 7 days | Discharge: NURSING, REHAB OR LONG-TERM CARE | End: 2019-07-05
Attending: HOSPITALIST | Admitting: HOSPITALIST
Payer: MEDICARE

## 2019-06-27 ENCOUNTER — OUTPATIENT (OUTPATIENT)
Dept: OUTPATIENT SERVICES | Facility: HOSPITAL | Age: 84
LOS: 1 days | Discharge: HOME | End: 2019-06-27

## 2019-06-27 VITALS
RESPIRATION RATE: 18 BRPM | DIASTOLIC BLOOD PRESSURE: 67 MMHG | HEART RATE: 76 BPM | SYSTOLIC BLOOD PRESSURE: 130 MMHG | OXYGEN SATURATION: 100 % | TEMPERATURE: 99 F

## 2019-06-27 DIAGNOSIS — Z98.890 OTHER SPECIFIED POSTPROCEDURAL STATES: Chronic | ICD-10-CM

## 2019-06-27 DIAGNOSIS — I48.91 UNSPECIFIED ATRIAL FIBRILLATION: ICD-10-CM

## 2019-06-27 LAB
ALBUMIN SERPL ELPH-MCNC: 3.2 G/DL — LOW (ref 3.5–5.2)
ALP SERPL-CCNC: 123 U/L — HIGH (ref 30–115)
ALT FLD-CCNC: 13 U/L — SIGNIFICANT CHANGE UP (ref 0–41)
ANION GAP SERPL CALC-SCNC: 12 MMOL/L — SIGNIFICANT CHANGE UP (ref 7–14)
APPEARANCE UR: CLEAR — SIGNIFICANT CHANGE UP
APTT BLD: 42 SEC — HIGH (ref 27–39.2)
AST SERPL-CCNC: 15 U/L — SIGNIFICANT CHANGE UP (ref 0–41)
BACTERIA # UR AUTO: ABNORMAL /HPF
BASOPHILS # BLD AUTO: 0.03 K/UL — SIGNIFICANT CHANGE UP (ref 0–0.2)
BASOPHILS NFR BLD AUTO: 0.3 % — SIGNIFICANT CHANGE UP (ref 0–1)
BILIRUB SERPL-MCNC: 0.7 MG/DL — SIGNIFICANT CHANGE UP (ref 0.2–1.2)
BILIRUB UR-MCNC: NEGATIVE — SIGNIFICANT CHANGE UP
BUN SERPL-MCNC: 87 MG/DL — CRITICAL HIGH (ref 10–20)
CALCIUM SERPL-MCNC: 9.1 MG/DL — SIGNIFICANT CHANGE UP (ref 8.5–10.1)
CHLORIDE SERPL-SCNC: 98 MMOL/L — SIGNIFICANT CHANGE UP (ref 98–110)
CO2 SERPL-SCNC: 26 MMOL/L — SIGNIFICANT CHANGE UP (ref 17–32)
COLOR SPEC: YELLOW — SIGNIFICANT CHANGE UP
CREAT SERPL-MCNC: 1.5 MG/DL — SIGNIFICANT CHANGE UP (ref 0.7–1.5)
DIFF PNL FLD: NEGATIVE — SIGNIFICANT CHANGE UP
EOSINOPHIL # BLD AUTO: 0.22 K/UL — SIGNIFICANT CHANGE UP (ref 0–0.7)
EOSINOPHIL NFR BLD AUTO: 2.4 % — SIGNIFICANT CHANGE UP (ref 0–8)
EPI CELLS # UR: ABNORMAL /HPF
GLUCOSE SERPL-MCNC: 106 MG/DL — HIGH (ref 70–99)
GLUCOSE UR QL: NEGATIVE MG/DL — SIGNIFICANT CHANGE UP
HCT VFR BLD CALC: 26.1 % — LOW (ref 42–52)
HGB BLD-MCNC: 8 G/DL — LOW (ref 14–18)
IMM GRANULOCYTES NFR BLD AUTO: 0.4 % — HIGH (ref 0.1–0.3)
INR BLD: 2.77 RATIO — HIGH (ref 0.65–1.3)
KETONES UR-MCNC: NEGATIVE — SIGNIFICANT CHANGE UP
LACTATE SERPL-SCNC: 0.7 MMOL/L — SIGNIFICANT CHANGE UP (ref 0.5–2.2)
LEUKOCYTE ESTERASE UR-ACNC: ABNORMAL
LIDOCAIN IGE QN: 55 U/L — SIGNIFICANT CHANGE UP (ref 7–60)
LYMPHOCYTES # BLD AUTO: 1.64 K/UL — SIGNIFICANT CHANGE UP (ref 1.2–3.4)
LYMPHOCYTES # BLD AUTO: 17.6 % — LOW (ref 20.5–51.1)
MCHC RBC-ENTMCNC: 19 PG — LOW (ref 27–31)
MCHC RBC-ENTMCNC: 30.7 G/DL — LOW (ref 32–37)
MCV RBC AUTO: 61.8 FL — LOW (ref 80–94)
MONOCYTES # BLD AUTO: 1.05 K/UL — HIGH (ref 0.1–0.6)
MONOCYTES NFR BLD AUTO: 11.3 % — HIGH (ref 1.7–9.3)
NEUTROPHILS # BLD AUTO: 6.33 K/UL — SIGNIFICANT CHANGE UP (ref 1.4–6.5)
NEUTROPHILS NFR BLD AUTO: 68 % — SIGNIFICANT CHANGE UP (ref 42.2–75.2)
NITRITE UR-MCNC: NEGATIVE — SIGNIFICANT CHANGE UP
NRBC # BLD: 0 /100 WBCS — SIGNIFICANT CHANGE UP (ref 0–0)
PH UR: 6 — SIGNIFICANT CHANGE UP (ref 5–8)
PLATELET # BLD AUTO: 193 K/UL — SIGNIFICANT CHANGE UP (ref 130–400)
POTASSIUM SERPL-MCNC: 4.8 MMOL/L — SIGNIFICANT CHANGE UP (ref 3.5–5)
POTASSIUM SERPL-SCNC: 4.8 MMOL/L — SIGNIFICANT CHANGE UP (ref 3.5–5)
PROT SERPL-MCNC: 6.4 G/DL — SIGNIFICANT CHANGE UP (ref 6–8)
PROT UR-MCNC: NEGATIVE MG/DL — SIGNIFICANT CHANGE UP
PROTHROM AB SERPL-ACNC: 31.5 SEC — HIGH (ref 9.95–12.87)
RBC # BLD: 4.22 M/UL — LOW (ref 4.7–6.1)
RBC # FLD: 18.2 % — HIGH (ref 11.5–14.5)
SODIUM SERPL-SCNC: 136 MMOL/L — SIGNIFICANT CHANGE UP (ref 135–146)
SP GR SPEC: 1.01 — SIGNIFICANT CHANGE UP (ref 1.01–1.03)
TROPONIN T SERPL-MCNC: <0.01 NG/ML — SIGNIFICANT CHANGE UP
UROBILINOGEN FLD QL: 0.2 MG/DL — SIGNIFICANT CHANGE UP (ref 0.2–0.2)
WBC # BLD: 9.31 K/UL — SIGNIFICANT CHANGE UP (ref 4.8–10.8)
WBC # FLD AUTO: 9.31 K/UL — SIGNIFICANT CHANGE UP (ref 4.8–10.8)
WBC UR QL: SIGNIFICANT CHANGE UP /HPF

## 2019-06-27 PROCEDURE — 74176 CT ABD & PELVIS W/O CONTRAST: CPT | Mod: 26

## 2019-06-27 PROCEDURE — 71045 X-RAY EXAM CHEST 1 VIEW: CPT | Mod: 26

## 2019-06-27 PROCEDURE — 99285 EMERGENCY DEPT VISIT HI MDM: CPT | Mod: GC

## 2019-06-27 PROCEDURE — 93010 ELECTROCARDIOGRAM REPORT: CPT

## 2019-06-27 PROCEDURE — 71250 CT THORAX DX C-: CPT | Mod: 26

## 2019-06-27 PROCEDURE — 73552 X-RAY EXAM OF FEMUR 2/>: CPT | Mod: 26,LT

## 2019-06-27 NOTE — ED PROVIDER NOTE - CADM POA URETHRAL CATHETER
Has not had a meal since October 7th. States he does this at least once year. Only intake po has been water. Seen by his doctor yesterday after he had tried to start eating again. Called and told to come to the ER today. Was scheduled for and endoscopy today. They were concerned about his gallbladder.    No

## 2019-06-27 NOTE — ED PROVIDER NOTE - OBJECTIVE STATEMENT
89 year old male w/ a pmh of afib on coumadin, chf, hld (cardiologist Dr. Hinkle 89 year old male w/ a pmh of afib on coumadin, chf, hld (cardiologist Dr. Helton), presents here for multiple complaints. Patient has been having "tightness" in his abdomen x 1 week with interimttent sob decreased po intake. Patient also sustained a fall 2 weeks ago which he was known to have a left femur fracture but has been unable to schedule surgery yet and has been unable to ambulate. Patient denies fever chills cough n/v/ diarrhea or urinary complaints.

## 2019-06-27 NOTE — ED PROVIDER NOTE - PHYSICAL EXAMINATION
CONSTITUTIONAL: WA / WN / NAD  HEAD: NCAT  EYES: PERRL; EOMI;   ENT: Normal pharynx; mucous membranes pink/moist, no erythema.  NECK: Supple; no meningeal signs  CARD: RRR; nl S1/S2; no M/R/G.   RESP: Respiratory rate and effort are normal; breath sounds clear and equal bilaterally.  ABD: Soft, NT ND   MSK/EXT: No gross deformities; full range of motion.  SKIN: Warm and dry;   NEURO: AAOx3  PSYCH: Memory Intact, Normal Affect

## 2019-06-27 NOTE — ED ADULT NURSE NOTE - NSIMPLEMENTINTERV_GEN_ALL_ED
Implemented All Fall with Harm Risk Interventions:  [place] to call system. Call bell, personal items and telephone within reach. Instruct patient to call for assistance. Room bathroom lighting operational. Non-slip footwear when patient is off stretcher. Physically safe environment: no spills, clutter or unnecessary equipment. Stretcher in lowest position, wheels locked, appropriate side rails in place. Provide visual cue, wrist band, yellow gown, etc. Monitor gait and stability. Monitor for mental status changes and reorient to person, place, and time. Review medications for side effects contributing to fall risk. Reinforce activity limits and safety measures with patient and family. Provide visual clues: red socks.

## 2019-06-27 NOTE — ED PROVIDER NOTE - ATTENDING CONTRIBUTION TO CARE
89 year old male w/ a pmh of afib on coumadin, chf, hld, come sin with difficulty ambulating since a fall that occurred 2 weeks, patient denies head injury, no loc, no n/v/d, patient bib family for rehab, difficulty ambulating, OP xray displ;ays femur fracture. trauma consulted.     CONSTITUTIONAL: Well-developed; well-nourished; in no acute distress. Sitting up and providing appropriate history and physical examination  TRAUMA: Primary and Secondary surveys intact, GCS 15, no midline CTLS spine tenderness, Pelvis stable, + limited left femur raise, + tenderness to the left proximal hip  SKIN: skin exam is warm and dry, no acute rash.  HEAD: Normocephalic; atraumatic.  EYES: PERRL, 3 mm bilateral, no nystagmus, EOM intact; conjunctiva and sclera clear.  ENT: No nasal discharge; airway clear.  NECK: Supple; non tender. + full passive ROM in all directions. No JVD  CARD: S1, S2 normal; no murmurs, gallops, or rubs. Regular rate and rhythm. + Symmetric Strong Pulses  RESP: No wheezes, rales or rhonchi. Good air movement bilaterally  ABD: soft; non-distended; non-tender. No Rebound, No Guarding, No signs of peritonitis, No CVA tenderness. No pulsatile abdominal mass. + Strong and Symmetric Pulses  EXT: Normal ROM. No clubbing, cyanosis or edema. Dp and Pt Pulses intact. Cap refill less than 3 seconds  NEURO: CN 2-12 intact, normal finger to nose, no sensory or motor deficits, Alert, oriented, grossly unremarkable. No Focal deficits. GCS 15. NIH 0  PSYCH: Cooperative, appropriate.

## 2019-06-27 NOTE — ED PROVIDER NOTE - CLINICAL SUMMARY MEDICAL DECISION MAKING FREE TEXT BOX
patient remained hemodynamically stable, Patient was signed out to me pending CT Scans and Trauma consult, patient cleared by trauma, is admitted to Medicine for further care.

## 2019-06-27 NOTE — ED PROVIDER NOTE - NS ED ROS FT
Constitutional: See HPI.  Eyes: No visual changes  ENMT:  No neck pain   Cardiac: No cp  Respiratory: No cough   GI: see hpi  : No dysuria, frequency or burning.  MS: see hpi  Psych: No suicidal or homicidal ideations.  Neuro: No headache  Skin: No skin rash.

## 2019-06-27 NOTE — ED ADULT TRIAGE NOTE - CHIEF COMPLAINT QUOTE
pt biba for abdominal pain and tightness , also wants to have his left sided fractured femur evaluated.

## 2019-06-27 NOTE — ED PROVIDER NOTE - PROGRESS NOTE DETAILS
ortho resident aware of consult. ortho resident aware of consult. trauma consult placed Trauma consulted, patient signed out to Dr. jacobs, will follow pan scan and surgery consultation. spoke with gabby from trauma cleared patient.

## 2019-06-28 LAB
ALBUMIN SERPL ELPH-MCNC: 3.2 G/DL — LOW (ref 3.5–5.2)
ALP SERPL-CCNC: 109 U/L — SIGNIFICANT CHANGE UP (ref 30–115)
ALT FLD-CCNC: 12 U/L — SIGNIFICANT CHANGE UP (ref 0–41)
AMYLASE P1 CFR SERPL: 48 U/L — SIGNIFICANT CHANGE UP (ref 25–115)
ANION GAP SERPL CALC-SCNC: 13 MMOL/L — SIGNIFICANT CHANGE UP (ref 7–14)
APTT BLD: 39.1 SEC — SIGNIFICANT CHANGE UP (ref 27–39.2)
AST SERPL-CCNC: 14 U/L — SIGNIFICANT CHANGE UP (ref 0–41)
BASOPHILS # BLD AUTO: 0.03 K/UL — SIGNIFICANT CHANGE UP (ref 0–0.2)
BASOPHILS NFR BLD AUTO: 0.4 % — SIGNIFICANT CHANGE UP (ref 0–1)
BILIRUB SERPL-MCNC: 0.7 MG/DL — SIGNIFICANT CHANGE UP (ref 0.2–1.2)
BLD GP AB SCN SERPL QL: SIGNIFICANT CHANGE UP
BUN SERPL-MCNC: 82 MG/DL — CRITICAL HIGH (ref 10–20)
CALCIUM SERPL-MCNC: 9.4 MG/DL — SIGNIFICANT CHANGE UP (ref 8.5–10.1)
CHLORIDE SERPL-SCNC: 101 MMOL/L — SIGNIFICANT CHANGE UP (ref 98–110)
CK SERPL-CCNC: 14 U/L — SIGNIFICANT CHANGE UP (ref 0–225)
CO2 SERPL-SCNC: 26 MMOL/L — SIGNIFICANT CHANGE UP (ref 17–32)
CREAT SERPL-MCNC: 1.4 MG/DL — SIGNIFICANT CHANGE UP (ref 0.7–1.5)
EOSINOPHIL # BLD AUTO: 0.23 K/UL — SIGNIFICANT CHANGE UP (ref 0–0.7)
EOSINOPHIL NFR BLD AUTO: 2.9 % — SIGNIFICANT CHANGE UP (ref 0–8)
GLUCOSE SERPL-MCNC: 114 MG/DL — HIGH (ref 70–99)
HCT VFR BLD CALC: 25.2 % — LOW (ref 42–52)
HGB BLD-MCNC: 7.5 G/DL — LOW (ref 14–18)
IMM GRANULOCYTES NFR BLD AUTO: 0.4 % — HIGH (ref 0.1–0.3)
INR BLD: 2.37 RATIO — HIGH (ref 0.65–1.3)
LIDOCAIN IGE QN: 34 U/L — SIGNIFICANT CHANGE UP (ref 7–60)
LYMPHOCYTES # BLD AUTO: 1.37 K/UL — SIGNIFICANT CHANGE UP (ref 1.2–3.4)
LYMPHOCYTES # BLD AUTO: 17.4 % — LOW (ref 20.5–51.1)
MAGNESIUM SERPL-MCNC: 2.3 MG/DL — SIGNIFICANT CHANGE UP (ref 1.8–2.4)
MCHC RBC-ENTMCNC: 18.6 PG — LOW (ref 27–31)
MCHC RBC-ENTMCNC: 29.8 G/DL — LOW (ref 32–37)
MCV RBC AUTO: 62.4 FL — LOW (ref 80–94)
MONOCYTES # BLD AUTO: 0.88 K/UL — HIGH (ref 0.1–0.6)
MONOCYTES NFR BLD AUTO: 11.2 % — HIGH (ref 1.7–9.3)
NEUTROPHILS # BLD AUTO: 5.33 K/UL — SIGNIFICANT CHANGE UP (ref 1.4–6.5)
NEUTROPHILS NFR BLD AUTO: 67.7 % — SIGNIFICANT CHANGE UP (ref 42.2–75.2)
NRBC # BLD: 0 /100 WBCS — SIGNIFICANT CHANGE UP (ref 0–0)
PHOSPHATE SERPL-MCNC: 4 MG/DL — SIGNIFICANT CHANGE UP (ref 2.1–4.9)
PLATELET # BLD AUTO: 174 K/UL — SIGNIFICANT CHANGE UP (ref 130–400)
POTASSIUM SERPL-MCNC: 4.7 MMOL/L — SIGNIFICANT CHANGE UP (ref 3.5–5)
POTASSIUM SERPL-SCNC: 4.7 MMOL/L — SIGNIFICANT CHANGE UP (ref 3.5–5)
PROT SERPL-MCNC: 6.2 G/DL — SIGNIFICANT CHANGE UP (ref 6–8)
PROTHROM AB SERPL-ACNC: 27 SEC — HIGH (ref 9.95–12.87)
RBC # BLD: 4.04 M/UL — LOW (ref 4.7–6.1)
RBC # FLD: 18.1 % — HIGH (ref 11.5–14.5)
SODIUM SERPL-SCNC: 140 MMOL/L — SIGNIFICANT CHANGE UP (ref 135–146)
WBC # BLD: 7.87 K/UL — SIGNIFICANT CHANGE UP (ref 4.8–10.8)
WBC # FLD AUTO: 7.87 K/UL — SIGNIFICANT CHANGE UP (ref 4.8–10.8)

## 2019-06-28 PROCEDURE — 72125 CT NECK SPINE W/O DYE: CPT | Mod: 26

## 2019-06-28 PROCEDURE — 72100 X-RAY EXAM L-S SPINE 2/3 VWS: CPT | Mod: 26

## 2019-06-28 PROCEDURE — 70450 CT HEAD/BRAIN W/O DYE: CPT | Mod: 26

## 2019-06-28 PROCEDURE — 99223 1ST HOSP IP/OBS HIGH 75: CPT | Mod: AI

## 2019-06-28 PROCEDURE — 72074 X-RAY EXAM THORAC SPINE4/>VW: CPT | Mod: 26

## 2019-06-28 PROCEDURE — 72170 X-RAY EXAM OF PELVIS: CPT | Mod: 26

## 2019-06-28 PROCEDURE — 99222 1ST HOSP IP/OBS MODERATE 55: CPT

## 2019-06-28 RX ORDER — FUROSEMIDE 40 MG
20 TABLET ORAL
Refills: 0 | Status: DISCONTINUED | OUTPATIENT
Start: 2019-06-28 | End: 2019-06-28

## 2019-06-28 RX ORDER — CHLORHEXIDINE GLUCONATE 213 G/1000ML
1 SOLUTION TOPICAL
Refills: 0 | Status: DISCONTINUED | OUTPATIENT
Start: 2019-06-28 | End: 2019-07-01

## 2019-06-28 RX ORDER — ACETAMINOPHEN 500 MG
650 TABLET ORAL EVERY 8 HOURS
Refills: 0 | Status: DISCONTINUED | OUTPATIENT
Start: 2019-06-28 | End: 2019-07-01

## 2019-06-28 RX ORDER — ALBUTEROL 90 UG/1
2.5 AEROSOL, METERED ORAL EVERY 6 HOURS
Refills: 0 | Status: DISCONTINUED | OUTPATIENT
Start: 2019-06-28 | End: 2019-07-01

## 2019-06-28 RX ORDER — PANTOPRAZOLE SODIUM 20 MG/1
40 TABLET, DELAYED RELEASE ORAL
Refills: 0 | Status: DISCONTINUED | OUTPATIENT
Start: 2019-06-28 | End: 2019-07-01

## 2019-06-28 RX ORDER — ERGOCALCIFEROL 1.25 MG/1
50000 CAPSULE ORAL
Refills: 0 | Status: DISCONTINUED | OUTPATIENT
Start: 2019-06-28 | End: 2019-07-01

## 2019-06-28 RX ORDER — METOPROLOL TARTRATE 50 MG
50 TABLET ORAL DAILY
Refills: 0 | Status: DISCONTINUED | OUTPATIENT
Start: 2019-06-28 | End: 2019-07-01

## 2019-06-28 RX ORDER — SODIUM CHLORIDE 9 MG/ML
1000 INJECTION INTRAMUSCULAR; INTRAVENOUS; SUBCUTANEOUS
Refills: 0 | Status: DISCONTINUED | OUTPATIENT
Start: 2019-06-28 | End: 2019-06-28

## 2019-06-28 RX ORDER — DOCUSATE SODIUM 100 MG
100 CAPSULE ORAL
Refills: 0 | Status: DISCONTINUED | OUTPATIENT
Start: 2019-06-28 | End: 2019-07-01

## 2019-06-28 RX ORDER — ATORVASTATIN CALCIUM 80 MG/1
10 TABLET, FILM COATED ORAL AT BEDTIME
Refills: 0 | Status: DISCONTINUED | OUTPATIENT
Start: 2019-06-28 | End: 2019-07-01

## 2019-06-28 RX ORDER — ISOSORBIDE MONONITRATE 60 MG/1
30 TABLET, EXTENDED RELEASE ORAL DAILY
Refills: 0 | Status: DISCONTINUED | OUTPATIENT
Start: 2019-06-28 | End: 2019-07-01

## 2019-06-28 RX ORDER — SENNA PLUS 8.6 MG/1
2 TABLET ORAL AT BEDTIME
Refills: 0 | Status: DISCONTINUED | OUTPATIENT
Start: 2019-06-28 | End: 2019-07-01

## 2019-06-28 RX ORDER — MORPHINE SULFATE 50 MG/1
1 CAPSULE, EXTENDED RELEASE ORAL EVERY 6 HOURS
Refills: 0 | Status: DISCONTINUED | OUTPATIENT
Start: 2019-06-28 | End: 2019-07-01

## 2019-06-28 RX ORDER — FUROSEMIDE 40 MG
20 TABLET ORAL
Refills: 0 | Status: DISCONTINUED | OUTPATIENT
Start: 2019-06-28 | End: 2019-06-30

## 2019-06-28 RX ORDER — ALBUTEROL 90 UG/1
1.25 AEROSOL, METERED ORAL EVERY 4 HOURS
Refills: 0 | Status: DISCONTINUED | OUTPATIENT
Start: 2019-06-28 | End: 2019-06-28

## 2019-06-28 RX ORDER — LEVOTHYROXINE SODIUM 125 MCG
50 TABLET ORAL DAILY
Refills: 0 | Status: DISCONTINUED | OUTPATIENT
Start: 2019-06-28 | End: 2019-07-01

## 2019-06-28 RX ADMIN — Medication 650 MILLIGRAM(S): at 16:00

## 2019-06-28 RX ADMIN — Medication 650 MILLIGRAM(S): at 15:24

## 2019-06-28 RX ADMIN — Medication 20 MILLIGRAM(S): at 17:43

## 2019-06-28 RX ADMIN — SENNA PLUS 2 TABLET(S): 8.6 TABLET ORAL at 21:26

## 2019-06-28 RX ADMIN — ATORVASTATIN CALCIUM 10 MILLIGRAM(S): 80 TABLET, FILM COATED ORAL at 21:26

## 2019-06-28 RX ADMIN — ERGOCALCIFEROL 50000 UNIT(S): 1.25 CAPSULE ORAL at 12:48

## 2019-06-28 RX ADMIN — ISOSORBIDE MONONITRATE 30 MILLIGRAM(S): 60 TABLET, EXTENDED RELEASE ORAL at 12:48

## 2019-06-28 NOTE — H&P ADULT - NSHPPHYSICALEXAM_GEN_ALL_CORE
PHYSICAL EXAM:  GENERAL: NAD, lying in bed comfortably  HEAD:  Atraumatic, Normocephalic  NECK: Supple, No JVD  CHEST/LUNG: Bibasilar crackles. Unlabored respirations  HEART: Regular rate and rhythm; No murmurs, rubs, or gallops  ABDOMEN: Bowel sounds present; Soft, Nontender, Nondistended. No hepatomegaly.  EXTREMITIES:  2+ Peripheral Pulses BLLE, brisk capillary refill. No clubbing, cyanosis, or edema. No calf tenderness noted BL.    NERVOUS SYSTEM:  Alert & Oriented X3, speech clear. No deficits   MSK: 5/5 strength RLE. Strength and ROM limited LLE. Sensation intact BL.   SKIN: Ecchymoses noted BL arms.

## 2019-06-28 NOTE — H&P ADULT - HISTORY OF PRESENT ILLNESS
This is a 89 yo male with PMH significant for CHF w REF 45-55% on 2L home O2, afib on coumadin, AAA s/p repair, HTN, CAD, DLD, and anemia presenting to the hospital with complaints of abdominal tightness for the past week and s/p mechanical fall about 2 weeks ago. Patient states that it feels like there is a tightening band around his lower abdomen as well as some epigastric pain, and feeling of food getting stuck when he eats and decreased appetite/PO intake. States that eating worsens his symptoms and bowel movements improve his symptoms. Patient also complains of dark bowel movements, no rodney blood, and less frequent bowel movements. Also endorses increased, malodorous urinations; denies any burning. Has also had a few episodes of dark phlegm, with no rodney blood. Additionally, about 2 weeks ago patient had a mechanical fall while on the way to bathroom; denies any dizziness, LOC, or head trauma. He was taken to an OP orthopedist and Xray confirmed L femur fracture. Since then, pain has continued - is better at rest, but worse with movements like getting out of bed/liftiing leg. he is unable to bear weight anymore, and now uses wheelchair instead of his walker. Of note, patient has been holding warfarin since Monday due to INR 6.5. He denies any fever, chills, diarrhea, vomiting, chest pain, cough or wheezing.     In the ED, /67, HR 76, RR 18, T 98.6, SaO2 100% RA. Ct cervical spine, CT CAP, Xray femur, CXR performed. This is a 89 yo male with PMH significant for CHF w REF 40-45% on 2L home O2, afib on coumadin, AAA s/p repair, HTN, CAD, DLD, and anemia presenting to the hospital with complaints of abdominal tightness for the past week and s/p mechanical fall about 2 weeks ago. Patient states that it feels like there is a tightening band around his lower abdomen as well as some epigastric pain, and feeling of food getting stuck when he eats and decreased appetite/PO intake. States that eating worsens his symptoms and bowel movements improve his symptoms. Patient also complains of dark bowel movements, no rodney blood, and less frequent bowel movements. Also endorses increased, malodorous urinations; denies any burning. Has also had a few episodes of dark phlegm, with no rodney blood. Additionally, about 2 weeks ago patient had a mechanical fall while on the way to bathroom; denies any dizziness, LOC, or head trauma. He was taken to an OP orthopedist and Xray confirmed L femur fracture. Since then, pain has continued - is better at rest, but worse with movements like getting out of bed/liftiing leg. he is unable to bear weight anymore, and now uses wheelchair instead of his walker. Of note, patient has been holding warfarin since Monday due to INR 6.5. He denies any fever, chills, diarrhea, vomiting, chest pain, cough or wheezing.     In the ED, /67, HR 76, RR 18, T 98.6, SaO2 100% RA. Ct cervical spine, CT CAP, Xray femur, CXR performed.

## 2019-06-28 NOTE — H&P ADULT - ATTENDING COMMENTS
Patient was evaluated and examined by bedside, c/o left hip pain, no dyspnea, no cough  All labs, radiology studies, VS was reviewed  Vital Signs Last 24 Hrs  T(C): 36.8 (28 Jun 2019 06:00), Max: 37 (27 Jun 2019 18:02)  T(F): 98.2 (28 Jun 2019 06:00), Max: 98.6 (27 Jun 2019 18:02)  HR: 71 (28 Jun 2019 06:00) (69 - 76)  BP: 112/58 (28 Jun 2019 06:00) (109/61 - 130/67)  BP(mean): --  RR: 18 (28 Jun 2019 06:00) (18 - 18)  SpO2: 95% (28 Jun 2019 08:26) (95% - 100%)  GENERAL: NAD, AAOX3, patient is laying comfortably in bed  HEENT: AT, NC, PERRLA, SUPPLE, NO JVD, NO CB  LUNG: good breath sounds B/L  CVS:  loud S1, S2, RRR, NO M/G/R  ABDOMEN: soft, bowel sounds present, normoactive in all 4 quadrants, non-tender, non-distended  EXT: no E/C/C, positive PP b/l extremities, left hip tenderness  NEURO: no acute focal neurological deficits, gait not tested  SKIN: no rash, mild  ecchymosis present on upper extremities    I agree with medical plan outlined by Medical resident as stated above.    # s/p Recent Mechanical fall with acute left hip fracture- patient need medical optimization preop, can not go to OR today   - pain management  -bed rest  -Ortho is following pt.    # COY- on CKD 3a    -no IVF due to pulmonary congestion  - f/up BMP today, avoid nephrotoxic agents    # h/o Aflutter/afib- was on Warfarin at home, now is being d/c  -f/up today's coagulation profile- INR to be below 2 preop to avoid perioperative excessive blood loss  -currently patient's HR well controlled    #h/o CHF with left ventricular systolic dysfunction  -clinically patient is in euvolemic state  -abn. 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

## 2019-06-28 NOTE — H&P ADULT - ASSESSMENT
This is a 89 yo male with PMH significant for CHF w REF 45-55% on 2L home O2, afib on coumadin, AAA s/p repair, HTN, CAD, DLD, and anemia presenting to the hospital with complaints of abdominal tightness for the past week and s/p mechanical fall about 2 weeks ago.    #) hip fracture s/p mechanical fall  - Xray Femur: Minimally impacted fracture of the left femoral neck. There is also seen   on the contemporaneous CT.  - Per orthopedics         -ORIF potentially for Mon/Wed next week; family discussion needed         -INR has to be below 1.5 preop; currently 2.77 - continue to follow         - trend H/H - Hgb 8, Hct 26         - Will need to transfuse to Hb of 10 if surgery to occur         - Pre op labs/2 TS needed         - hold 2 PRBC for OR if proceeding         - Pt non weight bearing  - Pain control - Tylenol PRN; Morphine 1 mg q6 PRN  - Medicine clearance, including risk stratification and medical optimization needed  - Cardiology clearance pending  - PT/rehab consult ordered    #) Abdominal tightness/discomfort  - CT abdomen: Unchanged nonspecific mesenteric edema surrounding the pancreas;   nonspecific.  - CPK, amylase and lipase ordered    #) CHF   - Reduced EF 40-55% echo 2/2019  - CT Chest No Cont Small bilateral pleural effusions, interstitial edema, and cardiomegaly   compatible with CHF.  - CXR Worsening left base opacity and scattered other opacities.  - Bibasilar crackles  - Continue furosemide  - Cardiology consulted: Dr. Davis  - Incentive spirometry for atelectasis     #) Mild COY on CKD st 3  - Cr 1.5; baseline 1.1  - Gentle hydration 75cc NS 24 hr only  - Continue to follow     #) Afib  -  Coumadin held since Monday - INR 6.5  - INR 2.77 - continue to monitor   - Will need heparin once INR <2    #) HTN  - Continue on home medications  - Continue to monitor     #) Anemia   - Microcytic   - HGb 8 - continue to monitor  - Need PRBC if going to sx; if no sx and medically stable Hgb 8 ok    #) AAA s/p repair  - Stable    #) Chronic back pain  - Denies current pain  - Sp kyphoplasty, and compression fracture at T8  - XR lumbar/thoracic spine ordered  - Pain control PRN    #) DVT ppx: INR 2.77- coumadin held; continue monitor - will need heparin once INR <2    #) GI ppx: protonix daily    #) Diet DASH    #)Dispo - arrived from home; dc to  home when medically stable    #) Full code This is a 89 yo male with PMH significant for CHF w REF 45-55% on 2L home O2, afib on coumadin, AAA s/p repair, HTN, CAD, DLD, and anemia presenting to the hospital with complaints of abdominal tightness for the past week and s/p mechanical fall about 2 weeks ago.    #) displaced L femur subcapital fracture hip fracture s/p mechanical fall  - Xray Femur: Minimally impacted fracture of the left femoral neck. There is also seen   on the contemporaneous CT.   - Per orthopedics         -ORIF potentially for Mon/Wed next week; family discussion needed         -INR has to be below 1.5 preop; currently 2.77 - continue to follow         - trend H/H - Hgb 8, Hct 26         - Will need to transfuse to Hb of 10 if surgery to occur         - Pre op labs/2 TS needed         - hold 2 PRBC for OR if proceeding         - Pt non weight bearing  - Pain control - Tylenol PRN; Morphine 1 mg q6 PRN  - Medicine clearance, including risk stratification and medical optimization needed  - Cardiology clearance pending  - PT/rehab consult ordered    #) Abdominal tightness/discomfort  - CT abdomen: Unchanged nonspecific mesenteric edema surrounding the pancreas;   nonspecific.  - CPK, amylase and lipase ordered    #) CHF   - Reduced EF 40-45% echo 2/2019  - CT Chest No Cont Small bilateral pleural effusions, interstitial edema, and cardiomegaly   compatible with CHF.  - CXR Worsening left base opacity and scattered other opacities.  - Bibasilar crackles  - Continue furosemide  - Cardiology consulted: Dr. Davis  - Incentive spirometry for atelectasis     #) Mild COY on CKD st 3  - Cr 1.5; baseline 1.1  - Gentle hydration 75cc NS 24 hr only  - Continue to follow   - Hold furosemide as patient is given gentle hydration    #) Afib  -  Coumadin held since Monday - INR 6.5  - INR 2.77 - continue to monitor   - Will need heparin ggt once INR <2    #) HTN  - Continue on home dose of metoprolol  - DASH diet  - Continue to monitor     #) Chronic Iron Deficiency Anemia   - Prior iron studies suggest LIZ  - HGb 8 - continue to monitor  - Need PRBC if going to sx; if no sx and medically stable Hgb 8 ok    #) AAA s/p repair  - Stable    #) Chronic back pain  - Denies current pain  - Sp kyphoplasty, and compression fracture at T8  - XR lumbar/thoracic spine ordered  - Pain control PRN    #) DVT ppx: INR 2.77- coumadin held; continue monitor - will need heparin once INR <2    #) GI ppx: protonix daily    #) Diet DASH    #)Dispo - arrived from home; dc to  home when medically stable    #) Full code

## 2019-06-28 NOTE — CONSULT NOTE ADULT - ASSESSMENT
89m w/ PMH including CHF, Afib, HTN, s/p AAA repair presents to the ED s/p mechanical fall from standing height at home 2 weeks ago. Reports worsening of LLE pain. After he fell he was able to get back into his wheelchair and transfer into bed. However, now he is unable to bear weight anymore. Denies chest pain, fevers, chills, nausea, vomiting, numbness, tingling, paresthesias. Household ambulator with walker at baseline.    PAST MEDICAL & SURGICAL HISTORY:  HLD  CHF   Afib  Hypertension  S/P AAA repair    Home Medications:  acetaminophen 325 mg oral tablet: 2 tab(s) orally every 8 hours -  (29 Mar 2018 18:57)  ALBUTEROL SUL 1.25 MG/3 ML SOL: inhaled every 4 hours, As Needed (26 Mar 2018 10:55)      No Known Allergies    Vital Signs Last 24 Hrs  T(C): 35.7 (2019 23:02), Max: 37 (2019 18:02)  T(F): 96.3 (2019 23:02), Max: 98.6 (2019 18:02)  HR: 69 (2019 23:02) (69 - 76)  BP: 109/61 (2019 23:02) (109/61 - 130/67)  BP(mean): --  RR: 18 (2019 23:02) (18 - 18)  SpO2: 100% (2019 23:02) (100% - 100%)      NAD, unlabored breathing   LLE   No gross deformity  skin intact, no erythema, no ecchymosis  minimal tender to deep palpation at groin area and GT, NTTP distally  +Log roll  + axial loading  ehl/fhl/ta intact, able to plantarflex/dorsiflex toes x 5/ankle  SILT throughout  DP faintly palpable, toes wwp, cap ref wnl  compartments soft, compressible      Imaging  RADIOLOGY & ADDITIONAL STUDIES:    < from: CT Pelvis No Cont (19 @ 22:21) >  Mildly displaced left femur subcapital fracture.    XRs show L femoral neck fx  .  LABS:                         8.0    9.31  )-----------( 193      ( 2019 20:30 )             26.1         136  |  98  |  87<HH>  ----------------------------<  106<H>  4.8   |  26  |  1.5    Ca    9.1      2019 20:30    TPro  6.4  /  Alb  3.2<L>  /  TBili  0.7  /  DBili  x   /  AST  15  /  ALT  13  /  AlkPhos  123<H>      PT/INR - ( 2019 20:30 )   PT: 31.50 sec;   INR: 2.77 ratio         PTT - ( 2019 20:30 )  PTT:42.0 sec  Urinalysis Basic - ( 2019 20:30 )    Color: Yellow / Appearance: Clear / S.015 / pH: x  Gluc: x / Ketone: Negative  / Bili: Negative / Urobili: 0.2 mg/dL   Blood: x / Protein: Negative mg/dL / Nitrite: Negative   Leuk Esterase: Trace / RBC: x / WBC 1-2 /HPF   Sq Epi: x / Non Sq Epi: Occasional /HPF / Bacteria: Few /HPF      CARDIAC MARKERS ( 2019 20:30 )  x     / <0.01 ng/mL / x     / x     / x            Lactate, Blood: 0.7 mmol/L ( @ 20:30)      89M with L femoral neck fx. Discussed with pt and family r/b/a of surgical fixation. Pt and daughter understood, but wants to discuss surgery with rest of family first. Pt is on coumadin for Afib with INR of 2.77. According to daughter INR was around 6.5 past Monday, coumadin was stopped as per PCP. Pt added on for OR tomorrow for L hip orif.  -Pain  control  -trend H/H  -home meds  -Preop labs, including 2 T&S  -Hold 2 units of PRBC for OR tomorrow  -Medicine clearance, including risk stratification and medical optimization  -Cardiology clearance  -INR has to be below 1.5 preop  -CXR  -EKG  -NPO at midnight  -Transfuse to hb of 10  -Ortho will follow

## 2019-06-28 NOTE — PROGRESS NOTE ADULT - SUBJECTIVE AND OBJECTIVE BOX
HPI:  This is a 89 yo male with PMH significant for CHF w REF 40-45% on 2L home O2, afib on coumadin, AAA s/p repair, HTN, CAD, DLD, and anemia presenting to the hospital with complaints of abdominal tightness for the past week and s/p mechanical fall about 2 weeks ago. Patient states that it feels like there is a tightening band around his lower abdomen as well as some epigastric pain, and feeling of food getting stuck when he eats and decreased appetite/PO intake. States that eating worsens his symptoms and bowel movements improve his symptoms. Patient also complains of dark bowel movements, no rodney blood, and less frequent bowel movements. Also endorses increased, malodorous urinations; denies any burning. Has also had a few episodes of dark phlegm, with no rodney blood. Additionally, about 2 weeks ago patient had a mechanical fall while on the way to bathroom; denies any dizziness, LOC, or head trauma. He was taken to an OP orthopedist and Xray confirmed L femur fracture. Since then, pain has continued - is better at rest, but worse with movements like getting out of bed/liftiing leg. he is unable to bear weight anymore, and now uses wheelchair instead of his walker. Of note, patient has been holding warfarin since Monday due to INR 6.5. He denies any fever, chills, diarrhea, vomiting, chest pain, cough or wheezing.     In the ED, /67, HR 76, RR 18, T 98.6, SaO2 100% RA. Ct cervical spine, CT CAP, Xray femur, CXR performed. (28 Jun 2019 10:01)      T(C): 36.1 (06-28-19 @ 13:28), Max: 37 (06-27-19 @ 18:02)  HR: 75 (06-28-19 @ 13:28) (69 - 76)  BP: 114/55 (06-28-19 @ 13:28) (109/61 - 130/67)  RR: 20 (06-28-19 @ 13:28) (18 - 20)  SpO2: 95% (06-28-19 @ 08:26) (95% - 100%)    MOTOR EXAM NOT ASSESSED      Physical Medicine And Rehabilitation Services are not indicated in this patient for the following Reason(s):    [x    ] Patient is medically unstable   HIP FX AWAITING SURGERY      [  x  ]  Patient does not have appropriate activity orders AT [facility]      [     ] Patient has no weight bearing status for:      [     ] Patient is independently ambulating      [     ]  Patient is from Skilled Nursing Facility and is appropriate to return      [     ] Patient was non-ambulatory prior to admission      [     ]  Other      WILL CANCEL PM&R / PT request  PLEASE RECONSULT POST OP WHEN CLEARED FOR OOB

## 2019-06-28 NOTE — CHART NOTE - NSCHARTNOTEFT_GEN_A_CORE
Tertiary Survey    Patient seen and examined. No complaints at this time.     PHYSICAL EXAM:  Craniofacial: Atraumatic, No deformity  Eyes: Pupil Size equal B/L and RTL. EOMI  Oropharynx: Atraumatic  Neck: Non-tender, No deformity, Trachea midline.  Chest: Equal breath sounds, non-tender, No deformity or crepitus  Heart: RSR, No murmurs, no rubs  Abdomen: Atraumatic, Non-tender, non-distended. No hepatosplenomegaly  Pelvis: Stable, non tender, no deformity  Back: Spine nontender. atraumatic  Extremities: left leg wrapped, pain with movement     All images/reports reviewed. No further traumatic work-up warranted.

## 2019-06-28 NOTE — CONSULT NOTE ADULT - ASSESSMENT
---------------------------------------------------------------------------------------    ASSESSMENT:  89y old male PMH significant for CHF, atrial fibrillation, HTN, s/p AAA repair comes to the ED s/p mechanical fall at home 2 weeks ago, has left femur subcapital fracture    PLAN:    Left femur subcapital fracture  Admit to medicine  F/u ortho  Tertiary survey tomorrow ---------------------------------------------------------------------------------------    ASSESSMENT:  89y old male PMH significant for CHF, atrial fibrillation, HTN, s/p AAA repair comes to the ED s/p mechanical fall at home 2 weeks ago, has left femur subcapital fracture    PLAN:    Left femur subcapital fracture  Admit to medicine  F/u ortho  Tertiary survey tomorrow    Senior Trauma Resident Note  89yo m sp fall 2 weeks ago -ht -loc on coumadin for afib  left hip fx - f/u ortho called at 11pm , for or, needs clearances, pain control  pan scan otherwise neg  no external signs of trauma other than some old bruising on arms  will follow for tertiar survery  Airway intact  Bilateral Breath Sounds  Palpable pulses in 4 ext  GCS 15, PERRL, LANE  VSS  No Subq emphysema, abdominal tenderness,  or pelvic instability   CXR negative  Ct findings above  Will Dispo accordingly  Plan as above d/w Dr Pieter Melchor

## 2019-06-28 NOTE — CONSULT NOTE ADULT - SUBJECTIVE AND OBJECTIVE BOX
Patient is a 89y old  Male who presents with a chief complaint of abdominal tightness and mechanical fall (2019 14:42)      HPI:  This is a 89 yo male with PMH significant for CHF w REF 40-45% on 2L home O2, afib on coumadin, AAA s/p repair, HTN, CAD, DLD, and anemia presenting to the hospital with complaints of abdominal tightness for the past week and s/p mechanical fall about 2 weeks ago. Patient states that it feels like there is a tightening band around his lower abdomen as well as some epigastric pain, and feeling of food getting stuck when he eats and decreased appetite/PO intake. States that eating worsens his symptoms and bowel movements improve his symptoms. Patient also complains of dark bowel movements, no rodney blood, and less frequent bowel movements. Also endorses increased, malodorous urinations; denies any burning. Has also had a few episodes of dark phlegm, with no rodney blood. Additionally, about 2 weeks ago patient had a mechanical fall while on the way to bathroom; denies any dizziness, LOC, or head trauma. He was taken to an OP orthopedist and Xray confirmed L femur fracture. Since then, pain has continued - is better at rest, but worse with movements like getting out of bed/liftiing leg. he is unable to bear weight anymore, and now uses wheelchair instead of his walker. Of note, patient has been holding warfarin since Monday due to INR 6.5. He denies any fever, chills, diarrhea, vomiting, chest pain, cough or wheezing.     In the ED, /67, HR 76, RR 18, T 98.6, SaO2 100% RA. Ct cervical spine, CT CAP, Xray femur, CXR performed. (2019 10:01)      PAST MEDICAL & SURGICAL HISTORY:  Hyperlipidemia  CHF (congestive heart failure)  Afib  Hypertension  S/P AAA (abdominal aortic aneurysm) repair      PREVIOUS DIAGNOSTIC TESTING:      ECHO  FINDINGS:    STRESS  FINDINGS:    CATHETERIZATION  FINDINGS:    MEDICATIONS  (STANDING):  atorvastatin 10 milliGRAM(s) Oral at bedtime  chlorhexidine 4% Liquid 1 Application(s) Topical <User Schedule>  ergocalciferol 54130 Unit(s) Oral every week  furosemide    Tablet 20 milliGRAM(s) Oral two times a day  isosorbide   mononitrate ER Tablet (IMDUR) 30 milliGRAM(s) Oral daily  levothyroxine 50 MICROGram(s) Oral daily  metoprolol succinate ER 50 milliGRAM(s) Oral daily  pantoprazole    Tablet 40 milliGRAM(s) Oral before breakfast    MEDICATIONS  (PRN):  acetaminophen   Tablet .. 650 milliGRAM(s) Oral every 8 hours PRN Mild Pain (1 - 3), Moderate Pain (4 - 6)  ALBUTerol    0.083% 2.5 milliGRAM(s) Nebulizer every 6 hours PRN Shortness of Breath and/or Wheezing  morphine  - Injectable 1 milliGRAM(s) IV Push every 6 hours PRN Severe Pain (7 - 10)      FAMILY HISTORY:  FH: congestive heart failure: sister      SOCIAL HISTORY:  CIGARETTES:    ALCOHOL:    REVIEW OF SYSTEMS:  CONSTITUTIONAL: No fever, weight loss, or fatigue  NECK: No pain or stiffness  RESPIRATORY: No cough, wheezing, chills or hemoptysis; No shortness of breath  CARDIOVASCULAR: No chest pain, palpitations, dizziness, or leg swelling  GASTROINTESTINAL: No abdominal or epigastric pain. No nausea, vomiting, or hematemesis; No diarrhea or constipation. No melena or hematochezia.  GENITOURINARY: No dysuria, frequency, hematuria, or incontinence  NEUROLOGICAL: No headaches, memory loss, loss of strength, numbness, or tremors  SKIN: No itching, burning, rashes, or lesions   ENDOCRINE: No heat or cold intolerance; No hair loss  MUSCULOSKELETAL: No joint pain or swelling; No muscle, back, or extremity pain  HEME/LYMPH: No easy bruising, or bleeding gums          Vital Signs Last 24 Hrs  T(C): 36.1 (2019 13:28), Max: 36.8 (2019 06:00)  T(F): 96.9 (2019 13:28), Max: 98.2 (2019 06:00)  HR: 75 (2019 13:28) (69 - 75)  BP: 114/55 (2019 13:28) (109/61 - 114/55)  BP(mean): --  RR: 20 (2019 13:28) (18 - 20)  SpO2: 95% (2019 08:26) (95% - 100%)        PHYSICAL EXAM:  GENERAL: NAD, well-groomed, well-developed  HEAD:  Atraumatic, Normocephalic  NECK: Supple, No JVD, Normal thyroid  NERVOUS SYSTEM:  Alert & Oriented X3, Good concentration  CHEST/LUNG: Clear to percussion bilaterally; No rales, rhonchi, wheezing, or rubs  HEART: Regular rate and rhythm; No murmurs, rubs, or gallops  ABDOMEN: Soft, Nontender, Nondistended; Bowel sounds present  EXTREMITIES:  2+ Peripheral Pulses, No clubbing, cyanosis, or edema  SKIN: No rashes or lesions    INTERPRETATION OF TELEMETRY:    ECG:    I&O's Detail      LABS:                        7.5    7.87  )-----------( 174      ( 2019 11:58 )             25.2     -    140  |  101  |  82<HH>  ----------------------------<  114<H>  4.7   |  26  |  1.4    Ca    9.4      2019 11:58  Phos  4.0       Mg     2.3         TPro  6.2  /  Alb  3.2<L>  /  TBili  0.7  /  DBili  x   /  AST  14  /  ALT  12  /  AlkPhos  109  -    CARDIAC MARKERS ( 2019 11:58 )  x     / x     / 14 U/L / x     / x      CARDIAC MARKERS ( 2019 20:30 )  x     / <0.01 ng/mL / x     / x     / x          PT/INR - ( 2019 11:58 )   PT: 27.00 sec;   INR: 2.37 ratio         PTT - ( 2019 11:58 )  PTT:39.1 sec  Urinalysis Basic - ( 2019 20:30 )    Color: Yellow / Appearance: Clear / S.015 / pH: x  Gluc: x / Ketone: Negative  / Bili: Negative / Urobili: 0.2 mg/dL   Blood: x / Protein: Negative mg/dL / Nitrite: Negative   Leuk Esterase: Trace / RBC: x / WBC 1-2 /HPF   Sq Epi: x / Non Sq Epi: Occasional /HPF / Bacteria: Few /HPF      I&O's Summary      RADIOLOGY & ADDITIONAL STUDIES:

## 2019-06-28 NOTE — CONSULT NOTE ADULT - ATTENDING COMMENTS
isolated hip fracture. will fu with tertiary survey.  Assessment and plan above were modified and discussed with residents, physician assistants, and nurses.

## 2019-06-28 NOTE — CONSULT NOTE ADULT - SUBJECTIVE AND OBJECTIVE BOX
89y m  TRAUMA ACTIVATION LEVEL:  Trauma consult    MECHANISM OF INJURY:      [] Blunt               [] MVC	                       [X] Fall	                 [] Pedestrian Struck	     [] Motorcycle    [] Assault                       [] Bicycle collision  [] Sports injury     [] Penetrating    [] Gun Shot Wound    [] Stab Wound    GCS: 	15  E: 4	V: 5	M: 6    HPI:   89y old male PMH significant for CHF, atrial fibrillation, HTN, s/p AAA repair comes to the ED s/p mechanical fall at home 2 weeks ago, comes to evaluation for worsening left lower extremity pain, starting at the hip and radiating down to the foot. Patient fell, was able to pick himself up back into wheelchair and ambulate into bed. However, after noticing that pain did not improve after 2 weeks, patient came in for evaluation. Denies chest pain, fevers, chills, nausea, vomiting, numbness, tingling, paresthesias.     PAST MEDICAL & SURGICAL HISTORY:  Hyperlipidemia  CHF (congestive heart failure)  Afib  Hypertension  S/P AAA (abdominal aortic aneurysm) repair    Allergies  No Known Allergies    Home Medications:  acetaminophen 325 mg oral tablet: 2 tab(s) orally every 8 hours -  (29 Mar 2018 18:57)  ALBUTEROL SUL 1.25 MG/3 ML SOL: inhaled every 4 hours, As Needed (26 Mar 2018 10:55)  melatonin 3 mg oral tablet: 1 tab(s) orally once a day (at bedtime) (2018 18:15)    ROS: 10-system review is otherwise negative except HPI above.      Primary Survey:    A - airway intact  B - bilateral breath sounds and good chest rise  C - palpable pulses in all extremities  D - GCS 15 on arrival, LANE  Exposure obtained    Vital Signs Last 24 Hrs  T(C): 35.7 (2019 23:02), Max: 37 (2019 18:02)  T(F): 96.3 (2019 23:02), Max: 98.6 (2019 18:02)  HR: 69 (2019 23:02) (69 - 76)  BP: 109/61 (2019 23:02) (109/61 - 130/67)  RR: 18 (2019 23:02) (18 - 18)  SpO2: 100% (2019 23:02) (100% - 100%)    Secondary Survey:   General: NAD  HEENT: Normocephalic, atraumatic, EOMI, PEERLA. no scalp lacerations   Neck: Soft, midline trachea. no cspine tenderness  Chest: No chest wall tenderness. or subq  emphysema   Cardiac: S1, S2, RRR  Respiratory: Bilateral breath sounds, clear and equal bilaterally  Abdomen: Soft, non-distended, non-tender, no rebound,   Groin: Normal appearing, pelvis stable   Ext: palp radial b/l UE, b/l DP palp in Lower Extrem.   Back: no TTP, no palpable runoff/stepoff/deformity    LABS:  Labs:  CAPILLARY BLOOD GLUCOSE                              8.0    9.31  )-----------( 193      ( 2019 20:30 )             26.1       Auto Neutrophil %: 68.0 % (19 @ 20:30)  Auto Immature Granulocyte %: 0.4 % (19 @ 20:30)        136  |  98  |  87<HH>  ----------------------------<  106<H>  4.8   |  26  |  1.5      Calcium, Total Serum: 9.1 mg/dL (19 @ 20:30)      LFTs:             6.4  | 0.7  | 15       ------------------[123     ( 2019 20:30 )  3.2  | x    | 13          Lipase:55        Lactate, Blood: 0.7 mmol/L (19 @ 20:30)      Coags:     31.50  ----< 2.77    ( 2019 20:30 )     42.0        CARDIAC MARKERS ( 2019 20:30 )  x     / <0.01 ng/mL / x     / x     / x        Urinalysis Basic - ( 2019 20:30 )    Color: Yellow / Appearance: Clear / S.015 / pH: x  Gluc: x / Ketone: Negative  / Bili: Negative / Urobili: 0.2 mg/dL   Blood: x / Protein: Negative mg/dL / Nitrite: Negative   Leuk Esterase: Trace / RBC: x / WBC 1-2 /HPF   Sq Epi: x / Non Sq Epi: Occasional /HPF / Bacteria: Few /HPF    RADIOLOGY & ADDITIONAL STUDIES:    < from: CT Chest No Cont (19 @ 22:21) >  Mildly displaced left femur subcapital fracture.    Small bilateral pleural effusions, interstitial edema, and cardiomegaly   compatible with CHF.    Unchanged nonspecific mesenteric edema surrounding the pancreas;   nonspecific.    < end of copied text >    < from: CT Abdomen and Pelvis No Cont (19 @ 22:21) >  Mildly displaced left femur subcapital fracture.    Small bilateral pleural effusions, interstitial edema, and cardiomegaly   compatible with CHF.    Unchanged nonspecific mesenteric edema surrounding the pancreas;   nonspecific.    < end of copied text > 89y m  TRAUMA ACTIVATION LEVEL:  Trauma consult    MECHANISM OF INJURY:      [] Blunt               [] MVC	                       [X] Fall	                 [] Pedestrian Struck	     [] Motorcycle    [] Assault                       [] Bicycle collision  [] Sports injury     [] Penetrating    [] Gun Shot Wound    [] Stab Wound    GCS: 	15  E: 4	V: 5	M: 6    HPI:   89y old male PMH significant for CHF, atrial fibrillation, HTN, s/p AAA repair comes to the ED s/p mechanical fall at home 2 weeks ago, comes to evaluation for worsening left lower extremity pain, starting at the hip and radiating down to the foot. Patient fell, was able to pick himself up back into wheelchair and ambulate into bed. However, after noticing that pain did not improve after 2 weeks, patient came in for evaluation. Denies chest pain, fevers, chills, nausea, vomiting, numbness, tingling, paresthesias.     PAST MEDICAL & SURGICAL HISTORY:  Hyperlipidemia  CHF (congestive heart failure)  Afib  Hypertension  S/P AAA (abdominal aortic aneurysm) repair    Allergies  No Known Allergies    Home Medications:  acetaminophen 325 mg oral tablet: 2 tab(s) orally every 8 hours -  (29 Mar 2018 18:57)  ALBUTEROL SUL 1.25 MG/3 ML SOL: inhaled every 4 hours, As Needed (26 Mar 2018 10:55)  melatonin 3 mg oral tablet: 1 tab(s) orally once a day (at bedtime) (2018 18:15)    ROS: 10-system review is otherwise negative except HPI above.      Primary Survey:    A - airway intact  B - bilateral breath sounds and good chest rise  C - palpable pulses in all extremities  D - GCS 15 on arrival, LANE  Exposure obtained    Vital Signs Last 24 Hrs  T(C): 35.7 (2019 23:02), Max: 37 (2019 18:02)  T(F): 96.3 (2019 23:02), Max: 98.6 (2019 18:02)  HR: 69 (2019 23:02) (69 - 76)  BP: 109/61 (2019 23:02) (109/61 - 130/67)  RR: 18 (2019 23:02) (18 - 18)  SpO2: 100% (2019 23:02) (100% - 100%)    Secondary Survey:   General: NAD  HEENT: Normocephalic, atraumatic, EOMI, PEERLA. no scalp lacerations   Neck: Soft, midline trachea. no cspine tenderness  Chest: No chest wall tenderness. or subq  emphysema   Cardiac: S1, S2, RRR  Respiratory: Bilateral breath sounds, clear and equal bilaterally  Abdomen: Soft, non-distended, non-tender, no rebound,   Groin: Normal appearing, pelvis stable   Ext: palp radial b/l UE, b/l DP palp in Lower Extrem.   Back: no TTP, no palpable runoff/stepoff/deformity    LABS:  Labs:  CAPILLARY BLOOD GLUCOSE                              8.0    9.31  )-----------( 193      ( 2019 20:30 )             26.1       Auto Neutrophil %: 68.0 % (19 @ 20:30)  Auto Immature Granulocyte %: 0.4 % (19 @ 20:30)        136  |  98  |  87<HH>  ----------------------------<  106<H>  4.8   |  26  |  1.5      Calcium, Total Serum: 9.1 mg/dL (19 @ 20:30)      LFTs:             6.4  | 0.7  | 15       ------------------[123     ( 2019 20:30 )  3.2  | x    | 13          Lipase:55        Lactate, Blood: 0.7 mmol/L (19 @ 20:30)      Coags:     31.50  ----< 2.77    ( 2019 20:30 )     42.0        CARDIAC MARKERS ( 2019 20:30 )  x     / <0.01 ng/mL / x     / x     / x        Urinalysis Basic - ( 2019 20:30 )    Color: Yellow / Appearance: Clear / S.015 / pH: x  Gluc: x / Ketone: Negative  / Bili: Negative / Urobili: 0.2 mg/dL   Blood: x / Protein: Negative mg/dL / Nitrite: Negative   Leuk Esterase: Trace / RBC: x / WBC 1-2 /HPF   Sq Epi: x / Non Sq Epi: Occasional /HPF / Bacteria: Few /HPF    RADIOLOGY & ADDITIONAL STUDIES:  < from: CT Head No Cont (19 @ 00:36) >  IMPRESSION:    No CT evidence for acute intracranial pathology.    < end of copied text >  < from: CT Cervical Spine No Cont (19 @ 00:37) >  IMPRESSION:    Osteopenia without acute displaced fracture.    < end of copied text >      < from: CT Chest No Cont (19 @ 22:21) >  Mildly displaced left femur subcapital fracture.    Small bilateral pleural effusions, interstitial edema, and cardiomegaly   compatible with CHF.    Unchanged nonspecific mesenteric edema surrounding the pancreas;   nonspecific.    < end of copied text >    < from: CT Abdomen and Pelvis No Cont (19 @ 22:21) >  Mildly displaced left femur subcapital fracture.    Small bilateral pleural effusions, interstitial edema, and cardiomegaly   compatible with CHF.    Unchanged nonspecific mesenteric edema surrounding the pancreas;   nonspecific.    < end of copied text >

## 2019-06-29 LAB
ALBUMIN SERPL ELPH-MCNC: 3.1 G/DL — LOW (ref 3.5–5.2)
ALP SERPL-CCNC: 123 U/L — HIGH (ref 30–115)
ALT FLD-CCNC: 15 U/L — SIGNIFICANT CHANGE UP (ref 0–41)
ANION GAP SERPL CALC-SCNC: 12 MMOL/L — SIGNIFICANT CHANGE UP (ref 7–14)
APTT BLD: 37.5 SEC — SIGNIFICANT CHANGE UP (ref 27–39.2)
AST SERPL-CCNC: 20 U/L — SIGNIFICANT CHANGE UP (ref 0–41)
BASOPHILS # BLD AUTO: 0.04 K/UL — SIGNIFICANT CHANGE UP (ref 0–0.2)
BASOPHILS NFR BLD AUTO: 0.5 % — SIGNIFICANT CHANGE UP (ref 0–1)
BILIRUB SERPL-MCNC: 0.6 MG/DL — SIGNIFICANT CHANGE UP (ref 0.2–1.2)
BUN SERPL-MCNC: 77 MG/DL — CRITICAL HIGH (ref 10–20)
CALCIUM SERPL-MCNC: 9.3 MG/DL — SIGNIFICANT CHANGE UP (ref 8.5–10.1)
CHLORIDE SERPL-SCNC: 102 MMOL/L — SIGNIFICANT CHANGE UP (ref 98–110)
CO2 SERPL-SCNC: 26 MMOL/L — SIGNIFICANT CHANGE UP (ref 17–32)
CREAT SERPL-MCNC: 1.4 MG/DL — SIGNIFICANT CHANGE UP (ref 0.7–1.5)
EOSINOPHIL # BLD AUTO: 0.39 K/UL — SIGNIFICANT CHANGE UP (ref 0–0.7)
EOSINOPHIL NFR BLD AUTO: 4.5 % — SIGNIFICANT CHANGE UP (ref 0–8)
GLUCOSE SERPL-MCNC: 98 MG/DL — SIGNIFICANT CHANGE UP (ref 70–99)
HCT VFR BLD CALC: 27 % — LOW (ref 42–52)
HGB BLD-MCNC: 8.1 G/DL — LOW (ref 14–18)
IMM GRANULOCYTES NFR BLD AUTO: 0.2 % — SIGNIFICANT CHANGE UP (ref 0.1–0.3)
INR BLD: 2.11 RATIO — HIGH (ref 0.65–1.3)
LYMPHOCYTES # BLD AUTO: 1.45 K/UL — SIGNIFICANT CHANGE UP (ref 1.2–3.4)
LYMPHOCYTES # BLD AUTO: 16.7 % — LOW (ref 20.5–51.1)
MCHC RBC-ENTMCNC: 18.9 PG — LOW (ref 27–31)
MCHC RBC-ENTMCNC: 30 G/DL — LOW (ref 32–37)
MCV RBC AUTO: 62.9 FL — LOW (ref 80–94)
MONOCYTES # BLD AUTO: 1.13 K/UL — HIGH (ref 0.1–0.6)
MONOCYTES NFR BLD AUTO: 13 % — HIGH (ref 1.7–9.3)
NEUTROPHILS # BLD AUTO: 5.63 K/UL — SIGNIFICANT CHANGE UP (ref 1.4–6.5)
NEUTROPHILS NFR BLD AUTO: 65.1 % — SIGNIFICANT CHANGE UP (ref 42.2–75.2)
NRBC # BLD: 0 /100 WBCS — SIGNIFICANT CHANGE UP (ref 0–0)
PLATELET # BLD AUTO: 225 K/UL — SIGNIFICANT CHANGE UP (ref 130–400)
POTASSIUM SERPL-MCNC: 4.9 MMOL/L — SIGNIFICANT CHANGE UP (ref 3.5–5)
POTASSIUM SERPL-SCNC: 4.9 MMOL/L — SIGNIFICANT CHANGE UP (ref 3.5–5)
PROT SERPL-MCNC: 6.4 G/DL — SIGNIFICANT CHANGE UP (ref 6–8)
PROTHROM AB SERPL-ACNC: 24.1 SEC — HIGH (ref 9.95–12.87)
RBC # BLD: 4.29 M/UL — LOW (ref 4.7–6.1)
RBC # FLD: 18.5 % — HIGH (ref 11.5–14.5)
SODIUM SERPL-SCNC: 140 MMOL/L — SIGNIFICANT CHANGE UP (ref 135–146)
WBC # BLD: 8.66 K/UL — SIGNIFICANT CHANGE UP (ref 4.8–10.8)
WBC # FLD AUTO: 8.66 K/UL — SIGNIFICANT CHANGE UP (ref 4.8–10.8)

## 2019-06-29 PROCEDURE — 99233 SBSQ HOSP IP/OBS HIGH 50: CPT

## 2019-06-29 RX ORDER — SODIUM CHLORIDE 9 MG/ML
500 INJECTION INTRAMUSCULAR; INTRAVENOUS; SUBCUTANEOUS ONCE
Refills: 0 | Status: COMPLETED | OUTPATIENT
Start: 2019-06-29 | End: 2019-06-29

## 2019-06-29 RX ORDER — SODIUM CHLORIDE 9 MG/ML
1000 INJECTION INTRAMUSCULAR; INTRAVENOUS; SUBCUTANEOUS ONCE
Refills: 0 | Status: DISCONTINUED | OUTPATIENT
Start: 2019-06-29 | End: 2019-06-29

## 2019-06-29 RX ADMIN — Medication 650 MILLIGRAM(S): at 10:55

## 2019-06-29 RX ADMIN — Medication 100 MILLIGRAM(S): at 05:14

## 2019-06-29 RX ADMIN — ISOSORBIDE MONONITRATE 30 MILLIGRAM(S): 60 TABLET, EXTENDED RELEASE ORAL at 12:34

## 2019-06-29 RX ADMIN — Medication 50 MICROGRAM(S): at 05:14

## 2019-06-29 RX ADMIN — PANTOPRAZOLE SODIUM 40 MILLIGRAM(S): 20 TABLET, DELAYED RELEASE ORAL at 05:14

## 2019-06-29 RX ADMIN — CHLORHEXIDINE GLUCONATE 1 APPLICATION(S): 213 SOLUTION TOPICAL at 05:15

## 2019-06-29 RX ADMIN — SODIUM CHLORIDE 500 MILLILITER(S): 9 INJECTION INTRAMUSCULAR; INTRAVENOUS; SUBCUTANEOUS at 13:33

## 2019-06-29 RX ADMIN — Medication 650 MILLIGRAM(S): at 23:53

## 2019-06-29 RX ADMIN — Medication 100 MILLIGRAM(S): at 17:37

## 2019-06-29 RX ADMIN — Medication 650 MILLIGRAM(S): at 09:31

## 2019-06-29 RX ADMIN — Medication 50 MILLIGRAM(S): at 05:14

## 2019-06-29 RX ADMIN — ATORVASTATIN CALCIUM 10 MILLIGRAM(S): 80 TABLET, FILM COATED ORAL at 21:05

## 2019-06-29 RX ADMIN — Medication 20 MILLIGRAM(S): at 05:15

## 2019-06-29 RX ADMIN — SENNA PLUS 2 TABLET(S): 8.6 TABLET ORAL at 21:05

## 2019-06-29 NOTE — CHART NOTE - NSCHARTNOTEFT_GEN_A_CORE
Nurse mentioned a newly recorded BP: 90/54 (29 Jun 2019 13:00) (90/54 - 129/69). Ordered 500ml bolus of NS infusion over an hour in the context of history of CHF w/ EF 40-45%. Discussed this with senior. No complaint of dizziness, lightheadedness, or weakness. Physical exam unchanged from the morning.

## 2019-06-29 NOTE — PROGRESS NOTE ADULT - SUBJECTIVE AND OBJECTIVE BOX
HPI:  This is a 89 yo male with PMH significant for CHF w REF 40-45% on 2L home O2, afib on coumadin, AAA s/p repair, HTN, CAD, DLD, and anemia presenting to the hospital with complaints of abdominal tightness for the past week and s/p mechanical fall about 2 weeks ago. Patient states that it feels like there is a tightening band around his lower abdomen as well as some epigastric pain, and feeling of food getting stuck when he eats and decreased appetite/PO intake. States that eating worsens his symptoms and bowel movements improve his symptoms. Patient also complains of dark bowel movements, no rodney blood, and less frequent bowel movements. Also endorses increased, malodorous urinations; denies any burning. Has also had a few episodes of dark phlegm, with no rodney blood. Additionally, about 2 weeks ago patient had a mechanical fall while on the way to bathroom; denies any dizziness, LOC, or head trauma. He was taken to an OP orthopedist and Xray confirmed L femur fracture. Since then, pain has continued - is better at rest, but worse with movements like getting out of bed/liftiing leg. he is unable to bear weight anymore, and now uses wheelchair instead of his walker. Of note, patient has been holding warfarin since Monday due to INR 6.5. He denies any fever, chills, diarrhea, vomiting, chest pain, cough or wheezing.   In the ED, /67, HR 76, RR 18, T 98.6, SaO2 100% RA. Ct cervical spine, CT CAP, Xray femur, CXR performed. (28 Jun 2019 10:01)    INTERVAL HPI:  Patient was seen and examined at bedside. Patient was seen sleeping comfortably in bed on 1-2L O2 nasal cannula, however patient mentioned that he did not sleep well because "I can't get in the right position". Patient also mentioned that he The patient did not report any shortness of breath, any chest pain, chest tightness, no abdominal pain, nausea, or vomiting. HPI:  This is a 89 yo male with PMH significant for CHF w REF 40-45% on 2L home O2, afib on coumadin, AAA s/p repair, HTN, CAD, DLD, and anemia presenting to the hospital with complaints of abdominal tightness for the past week and s/p mechanical fall about 2 weeks ago. Patient states that it feels like there is a tightening band around his lower abdomen as well as some epigastric pain, and feeling of food getting stuck when he eats and decreased appetite/PO intake. States that eating worsens his symptoms and bowel movements improve his symptoms. Patient also complains of dark bowel movements, no rodney blood, and less frequent bowel movements. Also endorses increased, malodorous urinations; denies any burning. Has also had a few episodes of dark phlegm, with no rodney blood. Additionally, about 2 weeks ago patient had a mechanical fall while on the way to bathroom; denies any dizziness, LOC, or head trauma. He was taken to an OP orthopedist and Xray confirmed L femur fracture. Since then, pain has continued - is better at rest, but worse with movements like getting out of bed/liftiing leg. he is unable to bear weight anymore, and now uses wheelchair instead of his walker. Of note, patient has been holding warfarin since Monday due to INR 6.5. He denies any fever, chills, diarrhea, vomiting, chest pain, cough or wheezing.   In the ED, /67, HR 76, RR 18, T 98.6, SaO2 100% RA. Ct cervical spine, CT CAP, Xray femur, CXR performed. (28 Jun 2019 10:01)    INTERVAL HPI:  Patient was seen and examined at bedside. Patient was seen sleeping comfortably in bed on 1-2L O2 nasal cannula, however patient mentioned that he did not sleep well because "I can't get in the right position". Patient also mentioned that he was "nervous about the surgery". The patient did not report any shortness of breath, any chest pain, chest tightness, no abdominal pain, nausea, or vomiting. HPI:  This is a 89 yo male with PMH significant for CHF w REF 40-45% on 2L home O2, afib on coumadin, AAA s/p repair, HTN, CAD, DLD, and anemia presenting to the hospital with complaints of abdominal tightness for the past week and s/p mechanical fall about 2 weeks ago. Patient states that it feels like there is a tightening band around his lower abdomen as well as some epigastric pain, and feeling of food getting stuck when he eats and decreased appetite/PO intake. States that eating worsens his symptoms and bowel movements improve his symptoms. Patient also complains of dark bowel movements, no rodney blood, and less frequent bowel movements. Also endorses increased, malodorous urinations; denies any burning. Has also had a few episodes of dark phlegm, with no rodney blood. Additionally, about 2 weeks ago patient had a mechanical fall while on the way to bathroom; denies any dizziness, LOC, or head trauma. He was taken to an OP orthopedist and Xray confirmed L femur fracture. Since then, pain has continued - is better at rest, but worse with movements like getting out of bed/liftiing leg. he is unable to bear weight anymore, and now uses wheelchair instead of his walker. Of note, patient has been holding warfarin since Monday due to INR 6.5. He denies any fever, chills, diarrhea, vomiting, chest pain, cough or wheezing.   In the ED, /67, HR 76, RR 18, T 98.6, SaO2 100% RA. Ct cervical spine, CT CAP, Xray femur, CXR performed. (28 Jun 2019 10:01)    This is hospital day 1    INTERVAL HPI:  Patient was seen and examined at bedside. Patient was seen sleeping comfortably in bed on 1-2L O2 nasal cannula, however patient mentioned that he did not sleep well because "I can't get in the right position". Patient also mentioned that he was "nervous about the surgery" for his left hip. The patient did not report any shortness of breath, any chest pain, chest tightness, no abdominal pain, nausea, or vomiting. Patient reported not having a BM and did not report passing gas. No overnight events occurred.     ROS: Otherwise unremarkable    Vital Signs Last 24 Hrs  T(C): 37.1 (29 Jun 2019 13:00), Max: 37.1 (29 Jun 2019 13:00)  T(F): 98.7 (29 Jun 2019 13:00), Max: 98.7 (29 Jun 2019 13:00)  HR: 70 (29 Jun 2019 13:00) (69 - 71)  BP: 90/54 (29 Jun 2019 13:00) (90/54 - 129/69)  BP(mean): --  RR: 18 (29 Jun 2019 13:00) (18 - 18)  SpO2: 95% (28 Jun 2019 19:49) (95% - 95%)    MEDICATIONS  (STANDING):  atorvastatin 10 milliGRAM(s) Oral at bedtime  chlorhexidine 4% Liquid 1 Application(s) Topical <User Schedule>  docusate sodium 100 milliGRAM(s) Oral two times a day  ergocalciferol 29462 Unit(s) Oral every week  furosemide    Tablet 20 milliGRAM(s) Oral two times a day  isosorbide   mononitrate ER Tablet (IMDUR) 30 milliGRAM(s) Oral daily  levothyroxine 50 MICROGram(s) Oral daily  metoprolol succinate ER 50 milliGRAM(s) Oral daily  pantoprazole    Tablet 40 milliGRAM(s) Oral before breakfast  senna 2 Tablet(s) Oral at bedtime    MEDICATIONS  (PRN):  acetaminophen   Tablet .. 650 milliGRAM(s) Oral every 8 hours PRN Mild Pain (1 - 3), Moderate Pain (4 - 6)  ALBUTerol    0.083% 2.5 milliGRAM(s) Nebulizer every 6 hours PRN Shortness of Breath and/or Wheezing  morphine  - Injectable 1 milliGRAM(s) IV Push every 6 hours PRN Severe Pain (7 - 10)    PHYSICAL EXAM:  Gen: Patient sleeping comfortably on 1-2L O2 cannula. NAD  Cardio: S1/S2, no murmurs, RRR  Pulm: CTA B/L, no wheezes  GI: NT/ND, no guarding  Extremities: Pain with movement of left leg at the level of the hip an traveled down to above the knee.  Psych: AAOx3    LABS:                        8.1    8.66  )-----------( 225      ( 29 Jun 2019 07:11 )             27.0   06-29    140  |  102  |  77<HH>  ----------------------------<  98  4.9   |  26  |  1.4    Ca    9.3      29 Jun 2019 07:11  Phos  4.0     06-28  Mg     2.3     06-28    TPro  6.4  /  Alb  3.1<L>  /  TBili  0.6  /  DBili  x   /  AST  20  /  ALT  15  /  AlkPhos  123<H>  06-29    Prothrombin Time and INR, Plasma (06.29.19 @ 07:11)    Prothrombin Time, Plasma: 24.10 sec    INR: 2.11: NO ANTICOAGULANT,NORMAL            0.65 -  1.30  ORAL ANTICOAGULANT,STD DOSE        2.00 - 3.00  MECHANICAL HEART VALVES            2.50 - 3.50  NOTE: INR RESULTS ARE INTENDED FOR USE ONLY TO  MONITOR  ORAL ANTICOAGULANT THERAPY IN STABILIZED  PATIENTS. ratio      Hemoglobin: 8.1 g/dL (06.29.19 @ 07:11)      RADIOLOGY:  < from: Xray Pelvis AP only (06.28.19 @ 11:03) >    There is a minimally displaced fracture of left femur neck.  The head of the femur articulates in the acetabulum.  Osteopenia.

## 2019-06-29 NOTE — PROGRESS NOTE ADULT - SUBJECTIVE AND OBJECTIVE BOX
KUSH TONG  [facility]            Patient was evaluated and examined  by bedside, c/o left hip pain          REVIEW OF SYSTEMS:  please see pertinent positives mentioned above, all other 12 ROS negative      T(C): , Max: 36.2 (06-29-19 @ 06:38)  HR: 69 (06-29-19 @ 06:38)  BP: 129/69 (06-29-19 @ 06:38)  RR: 18 (06-29-19 @ 06:38)  SpO2: 95% (06-28-19 @ 19:49)  CAPILLARY BLOOD GLUCOSE          PHYSICAL EXAM:  General: NAD, AAOX3, patient is laying comfortably in bed  HEENT: AT, NC, Supple, NO JVD, NO CB  Lungs: good breath sounds B/L, no wheezing, no rhonchi  CVS: loud S1, S2, RRR, systolic murmur present over left parasternal border, no G/R  Abdomen: soft, bowel sounds present, non-tender, non-distended  Extremities: no edema, no clubbing, no cyanosis, positive peripheral pulses b/l  Neuro: no acute focal neurological deficits, gait not tested  Skin: no rush, no ecchymosis      LAB  CBC  Date: 06-29-19 @ 07:11  Mean cell Niuzrtctsq41.9  Mean cell Hemoglobin Conc30.0  Mean cell Volum 62.9  Platelet count-Automate 225  RBC Count 4.29  Red Cell Distrib Width18.5  WBC Count8.66  % Albumin, Urine--  Hematocrit 27.0  Hemoglobin 8.1  CBC  Date: 06-28-19 @ 11:58  Mean cell Nqpsbmoukf57.6  Mean cell Hemoglobin Conc29.8  Mean cell Volum 62.4  Platelet count-Automate 174  RBC Count 4.04  Red Cell Distrib Width18.1  WBC Count7.87  % Albumin, Urine--  Hematocrit 25.2  Hemoglobin 7.5  CBC  Date: 06-27-19 @ 20:30  Mean cell Ydrffduozm32.0  Mean cell Hemoglobin Conc30.7  Mean cell Volum 61.8  Platelet count-Automate 193  RBC Count 4.22  Red Cell Distrib Width18.2  WBC Count9.31  % Albumin, Urine--  Hematocrit 26.1  Hemoglobin 8.0    BMP  06-29-19 @ 07:11  Blood Gas Arterial-Calcium,Ionized--  Blood Urea Nitrogen, Serum 77 mg/dL<HH> [10 - 20] [Critical value:]  Carbon Dioxide, Serum26 mmol/L [17 - 32]  Chloride, Wdkpy433 mmol/L [98 - 110]  Creatinie, Serum1.4 mg/dL [0.7 - 1.5]  Glucose, Serum98 mg/dL [70 - 99]  Potassium, Serum4.9 mmol/L [3.5 - 5.0]  Sodium, Serum 140 mmol/L [135 - 146]  [facility]  06-28-19 @ 11:58  Blood Gas Arterial-Calcium,Ionized--  Blood Urea Nitrogen, Serum 82 mg/dL<HH> [10 - 20] [Critical value:]  Carbon Dioxide, Serum26 mmol/L [17 - 32]  Chloride, Cpdnw566 mmol/L [98 - 110]  Creatinie, Serum1.4 mg/dL [0.7 - 1.5]  Glucose, Hyutl219 mg/dL<H> [70 - 99]  Potassium, Serum4.7 mmol/L [3.5 - 5.0]  Sodium, Serum 140 mmol/L [135 - 146]  [facility]  06-27-19 @ 20:30  Blood Gas Arterial-Calcium,Ionized--  Blood Urea Nitrogen, Serum 87 mg/dL<HH> [10 - 20] [TYPE:(C=Critical, N=Notification, A=Abnormal) C  TESTS: _BUN  DATE/TIME CALLED: _06/27/19 21:29  CALLED TO: _DR HEARN  READ BACK (2 Patient Identifiers)(Y/N): _Y  READ BACK VALUES (Y/N): _Y  CALLED BY: _DDS]  Carbon Dioxide, Serum26 mmol/L [17 - 32]  Chloride, Serum98 mmol/L [98 - 110]  Creatinie, Serum1.5 mg/dL [0.7 - 1.5]  Glucose, Helat560 mg/dL<H> [70 - 99]  Potassium, Serum4.8 mmol/L [3.5 - 5.0]  Sodium, Serum 136 mmol/L [135 - 146]        PT/INR - ( 29 Jun 2019 07:11 )   PT: 24.10 sec;   INR: 2.11 ratio         PTT - ( 29 Jun 2019 07:11 )  PTT:37.5 sec        Medications:  acetaminophen   Tablet .. 650 milliGRAM(s) Oral every 8 hours PRN  ALBUTerol    0.083% 2.5 milliGRAM(s) Nebulizer every 6 hours PRN  atorvastatin 10 milliGRAM(s) Oral at bedtime  chlorhexidine 4% Liquid 1 Application(s) Topical <User Schedule>  docusate sodium 100 milliGRAM(s) Oral two times a day  ergocalciferol 21430 Unit(s) Oral every week  furosemide    Tablet 20 milliGRAM(s) Oral two times a day  isosorbide   mononitrate ER Tablet (IMDUR) 30 milliGRAM(s) Oral daily  levothyroxine 50 MICROGram(s) Oral daily  metoprolol succinate ER 50 milliGRAM(s) Oral daily  morphine  - Injectable 1 milliGRAM(s) IV Push every 6 hours PRN  pantoprazole    Tablet 40 milliGRAM(s) Oral before breakfast  senna 2 Tablet(s) Oral at bedtime        Assessment and Plan:  # s/p Recent Mechanical fall with acute left hip fracture- patient need at least 24 hours for  medical optimization preop, pt. tentatively scheduled for monday  - pain management  -bed rest  -Ortho is following pt.    # COY- on CKD 3a    -no IVF due to pulmonary congestion  - f/up BMP in am, avoid nephrotoxic agents    # h/o Aflutter/afib- was on Warfarin at home, hold warfarin tx.  -INR daily  PT/INR - ( 29 Jun 2019 07:11 )   PT: 24.10 sec;   INR: 2.11 ratio  PTT - ( 29 Jun 2019 07:11 )  PTT:37.5 sec  -currently patient's HR well controlled    #h/o CHF with left ventricular systolic dysfunction  -clinically patient is in euvolemic state  -abn. CT chest- pulmonary congestion with small effusions  -consulted Cardiology - pt. is at moderate risk  -started on lasix tx.  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

## 2019-06-29 NOTE — PROGRESS NOTE ADULT - ASSESSMENT
Assessment:  This is a 89 yo male with PMH significant for CHF w REF 40-45% on 2L home O2, afib on coumadin, AAA s/p repair, HTN, CAD, DLD, and anemia presenting to the hospital with left hip pain for 2 weeks. Patient was found to have a displaced femoral neck fracture and is scheduled for surgical repair on Monday 7/1.    #Mechanical Fall with acute left hip fracture   - Pre-operative medical optimization. Scheduled for Monday 7/1  - Bed rest  - Ortho consult    #COY/CKD Stage 3a  - Monitoring I&O  - Following BUN/Cr  - Avoid nephrotoxic agents    #H/O of Aflutter/Afib - was on warfarin  - Hold warfarin  - INR daily last INR: 2.11:  - HR Controlled metoprolol    #H/O of CHF w/ LV systolic dysfunction  - Chest CT demonstrated pulmonary congestion with small effusions  - Consulted cardiology, moderate risk   - Lasix  - Daily weights  - Monitor I&O    #GI PPX  - Pantoprazole    #DLD  - Statin

## 2019-06-30 LAB
ANION GAP SERPL CALC-SCNC: 10 MMOL/L — SIGNIFICANT CHANGE UP (ref 7–14)
APTT BLD: 34.5 SEC — SIGNIFICANT CHANGE UP (ref 27–39.2)
APTT BLD: 56.5 SEC — HIGH (ref 27–39.2)
BUN SERPL-MCNC: 76 MG/DL — CRITICAL HIGH (ref 10–20)
CALCIUM SERPL-MCNC: 9 MG/DL — SIGNIFICANT CHANGE UP (ref 8.5–10.1)
CHLORIDE SERPL-SCNC: 104 MMOL/L — SIGNIFICANT CHANGE UP (ref 98–110)
CO2 SERPL-SCNC: 26 MMOL/L — SIGNIFICANT CHANGE UP (ref 17–32)
CREAT SERPL-MCNC: 1.9 MG/DL — HIGH (ref 0.7–1.5)
GLUCOSE SERPL-MCNC: 92 MG/DL — SIGNIFICANT CHANGE UP (ref 70–99)
HCT VFR BLD CALC: 24.7 % — LOW (ref 42–52)
HGB BLD-MCNC: 7.3 G/DL — LOW (ref 14–18)
INR BLD: 1.87 RATIO — HIGH (ref 0.65–1.3)
MCHC RBC-ENTMCNC: 18.7 PG — LOW (ref 27–31)
MCHC RBC-ENTMCNC: 29.6 G/DL — LOW (ref 32–37)
MCV RBC AUTO: 63.2 FL — LOW (ref 80–94)
NRBC # BLD: 0 /100 WBCS — SIGNIFICANT CHANGE UP (ref 0–0)
PLATELET # BLD AUTO: 224 K/UL — SIGNIFICANT CHANGE UP (ref 130–400)
POTASSIUM SERPL-MCNC: 5 MMOL/L — SIGNIFICANT CHANGE UP (ref 3.5–5)
POTASSIUM SERPL-SCNC: 5 MMOL/L — SIGNIFICANT CHANGE UP (ref 3.5–5)
PROTHROM AB SERPL-ACNC: 21.4 SEC — HIGH (ref 9.95–12.87)
RBC # BLD: 3.91 M/UL — LOW (ref 4.7–6.1)
RBC # FLD: 18.6 % — HIGH (ref 11.5–14.5)
SODIUM SERPL-SCNC: 140 MMOL/L — SIGNIFICANT CHANGE UP (ref 135–146)
WBC # BLD: 8.76 K/UL — SIGNIFICANT CHANGE UP (ref 4.8–10.8)
WBC # FLD AUTO: 8.76 K/UL — SIGNIFICANT CHANGE UP (ref 4.8–10.8)

## 2019-06-30 PROCEDURE — 99233 SBSQ HOSP IP/OBS HIGH 50: CPT

## 2019-06-30 RX ORDER — HEPARIN SODIUM 5000 [USP'U]/ML
800 INJECTION INTRAVENOUS; SUBCUTANEOUS
Qty: 25000 | Refills: 0 | Status: DISCONTINUED | OUTPATIENT
Start: 2019-06-30 | End: 2019-07-01

## 2019-06-30 RX ORDER — SODIUM CHLORIDE 9 MG/ML
70 INJECTION INTRAMUSCULAR; INTRAVENOUS; SUBCUTANEOUS ONCE
Refills: 0 | Status: COMPLETED | OUTPATIENT
Start: 2019-06-30 | End: 2019-06-30

## 2019-06-30 RX ADMIN — HEPARIN SODIUM 800 UNIT(S)/HR: 5000 INJECTION INTRAVENOUS; SUBCUTANEOUS at 11:40

## 2019-06-30 RX ADMIN — MORPHINE SULFATE 1 MILLIGRAM(S): 50 CAPSULE, EXTENDED RELEASE ORAL at 03:42

## 2019-06-30 RX ADMIN — SODIUM CHLORIDE 5 MILLILITER(S): 9 INJECTION INTRAMUSCULAR; INTRAVENOUS; SUBCUTANEOUS at 18:02

## 2019-06-30 RX ADMIN — ATORVASTATIN CALCIUM 10 MILLIGRAM(S): 80 TABLET, FILM COATED ORAL at 21:04

## 2019-06-30 RX ADMIN — ISOSORBIDE MONONITRATE 30 MILLIGRAM(S): 60 TABLET, EXTENDED RELEASE ORAL at 11:16

## 2019-06-30 RX ADMIN — Medication 20 MILLIGRAM(S): at 05:20

## 2019-06-30 RX ADMIN — Medication 100 MILLIGRAM(S): at 17:32

## 2019-06-30 RX ADMIN — Medication 50 MICROGRAM(S): at 05:20

## 2019-06-30 RX ADMIN — Medication 650 MILLIGRAM(S): at 00:43

## 2019-06-30 RX ADMIN — MORPHINE SULFATE 1 MILLIGRAM(S): 50 CAPSULE, EXTENDED RELEASE ORAL at 03:25

## 2019-06-30 RX ADMIN — CHLORHEXIDINE GLUCONATE 1 APPLICATION(S): 213 SOLUTION TOPICAL at 05:20

## 2019-06-30 RX ADMIN — SENNA PLUS 2 TABLET(S): 8.6 TABLET ORAL at 21:04

## 2019-06-30 RX ADMIN — Medication 100 MILLIGRAM(S): at 05:20

## 2019-06-30 RX ADMIN — Medication 50 MILLIGRAM(S): at 05:20

## 2019-06-30 RX ADMIN — PANTOPRAZOLE SODIUM 40 MILLIGRAM(S): 20 TABLET, DELAYED RELEASE ORAL at 05:21

## 2019-06-30 NOTE — PROGRESS NOTE ADULT - SUBJECTIVE AND OBJECTIVE BOX
Ortho Preop Note    Patient is a 89M with left femoral neck fracture. Plan for OR tomorrow, 7/1/19 for left hip hemiarthroplasty.                          7.3    8.76  )-----------( 224      ( 30 Jun 2019 05:48 )             24.7     06-30    140  |  104  |  76<HH>  ----------------------------<  92  5.0   |  26  |  1.9<H>    Ca    9.0      30 Jun 2019 05:48  Phos  4.0     06-28  Mg     2.3     06-28    TPro  6.4  /  Alb  3.1<L>  /  TBili  0.6  /  DBili  x   /  AST  20  /  ALT  15  /  AlkPhos  123<H>  06-29    PT/INR - ( 30 Jun 2019 05:48 )   PT: 21.40 sec;   INR: 1.87 ratio         PTT - ( 30 Jun 2019 05:48 )  PTT:34.5 sec  12 Lead ECG:   Ventricular Rate 71 BPM    Atrial Rate 352 BPM    QRS Duration 100 ms    Q-T Interval 442 ms    QTC Calculation(Bezet) 480 ms    P Axis 182 degrees    R Axis 1 degrees    T Axis -31 degrees    Diagnosis Line Atrial fibrillation  Inferior infarct , age undetermined  Cannot rule out Anterior infarct , age undetermined  Abnormal ECG    Confirmed by Jr Antonio ([id number]) on 6/27/2019 10:37:27 PM (06-27-19 @ 21:09)  Xray Chest 1 View AP/PA:   EXAM:  XR CHEST FRONTAL 1V            PROCEDURE DATE:  06/27/2019            INTERPRETATION:  Clinical History / Reason for exam: Shortness of breath      Comparison : Chest radiograph 1/29/2019.    Technique/Positioning: AP portable view.Slight Gambian projection    Findings:    Support devices: None.    Cardiac/mediastinum/hilum: The aortic arch is calcified. Cardiomegaly.    Lung parenchyma/Pleura: Worsening left base opacity and scattered other   opacities. No pneumothorax    Skeleton/soft tissues: There are degenerative changes of the visualized   spine.    Impression:      Worsening left base opacity and scattered other opacities.                  MARIA INES GUILLERMO M.D., ATTENDING RADIOLOGIST  This document has been electronically signed. Jun 28 2019  8:32AM             (06-27-19 @ 20:29)          [ ] Type & Screen - NEED NEW T&S TO BE ACTIVE 7/1  [ ] CBC - HGB 7.3 RECOMMEND PRE-OP TRANSFUSION  [X] BMP  [X] PT/PTT/INR - INR 1.87, CONTINUE TO HOLD COUMADIN  [X] Chest X-ray  [X] EKG  [X] NPO/IVF  [ ] Consent - TO BE SIGNED TODAY WHEN DAUGHTER ARRIVES  [ ] Clearance - PENDING CLEARANCE  [X] Added on to OR Schedule  [X] Hold AM Anti-coagulation      Assessment & Plan:  89M with left hip fracture planned for left hip hemiarthroplasty on Monday 7/1/19, pending above    [id number] Ortho Preop Note    Patient is a 89M with left femoral neck fracture. Plan for OR tomorrow, 7/1/19 for left hip hemiarthroplasty.                          7.3    8.76  )-----------( 224      ( 30 Jun 2019 05:48 )             24.7     06-30    140  |  104  |  76<HH>  ----------------------------<  92  5.0   |  26  |  1.9<H>    Ca    9.0      30 Jun 2019 05:48  Phos  4.0     06-28  Mg     2.3     06-28    TPro  6.4  /  Alb  3.1<L>  /  TBili  0.6  /  DBili  x   /  AST  20  /  ALT  15  /  AlkPhos  123<H>  06-29    PT/INR - ( 30 Jun 2019 05:48 )   PT: 21.40 sec;   INR: 1.87 ratio         PTT - ( 30 Jun 2019 05:48 )  PTT:34.5 sec  12 Lead ECG:   Ventricular Rate 71 BPM    Atrial Rate 352 BPM    QRS Duration 100 ms    Q-T Interval 442 ms    QTC Calculation(Bezet) 480 ms    P Axis 182 degrees    R Axis 1 degrees    T Axis -31 degrees    Diagnosis Line Atrial fibrillation  Inferior infarct , age undetermined  Cannot rule out Anterior infarct , age undetermined  Abnormal ECG    Confirmed by Jr Antonio ([id number]) on 6/27/2019 10:37:27 PM (06-27-19 @ 21:09)  Xray Chest 1 View AP/PA:   EXAM:  XR CHEST FRONTAL 1V            PROCEDURE DATE:  06/27/2019            INTERPRETATION:  Clinical History / Reason for exam: Shortness of breath      Comparison : Chest radiograph 1/29/2019.    Technique/Positioning: AP portable view.Slight [place] projection    Findings:    Support devices: None.    Cardiac/mediastinum/hilum: The aortic arch is calcified. Cardiomegaly.    Lung parenchyma/Pleura: Worsening left base opacity and scattered other   opacities. No pneumothorax    Skeleton/soft tissues: There are degenerative changes of the visualized   spine.    Impression:      Worsening left base opacity and scattered other opacities.                  MARIA INES GUILLERMO M.D., ATTENDING RADIOLOGIST  This document has been electronically signed. Jun 28 2019  8:32AM             (06-27-19 @ 20:29)          [ ] Type & Screen - NEED NEW T&S TO BE ACTIVE 7/1  [ ] CBC - HGB 7.3 RECOMMEND PRE-OP TRANSFUSION  [X] BMP  [X] PT/PTT/INR - INR 1.87, CONTINUE TO HOLD COUMADIN  [X] Chest X-ray  [X] EKG  [X] NPO/IVF  [ ] Consent - TO BE SIGNED TODAY WHEN DAUGHTER ARRIVES  [ ] Clearance - PENDING CLEARANCE  [X] Added on to OR Schedule  [X] Hold AM Anti-coagulation      Assessment & Plan:  89M with left hip fracture planned for left hip hemiarthroplasty on Monday 7/1/19, pending above    Ortho S[facility] [id number]  stable  INR trending down   anticipate OR monday

## 2019-06-30 NOTE — PROGRESS NOTE ADULT - SUBJECTIVE AND OBJECTIVE BOX
KUSH TONG  [facility] ([facility] T[facility])            Patient was evaluated and examined  by bedside, tolerating diet well, no dyspnea at rest, c/o left hip pain      REVIEW OF SYSTEMS:  please see pertinent positives mentioned above, all other 12 ROS negative      T(C): , Max: 37.1 (06-29-19 @ 13:00)  HR: 69 (06-30-19 @ 05:42)  BP: 124/71 (06-30-19 @ 05:42)  RR: 18 (06-30-19 @ 05:42)  SpO2: 98% (06-29-19 @ 20:38)  CAPILLARY BLOOD GLUCOSE          PHYSICAL EXAM:  General: NAD, AAOX3, patient is laying comfortably in bed  HEENT: AT, NC, Supple, NO JVD, NO CB  Lungs: good breath sounds B/L, no wheezing, no rhonchi  CVS: loud S1, S2, RRR, systolic murmur present over left parasternal border, no G/R  Abdomen: soft, bowel sounds present, non-tender, non-distended  Extremities: no edema, no clubbing, no cyanosis, positive peripheral pulses b/l  Neuro: no acute focal neurological deficits, gait not tested  Skin: no rush, no ecchymosis          LAB  CBC  Date: 06-30-19 @ 05:48  Mean cell Ezkcmizvxg59.7  Mean cell Hemoglobin Conc29.6  Mean cell Volum 63.2  Platelet count-Automate 224  RBC Count 3.91  Red Cell Distrib Width18.6  WBC Count8.76  % Albumin, Urine--  Hematocrit 24.7  Hemoglobin 7.3  CBC  Date: 06-29-19 @ 07:11  Mean cell Umkxrwgrgn43.9  Mean cell Hemoglobin Conc30.0  Mean cell Volum 62.9  Platelet count-Automate 225  RBC Count 4.29  Red Cell Distrib Width18.5  WBC Count8.66  % Albumin, Urine--  Hematocrit 27.0  Hemoglobin 8.1  CBC  Date: 06-28-19 @ 11:58  Mean cell Vmonozhode60.6  Mean cell Hemoglobin Conc29.8  Mean cell Volum 62.4  Platelet count-Automate 174  RBC Count 4.04  Red Cell Distrib Width18.1  WBC Count7.87  % Albumin, Urine--  Hematocrit 25.2  Hemoglobin 7.5  CBC  Date: 06-27-19 @ 20:30  Mean cell Tirefbldhn76.0  Mean cell Hemoglobin Conc30.7  Mean cell Volum 61.8  Platelet count-Automate 193  RBC Count 4.22  Red Cell Distrib Width18.2  WBC Count9.31  % Albumin, Urine--  Hematocrit 26.1  Hemoglobin 8.0    [facility]  06-30-19 @ 05:48  Blood Gas Arterial-Calcium,Ionized--  Blood Urea Nitrogen, Serum 76 mg/dL<HH> [10 - 20] [Critical value:]  Carbon Dioxide, Serum26 mmol/L [17 - 32]  Chloride, Rnebi271 mmol/L [98 - 110]  Creatinie, Serum1.9 mg/dL<H> [0.7 - 1.5]  Glucose, Serum92 mg/dL [70 - 99]  Potassium, Serum5.0 mmol/L [3.5 - 5.0]  Sodium, Serum 140 mmol/L [135 - 146]  [facility]  06-29-19 @ 07:11  Blood Gas Arterial-Calcium,Ionized--  Blood Urea Nitrogen, Serum 77 mg/dL<HH> [10 - 20] [Critical value:]  Carbon Dioxide, Serum26 mmol/L [17 - 32]  Chloride, Xeswh500 mmol/L [98 - 110]  Creatinie, Serum1.4 mg/dL [0.7 - 1.5]  Glucose, Serum98 mg/dL [70 - 99]  Potassium, Serum4.9 mmol/L [3.5 - 5.0]  Sodium, Serum 140 mmol/L [135 - 146]  [facility]  06-28-19 @ 11:58  Blood Gas Arterial-Calcium,Ionized--  Blood Urea Nitrogen, Serum 82 mg/dL<HH> [10 - 20] [Critical value:]  Carbon Dioxide, Serum26 mmol/L [17 - 32]  Chloride, Ldydy477 mmol/L [98 - 110]  Creatinie, Serum1.4 mg/dL [0.7 - 1.5]  Glucose, Ggblh923 mg/dL<H> [70 - 99]  Potassium, Serum4.7 mmol/L [3.5 - 5.0]  Sodium, Serum 140 mmol/L [135 - 146]  [facility]  06-27-19 @ 20:30  Blood Gas Arterial-Calcium,Ionized--  Blood Urea Nitrogen, Serum 87 mg/dL<HH> [10 - 20] [TYPE:(C=Critical, N=Notification, A=Abnormal) C  TESTS: _BUN  DATE/TIME CALLED: _06/27/19 21:29  CALLED TO: _DR HEARN  READ BACK (2 Patient Identifiers)(Y/N): _Y  READ BACK VALUES (Y/N): _Y  CALLED BY: _DDS]  Carbon Dioxide, Serum26 mmol/L [17 - 32]  Chloride, Serum98 mmol/L [98 - 110]  Creatinie, Serum1.5 mg/dL [0.7 - 1.5]  Glucose, Uyvwk375 mg/dL<H> [70 - 99]  Potassium, Serum4.8 mmol/L [3.5 - 5.0]  Sodium, Serum 136 mmol/L [135 - 146]        PT/INR - ( 30 Jun 2019 05:48 )   PT: 21.40 sec;   INR: 1.87 ratio         PTT - ( 30 Jun 2019 05:48 )  PTT:34.5 sec        Medications:  acetaminophen   Tablet .. 650 milliGRAM(s) Oral every 8 hours PRN  ALBUTerol    0.083% 2.5 milliGRAM(s) Nebulizer every 6 hours PRN  atorvastatin 10 milliGRAM(s) Oral at bedtime  chlorhexidine 4% Liquid 1 Application(s) Topical <User Schedule>  docusate sodium 100 milliGRAM(s) Oral two times a day  ergocalciferol 99655 Unit(s) Oral every week  isosorbide   mononitrate ER Tablet (IMDUR) 30 milliGRAM(s) Oral daily  levothyroxine 50 MICROGram(s) Oral daily  metoprolol succinate ER 50 milliGRAM(s) Oral daily  morphine  - Injectable 1 milliGRAM(s) IV Push every 6 hours PRN  pantoprazole    Tablet 40 milliGRAM(s) Oral before breakfast  senna 2 Tablet(s) Oral at bedtime        Assessment and Plan:  # s/p Recent Mechanical fall with acute left hip fracture- patient need at least 24 hours for  medical optimization preop, pt. tentatively scheduled for monday  - pain management  -bed rest  -Ortho is following pt.    # COY- on CKD 3a    -worsening renal function post lasix tx.  -will d/c lasix and give gentle fluid hydration for next 12 hours  - f/up BMP in am, avoid nephrotoxic agents    # h/o Aflutter/afib- was on Warfarin at home, hold warfarin tx.  -INR daily  PT/INR - ( 30 Jun 2019 05:48 )   PT: 21.40 sec;   INR: 1.87 ratio  PTT - ( 30 Jun 2019 05:48 )  PTT:34.5 sec  -currently patient's HR well controlled  -start patient on heparin drip with PTT goal 55-85, hold heparin drip from 2am tomorrow preop    #h/o CHF with left ventricular systolic dysfunction  -clinically patient is in euvolemic state  -abn. CT chest- pulmonary congestion with small effusions  -consulted Cardiology - pt. 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

## 2019-07-01 ENCOUNTER — RESULT REVIEW (OUTPATIENT)
Age: 84
End: 2019-07-01

## 2019-07-01 LAB
ANION GAP SERPL CALC-SCNC: 12 MMOL/L — SIGNIFICANT CHANGE UP (ref 7–14)
ANION GAP SERPL CALC-SCNC: 14 MMOL/L — SIGNIFICANT CHANGE UP (ref 7–14)
APTT BLD: 35.6 SEC — SIGNIFICANT CHANGE UP (ref 27–39.2)
APTT BLD: 69.4 SEC — HIGH (ref 27–39.2)
BUN SERPL-MCNC: 61 MG/DL — CRITICAL HIGH (ref 10–20)
BUN SERPL-MCNC: 69 MG/DL — CRITICAL HIGH (ref 10–20)
CALCIUM SERPL-MCNC: 8.9 MG/DL — SIGNIFICANT CHANGE UP (ref 8.5–10.1)
CALCIUM SERPL-MCNC: 9.2 MG/DL — SIGNIFICANT CHANGE UP (ref 8.5–10.1)
CHLORIDE SERPL-SCNC: 104 MMOL/L — SIGNIFICANT CHANGE UP (ref 98–110)
CHLORIDE SERPL-SCNC: 107 MMOL/L — SIGNIFICANT CHANGE UP (ref 98–110)
CO2 SERPL-SCNC: 21 MMOL/L — SIGNIFICANT CHANGE UP (ref 17–32)
CO2 SERPL-SCNC: 25 MMOL/L — SIGNIFICANT CHANGE UP (ref 17–32)
CREAT SERPL-MCNC: 1.5 MG/DL — SIGNIFICANT CHANGE UP (ref 0.7–1.5)
CREAT SERPL-MCNC: 1.6 MG/DL — HIGH (ref 0.7–1.5)
GLUCOSE BLDC GLUCOMTR-MCNC: 106 MG/DL — HIGH (ref 70–99)
GLUCOSE BLDC GLUCOMTR-MCNC: 98 MG/DL — SIGNIFICANT CHANGE UP (ref 70–99)
GLUCOSE SERPL-MCNC: 106 MG/DL — HIGH (ref 70–99)
GLUCOSE SERPL-MCNC: 107 MG/DL — HIGH (ref 70–99)
HCT VFR BLD CALC: 27.8 % — LOW (ref 42–52)
HCT VFR BLD CALC: 28.4 % — LOW (ref 42–52)
HGB BLD-MCNC: 8.4 G/DL — LOW (ref 14–18)
HGB BLD-MCNC: 8.6 G/DL — LOW (ref 14–18)
INR BLD: 1.55 RATIO — HIGH (ref 0.65–1.3)
INR BLD: 1.89 RATIO — HIGH (ref 0.65–1.3)
MCHC RBC-ENTMCNC: 19.5 PG — LOW (ref 27–31)
MCHC RBC-ENTMCNC: 19.5 PG — LOW (ref 27–31)
MCHC RBC-ENTMCNC: 30.2 G/DL — LOW (ref 32–37)
MCHC RBC-ENTMCNC: 30.3 G/DL — LOW (ref 32–37)
MCV RBC AUTO: 64.5 FL — LOW (ref 80–94)
MCV RBC AUTO: 64.5 FL — LOW (ref 80–94)
NRBC # BLD: 0 /100 WBCS — SIGNIFICANT CHANGE UP (ref 0–0)
NRBC # BLD: 0 /100 WBCS — SIGNIFICANT CHANGE UP (ref 0–0)
PLATELET # BLD AUTO: 233 K/UL — SIGNIFICANT CHANGE UP (ref 130–400)
PLATELET # BLD AUTO: 238 K/UL — SIGNIFICANT CHANGE UP (ref 130–400)
POTASSIUM SERPL-MCNC: 4.8 MMOL/L — SIGNIFICANT CHANGE UP (ref 3.5–5)
POTASSIUM SERPL-MCNC: 5 MMOL/L — SIGNIFICANT CHANGE UP (ref 3.5–5)
POTASSIUM SERPL-SCNC: 4.8 MMOL/L — SIGNIFICANT CHANGE UP (ref 3.5–5)
POTASSIUM SERPL-SCNC: 5 MMOL/L — SIGNIFICANT CHANGE UP (ref 3.5–5)
PROTHROM AB SERPL-ACNC: 17.8 SEC — HIGH (ref 9.95–12.87)
PROTHROM AB SERPL-ACNC: 21.6 SEC — HIGH (ref 9.95–12.87)
RBC # BLD: 4.31 M/UL — LOW (ref 4.7–6.1)
RBC # BLD: 4.4 M/UL — LOW (ref 4.7–6.1)
RBC # FLD: 20 % — HIGH (ref 11.5–14.5)
RBC # FLD: 20.2 % — HIGH (ref 11.5–14.5)
SODIUM SERPL-SCNC: 141 MMOL/L — SIGNIFICANT CHANGE UP (ref 135–146)
SODIUM SERPL-SCNC: 142 MMOL/L — SIGNIFICANT CHANGE UP (ref 135–146)
WBC # BLD: 12.58 K/UL — HIGH (ref 4.8–10.8)
WBC # BLD: 9.93 K/UL — SIGNIFICANT CHANGE UP (ref 4.8–10.8)
WBC # FLD AUTO: 12.58 K/UL — HIGH (ref 4.8–10.8)
WBC # FLD AUTO: 9.93 K/UL — SIGNIFICANT CHANGE UP (ref 4.8–10.8)

## 2019-07-01 PROCEDURE — 88305 TISSUE EXAM BY PATHOLOGIST: CPT | Mod: 26

## 2019-07-01 PROCEDURE — 99233 SBSQ HOSP IP/OBS HIGH 50: CPT

## 2019-07-01 PROCEDURE — 88311 DECALCIFY TISSUE: CPT | Mod: 26

## 2019-07-01 RX ORDER — SENNA PLUS 8.6 MG/1
2 TABLET ORAL AT BEDTIME
Refills: 0 | Status: DISCONTINUED | OUTPATIENT
Start: 2019-07-01 | End: 2019-07-05

## 2019-07-01 RX ORDER — ALBUTEROL 90 UG/1
2.5 AEROSOL, METERED ORAL EVERY 6 HOURS
Refills: 0 | Status: DISCONTINUED | OUTPATIENT
Start: 2019-07-01 | End: 2019-07-05

## 2019-07-01 RX ORDER — ERGOCALCIFEROL 1.25 MG/1
50000 CAPSULE ORAL
Refills: 0 | Status: DISCONTINUED | OUTPATIENT
Start: 2019-07-01 | End: 2019-07-05

## 2019-07-01 RX ORDER — ISOSORBIDE MONONITRATE 60 MG/1
30 TABLET, EXTENDED RELEASE ORAL DAILY
Refills: 0 | Status: DISCONTINUED | OUTPATIENT
Start: 2019-07-01 | End: 2019-07-02

## 2019-07-01 RX ORDER — SODIUM CHLORIDE 9 MG/ML
1000 INJECTION INTRAMUSCULAR; INTRAVENOUS; SUBCUTANEOUS
Refills: 0 | Status: DISCONTINUED | OUTPATIENT
Start: 2019-07-01 | End: 2019-07-01

## 2019-07-01 RX ORDER — SIMETHICONE 80 MG/1
80 TABLET, CHEWABLE ORAL EVERY 6 HOURS
Refills: 0 | Status: DISCONTINUED | OUTPATIENT
Start: 2019-07-01 | End: 2019-07-01

## 2019-07-01 RX ORDER — MORPHINE SULFATE 50 MG/1
1 CAPSULE, EXTENDED RELEASE ORAL EVERY 6 HOURS
Refills: 0 | Status: DISCONTINUED | OUTPATIENT
Start: 2019-07-01 | End: 2019-07-05

## 2019-07-01 RX ORDER — METOPROLOL TARTRATE 50 MG
50 TABLET ORAL DAILY
Refills: 0 | Status: DISCONTINUED | OUTPATIENT
Start: 2019-07-01 | End: 2019-07-02

## 2019-07-01 RX ORDER — ONDANSETRON 8 MG/1
4 TABLET, FILM COATED ORAL ONCE
Refills: 0 | Status: DISCONTINUED | OUTPATIENT
Start: 2019-07-01 | End: 2019-07-01

## 2019-07-01 RX ORDER — SODIUM CHLORIDE 9 MG/ML
1000 INJECTION INTRAMUSCULAR; INTRAVENOUS; SUBCUTANEOUS
Refills: 0 | Status: DISCONTINUED | OUTPATIENT
Start: 2019-07-01 | End: 2019-07-02

## 2019-07-01 RX ORDER — CEFAZOLIN SODIUM 1 G
2000 VIAL (EA) INJECTION EVERY 8 HOURS
Refills: 0 | Status: COMPLETED | OUTPATIENT
Start: 2019-07-02 | End: 2019-07-02

## 2019-07-01 RX ORDER — HEPARIN SODIUM 5000 [USP'U]/ML
5000 INJECTION INTRAVENOUS; SUBCUTANEOUS EVERY 8 HOURS
Refills: 0 | Status: DISCONTINUED | OUTPATIENT
Start: 2019-07-02 | End: 2019-07-02

## 2019-07-01 RX ORDER — CHLORHEXIDINE GLUCONATE 213 G/1000ML
1 SOLUTION TOPICAL
Refills: 0 | Status: DISCONTINUED | OUTPATIENT
Start: 2019-07-01 | End: 2019-07-05

## 2019-07-01 RX ORDER — HYDROMORPHONE HYDROCHLORIDE 2 MG/ML
1 INJECTION INTRAMUSCULAR; INTRAVENOUS; SUBCUTANEOUS
Refills: 0 | Status: DISCONTINUED | OUTPATIENT
Start: 2019-07-01 | End: 2019-07-01

## 2019-07-01 RX ORDER — DOCUSATE SODIUM 100 MG
100 CAPSULE ORAL
Refills: 0 | Status: DISCONTINUED | OUTPATIENT
Start: 2019-07-01 | End: 2019-07-05

## 2019-07-01 RX ORDER — ONDANSETRON 8 MG/1
4 TABLET, FILM COATED ORAL EVERY 6 HOURS
Refills: 0 | Status: DISCONTINUED | OUTPATIENT
Start: 2019-07-01 | End: 2019-07-05

## 2019-07-01 RX ORDER — PANTOPRAZOLE SODIUM 20 MG/1
40 TABLET, DELAYED RELEASE ORAL
Refills: 0 | Status: DISCONTINUED | OUTPATIENT
Start: 2019-07-01 | End: 2019-07-05

## 2019-07-01 RX ORDER — SIMETHICONE 80 MG/1
80 TABLET, CHEWABLE ORAL EVERY 6 HOURS
Refills: 0 | Status: DISCONTINUED | OUTPATIENT
Start: 2019-07-01 | End: 2019-07-05

## 2019-07-01 RX ORDER — ATORVASTATIN CALCIUM 80 MG/1
10 TABLET, FILM COATED ORAL AT BEDTIME
Refills: 0 | Status: DISCONTINUED | OUTPATIENT
Start: 2019-07-01 | End: 2019-07-05

## 2019-07-01 RX ORDER — ACETAMINOPHEN 500 MG
650 TABLET ORAL EVERY 8 HOURS
Refills: 0 | Status: DISCONTINUED | OUTPATIENT
Start: 2019-07-01 | End: 2019-07-05

## 2019-07-01 RX ORDER — LEVOTHYROXINE SODIUM 125 MCG
50 TABLET ORAL DAILY
Refills: 0 | Status: DISCONTINUED | OUTPATIENT
Start: 2019-07-01 | End: 2019-07-05

## 2019-07-01 RX ORDER — HYDROMORPHONE HYDROCHLORIDE 2 MG/ML
0.5 INJECTION INTRAMUSCULAR; INTRAVENOUS; SUBCUTANEOUS
Refills: 0 | Status: DISCONTINUED | OUTPATIENT
Start: 2019-07-01 | End: 2019-07-01

## 2019-07-01 RX ADMIN — CHLORHEXIDINE GLUCONATE 1 APPLICATION(S): 213 SOLUTION TOPICAL at 05:11

## 2019-07-01 RX ADMIN — PANTOPRAZOLE SODIUM 40 MILLIGRAM(S): 20 TABLET, DELAYED RELEASE ORAL at 05:11

## 2019-07-01 RX ADMIN — Medication 50 MICROGRAM(S): at 05:11

## 2019-07-01 RX ADMIN — SIMETHICONE 80 MILLIGRAM(S): 80 TABLET, CHEWABLE ORAL at 01:32

## 2019-07-01 RX ADMIN — Medication 100 MILLIGRAM(S): at 05:11

## 2019-07-01 RX ADMIN — Medication 50 MILLIGRAM(S): at 05:11

## 2019-07-01 NOTE — CHART NOTE - NSCHARTNOTEFT_GEN_A_CORE
PACU ANESTHESIA ADMISSION NOTE      Procedure:   Post op diagnosis:      ____  Intubated  TV:______       Rate: ______      FiO2: ______    ___x_  Patent Airway    ____  Full return of protective reflexes    ____  Full recovery from anesthesia / back to baseline     Vitals:   T:   98        R:   12               BP:       121/80            Sat:    97%                P:  101      Mental Status:  _x___ Awake   _____ Alert   _____ Drowsy   _____ Sedated    Nausea/Vomiting:  ___x_ NO  ______Yes,   See Post - Op Orders          Pain Scale (0-10):  _____    Treatment: ____ None    ___x_ See Post - Op/PCA Orders    Post - Operative Fluids:   ____ Oral   ___x_ See Post - Op Orders    Plan: Discharge:   ____Home       __x___Floor     _____Critical Care    _____  Other:_________________    Comments: Uneventful intraoperative course. No anesthesia issues or complications noted.  Patient stable upon arrival to PACU. Report given to RN. Discharge when criteria met.

## 2019-07-01 NOTE — PROGRESS NOTE ADULT - SUBJECTIVE AND OBJECTIVE BOX
HPI:  This is a 89 yo male with PMH significant for CHF w REF 40-45% on 2L home O2, afib on coumadin, AAA s/p repair, HTN, CAD, DLD, and anemia presenting to the hospital with left hip pain for 2 weeks. Patient was found to have a displaced femoral neck fracture and is scheduled for surgical repair today.    INTERVAL HPI/OVERNIGHT EVENTS:  Patient was seen and examined at bedside. The patient was lying in bed comfortably on nasal cannula. Patient reported of being "tired" and nervous about the surgery today. Complained of some tightness in his stomach O/N. His great grandson was present by bedside as well. Patient complained of mild shortness of breath earlier, but no chest pain, difficulty breathing. patient reported normal BM. Overnight patient was transfused one unit of pRBC because Hg was 7.3. Went up to 8.6. Heparin was held since 2 AM.    ROS: Otherwise unremarkable    VITALS  T(C): 35.7 (07-01-19 @ 06:18), Max: 35.8 (06-30-19 @ 20:55)  HR: 79 (07-01-19 @ 06:18) (68 - 79)  BP: 145/70 (07-01-19 @ 06:18) (100/52 - 145/70)  RR: 18 (07-01-19 @ 06:18) (18 - 18)  SpO2: 97% (07-01-19 @ 06:18) (97% - 97%)  Wt(kg): --Vital Signs Last 24 Hrs  T(C): 35.7 (01 Jul 2019 06:18), Max: 35.8 (30 Jun 2019 20:55)  T(F): 96.2 (01 Jul 2019 06:18), Max: 96.4 (30 Jun 2019 20:55)  HR: 79 (01 Jul 2019 06:18) (68 - 79)  BP: 145/70 (01 Jul 2019 06:18) (100/52 - 145/70)  BP(mean): --  RR: 18 (01 Jul 2019 06:18) (18 - 18)  SpO2: 97% (01 Jul 2019 06:18) (97% - 97%)    MEDICATIONS (STANDING))  atorvastatin 10 milliGRAM(s) Oral at bedtime  chlorhexidine 4% Liquid 1 Application(s) Topical <User Schedule>  docusate sodium 100 milliGRAM(s) Oral two times a day  ergocalciferol 55746 Unit(s) Oral every week  isosorbide   mononitrate ER Tablet (IMDUR) 30 milliGRAM(s) Oral daily  levothyroxine 50 MICROGram(s) Oral daily  metoprolol succinate ER 50 milliGRAM(s) Oral daily  pantoprazole    Tablet 40 milliGRAM(s) Oral before breakfast  senna 2 Tablet(s) Oral at bedtime    MEDICATIONS (PRN)  acetaminophen   Tablet .. 650 milliGRAM(s) Oral every 8 hours PRN Mild Pain (1 - 3), Moderate Pain (4 - 6)  ALBUTerol    0.083% 2.5 milliGRAM(s) Nebulizer every 6 hours PRN Shortness of Breath and/or Wheezing  morphine  - Injectable 1 milliGRAM(s) IV Push every 6 hours PRN Severe Pain (7 - 10)  simethicone 80 milliGRAM(s) Chew every 6 hours PRN gas pain    ALLERGIES  No Known Allergies    PHYSICAL EXAM:  GENERAL: NAD, lying in bed on cannula  HEAD:  Atraumatic, Normocephalic  NERVOUS SYSTEM:  Alert & Oriented X3, Good concentration, no focal deficits  CHEST/LUNG: CTA B/L, no wheezing  HEART: S1/S2, irregular. No murmurs  ABDOMEN: Soft, Nontender, Nondistended; Bowel sounds present    LABS:                        8.6    9.93  )-----------( 233      ( 01 Jul 2019 05:00 )             28.4   07-01    141  |  104  |  69<HH>  ----------------------------<  106<H>  5.0   |  25  |  1.6<H>    Ca    9.2      01 Jul 2019 05:00      PT/INR - ( 01 Jul 2019 05:00 )   PT: 21.60 sec;   INR: 1.89 ratio         PTT - ( 01 Jul 2019 05:00 )  PTT:35.6 sec    RADIOLOGY & ADDITIONAL TESTS:    < from: Xray Pelvis AP only (06.28.19 @ 11:03) >  Interpretation:    There is a minimally displaced fracture of left femur neck.  The head of the femur articulates in the acetabulum.  Osteopenia.    < end of copied text >

## 2019-07-01 NOTE — PROGRESS NOTE ADULT - ASSESSMENT
Assessment:   This is a 89 yo male with PMH significant for CHF w REF 40-45% on 2L home O2, afib on coumadin, AAA s/p repair, HTN, CAD, DLD, and anemia presenting to the hospital with left hip pain for 2 weeks. Patient was found to have a displaced femoral neck fracture and is scheduled for surgical repair today.    #Mechanical Fall with acute left hip fracture   - OR Today  - Transfused pRBC - hemoglobin optimized at 8.6  - PT/INR - ( 01 Jul 2019 05:00 )   PT: 21.60 sec;   INR: 1.89 ratio  PTT - ( 01 Jul 2019 05:00 )  PTT:35.6 sec  - Heparin held since 2AM   - Ortho is following. F/U post-op for when to restart heparin    #COY/CKD Stage 3a  - Monitoring I&O  - Gentle fluid hydration due to worsening kidney function and D/Rush lasix  - Following BUN/Cr   - Avoid nephrotoxic agents    #H/O of Aflutter/Afib - was on warfarin  - Hold warfarin  - INR daily last INR: 1.89  - HR Controlled metoprolol    #H/O of CHF w/ LV systolic dysfunction  - Chest CT demonstrated pulmonary congestion with small effusions  - Consulted cardiology, moderate risk   - Lasix - DC'ed due to worsening renal function   - Daily weights  - Monitor I&O    #GI PPX  - Pantoprazole    #DLD  - Statin

## 2019-07-01 NOTE — PROGRESS NOTE ADULT - SUBJECTIVE AND OBJECTIVE BOX
s/p hemiarthroplasty ORTHOPEDIC  POST OP CHECK     T(C): 37.6 (07-01-19 @ 19:46), Max: 37.6 (07-01-19 @ 19:46)  HR: 98 (07-01-19 @ 20:01) (66 - 109)  BP: 136/74 (07-01-19 @ 20:01) (121/68 - 145/70)  RR: 20 (07-01-19 @ 20:01) (18 - 21)  SpO2: 97% (07-01-19 @ 20:01) (97% - 98%)                        8.6    9.93  )-----------( 233      ( 01 Jul 2019 05:00 )             28.4     07-01    141  |  104  |  69<HH>  ----------------------------<  106<H>  5.0   |  25  |  1.6<H>    Ca    9.2      01 Jul 2019 05:00      PT/INR - ( 01 Jul 2019 05:00 )   PT: 21.60 sec;   INR: 1.89 ratio         PTT - ( 01 Jul 2019 05:00 )  PTT:35.6 sec      S/P GABBIE  ARTHROPLASTY  PAIN CONTROLLED  VSS   A&O X3  DRESSING C/D/I  NVI  ANTIBIOTICS INFUSED  DVT PROPHYLAXIS ORDERED  ENCOURAGE INCENTIVE SPIROMETRY  AM LABS  REHAB TO BEGIN IN THE AM  D/C PLANNING

## 2019-07-02 LAB
ALBUMIN SERPL ELPH-MCNC: 2.3 G/DL — LOW (ref 3.5–5.2)
ALP SERPL-CCNC: 85 U/L — SIGNIFICANT CHANGE UP (ref 30–115)
ALT FLD-CCNC: 7 U/L — SIGNIFICANT CHANGE UP (ref 0–41)
ANION GAP SERPL CALC-SCNC: 12 MMOL/L — SIGNIFICANT CHANGE UP (ref 7–14)
ANION GAP SERPL CALC-SCNC: 13 MMOL/L — SIGNIFICANT CHANGE UP (ref 7–14)
APTT BLD: 29.5 SEC — SIGNIFICANT CHANGE UP (ref 27–39.2)
APTT BLD: 61.2 SEC — HIGH (ref 27–39.2)
AST SERPL-CCNC: 13 U/L — SIGNIFICANT CHANGE UP (ref 0–41)
BILIRUB SERPL-MCNC: 0.5 MG/DL — SIGNIFICANT CHANGE UP (ref 0.2–1.2)
BLD GP AB SCN SERPL QL: SIGNIFICANT CHANGE UP
BUN SERPL-MCNC: 61 MG/DL — CRITICAL HIGH (ref 10–20)
BUN SERPL-MCNC: 62 MG/DL — CRITICAL HIGH (ref 10–20)
CALCIUM SERPL-MCNC: 7.8 MG/DL — LOW (ref 8.5–10.1)
CALCIUM SERPL-MCNC: 8.9 MG/DL — SIGNIFICANT CHANGE UP (ref 8.5–10.1)
CHLORIDE SERPL-SCNC: 106 MMOL/L — SIGNIFICANT CHANGE UP (ref 98–110)
CHLORIDE SERPL-SCNC: 106 MMOL/L — SIGNIFICANT CHANGE UP (ref 98–110)
CO2 SERPL-SCNC: 20 MMOL/L — SIGNIFICANT CHANGE UP (ref 17–32)
CO2 SERPL-SCNC: 22 MMOL/L — SIGNIFICANT CHANGE UP (ref 17–32)
CREAT SERPL-MCNC: 1.5 MG/DL — SIGNIFICANT CHANGE UP (ref 0.7–1.5)
CREAT SERPL-MCNC: 1.6 MG/DL — HIGH (ref 0.7–1.5)
GAS PNL BLDA: SIGNIFICANT CHANGE UP
GLUCOSE BLDC GLUCOMTR-MCNC: 230 MG/DL — HIGH (ref 70–99)
GLUCOSE SERPL-MCNC: 152 MG/DL — HIGH (ref 70–99)
GLUCOSE SERPL-MCNC: 158 MG/DL — HIGH (ref 70–99)
HCT VFR BLD CALC: 22.1 % — LOW (ref 42–52)
HCT VFR BLD CALC: 25.6 % — LOW (ref 42–52)
HGB BLD-MCNC: 6.9 G/DL — CRITICAL LOW (ref 14–18)
HGB BLD-MCNC: 7.6 G/DL — LOW (ref 14–18)
INR BLD: 1.6 RATIO — HIGH (ref 0.65–1.3)
LACTATE SERPL-SCNC: 2.6 MMOL/L — HIGH (ref 0.5–2.2)
MCHC RBC-ENTMCNC: 19.5 PG — LOW (ref 27–31)
MCHC RBC-ENTMCNC: 21.1 PG — LOW (ref 27–31)
MCHC RBC-ENTMCNC: 29.7 G/DL — LOW (ref 32–37)
MCHC RBC-ENTMCNC: 31.2 G/DL — LOW (ref 32–37)
MCV RBC AUTO: 65.8 FL — LOW (ref 80–94)
MCV RBC AUTO: 67.6 FL — LOW (ref 80–94)
NRBC # BLD: 0 /100 WBCS — SIGNIFICANT CHANGE UP (ref 0–0)
NRBC # BLD: 0 /100 WBCS — SIGNIFICANT CHANGE UP (ref 0–0)
PLATELET # BLD AUTO: 182 K/UL — SIGNIFICANT CHANGE UP (ref 130–400)
PLATELET # BLD AUTO: 236 K/UL — SIGNIFICANT CHANGE UP (ref 130–400)
POTASSIUM SERPL-MCNC: 5.3 MMOL/L — HIGH (ref 3.5–5)
POTASSIUM SERPL-MCNC: 6 MMOL/L — CRITICAL HIGH (ref 3.5–5)
POTASSIUM SERPL-SCNC: 5.3 MMOL/L — HIGH (ref 3.5–5)
POTASSIUM SERPL-SCNC: 6 MMOL/L — CRITICAL HIGH (ref 3.5–5)
PROT SERPL-MCNC: 4.9 G/DL — LOW (ref 6–8)
PROTHROM AB SERPL-ACNC: 18.3 SEC — HIGH (ref 9.95–12.87)
RBC # BLD: 3.27 M/UL — LOW (ref 4.7–6.1)
RBC # BLD: 3.89 M/UL — LOW (ref 4.7–6.1)
RBC # FLD: 20.7 % — HIGH (ref 11.5–14.5)
RBC # FLD: 21.8 % — HIGH (ref 11.5–14.5)
SODIUM SERPL-SCNC: 138 MMOL/L — SIGNIFICANT CHANGE UP (ref 135–146)
SODIUM SERPL-SCNC: 141 MMOL/L — SIGNIFICANT CHANGE UP (ref 135–146)
WBC # BLD: 16.25 K/UL — HIGH (ref 4.8–10.8)
WBC # BLD: 19.66 K/UL — HIGH (ref 4.8–10.8)
WBC # FLD AUTO: 16.25 K/UL — HIGH (ref 4.8–10.8)
WBC # FLD AUTO: 19.66 K/UL — HIGH (ref 4.8–10.8)

## 2019-07-02 PROCEDURE — 99233 SBSQ HOSP IP/OBS HIGH 50: CPT

## 2019-07-02 PROCEDURE — 71045 X-RAY EXAM CHEST 1 VIEW: CPT | Mod: 26

## 2019-07-02 RX ORDER — MAGNESIUM HYDROXIDE 400 MG/1
30 TABLET, CHEWABLE ORAL DAILY
Refills: 0 | Status: DISCONTINUED | OUTPATIENT
Start: 2019-07-02 | End: 2019-07-05

## 2019-07-02 RX ORDER — SODIUM POLYSTYRENE SULFONATE 4.1 MEQ/G
30 POWDER, FOR SUSPENSION ORAL ONCE
Refills: 0 | Status: DISCONTINUED | OUTPATIENT
Start: 2019-07-02 | End: 2019-07-02

## 2019-07-02 RX ORDER — ISOSORBIDE MONONITRATE 60 MG/1
30 TABLET, EXTENDED RELEASE ORAL DAILY
Refills: 0 | Status: DISCONTINUED | OUTPATIENT
Start: 2019-07-02 | End: 2019-07-05

## 2019-07-02 RX ORDER — SODIUM CHLORIDE 9 MG/ML
500 INJECTION INTRAMUSCULAR; INTRAVENOUS; SUBCUTANEOUS ONCE
Refills: 0 | Status: COMPLETED | OUTPATIENT
Start: 2019-07-02 | End: 2019-07-02

## 2019-07-02 RX ORDER — WARFARIN SODIUM 2.5 MG/1
5 TABLET ORAL ONCE
Refills: 0 | Status: COMPLETED | OUTPATIENT
Start: 2019-07-02 | End: 2019-07-02

## 2019-07-02 RX ORDER — METOPROLOL TARTRATE 50 MG
50 TABLET ORAL DAILY
Refills: 0 | Status: DISCONTINUED | OUTPATIENT
Start: 2019-07-02 | End: 2019-07-05

## 2019-07-02 RX ORDER — HEPARIN SODIUM 5000 [USP'U]/ML
800 INJECTION INTRAVENOUS; SUBCUTANEOUS
Qty: 25000 | Refills: 0 | Status: DISCONTINUED | OUTPATIENT
Start: 2019-07-02 | End: 2019-07-03

## 2019-07-02 RX ADMIN — HEPARIN SODIUM 8 UNIT(S)/HR: 5000 INJECTION INTRAVENOUS; SUBCUTANEOUS at 12:49

## 2019-07-02 RX ADMIN — SENNA PLUS 2 TABLET(S): 8.6 TABLET ORAL at 21:50

## 2019-07-02 RX ADMIN — Medication 650 MILLIGRAM(S): at 23:22

## 2019-07-02 RX ADMIN — HEPARIN SODIUM 5000 UNIT(S): 5000 INJECTION INTRAVENOUS; SUBCUTANEOUS at 05:38

## 2019-07-02 RX ADMIN — Medication 650 MILLIGRAM(S): at 22:52

## 2019-07-02 RX ADMIN — ERGOCALCIFEROL 50000 UNIT(S): 1.25 CAPSULE ORAL at 12:40

## 2019-07-02 RX ADMIN — Medication 50 MILLIGRAM(S): at 05:38

## 2019-07-02 RX ADMIN — Medication 50 MICROGRAM(S): at 05:38

## 2019-07-02 RX ADMIN — ISOSORBIDE MONONITRATE 30 MILLIGRAM(S): 60 TABLET, EXTENDED RELEASE ORAL at 12:40

## 2019-07-02 RX ADMIN — SODIUM CHLORIDE 2000 MILLILITER(S): 9 INJECTION INTRAMUSCULAR; INTRAVENOUS; SUBCUTANEOUS at 17:06

## 2019-07-02 RX ADMIN — Medication 100 MILLIGRAM(S): at 05:38

## 2019-07-02 RX ADMIN — Medication 100 MILLIGRAM(S): at 14:19

## 2019-07-02 RX ADMIN — SODIUM CHLORIDE 1000 MILLILITER(S): 9 INJECTION INTRAMUSCULAR; INTRAVENOUS; SUBCUTANEOUS at 17:44

## 2019-07-02 RX ADMIN — ATORVASTATIN CALCIUM 10 MILLIGRAM(S): 80 TABLET, FILM COATED ORAL at 21:50

## 2019-07-02 RX ADMIN — WARFARIN SODIUM 5 MILLIGRAM(S): 2.5 TABLET ORAL at 21:50

## 2019-07-02 RX ADMIN — Medication 650 MILLIGRAM(S): at 14:21

## 2019-07-02 RX ADMIN — Medication 650 MILLIGRAM(S): at 15:11

## 2019-07-02 RX ADMIN — MAGNESIUM HYDROXIDE 30 MILLILITER(S): 400 TABLET, CHEWABLE ORAL at 21:50

## 2019-07-02 RX ADMIN — PANTOPRAZOLE SODIUM 40 MILLIGRAM(S): 20 TABLET, DELAYED RELEASE ORAL at 05:38

## 2019-07-02 RX ADMIN — Medication 100 MILLIGRAM(S): at 21:50

## 2019-07-02 NOTE — PROGRESS NOTE ADULT - SUBJECTIVE AND OBJECTIVE BOX
S/E s/p hemiarthroplasty  POD#1. Doing well. No acute events overnight. Did not void yet.       ICU Vital Signs Last 24 Hrs  T(C): 35.6 (02 Jul 2019 04:54), Max: 37.6 (01 Jul 2019 19:46)  T(F): 96.1 (02 Jul 2019 04:54), Max: 99.6 (01 Jul 2019 19:46)  HR: 88 (02 Jul 2019 04:54) (66 - 109)  BP: 102/55 (02 Jul 2019 04:54) (102/55 - 140/63)  BP(mean): --  ABP: 113/56 (01 Jul 2019 20:50) (113/56 - 134/66)  ABP(mean): --  RR: 18 (02 Jul 2019 04:54) (17 - 22)  SpO2: 100% (01 Jul 2019 21:35) (96% - 100%)                            7.6    16.25 )-----------( 236      ( 02 Jul 2019 05:45 )             25.6     PE:  NAD  Laying comfortably in bed  Dressing c/d/i  Compartments soft  Motor EHL/FHL/TA intact  Sensory intact distally   WWP      A/P: 89M POD#1 S/P GABBIE  ARTHROPLASTY  WBAT LLE  DVT PROPHYLAXIS   ENCOURAGE INCENTIVE SPIROMETRY  REHAB TO BEGIN TODAY  D/C PLANNING

## 2019-07-02 NOTE — OCCUPATIONAL THERAPY INITIAL EVALUATION ADULT - TRANSFER SAFETY CONCERNS NOTED: SIT/STAND, REHAB EVAL
inability to maintain weight-bearing restrictions w/o assist/decreased weight-shifting ability/stand pivot/decreased balance during turns/losing balance

## 2019-07-02 NOTE — PHYSICAL THERAPY INITIAL EVALUATION ADULT - SPECIFY REASON(S)
PT Initial Evaluation completed. Pt refused PT, despite encouragement, secondary to dizziness and not wanting to get up. MARIBELL tovar.

## 2019-07-02 NOTE — PROGRESS NOTE ADULT - ATTENDING COMMENTS
follow hg
Patient was evaluated and examined by bedside,  c/o hip pain, NPO for surgical repair of hip fracture today.   All labs, radiology studies, VS was reviewed  I agree with medical plan outlined by Medical resident as stated above.    # s/p Recent Mechanical fall with acute left hip fracture- patient is medically optimized preop, remains NPO for OR today, f/up postop  - pain management  -bed rest  -Ortho is following pt.    # COY- on CKD 3a    -worsening renal function post lasix tx.  -continue to hold lasix, continue gentle IVF hydration  - f/up BMP in am, avoid nephrotoxic agents  07-01    141  |  104  |  69<HH>  ----------------------------<  106<H>  5.0   |  25  |  1.6<H>    Ca    9.2      01 Jul 2019 05:00        # h/o Aflutter/afib- was on Warfarin at home, hold warfarin tx.  -INR daily  -currently patient's HR well controlled  -will restart pt. on warfarin postop    #h/o CHF with left ventricular systolic dysfunction  -clinically patient is in euvolemic state  -abn. CT chest- pulmonary congestion with small effusions  -consulted Cardiology - pt. 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
Patient was evaluated and examined by bedside, post op, feeling very weak, with mild dyspnea, urinary retention in am, with s/p straight cath-600ml was obtained, no fever, looks pale on exam.  All labs, radiology studies, VS was reviewed  I agree with medical plan outlined by Medical resident as stated above.    # s/p Recent Mechanical fall with acute left hip fracture- post op day 1  - pain management  -PT/OT as tolerated  -Ortho is following pt.    # COY- on CKD 3a    -renal function stable  -continue to hold lasix  - f/up BMP in am, avoid nephrotoxic agents  07-01    141  |  104  |  69<HH>  ----------------------------<  106<H>  5.0   |  25  |  1.6<H>    Ca    9.2      01 Jul 2019 05:00    07-02    141  |  106  |  61<HH>  ----------------------------<  152<H>  6.0<HH>   |  22  |  1.5    Ca    8.9      02 Jul 2019 05:45            # h/o Aflutter/afib- was on Warfarin at home, resume on  warfarin tx.  -INR daily  -currently patient's HR well controlled  -heparin drip bridging    #h/o CHF with left ventricular systolic dysfunction  -clinically patient is in euvolemic state  -CXR- no worsening of congestion  -abn. CT chest- pulmonary congestion with small effusions  -consulted Cardiology - pt. 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

## 2019-07-02 NOTE — PROGRESS NOTE ADULT - SUBJECTIVE AND OBJECTIVE BOX
HPI:  This is a 89 yo male with PMH significant for CHF w REF 40-45% on 2L home O2, afib on coumadin, AAA s/p repair, HTN, CAD, DLD, and anemia presenting to the hospital with left hip pain for 2 weeks. Patient was found to have a displaced femoral neck fracture and is scheduled for surgical repair today.    INTERVAL HPI/OVERNIGHT EVENTS:  Patient was seen and examined at bedside. The patient was sleeping in bed comfortably on nasal cannula. Patient reported of being "tired" but feels good after the surgery. Had no complaints this morning. Denied chest pain, chest tightness, shortness of breath or abdominal pain. Patient reported not having a BM. It was mentioned by a nurse that he was retaining urine - with a bladder scan result of 500ml. Otherwise, patient had no complaints.    ROS: Otherwise unremarkable    VITALS  T(C): 35.6 (07-02-19 @ 04:54), Max: 37.6 (07-01-19 @ 19:46)  HR: 93 (07-02-19 @ 08:55) (66 - 109)  BP: 114/71 (07-02-19 @ 08:55) (102/55 - 140/63)  RR: 18 (07-02-19 @ 04:54) (17 - 22)  SpO2: 97% (07-02-19 @ 08:55) (96% - 100%)  Wt(kg): --Vital Signs Last 24 Hrs  T(C): 35.6 (02 Jul 2019 04:54), Max: 37.6 (01 Jul 2019 19:46)  T(F): 96.1 (02 Jul 2019 04:54), Max: 99.6 (01 Jul 2019 19:46)  HR: 93 (02 Jul 2019 08:55) (66 - 109)  BP: 114/71 (02 Jul 2019 08:55) (102/55 - 140/63)  BP(mean): --  RR: 18 (02 Jul 2019 04:54) (17 - 22)  SpO2: 97% (02 Jul 2019 08:55) (96% - 100%)    MEDICATIONS (STANDING))  atorvastatin 10 milliGRAM(s) Oral at bedtime  ceFAZolin   IVPB 2000 milliGRAM(s) IV Intermittent every 8 hours  chlorhexidine 4% Liquid 1 Application(s) Topical <User Schedule>  docusate sodium 100 milliGRAM(s) Oral two times a day  ergocalciferol 03239 Unit(s) Oral every week  heparin  Injectable 5000 Unit(s) SubCutaneous every 8 hours  isosorbide   mononitrate ER Tablet (IMDUR) 30 milliGRAM(s) Oral daily  levothyroxine 50 MICROGram(s) Oral daily  metoprolol succinate ER 50 milliGRAM(s) Oral daily  pantoprazole    Tablet 40 milliGRAM(s) Oral before breakfast  senna 2 Tablet(s) Oral at bedtime    MEDICATIONS (PRN)  acetaminophen   Tablet .. 650 milliGRAM(s) Oral every 8 hours PRN Mild Pain (1 - 3), Moderate Pain (4 - 6)  ALBUTerol    0.083% 2.5 milliGRAM(s) Nebulizer every 6 hours PRN Shortness of Breath and/or Wheezing  morphine  - Injectable 1 milliGRAM(s) IV Push every 6 hours PRN Severe Pain (7 - 10)  ondansetron Injectable 4 milliGRAM(s) IV Push every 6 hours PRN Nausea and/or Vomiting  simethicone 80 milliGRAM(s) Chew every 6 hours PRN gas pain    ALLERGIES  No Known Allergies    PHYSICAL EXAM:  GENERAL: NAD, sleeping in bed on cannula  HEAD:  Atraumatic, Normocephalic  NERVOUS SYSTEM:  Alert & Oriented X3, Good concentration, no focal deficits  CHEST/LUNG: CTA B/L, no wheezing  HEART: S1/S2, irregular. No murmurs  ABDOMEN: Soft, Nontender, Nondistended; Bowel sounds present      LABS:                        7.6    16.25 )-----------( 236      ( 02 Jul 2019 05:45 )             25.6     07-02    141  |  106  |  61<HH>  ----------------------------<  152<H>  6.0<HH>   |  22  |  1.5    Ca    8.9      02 Jul 2019 05:45    PT/INR - ( 02 Jul 2019 05:45 )   PT: 18.30 sec;   INR: 1.60 ratio      PTT - ( 02 Jul 2019 05:45 )  PTT:29.5 sec    RADIOLOGY & ADDITIONAL TESTS:  < from: Xray Chest 1 View- PORTABLE-Urgent (07.02.19 @ 10:17) >  Low lung volumes, atelectasis. Cardiomegaly.    Follow-up as needed.

## 2019-07-02 NOTE — PROGRESS NOTE ADULT - ASSESSMENT
Assessment:   This is a 89 yo male with PMH significant for CHF w REF 40-45% on 2L home O2, afib on coumadin, AAA s/p repair, HTN, CAD, DLD, and anemia presenting to the hospital with left hip pain for 2 weeks. Patient was found to have a displaced femoral neck fracture and is scheduled for surgical repair today.    #Mechanical Fall with acute left hip fracture   - Post-left hemiarthroplasty  - Hemoglobin dropped from 8.4 -> 7.6  - Transfused 1 pRBC  - PT/INR - ( 02 Jul 2019 05:45 )   PT: 18.30 sec;   INR: 1.60 ratio  PTT - ( 02 Jul 2019 05:45 )  PTT:29.5 sec  - Ortho recommended starting heparin drip at noon and warfarin in the evening    #Hyperkalemia  - Morning labs K = 6.0, a stat VBG showed K = 5  - EKG   - DC'ing fluids and holding diuretics    #Urinary Retention  - Bladder scan ~500ml  - Straight catheter ~600ml  - Nolasco?    #COY/CKD Stage 3a  - Monitoring I&O  - Gentle fluid hydration due to worsening kidney function and D/Rush lasix  - Following BUN/Cr   - Avoid nephrotoxic agents    #H/O of Aflutter/Afib - was on warfarin  - Hold warfarin  - INR daily last INR: 1.60 ratio    - HR Controlled metoprolol    #H/O of CHF w/ LV systolic dysfunction  - Chest CT demonstrated pulmonary congestion with small effusions  - Consulted cardiology, moderate risk   - Lasix - DC'ed due to worsening renal function   - Daily weights  - Monitor I&O    #GI PPX  - Pantoprazole    #DLD  - Statin

## 2019-07-02 NOTE — CONSULT NOTE ADULT - SUBJECTIVE AND OBJECTIVE BOX
HPI:  This is a 89 yo male with PMH significant for CHF w REF 40-45% on 2L home O2, afib on coumadin, AAA s/p repair, HTN, CAD, DLD, and anemia presenting to the hospital with complaints of abdominal tightness for the past week and s/p mechanical fall about 2 weeks ago. Patient states that it feels like there is a tightening band around his lower abdomen as well as some epigastric pain, and feeling of food getting stuck when he eats and decreased appetite/PO intake. States that eating worsens his symptoms and bowel movements improve his symptoms. Patient also complains of dark bowel movements, no rodney blood, and less frequent bowel movements. Also endorses increased, malodorous urinations; denies any burning. Has also had a few episodes of dark phlegm, with no rodney blood. Additionally, about 2 weeks ago patient had a mechanical fall while on the way to bathroom; denies any dizziness, LOC, or head trauma. He was taken to an OP orthopedist and Xray confirmed L femur fracture. Since then, pain has continued - is better at rest, but worse with movements like getting out of bed/liftiing leg. he is unable to bear weight anymore, and now uses wheelchair instead of his walker. Of note, patient has been holding warfarin since Monday due to INR 6.5. He denies any fever, chills, diarrhea, vomiting, chest pain, cough or wheezing.     In the ED, /67, HR 76, RR 18, T 98.6, SaO2 100% RA. Ct cervical spine, CT CAP, Xray femur, CXR performed. (28 Jun 2019 10:01)      PAST MEDICAL & SURGICAL HISTORY:  Hyperlipidemia  CHF (congestive heart failure)  Afib  Hypertension  S/P AAA (abdominal aortic aneurysm) repair      Hospital Course: s/p hemiarthroplasty for left femoral subcapital neck fx on 7/1    TODAY'S SUBJECTIVE & REVIEW OF SYMPTOMS:     Constitutional WNL   Cardio WNL   Resp WNL   GI WNL  Heme WNL  Endo WNL  Skin WNL  MSK weakness  Neuro WNL  Cognitive WNL  Psych WNL      MEDICATIONS  (STANDING):  atorvastatin 10 milliGRAM(s) Oral at bedtime  ceFAZolin   IVPB 2000 milliGRAM(s) IV Intermittent every 8 hours  chlorhexidine 4% Liquid 1 Application(s) Topical <User Schedule>  docusate sodium 100 milliGRAM(s) Oral two times a day  ergocalciferol 66791 Unit(s) Oral every week  heparin  Injectable 5000 Unit(s) SubCutaneous every 8 hours  isosorbide   mononitrate ER Tablet (IMDUR) 30 milliGRAM(s) Oral daily  levothyroxine 50 MICROGram(s) Oral daily  metoprolol succinate ER 50 milliGRAM(s) Oral daily  pantoprazole    Tablet 40 milliGRAM(s) Oral before breakfast  senna 2 Tablet(s) Oral at bedtime  sodium chloride 0.9%. 1000 milliLiter(s) (75 mL/Hr) IV Continuous <Continuous>    MEDICATIONS  (PRN):  acetaminophen   Tablet .. 650 milliGRAM(s) Oral every 8 hours PRN Mild Pain (1 - 3), Moderate Pain (4 - 6)  ALBUTerol    0.083% 2.5 milliGRAM(s) Nebulizer every 6 hours PRN Shortness of Breath and/or Wheezing  morphine  - Injectable 1 milliGRAM(s) IV Push every 6 hours PRN Severe Pain (7 - 10)  ondansetron Injectable 4 milliGRAM(s) IV Push every 6 hours PRN Nausea and/or Vomiting  simethicone 80 milliGRAM(s) Chew every 6 hours PRN gas pain      FAMILY HISTORY:  FH: congestive heart failure: sister      Allergies    No Known Allergies    Intolerances        SOCIAL HISTORY:    [  ] Etoh  [  ] Smoking  [  ] Substance abuse     Home Environment:  [  ] Home Alone  [ x ] Lives with Family  [  ] Home Health Aid    Dwelling:  [  ] Apartment  [x  ] Private House  [  ] Adult Home  [  ] Skilled Nursing Facility      [  ] Short Term  [  ] Long Term  [ x ] Stairs       Elevator [  ]    FUNCTIONAL STATUS PTA: (Check all that apply)  Ambulation: [ x  ]Independent    [  ] Dependent     [  ] Non-Ambulatory  Assistive Device: [  ] SA Cane  [  ]  Q Cane  [x  ] Walker  [  ]  Wheelchair  ADL : [  ] Independent  [  ]  Dependent       Vital Signs Last 24 Hrs  T(C): 35.6 (02 Jul 2019 04:54), Max: 37.6 (01 Jul 2019 19:46)  T(F): 96.1 (02 Jul 2019 04:54), Max: 99.6 (01 Jul 2019 19:46)  HR: 93 (02 Jul 2019 08:55) (66 - 109)  BP: 114/71 (02 Jul 2019 08:55) (102/55 - 140/63)  BP(mean): --  RR: 18 (02 Jul 2019 04:54) (17 - 22)  SpO2: 97% (02 Jul 2019 08:55) (96% - 100%)      PHYSICAL EXAM: Alert & Oriented X3  GENERAL: NAD, well-groomed, well-developed  HEAD:  Atraumatic, Normocephalic  CHEST/LUNG: Clear   HEART: S1S2+  ABDOMEN: Soft, Nontender  EXTREMITIES:  no calf tenderness    NERVOUS SYSTEM:  Cranial Nerves 2-12 intact [  ] Abnormal  [  ]  ROM: WFL all extremities [  ]  Abnormal [x  ]limited lle  Motor Strength: WFL all extremities  [  ]  Abnormal [x  ]limited lle  Sensation: intact to light touch [  ] Abnormal [  ]  Reflexes: Symmetric [  ]  Abnormal [  ]    FUNCTIONAL STATUS:  Bed Mobility: Independent [  ]  Supervision [  ]  Needs Assistance [x  ]  N/A [  ]  Transfers: Independent [  ]  Supervision [  ]  Needs Assistance [ x ]  N/A [  ]   Ambulation: Independent [  ]  Supervision [  ]  Needs Assistance [  ]  N/A [  ]  ADL: Independent [  ] Requires Assistance [  ] N/A [  ]      LABS:                        7.6    16.25 )-----------( 236      ( 02 Jul 2019 05:45 )             25.6     07-02    141  |  106  |  61<HH>  ----------------------------<  152<H>  6.0<HH>   |  22  |  1.5    Ca    8.9      02 Jul 2019 05:45      PT/INR - ( 02 Jul 2019 05:45 )   PT: 18.30 sec;   INR: 1.60 ratio         PTT - ( 02 Jul 2019 05:45 )  PTT:29.5 sec      RADIOLOGY & ADDITIONAL STUDIES:    Assesment:

## 2019-07-02 NOTE — OCCUPATIONAL THERAPY INITIAL EVALUATION ADULT - GENERAL OBSERVATIONS, REHAB EVAL
Pt encountered asleep in bed in NAD and was agreeable to OT with Min encouragement. Pt seen 10:29-11:02 PM. Pt IV locked with + Bed alarm, + sequentials, 2L O2 via nc.

## 2019-07-02 NOTE — CONSULT NOTE ADULT - ASSESSMENT
IMPRESSION: Rehab of left femoral subcapital neck fx, s/p hemiarthroplasty    PRECAUTIONS: [  ] Cardiac  [  ] Respiratory  [  ] Seizures [  ] Contact Isolation  [  ] Droplet Isolation  [  ] Other    Weight Bearing Status:  wbat    RECOMMENDATION:    Out of Bed to Chair     DVT/Decubiti Prophylaxis    REHAB PLAN:     [ x  ] Bedside P/T 3-5 times a week   [ x  ]   Bedside O/T  2-3 times a week             [   ] No Rehab Therapy Indicated                   [   ]  Speech Therapy   Conditioning/ROM                                    ADL  Bed Mobility                                               Conditioning/ROM  Transfers                                                     Bed Mobility  Sitting /Standing Balance                         Transfers                                        Gait Training                                               Sitting/Standing Balance  Stair Training [   ]Applicable                    Home equipment Eval                                                                        Splinting  [   ] Only      GOALS:   ADL   [   ]   Independent                    Transfers  [ x  ] Independent                          Ambulation  [ x  ] Independent     [ x   ] With device                            [   ]  CG                                                         [   ]  CG                                                                  [   ] CG                            [    ] Min A                                                   [   ] Min A                                                              [   ] Min  A          DISCHARGE PLAN:   [   ]  Good candidate for Intensive Rehabilitation/Hospital based                                             Will tolerate 3hrs Intensive Rehab Daily                                       [    ]  Short Term Rehab in Skilled Nursing Facility                                       [    ]  Home with Outpatient or  services                                         [  x  ]  Possible Candidate for Intensive Hospital based Rehab IMPRESSION: Rehab of left femoral subcapital neck fx, s/p hemiarthroplasty    PRECAUTIONS: [  ] Cardiac  [  ] Respiratory  [  ] Seizures [  ] Contact Isolation  [  ] Droplet Isolation  [  ] Other    Weight Bearing Status:  wbat    RECOMMENDATION:    Out of Bed to Chair     DVT/Decubiti Prophylaxis    REHAB PLAN:     [ x  ] Bedside P/T 3-5 times a week   [ x  ]   Bedside O/T  2-3 times a week             [   ] No Rehab Therapy Indicated                   [   ]  Speech Therapy   Conditioning/ROM                                    ADL  Bed Mobility                                               Conditioning/ROM  Transfers                                                     Bed Mobility  Sitting /Standing Balance                         Transfers                                        Gait Training                                               Sitting/Standing Balance  Stair Training [   ]Applicable                    Home equipment Eval                                                                        Splinting  [   ] Only      GOALS:   ADL   [   ]   Independent                    Transfers  [ x  ] Independent                          Ambulation  [ x  ] Independent     [ x   ] With device                            [   ]  CG                                                         [   ]  CG                                                                  [   ] CG                            [    ] Min A                                                   [   ] Min A                                                              [   ] Min  A          DISCHARGE PLAN:   [  x ]  Good candidate for Intensive Rehabilitation/Hospital based                                             Will tolerate 3hrs Intensive Rehab Daily                                       [    ]  Short Term Rehab in Skilled Nursing Facility                                       [    ]  Home with Outpatient or  services                                         [    ]  Possible Candidate for Intensive Hospital based Rehab

## 2019-07-02 NOTE — OCCUPATIONAL THERAPY INITIAL EVALUATION ADULT - IMPAIRED TRANSFERS: SIT/STAND, REHAB EVAL
impaired coordination/impaired balance/decreased flexibility/impaired motor control/pain/impaired postural control/decreased ROM

## 2019-07-03 LAB
ALBUMIN SERPL ELPH-MCNC: 2.6 G/DL — LOW (ref 3.5–5.2)
ALP SERPL-CCNC: 89 U/L — SIGNIFICANT CHANGE UP (ref 30–115)
ALT FLD-CCNC: <5 U/L — SIGNIFICANT CHANGE UP (ref 0–41)
ANION GAP SERPL CALC-SCNC: 12 MMOL/L — SIGNIFICANT CHANGE UP (ref 7–14)
ANION GAP SERPL CALC-SCNC: 9 MMOL/L — SIGNIFICANT CHANGE UP (ref 7–14)
APTT BLD: 34.9 SEC — SIGNIFICANT CHANGE UP (ref 27–39.2)
APTT BLD: 36.8 SEC — SIGNIFICANT CHANGE UP (ref 27–39.2)
APTT BLD: 61.4 SEC — HIGH (ref 27–39.2)
AST SERPL-CCNC: 15 U/L — SIGNIFICANT CHANGE UP (ref 0–41)
BASOPHILS # BLD AUTO: 0.03 K/UL — SIGNIFICANT CHANGE UP (ref 0–0.2)
BASOPHILS # BLD AUTO: 0.04 K/UL — SIGNIFICANT CHANGE UP (ref 0–0.2)
BASOPHILS NFR BLD AUTO: 0.2 % — SIGNIFICANT CHANGE UP (ref 0–1)
BASOPHILS NFR BLD AUTO: 0.3 % — SIGNIFICANT CHANGE UP (ref 0–1)
BILIRUB SERPL-MCNC: 0.6 MG/DL — SIGNIFICANT CHANGE UP (ref 0.2–1.2)
BUN SERPL-MCNC: 62 MG/DL — CRITICAL HIGH (ref 10–20)
BUN SERPL-MCNC: 63 MG/DL — CRITICAL HIGH (ref 10–20)
CALCIUM SERPL-MCNC: 8.3 MG/DL — LOW (ref 8.5–10.1)
CALCIUM SERPL-MCNC: 8.8 MG/DL — SIGNIFICANT CHANGE UP (ref 8.5–10.1)
CHLORIDE SERPL-SCNC: 100 MMOL/L — SIGNIFICANT CHANGE UP (ref 98–110)
CHLORIDE SERPL-SCNC: 105 MMOL/L — SIGNIFICANT CHANGE UP (ref 98–110)
CO2 SERPL-SCNC: 20 MMOL/L — SIGNIFICANT CHANGE UP (ref 17–32)
CO2 SERPL-SCNC: 23 MMOL/L — SIGNIFICANT CHANGE UP (ref 17–32)
CREAT SERPL-MCNC: 1.6 MG/DL — HIGH (ref 0.7–1.5)
CREAT SERPL-MCNC: 1.6 MG/DL — HIGH (ref 0.7–1.5)
EOSINOPHIL # BLD AUTO: 0.07 K/UL — SIGNIFICANT CHANGE UP (ref 0–0.7)
EOSINOPHIL # BLD AUTO: 0.31 K/UL — SIGNIFICANT CHANGE UP (ref 0–0.7)
EOSINOPHIL NFR BLD AUTO: 0.4 % — SIGNIFICANT CHANGE UP (ref 0–8)
EOSINOPHIL NFR BLD AUTO: 2 % — SIGNIFICANT CHANGE UP (ref 0–8)
GLUCOSE SERPL-MCNC: 111 MG/DL — HIGH (ref 70–99)
GLUCOSE SERPL-MCNC: 124 MG/DL — HIGH (ref 70–99)
HCT VFR BLD CALC: 24.1 % — LOW (ref 42–52)
HCT VFR BLD CALC: 24.9 % — LOW (ref 42–52)
HCT VFR BLD CALC: 27.2 % — LOW (ref 42–52)
HGB BLD-MCNC: 7.7 G/DL — LOW (ref 14–18)
HGB BLD-MCNC: 8.1 G/DL — LOW (ref 14–18)
HGB BLD-MCNC: 8.7 G/DL — LOW (ref 14–18)
IMM GRANULOCYTES NFR BLD AUTO: 0.5 % — HIGH (ref 0.1–0.3)
IMM GRANULOCYTES NFR BLD AUTO: 0.6 % — HIGH (ref 0.1–0.3)
INR BLD: 1.98 RATIO — HIGH (ref 0.65–1.3)
LACTATE SERPL-SCNC: 1.9 MMOL/L — SIGNIFICANT CHANGE UP (ref 0.5–2.2)
LYMPHOCYTES # BLD AUTO: 1.59 K/UL — SIGNIFICANT CHANGE UP (ref 1.2–3.4)
LYMPHOCYTES # BLD AUTO: 1.8 K/UL — SIGNIFICANT CHANGE UP (ref 1.2–3.4)
LYMPHOCYTES # BLD AUTO: 10 % — LOW (ref 20.5–51.1)
LYMPHOCYTES # BLD AUTO: 11.4 % — LOW (ref 20.5–51.1)
MCHC RBC-ENTMCNC: 22.1 PG — LOW (ref 27–31)
MCHC RBC-ENTMCNC: 22.3 PG — LOW (ref 27–31)
MCHC RBC-ENTMCNC: 22.3 PG — LOW (ref 27–31)
MCHC RBC-ENTMCNC: 32 G/DL — SIGNIFICANT CHANGE UP (ref 32–37)
MCHC RBC-ENTMCNC: 32 G/DL — SIGNIFICANT CHANGE UP (ref 32–37)
MCHC RBC-ENTMCNC: 32.5 G/DL — SIGNIFICANT CHANGE UP (ref 32–37)
MCV RBC AUTO: 68.4 FL — LOW (ref 80–94)
MCV RBC AUTO: 69.1 FL — LOW (ref 80–94)
MCV RBC AUTO: 69.7 FL — LOW (ref 80–94)
MONOCYTES # BLD AUTO: 1.46 K/UL — HIGH (ref 0.1–0.6)
MONOCYTES # BLD AUTO: 1.76 K/UL — HIGH (ref 0.1–0.6)
MONOCYTES NFR BLD AUTO: 11.1 % — HIGH (ref 1.7–9.3)
MONOCYTES NFR BLD AUTO: 9.2 % — SIGNIFICANT CHANGE UP (ref 1.7–9.3)
NEUTROPHILS # BLD AUTO: 12.08 K/UL — HIGH (ref 1.4–6.5)
NEUTROPHILS # BLD AUTO: 12.37 K/UL — HIGH (ref 1.4–6.5)
NEUTROPHILS NFR BLD AUTO: 76.2 % — HIGH (ref 42.2–75.2)
NEUTROPHILS NFR BLD AUTO: 78.1 % — HIGH (ref 42.2–75.2)
NRBC # BLD: 0 /100 WBCS — SIGNIFICANT CHANGE UP (ref 0–0)
PLATELET # BLD AUTO: 163 K/UL — SIGNIFICANT CHANGE UP (ref 130–400)
PLATELET # BLD AUTO: 169 K/UL — SIGNIFICANT CHANGE UP (ref 130–400)
PLATELET # BLD AUTO: 188 K/UL — SIGNIFICANT CHANGE UP (ref 130–400)
POTASSIUM SERPL-MCNC: 5 MMOL/L — SIGNIFICANT CHANGE UP (ref 3.5–5)
POTASSIUM SERPL-MCNC: 5.1 MMOL/L — HIGH (ref 3.5–5)
POTASSIUM SERPL-SCNC: 5 MMOL/L — SIGNIFICANT CHANGE UP (ref 3.5–5)
POTASSIUM SERPL-SCNC: 5.1 MMOL/L — HIGH (ref 3.5–5)
PROT SERPL-MCNC: 5.2 G/DL — LOW (ref 6–8)
PROTHROM AB SERPL-ACNC: 22.6 SEC — HIGH (ref 9.95–12.87)
RBC # BLD: 3.49 M/UL — LOW (ref 4.7–6.1)
RBC # BLD: 3.64 M/UL — LOW (ref 4.7–6.1)
RBC # BLD: 3.9 M/UL — LOW (ref 4.7–6.1)
RBC # FLD: 21.7 % — HIGH (ref 11.5–14.5)
RBC # FLD: 21.8 % — HIGH (ref 11.5–14.5)
RBC # FLD: 22.2 % — HIGH (ref 11.5–14.5)
SODIUM SERPL-SCNC: 132 MMOL/L — LOW (ref 135–146)
SODIUM SERPL-SCNC: 137 MMOL/L — SIGNIFICANT CHANGE UP (ref 135–146)
WBC # BLD: 15.7 K/UL — HIGH (ref 4.8–10.8)
WBC # BLD: 15.84 K/UL — HIGH (ref 4.8–10.8)
WBC # BLD: 15.84 K/UL — HIGH (ref 4.8–10.8)
WBC # FLD AUTO: 15.7 K/UL — HIGH (ref 4.8–10.8)
WBC # FLD AUTO: 15.84 K/UL — HIGH (ref 4.8–10.8)
WBC # FLD AUTO: 15.84 K/UL — HIGH (ref 4.8–10.8)

## 2019-07-03 PROCEDURE — 99233 SBSQ HOSP IP/OBS HIGH 50: CPT

## 2019-07-03 RX ORDER — WARFARIN SODIUM 2.5 MG/1
1 TABLET ORAL ONCE
Refills: 0 | Status: COMPLETED | OUTPATIENT
Start: 2019-07-03 | End: 2019-07-03

## 2019-07-03 RX ADMIN — Medication 100 MILLIGRAM(S): at 21:10

## 2019-07-03 RX ADMIN — PANTOPRAZOLE SODIUM 40 MILLIGRAM(S): 20 TABLET, DELAYED RELEASE ORAL at 05:18

## 2019-07-03 RX ADMIN — CHLORHEXIDINE GLUCONATE 1 APPLICATION(S): 213 SOLUTION TOPICAL at 05:18

## 2019-07-03 RX ADMIN — ISOSORBIDE MONONITRATE 30 MILLIGRAM(S): 60 TABLET, EXTENDED RELEASE ORAL at 11:51

## 2019-07-03 RX ADMIN — ATORVASTATIN CALCIUM 10 MILLIGRAM(S): 80 TABLET, FILM COATED ORAL at 21:09

## 2019-07-03 RX ADMIN — SENNA PLUS 2 TABLET(S): 8.6 TABLET ORAL at 21:09

## 2019-07-03 RX ADMIN — WARFARIN SODIUM 1 MILLIGRAM(S): 2.5 TABLET ORAL at 21:09

## 2019-07-03 RX ADMIN — Medication 50 MICROGRAM(S): at 05:18

## 2019-07-03 RX ADMIN — Medication 100 MILLIGRAM(S): at 05:18

## 2019-07-03 NOTE — PHYSICAL THERAPY INITIAL EVALUATION ADULT - ADDITIONAL COMMENTS
Pt lives in  with wife, 2 steps to enter and 4 steps inside. His daughter lives upstairs. Prior to admission, pt used rolling walker

## 2019-07-03 NOTE — PHYSICAL THERAPY INITIAL EVALUATION ADULT - GENERAL OBSERVATIONS, REHAB EVAL
PT initial Evaluation completed. Pt seen from 11:20-12:00 for a total of 40 min. Pt encountered sitting in chair, +chair alarm, +2L O2 via NC, No apparent distress. Pt agreeable to PT.

## 2019-07-03 NOTE — DIETITIAN INITIAL EVALUATION ADULT. - FACTORS AFF FOOD INTAKE
Pt and pt's son report pt to have fairly good appetite and PO intake- has been improving. 75% PO intake at breakfast. Reports drinking 100% of ordered PO supplements. Pt reports constipation, had very small BM today and yesterday- LIP aware. PTA pt supplements vit D. NKFA.

## 2019-07-03 NOTE — DIETITIAN INITIAL EVALUATION ADULT. - PERTINENT MEDS FT
toprol, abluterol, lipitor, milk of magnesia, colace, vit D, morphine, synthroid, zofran, protonix, senna, mylicon

## 2019-07-03 NOTE — PHYSICAL THERAPY INITIAL EVALUATION ADULT - PHYSICAL ASSIST/NONPHYSICAL ASSIST: GAIT, REHAB EVAL
verbal cues/Pt required assistance for balance as pt lost balance at times. Required constant cues for rolling walker negotiation and gait pattern as pt was unable to maintain without cueing./1 person assist/set-up required

## 2019-07-03 NOTE — PHYSICAL THERAPY INITIAL EVALUATION ADULT - PERTINENT HX OF CURRENT PROBLEM, REHAB EVAL
Pt adm with L femur fx secondary to abdominal tightness and mechanical fall. Pt s/p L hemiarthroplasty

## 2019-07-03 NOTE — PHYSICAL THERAPY INITIAL EVALUATION ADULT - IMPAIRMENTS FOUND, PT EVAL
ergonomics and body mechanics/gait, locomotion, and balance/joint integrity and mobility/posture/muscle strength/ROM/gross motor/neuromotor development and sensory integration/poor safety awareness

## 2019-07-03 NOTE — PROGRESS NOTE ADULT - SUBJECTIVE AND OBJECTIVE BOX
Patient seen and examined at bedside in the am. Required 2U PRBC over last 24hrs secondary to hypotension. This morning resting comfortably in bed, pain well controlled. No complaints.    Physical Exam  NAD, AOx3  Non-labored breathing   Left Hip  Staples in place, incision C/D/I  EHL/FHL/GA/TA motor intact  SP/Dp/T/S/S SILT   Toes WWP                          7.7    15.84 )-----------( 163      ( 03 Jul 2019 01:23 )             24.1     07-03    137  |  105  |  62<HH>  ----------------------------<  111<H>  5.0   |  23  |  1.6<H>    Ca    8.3<L>      03 Jul 2019 06:35    TPro  5.2<L>  /  Alb  2.6<L>  /  TBili  0.6  /  DBili  x   /  AST  15  /  ALT  <5  /  AlkPhos  89  07-03      Assessment/Plan  89M POD#2 s/p left femoral neck fracture s/p hemiarthroplasty    Continue to trend CBC  Maintain active T&S  Pain control  PT  Management of comorbidities per primary team  Ortho to follow Patient seen and examined at bedside in the am. Required 2U PRBC over last 24hrs secondary to hypotension. This morning resting comfortably in bed, pain well controlled. No complaints.    Physical Exam  NAD, AOx3  Non-labored breathing   Left Hip  Staples in place, incision C/D/I  EHL/FHL/GA/TA motor intact  SP/Dp/T/S/S SILT   Toes WWP                          7.7    15.84 )-----------( 163      ( 03 Jul 2019 01:23 )             24.1     07-03    137  |  105  |  62<HH>  ----------------------------<  111<H>  5.0   |  23  |  1.6<H>    Ca    8.3<L>      03 Jul 2019 06:35    TPro  5.2<L>  /  Alb  2.6<L>  /  TBili  0.6  /  DBili  x   /  AST  15  /  ALT  <5  /  AlkPhos  89  07-03      Assessment/Plan  89M POD#2 s/p left femoral neck fracture s/p hemiarthroplasty    Continue to trend CBC  Maintain active T&S  Pain control  PT  Management of comorbidities per primary team  Ortho to follow   resting in bed  reports less pain  continue PT

## 2019-07-03 NOTE — PROGRESS NOTE ADULT - SUBJECTIVE AND OBJECTIVE BOX
Patient is a 89y old  Male who presents with a chief complaint of abdominal tightness and mechanical fall (03 Jul 2019 08:34)    HPI:  This is a 89 yo male with PMH significant for CHF w REF 40-45% on 2L home O2, afib on coumadin, AAA s/p repair, HTN, CAD, DLD, and anemia presenting to the hospital with complaints of abdominal tightness for the past week and s/p mechanical fall about 2 weeks ago. Patient states that it feels like there is a tightening band around his lower abdomen as well as some epigastric pain, and feeling of food getting stuck when he eats and decreased appetite/PO intake. States that eating worsens his symptoms and bowel movements improve his symptoms. Patient also complains of dark bowel movements, no rodney blood, and less frequent bowel movements. Also endorses increased, malodorous urinations; denies any burning. Has also had a few episodes of dark phlegm, with no rodney blood. Additionally, about 2 weeks ago patient had a mechanical fall while on the way to bathroom; denies any dizziness, LOC, or head trauma. He was taken to an OP orthopedist and Xray confirmed L femur fracture. Since then, pain has continued - is better at rest, but worse with movements like getting out of bed/liftiing leg. he is unable to bear weight anymore, and now uses wheelchair instead of his walker. Of note, patient has been holding warfarin since Monday due to INR 6.5. He denies any fever, chills, diarrhea, vomiting, chest pain, cough or wheezing.     In the ED, /67, HR 76, RR 18, T 98.6, SaO2 100% RA. Ct cervical spine, CT CAP, Xray femur, CXR performed. (28 Jun 2019 10:01)    PAST MEDICAL & SURGICAL HISTORY:  Hyperlipidemia  CHF (congestive heart failure)  Afib  Hypertension  S/P AAA (abdominal aortic aneurysm) repair    patient seen and examined independently on morning rounds for the first time today, chart reviewed and discussed with the medicine resident and on interdisciplinary rounds.    initiating PT (was able to take few steps forward and back)--c/o Left hip pain but surgical site looks clean (c/d/i)    Vital Signs Last 24 Hrs  T(C): 35.4 (03 Jul 2019 13:25), Max: 37.1 (02 Jul 2019 18:01)  T(F): 95.8 (03 Jul 2019 13:25), Max: 98.7 (02 Jul 2019 18:01)  HR: 79 (03 Jul 2019 13:25) (66 - 85)  BP: 108/66 (03 Jul 2019 13:25) (86/49 - 108/66)  BP(mean): --  RR: 18 (03 Jul 2019 13:25) (17 - 18)  SpO2: 98% (03 Jul 2019 09:38) (98% - 99%)             PE:  GEN-NAD, AAOx3, +pallor,   PULM- Clear to auscultation bilaterally, fair air entry  CVS- +s1/s2 RRR no murmurs  GI- soft NT ND +bs, no rebound, no guarding  EXT- Left lateral hip surgical dressing c/d/i-   SKIN- no rashes/no lesions               8.7    15.84 )-----------( 188      ( 03 Jul 2019 12:11 )             27.2     07-03    132<L>  |  100  |  63<HH>  ----------------------------<  124<H>  5.1<H>   |  20  |  1.6<H>    Ca    8.8      03 Jul 2019 12:11    TPro  5.2<L>  /  Alb  2.6<L>  /  TBili  0.6  /  DBili  x   /  AST  15  /  ALT  <5  /  AlkPhos  89  07-03              MEDICATIONS  (STANDING):  atorvastatin 10 milliGRAM(s) Oral at bedtime  chlorhexidine 4% Liquid 1 Application(s) Topical <User Schedule>  docusate sodium 100 milliGRAM(s) Oral two times a day  ergocalciferol 88979 Unit(s) Oral every week  isosorbide   mononitrate ER Tablet (IMDUR) 30 milliGRAM(s) Oral daily  levothyroxine 50 MICROGram(s) Oral daily  metoprolol succinate ER 50 milliGRAM(s) Oral daily  pantoprazole    Tablet 40 milliGRAM(s) Oral before breakfast  senna 2 Tablet(s) Oral at bedtime Patient is a 89y old  Male who presents with a chief complaint of abdominal tightness and mechanical fall (03 Jul 2019 08:34)    HPI:  This is a 89 yo male with PMH significant for CHF w REF 40-45% on 2L home O2, afib on coumadin, AAA s/p repair, HTN, CAD, DLD, and anemia presenting to the hospital with complaints of abdominal tightness for the past week and s/p mechanical fall about 2 weeks ago. Patient states that it feels like there is a tightening band around his lower abdomen as well as some epigastric pain, and feeling of food getting stuck when he eats and decreased appetite/PO intake. States that eating worsens his symptoms and bowel movements improve his symptoms. Patient also complains of dark bowel movements, no rodney blood, and less frequent bowel movements. Also endorses increased, malodorous urinations; denies any burning. Has also had a few episodes of dark phlegm, with no rodney blood. Additionally, about 2 weeks ago patient had a mechanical fall while on the way to bathroom; denies any dizziness, LOC, or head trauma. He was taken to an OP orthopedist and Xray confirmed L femur fracture. Since then, pain has continued - is better at rest, but worse with movements like getting out of bed/liftiing leg. he is unable to bear weight anymore, and now uses wheelchair instead of his walker. Of note, patient has been holding warfarin since Monday due to INR 6.5. He denies any fever, chills, diarrhea, vomiting, chest pain, cough or wheezing.     In the ED, /67, HR 76, RR 18, T 98.6, SaO2 100% RA. Ct cervical spine, CT CAP, Xray femur, CXR performed. (28 Jun 2019 10:01)    PAST MEDICAL & SURGICAL HISTORY:  Hyperlipidemia  CHF (congestive heart failure)  Afib  Hypertension  S/P AAA (abdominal aortic aneurysm) repair    patient seen and examined independently on morning rounds for the first time today, chart reviewed and discussed with the medicine resident and on interdisciplinary rounds.    initiating PT (was able to take few steps forward and back)--c/o Left hip pain but surgical site looks clean (c/d/i)    Vital Signs Last 24 Hrs  T(C): 35.4 (03 Jul 2019 13:25), Max: 37.1 (02 Jul 2019 18:01)  T(F): 95.8 (03 Jul 2019 13:25), Max: 98.7 (02 Jul 2019 18:01)  HR: 79 (03 Jul 2019 13:25) (66 - 85)  BP: 108/66 (03 Jul 2019 13:25) (86/49 - 108/66)  BP(mean): --  RR: 18 (03 Jul 2019 13:25) (17 - 18)  SpO2: 98% (03 Jul 2019 09:38) (98% - 99%)             PE:  GEN-NAD, AAOx3, +pallor,   PULM- Clear to auscultation bilaterally, fair air entry  CVS- +s1/s2 irreg irreg no murmurs  GI- soft NT ND +bs, no rebound, no guarding  EXT- Left lateral hip surgical dressing c/d/i-   SKIN- no rashes/no lesions               8.7    15.84 )-----------( 188      ( 03 Jul 2019 12:11 )             27.2     07-03    132<L>  |  100  |  63<HH>  ----------------------------<  124<H>  5.1<H>   |  20  |  1.6<H>    Ca    8.8      03 Jul 2019 12:11    TPro  5.2<L>  /  Alb  2.6<L>  /  TBili  0.6  /  DBili  x   /  AST  15  /  ALT  <5  /  AlkPhos  89  07-03              MEDICATIONS  (STANDING):  atorvastatin 10 milliGRAM(s) Oral at bedtime  chlorhexidine 4% Liquid 1 Application(s) Topical <User Schedule>  docusate sodium 100 milliGRAM(s) Oral two times a day  ergocalciferol 59196 Unit(s) Oral every week  isosorbide   mononitrate ER Tablet (IMDUR) 30 milliGRAM(s) Oral daily  levothyroxine 50 MICROGram(s) Oral daily  metoprolol succinate ER 50 milliGRAM(s) Oral daily  pantoprazole    Tablet 40 milliGRAM(s) Oral before breakfast  senna 2 Tablet(s) Oral at bedtime

## 2019-07-03 NOTE — PROGRESS NOTE ADULT - ASSESSMENT
Assessment:   This is a 89 yo male with PMH significant for CHF w REF 40-45% on 2L home O2, afib on coumadin, AAA s/p repair, HTN, CAD, DLD, and anemia presenting to the hospital with left hip pain for 2 weeks. Patient was found to have a displaced femoral neck fracture and is scheduled for surgical repair today.    #Mechanical Fall with acute left hip fracture s/p hemiarthroplasty (7/1/19)  - Post-left hemiarthroplasty  - s/p 2 units of pRBC transfusion  - PT/INR - ( 02 Jul 2019 05:45 )   PT: 18.30 sec;   INR: 1.60 ratio  PTT - ( 03 Jul 2019 06:35 )  PTT:36.8 sec (from 61.4 at 1 AM) Assessment:   This is a 89 yo male with PMH significant for CHF w REF 40-45% on 2L home O2, afib on coumadin, AAA s/p repair, HTN, CAD, DLD, and anemia presenting to the hospital with left hip pain for 2 weeks. Patient was found to have a displaced femoral neck fracture and is scheduled for surgical repair today.    #Mechanical Fall with acute left hip fracture s/p hemiarthroplasty (7/1/19)  - Post-left hemiarthroplasty  - s/p 2 units of pRBC transfusion  - PT/INR - ( 02 Jul 2019 05:45 )   PT: 18.30 sec;   INR: 1.60 ratio  PTT - ( 03 Jul 2019 06:35 )  PTT:36.8 sec (from 61.4 at 1 AM)  - Received 5mg warfarin last night.   - Monitor INR, warfarin today  - Stop heparin drip   - Incentive spirometry    #Hyperkalemia - improving  - K 5.0 this morning  - EKG   - DC'ing fluids and holding diuretics    #Urinary Retention - resolved  - Recent bladder scan ~70ml    #COY/CKD Stage 3a  - Monitoring I&O  - Gentle fluid hydration due to worsening kidney function and D/Rush lasix  - Following BUN/Cr, most recent 62/1.6  - Avoid nephrotoxic agents    #H/O of Aflutter/Afib - was on warfarin  - Hold warfarin  - INR daily last INR: 1.60 ratio    - HR Controlled metoprolol    #H/O of CHF w/ LV systolic dysfunction  - Chest CT demonstrated pulmonary congestion with small effusions  - Consulted cardiology, moderate risk   - Lasix - DC'ed due to worsening renal function   - Daily weights  - Monitor I&O    #GI PPX  - Pantoprazole    #DLD  - Statin

## 2019-07-03 NOTE — DIETITIAN INITIAL EVALUATION ADULT. - PHYSICAL APPEARANCE
well nourished/Pt denies recent wt loss, however, notes previous wt loss in the past. UBW ~140#- dosing wt 149# actual wt gain vs inaccurate report vs fluid retention 2/2 CHF? BMI: 24.8. IBW: 136#+/-10%

## 2019-07-03 NOTE — PHYSICAL THERAPY INITIAL EVALUATION ADULT - RANGE OF MOTION EXAMINATION, REHAB EVAL
Right LE ROM was WFL (within functional limits)/L LE hip flex PROM about 50% of normal ROM, L knee ext PROM WFL, DF AROM WFL./bilateral upper extremity ROM was WFL (within functional limits)

## 2019-07-03 NOTE — DIETITIAN INITIAL EVALUATION ADULT. - RD TO REMAIN AVAILABLE
yes/Pt not at risk f/u 7 days. RD to monitor diet order, energy intake, NFPF, body comp, glucose and renal profile

## 2019-07-03 NOTE — PHYSICAL THERAPY INITIAL EVALUATION ADULT - TRANSFER SAFETY CONCERNS NOTED: SIT/STAND, REHAB EVAL
decreased safety awareness/losing balance/decreased sequencing ability/decreased step length/decreased weight-shifting ability/decreased balance during turns

## 2019-07-03 NOTE — DIETITIAN INITIAL EVALUATION ADULT. - OTHER INFO
Pt presented to ED with chief complaint of abdominal tightness and mechanical fall. Pt became hypotensive yesterday evening. Is s/p hemiarthroplasty 7/1. Hyperkalemia is improving. Urinary retention resolved. COY on CKDIIIa noted. Afib, CHF, DLD noted.

## 2019-07-03 NOTE — PROGRESS NOTE ADULT - ASSESSMENT
a/p:  #s/p fall- Left femoral fracture  -pod#2 Left hemiarthroplasty  -f/u ortho recom  -pain control  -PT eval---consideration for 4A acute rehab  -oob in chair  -fall precautions  -cont incentive spirometry    #post-op anemia  -stable s/p 2 u prbc transfusion  -monitor cbc daily---transfuse if Hb <8  -today hb 8.7    #AF   -restart coumadin--today 5 mg  -daily inr for dosing (goal inr 2-3)  -stop hep gtt (no need for bridge)  -bblocker for rate control    #DVT/GI ppx  FULL CODE    #Progress Note Handoff    Disposition: to STR vs acute rehab (4A)

## 2019-07-03 NOTE — DIETITIAN INITIAL EVALUATION ADULT. - ENERGY NEEDS
estimated energy needs:1490-1615kcal (MSJx1.2-1.3AF)  estimated protein needs: 61-75g (0.9-1.1g/kg) COY on CKD considered  estimated fluid needs: per LIP

## 2019-07-03 NOTE — PHYSICAL THERAPY INITIAL EVALUATION ADULT - PLANNED THERAPY INTERVENTIONS, PT EVAL
strengthening/transfer training/bed mobility training/gait training/postural re-education/balance training/ROM

## 2019-07-03 NOTE — PROGRESS NOTE ADULT - SUBJECTIVE AND OBJECTIVE BOX
HPI:  This is a 89 yo male with PMH significant for CHF w REF 40-45% on 2L home O2, afib on coumadin, AAA s/p repair, HTN, CAD, DLD, and anemia presenting to the hospital with left hip pain for 2 weeks. Patient was found to have a displaced femoral neck fracture and is scheduled for surgical repair today.    INTERVAL HPI/OVERNIGHT EVENTS:  Patient was seen and examined at bedside. The patient awake in bed comfortably on  3L nasal cannula. Patient became hypotensive as of yesterday evening, received 500ml boluses and transfused 1 unit of pRBC. Overnight, the hemoglobin dropped below 7 and received another unit of transfusion. This morning, the patients blood pressure normalized and the patient felt subjectively well with no complaints aside from mild left hip pain. Denied chest pain, chest tightness, shortness of breath or abdominal pain. Patient reported being constipated. Patient also urinated last night, nurse reported bladder scanning the patient at midnight with a volume of ~70.     ROS: Otherwise unremarkable    VITALS  T(C): 35.8 (07-03-19 @ 06:00), Max: 37.1 (07-02-19 @ 18:01)  HR: 66 (07-03-19 @ 06:00) (66 - 100)  BP: 100/73 (07-03-19 @ 06:00) (86/49 - 114/71)  RR: 18 (07-03-19 @ 06:00) (17 - 18)  SpO2: 98% (07-02-19 @ 20:00) (97% - 99%)  Wt(kg): --Vital Signs Last 24 Hrs  T(C): 35.8 (03 Jul 2019 06:00), Max: 37.1 (02 Jul 2019 18:01)  T(F): 96.4 (03 Jul 2019 06:00), Max: 98.7 (02 Jul 2019 18:01)  HR: 66 (03 Jul 2019 06:00) (66 - 100)  BP: 100/73 (03 Jul 2019 06:00) (86/49 - 114/71)  BP(mean): --  RR: 18 (03 Jul 2019 06:00) (17 - 18)  SpO2: 98% (02 Jul 2019 20:00) (97% - 99%)    MEDICATIONS (STANDING))  atorvastatin 10 milliGRAM(s) Oral at bedtime  chlorhexidine 4% Liquid 1 Application(s) Topical <User Schedule>  docusate sodium 100 milliGRAM(s) Oral two times a day  ergocalciferol 72991 Unit(s) Oral every week  heparin  Infusion 800 Unit(s)/Hr IV Continuous <Continuous>  isosorbide   mononitrate ER Tablet (IMDUR) 30 milliGRAM(s) Oral daily  levothyroxine 50 MICROGram(s) Oral daily  metoprolol succinate ER 50 milliGRAM(s) Oral daily  pantoprazole    Tablet 40 milliGRAM(s) Oral before breakfast  senna 2 Tablet(s) Oral at bedtime      MEDICATIONS (PRN)  acetaminophen   Tablet .. 650 milliGRAM(s) Oral every 8 hours PRN Mild Pain (1 - 3), Moderate Pain (4 - 6)  ALBUTerol    0.083% 2.5 milliGRAM(s) Nebulizer every 6 hours PRN Shortness of Breath and/or Wheezing  magnesium hydroxide Suspension 30 milliLiter(s) Oral daily PRN Constipation  morphine  - Injectable 1 milliGRAM(s) IV Push every 6 hours PRN Severe Pain (7 - 10)  ondansetron Injectable 4 milliGRAM(s) IV Push every 6 hours PRN Nausea and/or Vomiting  simethicone 80 milliGRAM(s) Chew every 6 hours PRN gas pain      ALLERGIES  No Known Allergies      PHYSICAL EXAM:  GENERAL: NAD, awake in bed on 3L cannula  HEAD:  Atraumatic, Normocephalic  NERVOUS SYSTEM:  Alert & Oriented X3, Good concentration, no focal deficits  CHEST/LUNG: CTA B/L, no wheezing  HEART: S1/S2, irregular. No murmurs  ABDOMEN: Soft, Nontender, Nondistended; Bowel sounds present      LABS:                        7.7    15.84 )-----------( 163      ( 03 Jul 2019 01:23 )             24.1     07-02    138  |  106  |  62<HH>  ----------------------------<  158<H>  5.3<H>   |  20  |  1.6<H>    Ca    7.8<L>      02 Jul 2019 17:38    TPro  4.9<L>  /  Alb  2.3<L>  /  TBili  0.5  /  DBili  x   /  AST  13  /  ALT  7   /  AlkPhos  85  07-02    PT/INR - ( 02 Jul 2019 05:45 )   PT: 18.30 sec;   INR: 1.60 ratio      PTT - ( 03 Jul 2019 06:35 )  PTT:36.8 sec (from 61.4)

## 2019-07-04 LAB
ANION GAP SERPL CALC-SCNC: 12 MMOL/L — SIGNIFICANT CHANGE UP (ref 7–14)
ANION GAP SERPL CALC-SCNC: 13 MMOL/L — SIGNIFICANT CHANGE UP (ref 7–14)
ANION GAP SERPL CALC-SCNC: 13 MMOL/L — SIGNIFICANT CHANGE UP (ref 7–14)
APTT BLD: 33.6 SEC — SIGNIFICANT CHANGE UP (ref 27–39.2)
BASOPHILS # BLD AUTO: 0.01 K/UL — SIGNIFICANT CHANGE UP (ref 0–0.2)
BASOPHILS NFR BLD AUTO: 0.1 % — SIGNIFICANT CHANGE UP (ref 0–1)
BUN SERPL-MCNC: 52 MG/DL — HIGH (ref 10–20)
BUN SERPL-MCNC: 54 MG/DL — HIGH (ref 10–20)
BUN SERPL-MCNC: 57 MG/DL — HIGH (ref 10–20)
CALCIUM SERPL-MCNC: 8.4 MG/DL — LOW (ref 8.5–10.1)
CHLORIDE SERPL-SCNC: 102 MMOL/L — SIGNIFICANT CHANGE UP (ref 98–110)
CHLORIDE SERPL-SCNC: 104 MMOL/L — SIGNIFICANT CHANGE UP (ref 98–110)
CHLORIDE SERPL-SCNC: 105 MMOL/L — SIGNIFICANT CHANGE UP (ref 98–110)
CO2 SERPL-SCNC: 18 MMOL/L — SIGNIFICANT CHANGE UP (ref 17–32)
CO2 SERPL-SCNC: 20 MMOL/L — SIGNIFICANT CHANGE UP (ref 17–32)
CO2 SERPL-SCNC: 21 MMOL/L — SIGNIFICANT CHANGE UP (ref 17–32)
CREAT SERPL-MCNC: 1.5 MG/DL — SIGNIFICANT CHANGE UP (ref 0.7–1.5)
CREAT SERPL-MCNC: 1.6 MG/DL — HIGH (ref 0.7–1.5)
CREAT SERPL-MCNC: 1.7 MG/DL — HIGH (ref 0.7–1.5)
EOSINOPHIL # BLD AUTO: 0.23 K/UL — SIGNIFICANT CHANGE UP (ref 0–0.7)
EOSINOPHIL NFR BLD AUTO: 1.7 % — SIGNIFICANT CHANGE UP (ref 0–8)
GLUCOSE SERPL-MCNC: 101 MG/DL — HIGH (ref 70–99)
GLUCOSE SERPL-MCNC: 109 MG/DL — HIGH (ref 70–99)
GLUCOSE SERPL-MCNC: 109 MG/DL — HIGH (ref 70–99)
HCT VFR BLD CALC: 24.5 % — LOW (ref 42–52)
HCT VFR BLD CALC: 24.5 % — LOW (ref 42–52)
HCT VFR BLD CALC: 25.6 % — LOW (ref 42–52)
HGB BLD-MCNC: 7.7 G/DL — LOW (ref 14–18)
HGB BLD-MCNC: 7.8 G/DL — LOW (ref 14–18)
HGB BLD-MCNC: 8 G/DL — LOW (ref 14–18)
IMM GRANULOCYTES NFR BLD AUTO: 0.5 % — HIGH (ref 0.1–0.3)
INR BLD: 2.55 RATIO — HIGH (ref 0.65–1.3)
LYMPHOCYTES # BLD AUTO: 1.53 K/UL — SIGNIFICANT CHANGE UP (ref 1.2–3.4)
LYMPHOCYTES # BLD AUTO: 11.6 % — LOW (ref 20.5–51.1)
MAGNESIUM SERPL-MCNC: 1.7 MG/DL — LOW (ref 1.8–2.4)
MCHC RBC-ENTMCNC: 21.6 PG — LOW (ref 27–31)
MCHC RBC-ENTMCNC: 21.9 PG — LOW (ref 27–31)
MCHC RBC-ENTMCNC: 22.1 PG — LOW (ref 27–31)
MCHC RBC-ENTMCNC: 31.3 G/DL — LOW (ref 32–37)
MCHC RBC-ENTMCNC: 31.4 G/DL — LOW (ref 32–37)
MCHC RBC-ENTMCNC: 31.8 G/DL — LOW (ref 32–37)
MCV RBC AUTO: 68.6 FL — LOW (ref 80–94)
MCV RBC AUTO: 69.4 FL — LOW (ref 80–94)
MCV RBC AUTO: 69.9 FL — LOW (ref 80–94)
MONOCYTES # BLD AUTO: 1.37 K/UL — HIGH (ref 0.1–0.6)
MONOCYTES NFR BLD AUTO: 10.4 % — HIGH (ref 1.7–9.3)
NEUTROPHILS # BLD AUTO: 9.99 K/UL — HIGH (ref 1.4–6.5)
NEUTROPHILS NFR BLD AUTO: 75.7 % — HIGH (ref 42.2–75.2)
NRBC # BLD: 0 /100 WBCS — SIGNIFICANT CHANGE UP (ref 0–0)
PHOSPHATE SERPL-MCNC: 3.4 MG/DL — SIGNIFICANT CHANGE UP (ref 2.1–4.9)
PLATELET # BLD AUTO: 176 K/UL — SIGNIFICANT CHANGE UP (ref 130–400)
PLATELET # BLD AUTO: 180 K/UL — SIGNIFICANT CHANGE UP (ref 130–400)
PLATELET # BLD AUTO: 184 K/UL — SIGNIFICANT CHANGE UP (ref 130–400)
POTASSIUM SERPL-MCNC: 4.9 MMOL/L — SIGNIFICANT CHANGE UP (ref 3.5–5)
POTASSIUM SERPL-MCNC: 5.3 MMOL/L — HIGH (ref 3.5–5)
POTASSIUM SERPL-MCNC: 5.3 MMOL/L — HIGH (ref 3.5–5)
POTASSIUM SERPL-SCNC: 4.9 MMOL/L — SIGNIFICANT CHANGE UP (ref 3.5–5)
POTASSIUM SERPL-SCNC: 5.3 MMOL/L — HIGH (ref 3.5–5)
POTASSIUM SERPL-SCNC: 5.3 MMOL/L — HIGH (ref 3.5–5)
PROTHROM AB SERPL-ACNC: 29.1 SEC — HIGH (ref 9.95–12.87)
RBC # BLD: 3.53 M/UL — LOW (ref 4.7–6.1)
RBC # BLD: 3.57 M/UL — LOW (ref 4.7–6.1)
RBC # BLD: 3.66 M/UL — LOW (ref 4.7–6.1)
RBC # FLD: 22.5 % — HIGH (ref 11.5–14.5)
RBC # FLD: 23 % — HIGH (ref 11.5–14.5)
RBC # FLD: 23.1 % — HIGH (ref 11.5–14.5)
SODIUM SERPL-SCNC: 136 MMOL/L — SIGNIFICANT CHANGE UP (ref 135–146)
WBC # BLD: 12.89 K/UL — HIGH (ref 4.8–10.8)
WBC # BLD: 13.2 K/UL — HIGH (ref 4.8–10.8)
WBC # BLD: 13.58 K/UL — HIGH (ref 4.8–10.8)
WBC # FLD AUTO: 12.89 K/UL — HIGH (ref 4.8–10.8)
WBC # FLD AUTO: 13.2 K/UL — HIGH (ref 4.8–10.8)
WBC # FLD AUTO: 13.58 K/UL — HIGH (ref 4.8–10.8)

## 2019-07-04 PROCEDURE — 99233 SBSQ HOSP IP/OBS HIGH 50: CPT

## 2019-07-04 RX ORDER — WARFARIN SODIUM 2.5 MG/1
1 TABLET ORAL ONCE
Refills: 0 | Status: COMPLETED | OUTPATIENT
Start: 2019-07-04 | End: 2019-07-04

## 2019-07-04 RX ORDER — SODIUM POLYSTYRENE SULFONATE 4.1 MEQ/G
15 POWDER, FOR SUSPENSION ORAL ONCE
Refills: 0 | Status: COMPLETED | OUTPATIENT
Start: 2019-07-04 | End: 2019-07-04

## 2019-07-04 RX ORDER — SODIUM POLYSTYRENE SULFONATE 4.1 MEQ/G
30 POWDER, FOR SUSPENSION ORAL ONCE
Refills: 0 | Status: COMPLETED | OUTPATIENT
Start: 2019-07-04 | End: 2019-07-04

## 2019-07-04 RX ADMIN — WARFARIN SODIUM 1 MILLIGRAM(S): 2.5 TABLET ORAL at 21:29

## 2019-07-04 RX ADMIN — SODIUM POLYSTYRENE SULFONATE 15 GRAM(S): 4.1 POWDER, FOR SUSPENSION ORAL at 19:53

## 2019-07-04 RX ADMIN — Medication 100 MILLIGRAM(S): at 05:19

## 2019-07-04 RX ADMIN — PANTOPRAZOLE SODIUM 40 MILLIGRAM(S): 20 TABLET, DELAYED RELEASE ORAL at 06:07

## 2019-07-04 RX ADMIN — CHLORHEXIDINE GLUCONATE 1 APPLICATION(S): 213 SOLUTION TOPICAL at 05:19

## 2019-07-04 RX ADMIN — ISOSORBIDE MONONITRATE 30 MILLIGRAM(S): 60 TABLET, EXTENDED RELEASE ORAL at 11:17

## 2019-07-04 RX ADMIN — Medication 50 MILLIGRAM(S): at 05:17

## 2019-07-04 RX ADMIN — SODIUM POLYSTYRENE SULFONATE 30 GRAM(S): 4.1 POWDER, FOR SUSPENSION ORAL at 10:32

## 2019-07-04 RX ADMIN — MORPHINE SULFATE 1 MILLIGRAM(S): 50 CAPSULE, EXTENDED RELEASE ORAL at 11:16

## 2019-07-04 RX ADMIN — MORPHINE SULFATE 1 MILLIGRAM(S): 50 CAPSULE, EXTENDED RELEASE ORAL at 10:31

## 2019-07-04 RX ADMIN — Medication 100 MILLIGRAM(S): at 17:11

## 2019-07-04 RX ADMIN — ATORVASTATIN CALCIUM 10 MILLIGRAM(S): 80 TABLET, FILM COATED ORAL at 21:29

## 2019-07-04 RX ADMIN — Medication 50 MICROGRAM(S): at 05:17

## 2019-07-04 RX ADMIN — SENNA PLUS 2 TABLET(S): 8.6 TABLET ORAL at 21:29

## 2019-07-04 NOTE — PROGRESS NOTE ADULT - SUBJECTIVE AND OBJECTIVE BOX
Patient is a 89y old  Male who presents with a chief complaint of abdominal tightness and mechanical fall (03 Jul 2019 08:34)    HPI:  This is a 89 yo male with PMH significant for CHF w REF 40-45% on 2L home O2, afib on coumadin, AAA s/p repair, HTN, CAD, DLD, and anemia presenting to the hospital with complaints of abdominal tightness for the past week and s/p mechanical fall about 2 weeks ago. Patient states that it feels like there is a tightening band around his lower abdomen as well as some epigastric pain, and feeling of food getting stuck when he eats and decreased appetite/PO intake. States that eating worsens his symptoms and bowel movements improve his symptoms. Patient also complains of dark bowel movements, no rodney blood, and less frequent bowel movements. Also endorses increased, malodorous urinations; denies any burning. Has also had a few episodes of dark phlegm, with no rodney blood. Additionally, about 2 weeks ago patient had a mechanical fall while on the way to bathroom; denies any dizziness, LOC, or head trauma. He was taken to an OP orthopedist and Xray confirmed L femur fracture. Since then, pain has continued - is better at rest, but worse with movements like getting out of bed/liftiing leg. he is unable to bear weight anymore, and now uses wheelchair instead of his walker. Of note, patient has been holding warfarin since Monday due to INR 6.5. He denies any fever, chills, diarrhea, vomiting, chest pain, cough or wheezing.     In the ED, /67, HR 76, RR 18, T 98.6, SaO2 100% RA. Ct cervical spine, CT CAP, Xray femur, CXR performed. (28 Jun 2019 10:01)    PAST MEDICAL & SURGICAL HISTORY:  Hyperlipidemia  CHF (congestive heart failure)  Afib  Hypertension  S/P AAA (abdominal aortic aneurysm) repair    patient seen and examined independently on morning rounds, chart reviewed and discussed with the medicine resident on call    no overnight events---continues to c/o LEft hip pain- feels weak and tired today-                PE:  GEN-NAD, AAOx3, +pallor,   PULM- Clear to auscultation bilaterally, fair air entry  CVS- +s1/s2 irreg irreg no murmurs  GI- soft NT ND +bs, no rebound, no guarding  EXT- Left lateral hip surgical dressing c/d/i-   SKIN- no rashes/no lesions        Labs:                        7.7    13.20 )-----------( 180      ( 04 Jul 2019 06:07 )             24.5     CBC Full  -  ( 04 Jul 2019 06:07 )  WBC Count : 13.20 K/uL  RBC Count : 3.57 M/uL  Hemoglobin : 7.7 g/dL  Hematocrit : 24.5 %  Platelet Count - Automated : 180 K/uL  Mean Cell Volume : 68.6 fL  Mean Cell Hemoglobin : 21.6 pg  Mean Cell Hemoglobin Concentration : 31.4 g/dL  Auto Neutrophil # : 9.99 K/uL  Auto Lymphocyte # : 1.53 K/uL  Auto Monocyte # : 1.37 K/uL  Auto Eosinophil # : 0.23 K/uL  Auto Basophil # : 0.01 K/uL  Auto Neutrophil % : 75.7 %  Auto Lymphocyte % : 11.6 %  Auto Monocyte % : 10.4 %  Auto Eosinophil % : 1.7 %  Auto Basophil % : 0.1 %      07-04    136  |  102  |  57<H>  ----------------------------<  101<H>  5.3<H>   |  21  |  1.7<H>    Ca    8.4<L>      04 Jul 2019 06:07  Phos  3.4     07-04  Mg     1.7     07-04    TPro  5.2<L>  /  Alb  2.6<L>  /  TBili  0.6  /  DBili  x   /  AST  15  /  ALT  <5  /  AlkPhos  89  07-03      Microbiology:      Vital Signs Last 24 Hrs  T(C): 36.2 (04 Jul 2019 13:50), Max: 37.6 (03 Jul 2019 21:00)  T(F): 97.1 (04 Jul 2019 13:50), Max: 99.7 (03 Jul 2019 21:00)  HR: 76 (04 Jul 2019 13:50) (76 - 90)  BP: 100/58 (04 Jul 2019 13:50) (100/58 - 114/65)  BP(mean): --  RR: 18 (04 Jul 2019 13:50) (18 - 18)  SpO2: 100% (04 Jul 2019 08:30) (100% - 100%)    I&O's Summary    03 Jul 2019 07:01  -  04 Jul 2019 07:00  --------------------------------------------------------  IN: 0 mL / OUT: 550 mL / NET: -550 mL                MEDICATIONS  (STANDING):  atorvastatin 10 milliGRAM(s) Oral at bedtime  chlorhexidine 4% Liquid 1 Application(s) Topical <User Schedule>  docusate sodium 100 milliGRAM(s) Oral two times a day  ergocalciferol 04813 Unit(s) Oral every week  isosorbide   mononitrate ER Tablet (IMDUR) 30 milliGRAM(s) Oral daily  levothyroxine 50 MICROGram(s) Oral daily  metoprolol succinate ER 50 milliGRAM(s) Oral daily  pantoprazole    Tablet 40 milliGRAM(s) Oral before breakfast  senna 2 Tablet(s) Oral at bedtime

## 2019-07-04 NOTE — PROGRESS NOTE ADULT - ASSESSMENT
a/p:  #s/p fall- Left femoral fracture  -pod#3 Left hemiarthroplasty  -f/u ortho recom  -pain control  -PT eval---consideration for 4A acute rehab--patient feeling tired today---continue with PT tomorrow  -oob in chair  -fall precautions  -cont incentive spirometry    #post-op anemia- microcytic  -s/p 2 u prbc transfusion  -monitor cbc daily---repeat cbc this morning hb 7.7----will   repeat cbc at 4 pm and transfuse if hb<7  -check anemia w/u     #AF   -daily inr for dosing--today inr 2.55---coumadin 1 mg tonight  -repat inr in am   -bblocker for rate control    #hyperkalemia  -k 5.3 today--kayexalate 30 gm x 1 and repeat bmp at 4 pm    #DVT/GI ppx  FULL CODE    #Progress Note Handoff    Disposition: to STR vs acute rehab (4A) when medically stable

## 2019-07-05 ENCOUNTER — TRANSCRIPTION ENCOUNTER (OUTPATIENT)
Age: 84
End: 2019-07-05

## 2019-07-05 ENCOUNTER — INPATIENT (INPATIENT)
Facility: HOSPITAL | Age: 84
LOS: 24 days | Discharge: HOME HEALTH | End: 2019-07-30
Attending: PHYSICAL MEDICINE & REHABILITATION | Admitting: PHYSICAL MEDICINE & REHABILITATION
Payer: MEDICARE

## 2019-07-05 VITALS
TEMPERATURE: 97 F | RESPIRATION RATE: 18 BRPM | SYSTOLIC BLOOD PRESSURE: 114 MMHG | HEART RATE: 76 BPM | DIASTOLIC BLOOD PRESSURE: 59 MMHG

## 2019-07-05 DIAGNOSIS — Z98.890 OTHER SPECIFIED POSTPROCEDURAL STATES: Chronic | ICD-10-CM

## 2019-07-05 LAB
ANION GAP SERPL CALC-SCNC: 12 MMOL/L — SIGNIFICANT CHANGE UP (ref 7–14)
APTT BLD: 34 SEC — SIGNIFICANT CHANGE UP (ref 27–39.2)
BUN SERPL-MCNC: 51 MG/DL — HIGH (ref 10–20)
CALCIUM SERPL-MCNC: 8.6 MG/DL — SIGNIFICANT CHANGE UP (ref 8.5–10.1)
CHLORIDE SERPL-SCNC: 104 MMOL/L — SIGNIFICANT CHANGE UP (ref 98–110)
CO2 SERPL-SCNC: 22 MMOL/L — SIGNIFICANT CHANGE UP (ref 17–32)
CREAT SERPL-MCNC: 1.4 MG/DL — SIGNIFICANT CHANGE UP (ref 0.7–1.5)
GLUCOSE SERPL-MCNC: 110 MG/DL — HIGH (ref 70–99)
HCT VFR BLD CALC: 24.5 % — LOW (ref 42–52)
HGB BLD-MCNC: 7.7 G/DL — LOW (ref 14–18)
INR BLD: 2.35 RATIO — HIGH (ref 0.65–1.3)
LIDOCAIN IGE QN: 12 U/L — SIGNIFICANT CHANGE UP (ref 7–60)
MCHC RBC-ENTMCNC: 22 PG — LOW (ref 27–31)
MCHC RBC-ENTMCNC: 31.4 G/DL — LOW (ref 32–37)
MCV RBC AUTO: 70 FL — LOW (ref 80–94)
NRBC # BLD: 0 /100 WBCS — SIGNIFICANT CHANGE UP (ref 0–0)
PLATELET # BLD AUTO: 175 K/UL — SIGNIFICANT CHANGE UP (ref 130–400)
POTASSIUM SERPL-MCNC: 4.6 MMOL/L — SIGNIFICANT CHANGE UP (ref 3.5–5)
POTASSIUM SERPL-SCNC: 4.6 MMOL/L — SIGNIFICANT CHANGE UP (ref 3.5–5)
PROTHROM AB SERPL-ACNC: 26.8 SEC — HIGH (ref 9.95–12.87)
RBC # BLD: 3.5 M/UL — LOW (ref 4.7–6.1)
RBC # FLD: 23.5 % — HIGH (ref 11.5–14.5)
SODIUM SERPL-SCNC: 138 MMOL/L — SIGNIFICANT CHANGE UP (ref 135–146)
WBC # BLD: 11.45 K/UL — HIGH (ref 4.8–10.8)
WBC # FLD AUTO: 11.45 K/UL — HIGH (ref 4.8–10.8)

## 2019-07-05 PROCEDURE — 99233 SBSQ HOSP IP/OBS HIGH 50: CPT

## 2019-07-05 RX ORDER — FUROSEMIDE 40 MG
20 TABLET ORAL DAILY
Refills: 0 | Status: DISCONTINUED | OUTPATIENT
Start: 2019-07-05 | End: 2019-07-19

## 2019-07-05 RX ORDER — SENNA PLUS 8.6 MG/1
2 TABLET ORAL
Qty: 0 | Refills: 0 | DISCHARGE
Start: 2019-07-05

## 2019-07-05 RX ORDER — ONDANSETRON 8 MG/1
4 TABLET, FILM COATED ORAL
Qty: 0 | Refills: 0 | DISCHARGE
Start: 2019-07-05

## 2019-07-05 RX ORDER — ERGOCALCIFEROL 1.25 MG/1
50000 CAPSULE ORAL
Refills: 0 | Status: DISCONTINUED | OUTPATIENT
Start: 2019-07-05 | End: 2019-07-07

## 2019-07-05 RX ORDER — SIMETHICONE 80 MG/1
80 TABLET, CHEWABLE ORAL EVERY 6 HOURS
Refills: 0 | Status: DISCONTINUED | OUTPATIENT
Start: 2019-07-05 | End: 2019-07-30

## 2019-07-05 RX ORDER — TRAMADOL HYDROCHLORIDE 50 MG/1
50 TABLET ORAL EVERY 6 HOURS
Refills: 0 | Status: DISCONTINUED | OUTPATIENT
Start: 2019-07-05 | End: 2019-07-09

## 2019-07-05 RX ORDER — WARFARIN SODIUM 2.5 MG/1
1 TABLET ORAL ONCE
Refills: 0 | Status: COMPLETED | OUTPATIENT
Start: 2019-07-05 | End: 2019-07-05

## 2019-07-05 RX ORDER — PANTOPRAZOLE SODIUM 20 MG/1
1 TABLET, DELAYED RELEASE ORAL
Qty: 0 | Refills: 0 | DISCHARGE

## 2019-07-05 RX ORDER — ATORVASTATIN CALCIUM 80 MG/1
10 TABLET, FILM COATED ORAL AT BEDTIME
Refills: 0 | Status: DISCONTINUED | OUTPATIENT
Start: 2019-07-05 | End: 2019-07-30

## 2019-07-05 RX ORDER — MAGNESIUM HYDROXIDE 400 MG/1
30 TABLET, CHEWABLE ORAL
Qty: 0 | Refills: 0 | DISCHARGE
Start: 2019-07-05

## 2019-07-05 RX ORDER — LEVOTHYROXINE SODIUM 125 MCG
1 TABLET ORAL
Qty: 0 | Refills: 0 | DISCHARGE

## 2019-07-05 RX ORDER — SIMETHICONE 80 MG/1
1 TABLET, CHEWABLE ORAL
Qty: 0 | Refills: 0 | DISCHARGE
Start: 2019-07-05

## 2019-07-05 RX ORDER — METOPROLOL TARTRATE 50 MG
50 TABLET ORAL DAILY
Refills: 0 | Status: DISCONTINUED | OUTPATIENT
Start: 2019-07-05 | End: 2019-07-30

## 2019-07-05 RX ORDER — PANTOPRAZOLE SODIUM 20 MG/1
1 TABLET, DELAYED RELEASE ORAL
Qty: 0 | Refills: 0 | DISCHARGE
Start: 2019-07-05

## 2019-07-05 RX ORDER — ISOSORBIDE MONONITRATE 60 MG/1
1 TABLET, EXTENDED RELEASE ORAL
Qty: 0 | Refills: 0 | DISCHARGE
Start: 2019-07-05

## 2019-07-05 RX ORDER — LEVOTHYROXINE SODIUM 125 MCG
50 TABLET ORAL DAILY
Refills: 0 | Status: DISCONTINUED | OUTPATIENT
Start: 2019-07-05 | End: 2019-07-30

## 2019-07-05 RX ORDER — ACETAMINOPHEN 500 MG
650 TABLET ORAL EVERY 6 HOURS
Refills: 0 | Status: DISCONTINUED | OUTPATIENT
Start: 2019-07-05 | End: 2019-07-30

## 2019-07-05 RX ORDER — DOCUSATE SODIUM 100 MG
1 CAPSULE ORAL
Qty: 0 | Refills: 0 | DISCHARGE
Start: 2019-07-05

## 2019-07-05 RX ORDER — WARFARIN SODIUM 2.5 MG/1
1 TABLET ORAL ONCE
Refills: 0 | Status: DISCONTINUED | OUTPATIENT
Start: 2019-07-05 | End: 2019-07-05

## 2019-07-05 RX ORDER — ALBUTEROL 90 UG/1
2.5 AEROSOL, METERED ORAL EVERY 6 HOURS
Refills: 0 | Status: DISCONTINUED | OUTPATIENT
Start: 2019-07-05 | End: 2019-07-30

## 2019-07-05 RX ORDER — LEVOTHYROXINE SODIUM 125 MCG
1 TABLET ORAL
Qty: 0 | Refills: 0 | DISCHARGE
Start: 2019-07-05

## 2019-07-05 RX ORDER — FERROUS SULFATE 325(65) MG
325 TABLET ORAL DAILY
Refills: 0 | Status: DISCONTINUED | OUTPATIENT
Start: 2019-07-05 | End: 2019-07-30

## 2019-07-05 RX ORDER — PANTOPRAZOLE SODIUM 20 MG/1
40 TABLET, DELAYED RELEASE ORAL
Refills: 0 | Status: DISCONTINUED | OUTPATIENT
Start: 2019-07-05 | End: 2019-07-30

## 2019-07-05 RX ORDER — DOCUSATE SODIUM 100 MG
100 CAPSULE ORAL
Refills: 0 | Status: DISCONTINUED | OUTPATIENT
Start: 2019-07-05 | End: 2019-07-30

## 2019-07-05 RX ORDER — MORPHINE SULFATE 50 MG/1
1 CAPSULE, EXTENDED RELEASE ORAL
Qty: 0 | Refills: 0 | DISCHARGE
Start: 2019-07-05

## 2019-07-05 RX ORDER — MAGNESIUM HYDROXIDE 400 MG/1
30 TABLET, CHEWABLE ORAL DAILY
Refills: 0 | Status: DISCONTINUED | OUTPATIENT
Start: 2019-07-05 | End: 2019-07-30

## 2019-07-05 RX ORDER — ACETAMINOPHEN 500 MG
2 TABLET ORAL
Qty: 0 | Refills: 0 | DISCHARGE
Start: 2019-07-05

## 2019-07-05 RX ORDER — ISOSORBIDE MONONITRATE 60 MG/1
30 TABLET, EXTENDED RELEASE ORAL DAILY
Refills: 0 | Status: DISCONTINUED | OUTPATIENT
Start: 2019-07-05 | End: 2019-07-30

## 2019-07-05 RX ORDER — SENNA PLUS 8.6 MG/1
2 TABLET ORAL AT BEDTIME
Refills: 0 | Status: DISCONTINUED | OUTPATIENT
Start: 2019-07-05 | End: 2019-07-30

## 2019-07-05 RX ADMIN — CHLORHEXIDINE GLUCONATE 1 APPLICATION(S): 213 SOLUTION TOPICAL at 05:18

## 2019-07-05 RX ADMIN — SENNA PLUS 2 TABLET(S): 8.6 TABLET ORAL at 22:29

## 2019-07-05 RX ADMIN — ATORVASTATIN CALCIUM 10 MILLIGRAM(S): 80 TABLET, FILM COATED ORAL at 22:28

## 2019-07-05 RX ADMIN — Medication 50 MILLIGRAM(S): at 05:19

## 2019-07-05 RX ADMIN — ISOSORBIDE MONONITRATE 30 MILLIGRAM(S): 60 TABLET, EXTENDED RELEASE ORAL at 11:52

## 2019-07-05 RX ADMIN — Medication 50 MICROGRAM(S): at 05:19

## 2019-07-05 RX ADMIN — Medication 100 MILLIGRAM(S): at 17:11

## 2019-07-05 RX ADMIN — PANTOPRAZOLE SODIUM 40 MILLIGRAM(S): 20 TABLET, DELAYED RELEASE ORAL at 05:19

## 2019-07-05 RX ADMIN — WARFARIN SODIUM 1 MILLIGRAM(S): 2.5 TABLET ORAL at 22:29

## 2019-07-05 RX ADMIN — Medication 100 MILLIGRAM(S): at 05:19

## 2019-07-05 NOTE — DISCHARGE NOTE NURSING/CASE MANAGEMENT/SOCIAL WORK - [email or web address]_GEN_ALL_CORE
You can access the [facility] Portal, offered by [facility], by registering with the following website: [email or web address]

## 2019-07-05 NOTE — PROGRESS NOTE ADULT - PROVIDER SPECIALTY LIST ADULT
Hospitalist
Internal Medicine
Orthopedics
Physiatry
Orthopedics

## 2019-07-05 NOTE — PROGRESS NOTE ADULT - SUBJECTIVE AND OBJECTIVE BOX
Patient is a 89y old  Male who presents with a chief complaint of abdominal tightness and mechanical fall (03 Jul 2019 08:34)    HPI:  This is a 89 yo male with PMH significant for CHF w REF 40-45% on 2L home O2, afib on coumadin, AAA s/p repair, HTN, CAD, DLD, and anemia presenting to the hospital with complaints of abdominal tightness for the past week and s/p mechanical fall about 2 weeks ago. Patient states that it feels like there is a tightening band around his lower abdomen as well as some epigastric pain, and feeling of food getting stuck when he eats and decreased appetite/PO intake. States that eating worsens his symptoms and bowel movements improve his symptoms. Patient also complains of dark bowel movements, no rodney blood, and less frequent bowel movements. Also endorses increased, malodorous urinations; denies any burning. Has also had a few episodes of dark phlegm, with no rodney blood. Additionally, about 2 weeks ago patient had a mechanical fall while on the way to bathroom; denies any dizziness, LOC, or head trauma. He was taken to an OP orthopedist and Xray confirmed L femur fracture. Since then, pain has continued - is better at rest, but worse with movements like getting out of bed/liftiing leg. he is unable to bear weight anymore, and now uses wheelchair instead of his walker. Of note, patient has been holding warfarin since Monday due to INR 6.5. He denies any fever, chills, diarrhea, vomiting, chest pain, cough or wheezing.     In the ED, /67, HR 76, RR 18, T 98.6, SaO2 100% RA. Ct cervical spine, CT CAP, Xray femur, CXR performed. (28 Jun 2019 10:01)    PAST MEDICAL & SURGICAL HISTORY:  Hyperlipidemia  CHF (congestive heart failure)  Afib  Hypertension  S/P AAA (abdominal aortic aneurysm) repair    patient seen and examined independently on morning rounds, chart reviewed and discussed with the medicine resident and on interdisciplinary rounds    no overnight events---oob into chair this morning- still c/o Left hip pain- had abodminal pain this morning which resolved after bm- awaiting auth/decision for possible transfer to                PE:  GEN-NAD, AAOx3, +pallor,   PULM- Clear to auscultation bilaterally, fair air entry  CVS- +s1/s2 irreg irreg no murmurs  GI- soft NT ND +bs, no rebound, no guarding, slight LLQ ecchymosis without any tenderness  EXT- Left lateral hip surgical dressing c/d/i-   SKIN- no rashes/no lesions          Labs:                        7.7    11.45 )-----------( 175      ( 05 Jul 2019 05:27 )             24.5     CBC Full  -  ( 05 Jul 2019 05:27 )  WBC Count : 11.45 K/uL  RBC Count : 3.50 M/uL  Hemoglobin : 7.7 g/dL  Hematocrit : 24.5 %  Platelet Count - Automated : 175 K/uL  Mean Cell Volume : 70.0 fL  Mean Cell Hemoglobin : 22.0 pg  Mean Cell Hemoglobin Concentration : 31.4 g/dL  Auto Neutrophil # : x  Auto Lymphocyte # : x  Auto Monocyte # : x  Auto Eosinophil # : x  Auto Basophil # : x  Auto Neutrophil % : x  Auto Lymphocyte % : x  Auto Monocyte % : x  Auto Eosinophil % : x  Auto Basophil % : x      07-05    138  |  104  |  51<H>  ----------------------------<  110<H>  4.6   |  22  |  1.4    Ca    8.6      05 Jul 2019 05:27  Phos  3.4     07-04  Mg     1.7     07-04        Microbiology:      Vital Signs Last 24 Hrs  T(C): 36.2 (05 Jul 2019 12:30), Max: 37.4 (04 Jul 2019 21:00)  T(F): 97.1 (05 Jul 2019 12:30), Max: 99.3 (04 Jul 2019 21:00)  HR: 76 (05 Jul 2019 12:30) (73 - 76)  BP: 114/59 (05 Jul 2019 12:30) (111/59 - 117/63)  BP(mean): --  RR: 18 (05 Jul 2019 12:30) (18 - 18)  SpO2: 100% (05 Jul 2019 07:26) (100% - 100%)    I&O's Summary    04 Jul 2019 07:01  -  05 Jul 2019 07:00  --------------------------------------------------------  IN: 0 mL / OUT: 350 mL / NET: -350 mL    05 Jul 2019 07:01  -  05 Jul 2019 17:15  --------------------------------------------------------  IN: 480 mL / OUT: 150 mL / NET: 330 mL                MEDICATIONS  (STANDING):  atorvastatin 10 milliGRAM(s) Oral at bedtime  chlorhexidine 4% Liquid 1 Application(s) Topical <User Schedule>  docusate sodium 100 milliGRAM(s) Oral two times a day  ergocalciferol 30068 Unit(s) Oral every week  isosorbide   mononitrate ER Tablet (IMDUR) 30 milliGRAM(s) Oral daily  levothyroxine 50 MICROGram(s) Oral daily  metoprolol succinate ER 50 milliGRAM(s) Oral daily  pantoprazole    Tablet 40 milliGRAM(s) Oral before breakfast  senna 2 Tablet(s) Oral at bedtime

## 2019-07-05 NOTE — PROGRESS NOTE ADULT - ASSESSMENT
Assessment:   This is a 89 yo male with PMH significant for CHF w REF 40-45% on 2L home O2, afib on coumadin, AAA s/p repair, HTN, CAD, DLD, and anemia presenting to the hospital with left hip pain for 2 weeks. Patient was found to have a displaced femoral neck fracture and is scheduled for surgical repair today.    #Mechanical Fall with acute left hip fracture s/p hemiarthroplasty (19)  - Post-left hemiarthroplasty  - Monitor INR  - Incentive spirometry  - OOBTC  - F/U Ortho  - Fall precaution    #Post-operative anemia  - s/p 2 units of pRBC transfusion   - Noted ecchymoses on left lower abdomen  - Will continue to evaluate, possible CT to rule out bleed?  - H.7, will contnue to monitor. Transfuse if <7    #Hyperkalemia - improving  - K 4.6 this morning (given kayexalate yesterday)    #COY/CKD Stage 3a  - Monitoring I&O  - Gentle fluid hydration due to worsening kidney function and D/Rush lasix  - Following BUN/Cr, most recent .4  - Avoid nephrotoxic agents    #H/O of Aflutter/Afib - was on warfarin  - Warfarin restarted  - INR daily last INR: 2.35  - HR Controlled metoprolol    #H/O of CHF w/ LV systolic dysfunction  - Chest CT demonstrated pulmonary congestion with small effusions  - Consulted cardiology, moderate risk   - Lasix - DC'ed due to worsening renal function   - Daily weights  - Monitor I&O    #GI PPX  - Pantoprazole    #DLD  - Statin

## 2019-07-05 NOTE — DISCHARGE NOTE PROVIDER - HOSPITAL COURSE
This is a 89 yo male with PMH significant for CHF w REF 40-45% on 2L home O2, afib on coumadin, AAA s/p repair, HTN, CAD, DLD, and anemia presenting to the hospital with left hip pain for 2 weeks. Patient was found to have a displaced femoral neck fracture and had a left hip hemiarthroplasty. The patient had events of low blood pressure, for which he received gentle hydration. Additionally, the patient was having low hemoglobin intermittently and received multiple transfusions. He also had higher potassium. The patient was made aware of all plans pertaining to his stay. The patient was ambulating as tolerated, however he still complained of nonspecific upper abdominal tightness. There was also an ecchymoses present in the left lower abdominal region near the area of the surgery.

## 2019-07-05 NOTE — PROGRESS NOTE ADULT - SUBJECTIVE AND OBJECTIVE BOX
Patient seen and examined at bedside in the am, resting comfortably in bed, pain well controlled. No complaints.    Physical Exam  NAD, AOx3  Non-labored breathing   Left Hip  Dressing C/D/I  EHL/FHL/GA/TA motor intact  SP/Dp/T/S/S SILT   Toes WWP                           7.7    11.45 )-----------( 175      ( 05 Jul 2019 05:27 )             24.5                        7.7      Assessment/Plan  89M POD#4 s/p left femoral neck fracture s/p hemiarthroplasty    Continue to trend CBC  Pain control  Slow PT progression, c/w sessions  Management of comorbidities per primary team  Ortho to follow

## 2019-07-05 NOTE — DISCHARGE NOTE PROVIDER - NSDCCPCAREPLAN_GEN_ALL_CORE_FT
PRINCIPAL DISCHARGE DIAGNOSIS  Diagnosis: Femur fracture, left  Assessment and Plan of Treatment: You experienced a left femur fracture and underwent a left hemiarthroplasty. Please follow up with your new doctors for further management.

## 2019-07-05 NOTE — PROGRESS NOTE ADULT - ASSESSMENT
a/p:  #s/p fall- Left femoral fracture  -pod#4 Left hemiarthroplasty  -f/u ortho recom  -pain control  -PT eval---consideration for 4A acute rehab-awiating auth and transfser when bed avail   -oob in chair  -fall precautions  -cont incentive spirometry    #post-op anemia- microcytic  -s/p 2 u prbc transfusion  -monitor cbc daily---repeat cbc this morning stable (hb 7.7)----will   continue to monitor cbc --no signs of active bleeding  -check anemia w/u     #AF   -daily inr for dosing--today inr 2.35---coumadin 1 mg tonight  -repat inr daily for coumadin dosing (goal inr 2-3)  -bblocker for rate control    #hyperkalemia with constipation  -resolved post bm yesterday and today---monitor bmp  -bowel regimen---encourage oob and increasing movement to facilite GI stability    #DVT/GI ppx  FULL CODE    #Progress Note Handoff    Disposition:  medically stable for transfer to acute 4A rehab when bed avail/authorization

## 2019-07-05 NOTE — PROGRESS NOTE ADULT - REASON FOR ADMISSION
left hip fracture
abdominal tightness and mechanical fall
s/p albina
abdominal tightness and mechanical fall

## 2019-07-05 NOTE — PROGRESS NOTE ADULT - SUBJECTIVE AND OBJECTIVE BOX
HPI  Patient is a 89y old Male who presents with a chief complaint of abdominal tightness and mechanical fall (05 Jul 2019 07:43)    Currently admitted to medicine with the primary diagnosis of Femur fracture, left     Today is hospital day 7d.     INTERVAL HPI / OVERNIGHT EVENTS:  Patient was examined and seen at bedside. This morning he is resting in bed on 2L cannula in some distress. Was complaining of "numbness in my leg" with the complaint of having difficulty moving it. He also complained of some tightness in his upper abdomen and some pain in the left hip s/p surgery. Denied difficulty breathing, chest pain, nausea, or vomiting. The patient said he felt "tired" and slept little. Last BM last night - normal.  Is passing gas.    ROS: Otherwise unremarkable     PAST MEDICAL & SURGICAL HISTORY  Hyperlipidemia  CHF (congestive heart failure)  Afib  Hypertension  S/P AAA (abdominal aortic aneurysm) repair    ALLERGIES  No Known Allergies    MEDICATIONS  STANDING MEDICATIONS  atorvastatin 10 milliGRAM(s) Oral at bedtime  chlorhexidine 4% Liquid 1 Application(s) Topical <User Schedule>  docusate sodium 100 milliGRAM(s) Oral two times a day  ergocalciferol 31671 Unit(s) Oral every week  isosorbide   mononitrate ER Tablet (IMDUR) 30 milliGRAM(s) Oral daily  levothyroxine 50 MICROGram(s) Oral daily  metoprolol succinate ER 50 milliGRAM(s) Oral daily  pantoprazole    Tablet 40 milliGRAM(s) Oral before breakfast  senna 2 Tablet(s) Oral at bedtime  warfarin 1 milliGRAM(s) Oral once    PRN MEDICATIONS  acetaminophen   Tablet .. 650 milliGRAM(s) Oral every 8 hours PRN  ALBUTerol    0.083% 2.5 milliGRAM(s) Nebulizer every 6 hours PRN  magnesium hydroxide Suspension 30 milliLiter(s) Oral daily PRN  morphine  - Injectable 1 milliGRAM(s) IV Push every 6 hours PRN  ondansetron Injectable 4 milliGRAM(s) IV Push every 6 hours PRN  simethicone 80 milliGRAM(s) Chew every 6 hours PRN    VITALS:  T(F): 96.6  HR: 76  BP: 117/63  RR: 18  SpO2: 100%    PHYSICAL EXAM  GEN: Some distress, in bed on 2L cannula  PULM: Clear to auscultation bilaterally, No wheezes  CVS: S1-S2, irregular rate, no murmurs  ABD: Soft, non-tender, non-distended, no guarding. Noticeable ecchymosis in left lower abdomen  EXT: No edema  NEURO: AAOx3, no focal deficits    LABS                        7.7    11.45 )-----------( 175      ( 05 Jul 2019 05:27 )             24.5     07-05    138  |  104  |  51<H>  ----------------------------<  110<H>  4.6   |  22  |  1.4    Ca    8.6      05 Jul 2019 05:27  Phos  3.4     07-04  Mg     1.7     07-04    PT/INR - ( 05 Jul 2019 05:27 )   PT: 26.80 sec;   INR: 2.35 ratio      PTT - ( 05 Jul 2019 05:27 )  PTT:34.0 sec    RADIOLOGY  < from: Xray Chest 1 View- PORTABLE-Urgent (07.02.19 @ 10:17) >  Low lung volumes, atelectasis. Cardiomegaly.    Follow-up as needed.

## 2019-07-05 NOTE — DISCHARGE NOTE PROVIDER - INSTRUCTIONS
DASH/TLC Sodium and cholesterol restriction. Oral ensure clear cans per day: 1    Ensure Compact cans or servings per day: 1    Two times a day

## 2019-07-06 LAB
24R-OH-CALCIDIOL SERPL-MCNC: 39 NG/ML — SIGNIFICANT CHANGE UP (ref 30–80)
ALBUMIN SERPL ELPH-MCNC: 2.8 G/DL — LOW (ref 3.5–5.2)
ALP SERPL-CCNC: 131 U/L — HIGH (ref 30–115)
ALT FLD-CCNC: 9 U/L — SIGNIFICANT CHANGE UP (ref 0–41)
ANION GAP SERPL CALC-SCNC: 10 MMOL/L — SIGNIFICANT CHANGE UP (ref 7–14)
APTT BLD: 37.1 SEC — SIGNIFICANT CHANGE UP (ref 27–39.2)
AST SERPL-CCNC: 20 U/L — SIGNIFICANT CHANGE UP (ref 0–41)
BILIRUB SERPL-MCNC: 1 MG/DL — SIGNIFICANT CHANGE UP (ref 0.2–1.2)
BUN SERPL-MCNC: 50 MG/DL — HIGH (ref 10–20)
CALCIUM SERPL-MCNC: 8.8 MG/DL — SIGNIFICANT CHANGE UP (ref 8.5–10.1)
CHLORIDE SERPL-SCNC: 103 MMOL/L — SIGNIFICANT CHANGE UP (ref 98–110)
CO2 SERPL-SCNC: 24 MMOL/L — SIGNIFICANT CHANGE UP (ref 17–32)
CREAT SERPL-MCNC: 1.3 MG/DL — SIGNIFICANT CHANGE UP (ref 0.7–1.5)
GLUCOSE SERPL-MCNC: 112 MG/DL — HIGH (ref 70–99)
HCT VFR BLD CALC: 26.2 % — LOW (ref 42–52)
HGB BLD-MCNC: 8.1 G/DL — LOW (ref 14–18)
INR BLD: 2.2 RATIO — HIGH (ref 0.65–1.3)
MCHC RBC-ENTMCNC: 21.7 PG — LOW (ref 27–31)
MCHC RBC-ENTMCNC: 30.9 G/DL — LOW (ref 32–37)
MCV RBC AUTO: 70.2 FL — LOW (ref 80–94)
NRBC # BLD: 0 /100 WBCS — SIGNIFICANT CHANGE UP (ref 0–0)
NT-PROBNP SERPL-SCNC: HIGH PG/ML (ref 0–300)
PLATELET # BLD AUTO: 184 K/UL — SIGNIFICANT CHANGE UP (ref 130–400)
POTASSIUM SERPL-MCNC: 4.2 MMOL/L — SIGNIFICANT CHANGE UP (ref 3.5–5)
POTASSIUM SERPL-SCNC: 4.2 MMOL/L — SIGNIFICANT CHANGE UP (ref 3.5–5)
PROT SERPL-MCNC: 5.9 G/DL — LOW (ref 6–8)
PROTHROM AB SERPL-ACNC: 25.1 SEC — HIGH (ref 9.95–12.87)
RBC # BLD: 3.73 M/UL — LOW (ref 4.7–6.1)
RBC # FLD: 23.6 % — HIGH (ref 11.5–14.5)
SODIUM SERPL-SCNC: 137 MMOL/L — SIGNIFICANT CHANGE UP (ref 135–146)
WBC # BLD: 9.51 K/UL — SIGNIFICANT CHANGE UP (ref 4.8–10.8)
WBC # FLD AUTO: 9.51 K/UL — SIGNIFICANT CHANGE UP (ref 4.8–10.8)

## 2019-07-06 PROCEDURE — 93010 ELECTROCARDIOGRAM REPORT: CPT

## 2019-07-06 RX ORDER — WARFARIN SODIUM 2.5 MG/1
1 TABLET ORAL ONCE
Refills: 0 | Status: COMPLETED | OUTPATIENT
Start: 2019-07-06 | End: 2019-07-06

## 2019-07-06 RX ORDER — LANOLIN ALCOHOL/MO/W.PET/CERES
5 CREAM (GRAM) TOPICAL AT BEDTIME
Refills: 0 | Status: DISCONTINUED | OUTPATIENT
Start: 2019-07-06 | End: 2019-07-06

## 2019-07-06 RX ADMIN — TRAMADOL HYDROCHLORIDE 50 MILLIGRAM(S): 50 TABLET ORAL at 08:34

## 2019-07-06 RX ADMIN — PANTOPRAZOLE SODIUM 40 MILLIGRAM(S): 20 TABLET, DELAYED RELEASE ORAL at 06:45

## 2019-07-06 RX ADMIN — Medication 100 MILLIGRAM(S): at 17:54

## 2019-07-06 RX ADMIN — SENNA PLUS 2 TABLET(S): 8.6 TABLET ORAL at 22:01

## 2019-07-06 RX ADMIN — ALBUTEROL 2.5 MILLIGRAM(S): 90 AEROSOL, METERED ORAL at 00:11

## 2019-07-06 RX ADMIN — Medication 100 MILLIGRAM(S): at 06:45

## 2019-07-06 RX ADMIN — ISOSORBIDE MONONITRATE 30 MILLIGRAM(S): 60 TABLET, EXTENDED RELEASE ORAL at 12:54

## 2019-07-06 RX ADMIN — Medication 50 MICROGRAM(S): at 06:45

## 2019-07-06 RX ADMIN — TRAMADOL HYDROCHLORIDE 50 MILLIGRAM(S): 50 TABLET ORAL at 02:17

## 2019-07-06 RX ADMIN — Medication 20 MILLIGRAM(S): at 06:45

## 2019-07-06 RX ADMIN — TRAMADOL HYDROCHLORIDE 50 MILLIGRAM(S): 50 TABLET ORAL at 01:45

## 2019-07-06 RX ADMIN — WARFARIN SODIUM 1 MILLIGRAM(S): 2.5 TABLET ORAL at 22:02

## 2019-07-06 RX ADMIN — ERGOCALCIFEROL 50000 UNIT(S): 1.25 CAPSULE ORAL at 12:54

## 2019-07-06 RX ADMIN — Medication 50 MILLIGRAM(S): at 06:44

## 2019-07-06 RX ADMIN — Medication 325 MILLIGRAM(S): at 12:54

## 2019-07-06 RX ADMIN — ATORVASTATIN CALCIUM 10 MILLIGRAM(S): 80 TABLET, FILM COATED ORAL at 22:01

## 2019-07-07 LAB
ANION GAP SERPL CALC-SCNC: 9 MMOL/L — SIGNIFICANT CHANGE UP (ref 7–14)
APTT BLD: 35.5 SEC — SIGNIFICANT CHANGE UP (ref 27–39.2)
BUN SERPL-MCNC: 53 MG/DL — HIGH (ref 10–20)
CALCIUM SERPL-MCNC: 9 MG/DL — SIGNIFICANT CHANGE UP (ref 8.5–10.1)
CHLORIDE SERPL-SCNC: 103 MMOL/L — SIGNIFICANT CHANGE UP (ref 98–110)
CO2 SERPL-SCNC: 26 MMOL/L — SIGNIFICANT CHANGE UP (ref 17–32)
CREAT SERPL-MCNC: 1.5 MG/DL — SIGNIFICANT CHANGE UP (ref 0.7–1.5)
GLUCOSE SERPL-MCNC: 110 MG/DL — HIGH (ref 70–99)
HCT VFR BLD CALC: 25.4 % — LOW (ref 42–52)
HGB BLD-MCNC: 7.8 G/DL — LOW (ref 14–18)
INR BLD: 2.16 RATIO — HIGH (ref 0.65–1.3)
MCHC RBC-ENTMCNC: 21.8 PG — LOW (ref 27–31)
MCHC RBC-ENTMCNC: 30.7 G/DL — LOW (ref 32–37)
MCV RBC AUTO: 70.9 FL — LOW (ref 80–94)
NRBC # BLD: 0 /100 WBCS — SIGNIFICANT CHANGE UP (ref 0–0)
PLATELET # BLD AUTO: 206 K/UL — SIGNIFICANT CHANGE UP (ref 130–400)
POTASSIUM SERPL-MCNC: 4.9 MMOL/L — SIGNIFICANT CHANGE UP (ref 3.5–5)
POTASSIUM SERPL-SCNC: 4.9 MMOL/L — SIGNIFICANT CHANGE UP (ref 3.5–5)
PROTHROM AB SERPL-ACNC: 24.6 SEC — HIGH (ref 9.95–12.87)
RBC # BLD: 3.58 M/UL — LOW (ref 4.7–6.1)
RBC # FLD: 23.5 % — HIGH (ref 11.5–14.5)
SODIUM SERPL-SCNC: 138 MMOL/L — SIGNIFICANT CHANGE UP (ref 135–146)
WBC # BLD: 10.52 K/UL — SIGNIFICANT CHANGE UP (ref 4.8–10.8)
WBC # FLD AUTO: 10.52 K/UL — SIGNIFICANT CHANGE UP (ref 4.8–10.8)

## 2019-07-07 RX ORDER — CHOLECALCIFEROL (VITAMIN D3) 125 MCG
2000 CAPSULE ORAL DAILY
Refills: 0 | Status: DISCONTINUED | OUTPATIENT
Start: 2019-07-07 | End: 2019-07-30

## 2019-07-07 RX ORDER — WARFARIN SODIUM 2.5 MG/1
1 TABLET ORAL ONCE
Refills: 0 | Status: COMPLETED | OUTPATIENT
Start: 2019-07-07 | End: 2019-07-07

## 2019-07-07 RX ADMIN — PANTOPRAZOLE SODIUM 40 MILLIGRAM(S): 20 TABLET, DELAYED RELEASE ORAL at 06:13

## 2019-07-07 RX ADMIN — Medication 650 MILLIGRAM(S): at 11:02

## 2019-07-07 RX ADMIN — SENNA PLUS 2 TABLET(S): 8.6 TABLET ORAL at 21:47

## 2019-07-07 RX ADMIN — Medication 50 MILLIGRAM(S): at 06:13

## 2019-07-07 RX ADMIN — Medication 20 MILLIGRAM(S): at 06:13

## 2019-07-07 RX ADMIN — Medication 325 MILLIGRAM(S): at 12:38

## 2019-07-07 RX ADMIN — ATORVASTATIN CALCIUM 10 MILLIGRAM(S): 80 TABLET, FILM COATED ORAL at 21:47

## 2019-07-07 RX ADMIN — Medication 650 MILLIGRAM(S): at 11:01

## 2019-07-07 RX ADMIN — SIMETHICONE 80 MILLIGRAM(S): 80 TABLET, CHEWABLE ORAL at 16:08

## 2019-07-07 RX ADMIN — WARFARIN SODIUM 1 MILLIGRAM(S): 2.5 TABLET ORAL at 21:47

## 2019-07-07 RX ADMIN — Medication 100 MILLIGRAM(S): at 06:13

## 2019-07-07 RX ADMIN — ISOSORBIDE MONONITRATE 30 MILLIGRAM(S): 60 TABLET, EXTENDED RELEASE ORAL at 12:38

## 2019-07-07 RX ADMIN — Medication 15 MILLILITER(S): at 16:07

## 2019-07-07 RX ADMIN — Medication 50 MICROGRAM(S): at 06:13

## 2019-07-07 RX ADMIN — ALBUTEROL 2.5 MILLIGRAM(S): 90 AEROSOL, METERED ORAL at 20:09

## 2019-07-08 ENCOUNTER — OUTPATIENT (OUTPATIENT)
Dept: OUTPATIENT SERVICES | Facility: HOSPITAL | Age: 84
LOS: 1 days | Discharge: HOME | End: 2019-07-08

## 2019-07-08 DIAGNOSIS — Z98.890 OTHER SPECIFIED POSTPROCEDURAL STATES: Chronic | ICD-10-CM

## 2019-07-08 DIAGNOSIS — I48.91 UNSPECIFIED ATRIAL FIBRILLATION: ICD-10-CM

## 2019-07-08 DIAGNOSIS — Z02.9 ENCOUNTER FOR ADMINISTRATIVE EXAMINATIONS, UNSPECIFIED: ICD-10-CM

## 2019-07-08 LAB
INR BLD: 2.15 RATIO — HIGH (ref 0.65–1.3)
PROTHROM AB SERPL-ACNC: 24.5 SEC — HIGH (ref 9.95–12.87)

## 2019-07-08 RX ORDER — WARFARIN SODIUM 2.5 MG/1
1 TABLET ORAL ONCE
Refills: 0 | Status: COMPLETED | OUTPATIENT
Start: 2019-07-08 | End: 2019-07-08

## 2019-07-08 RX ADMIN — PANTOPRAZOLE SODIUM 40 MILLIGRAM(S): 20 TABLET, DELAYED RELEASE ORAL at 05:32

## 2019-07-08 RX ADMIN — ALBUTEROL 2.5 MILLIGRAM(S): 90 AEROSOL, METERED ORAL at 01:48

## 2019-07-08 RX ADMIN — Medication 650 MILLIGRAM(S): at 07:51

## 2019-07-08 RX ADMIN — Medication 650 MILLIGRAM(S): at 23:42

## 2019-07-08 RX ADMIN — Medication 2000 UNIT(S): at 11:13

## 2019-07-08 RX ADMIN — Medication 325 MILLIGRAM(S): at 11:13

## 2019-07-08 RX ADMIN — WARFARIN SODIUM 1 MILLIGRAM(S): 2.5 TABLET ORAL at 21:22

## 2019-07-08 RX ADMIN — Medication 100 MILLIGRAM(S): at 05:31

## 2019-07-08 RX ADMIN — ISOSORBIDE MONONITRATE 30 MILLIGRAM(S): 60 TABLET, EXTENDED RELEASE ORAL at 11:13

## 2019-07-08 RX ADMIN — ATORVASTATIN CALCIUM 10 MILLIGRAM(S): 80 TABLET, FILM COATED ORAL at 21:22

## 2019-07-08 RX ADMIN — Medication 50 MILLIGRAM(S): at 05:31

## 2019-07-08 RX ADMIN — Medication 100 MILLIGRAM(S): at 17:10

## 2019-07-08 RX ADMIN — Medication 50 MICROGRAM(S): at 05:31

## 2019-07-08 RX ADMIN — Medication 20 MILLIGRAM(S): at 05:31

## 2019-07-08 RX ADMIN — Medication 650 MILLIGRAM(S): at 09:53

## 2019-07-09 LAB
INR BLD: 2.09 RATIO — HIGH (ref 0.65–1.3)
PROTHROM AB SERPL-ACNC: 23.9 SEC — HIGH (ref 9.95–12.87)

## 2019-07-09 PROCEDURE — 71045 X-RAY EXAM CHEST 1 VIEW: CPT | Mod: 26

## 2019-07-09 RX ORDER — WARFARIN SODIUM 2.5 MG/1
1.5 TABLET ORAL ONCE
Refills: 0 | Status: COMPLETED | OUTPATIENT
Start: 2019-07-09 | End: 2019-07-09

## 2019-07-09 RX ORDER — TRAMADOL HYDROCHLORIDE 50 MG/1
50 TABLET ORAL EVERY 6 HOURS
Refills: 0 | Status: DISCONTINUED | OUTPATIENT
Start: 2019-07-09 | End: 2019-07-15

## 2019-07-09 RX ADMIN — Medication 650 MILLIGRAM(S): at 09:05

## 2019-07-09 RX ADMIN — PANTOPRAZOLE SODIUM 40 MILLIGRAM(S): 20 TABLET, DELAYED RELEASE ORAL at 06:55

## 2019-07-09 RX ADMIN — Medication 50 MICROGRAM(S): at 05:44

## 2019-07-09 RX ADMIN — SENNA PLUS 2 TABLET(S): 8.6 TABLET ORAL at 21:07

## 2019-07-09 RX ADMIN — Medication 325 MILLIGRAM(S): at 13:26

## 2019-07-09 RX ADMIN — Medication 15 MILLILITER(S): at 16:28

## 2019-07-09 RX ADMIN — Medication 20 MILLIGRAM(S): at 05:44

## 2019-07-09 RX ADMIN — ALBUTEROL 2.5 MILLIGRAM(S): 90 AEROSOL, METERED ORAL at 01:35

## 2019-07-09 RX ADMIN — Medication 100 MILLIGRAM(S): at 05:44

## 2019-07-09 RX ADMIN — Medication 650 MILLIGRAM(S): at 08:25

## 2019-07-09 RX ADMIN — ISOSORBIDE MONONITRATE 30 MILLIGRAM(S): 60 TABLET, EXTENDED RELEASE ORAL at 13:26

## 2019-07-09 RX ADMIN — WARFARIN SODIUM 1.5 MILLIGRAM(S): 2.5 TABLET ORAL at 21:07

## 2019-07-09 RX ADMIN — ATORVASTATIN CALCIUM 10 MILLIGRAM(S): 80 TABLET, FILM COATED ORAL at 21:07

## 2019-07-09 RX ADMIN — Medication 50 MILLIGRAM(S): at 05:44

## 2019-07-09 RX ADMIN — Medication 100 MILLIGRAM(S): at 17:48

## 2019-07-09 RX ADMIN — Medication 2000 UNIT(S): at 13:26

## 2019-07-10 LAB
INR BLD: 2.09 RATIO — HIGH (ref 0.65–1.3)
PROTHROM AB SERPL-ACNC: 23.9 SEC — HIGH (ref 9.95–12.87)

## 2019-07-10 PROCEDURE — 73501 X-RAY EXAM HIP UNI 1 VIEW: CPT | Mod: 26,LT

## 2019-07-10 RX ORDER — WARFARIN SODIUM 2.5 MG/1
1 TABLET ORAL ONCE
Refills: 0 | Status: COMPLETED | OUTPATIENT
Start: 2019-07-10 | End: 2019-07-10

## 2019-07-10 RX ORDER — ACETAMINOPHEN 500 MG
650 TABLET ORAL EVERY 8 HOURS
Refills: 0 | Status: DISCONTINUED | OUTPATIENT
Start: 2019-07-10 | End: 2019-07-30

## 2019-07-10 RX ORDER — BACITRACIN ZINC 500 UNIT/G
1 OINTMENT IN PACKET (EA) TOPICAL DAILY
Refills: 0 | Status: DISCONTINUED | OUTPATIENT
Start: 2019-07-10 | End: 2019-07-30

## 2019-07-10 RX ADMIN — WARFARIN SODIUM 1 MILLIGRAM(S): 2.5 TABLET ORAL at 21:20

## 2019-07-10 RX ADMIN — Medication 650 MILLIGRAM(S): at 21:19

## 2019-07-10 RX ADMIN — Medication 100 MILLIGRAM(S): at 17:11

## 2019-07-10 RX ADMIN — Medication 1 APPLICATION(S): at 11:54

## 2019-07-10 RX ADMIN — Medication 650 MILLIGRAM(S): at 13:03

## 2019-07-10 RX ADMIN — Medication 100 MILLIGRAM(S): at 05:52

## 2019-07-10 RX ADMIN — ISOSORBIDE MONONITRATE 30 MILLIGRAM(S): 60 TABLET, EXTENDED RELEASE ORAL at 11:55

## 2019-07-10 RX ADMIN — ALBUTEROL 2.5 MILLIGRAM(S): 90 AEROSOL, METERED ORAL at 22:20

## 2019-07-10 RX ADMIN — Medication 20 MILLIGRAM(S): at 05:52

## 2019-07-10 RX ADMIN — ATORVASTATIN CALCIUM 10 MILLIGRAM(S): 80 TABLET, FILM COATED ORAL at 21:20

## 2019-07-10 RX ADMIN — Medication 50 MILLIGRAM(S): at 05:52

## 2019-07-10 RX ADMIN — SENNA PLUS 2 TABLET(S): 8.6 TABLET ORAL at 21:20

## 2019-07-10 RX ADMIN — PANTOPRAZOLE SODIUM 40 MILLIGRAM(S): 20 TABLET, DELAYED RELEASE ORAL at 06:00

## 2019-07-10 RX ADMIN — Medication 325 MILLIGRAM(S): at 11:55

## 2019-07-10 RX ADMIN — ALBUTEROL 2.5 MILLIGRAM(S): 90 AEROSOL, METERED ORAL at 02:19

## 2019-07-10 RX ADMIN — Medication 50 MICROGRAM(S): at 05:52

## 2019-07-10 RX ADMIN — Medication 2000 UNIT(S): at 11:55

## 2019-07-11 DIAGNOSIS — S72.012A UNSPECIFIED INTRACAPSULAR FRACTURE OF LEFT FEMUR, INITIAL ENCOUNTER FOR CLOSED FRACTURE: ICD-10-CM

## 2019-07-11 DIAGNOSIS — I25.10 ATHEROSCLEROTIC HEART DISEASE OF NATIVE CORONARY ARTERY WITHOUT ANGINA PECTORIS: ICD-10-CM

## 2019-07-11 DIAGNOSIS — I48.92 UNSPECIFIED ATRIAL FLUTTER: ICD-10-CM
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
## 2019-07-11 DIAGNOSIS — G89.29 OTHER CHRONIC PAIN: ICD-10-CM

## 2019-07-11 DIAGNOSIS — I50.20 UNSPECIFIED SYSTOLIC (CONGESTIVE) HEART FAILURE: ICD-10-CM

## 2019-07-11 DIAGNOSIS — E87.5 HYPERKALEMIA: ICD-10-CM

## 2019-07-11 DIAGNOSIS — N17.9 ACUTE KIDNEY FAILURE, UNSPECIFIED: ICD-10-CM

## 2019-07-11 DIAGNOSIS — K59.00 CONSTIPATION, UNSPECIFIED: ICD-10-CM
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
## 2019-07-11 DIAGNOSIS — R33.9 RETENTION OF URINE, UNSPECIFIED: ICD-10-CM

## 2019-07-11 DIAGNOSIS — I48.91 UNSPECIFIED ATRIAL FIBRILLATION: ICD-10-CM

## 2019-07-11 DIAGNOSIS — D50.9 IRON DEFICIENCY ANEMIA, UNSPECIFIED: ICD-10-CM

## 2019-07-11 DIAGNOSIS — D56.3 THALASSEMIA MINOR: ICD-10-CM

## 2019-07-11 DIAGNOSIS — Z87.891 PERSONAL HISTORY OF NICOTINE DEPENDENCE: ICD-10-CM

## 2019-07-11 DIAGNOSIS — M54.9 DORSALGIA, UNSPECIFIED: ICD-10-CM

## 2019-07-11 DIAGNOSIS — Z99.81 DEPENDENCE ON SUPPLEMENTAL OXYGEN: ICD-10-CM

## 2019-07-11 DIAGNOSIS — E78.5 HYPERLIPIDEMIA, UNSPECIFIED: ICD-10-CM

## 2019-07-11 DIAGNOSIS — I13.0 HYPERTENSIVE HEART AND CHRONIC KIDNEY DISEASE WITH HEART FAILURE AND STAGE 1 THROUGH STAGE 4 CHRONIC KIDNEY DISEASE, OR UNSPECIFIED CHRONIC KIDNEY DISEASE: ICD-10-CM

## 2019-07-11 DIAGNOSIS — N18.3 CHRONIC KIDNEY DISEASE, STAGE 3 (MODERATE): ICD-10-CM
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
## 2019-07-11 LAB
INR BLD: 2.16 RATIO — HIGH (ref 0.65–1.3)
PROTHROM AB SERPL-ACNC: 24.6 SEC — HIGH (ref 9.95–12.87)

## 2019-07-11 PROCEDURE — 93970 EXTREMITY STUDY: CPT | Mod: 26

## 2019-07-11 RX ORDER — WARFARIN SODIUM 2.5 MG/1
1 TABLET ORAL ONCE
Refills: 0 | Status: COMPLETED | OUTPATIENT
Start: 2019-07-11 | End: 2019-07-11

## 2019-07-11 RX ADMIN — ATORVASTATIN CALCIUM 10 MILLIGRAM(S): 80 TABLET, FILM COATED ORAL at 21:47

## 2019-07-11 RX ADMIN — Medication 650 MILLIGRAM(S): at 05:29

## 2019-07-11 RX ADMIN — Medication 100 MILLIGRAM(S): at 21:47

## 2019-07-11 RX ADMIN — Medication 650 MILLIGRAM(S): at 23:26

## 2019-07-11 RX ADMIN — TRAMADOL HYDROCHLORIDE 50 MILLIGRAM(S): 50 TABLET ORAL at 11:15

## 2019-07-11 RX ADMIN — Medication 50 MILLIGRAM(S): at 05:29

## 2019-07-11 RX ADMIN — Medication 650 MILLIGRAM(S): at 15:26

## 2019-07-11 RX ADMIN — ISOSORBIDE MONONITRATE 30 MILLIGRAM(S): 60 TABLET, EXTENDED RELEASE ORAL at 15:26

## 2019-07-11 RX ADMIN — Medication 50 MICROGRAM(S): at 05:29

## 2019-07-11 RX ADMIN — Medication 2000 UNIT(S): at 15:26

## 2019-07-11 RX ADMIN — TRAMADOL HYDROCHLORIDE 50 MILLIGRAM(S): 50 TABLET ORAL at 15:25

## 2019-07-11 RX ADMIN — SENNA PLUS 2 TABLET(S): 8.6 TABLET ORAL at 21:47

## 2019-07-11 RX ADMIN — Medication 100 MILLIGRAM(S): at 05:29

## 2019-07-11 RX ADMIN — WARFARIN SODIUM 1 MILLIGRAM(S): 2.5 TABLET ORAL at 23:24

## 2019-07-11 RX ADMIN — PANTOPRAZOLE SODIUM 40 MILLIGRAM(S): 20 TABLET, DELAYED RELEASE ORAL at 06:00

## 2019-07-11 RX ADMIN — Medication 325 MILLIGRAM(S): at 15:26

## 2019-07-11 RX ADMIN — Medication 650 MILLIGRAM(S): at 21:47

## 2019-07-11 RX ADMIN — Medication 20 MILLIGRAM(S): at 05:29

## 2019-07-11 RX ADMIN — Medication 1 APPLICATION(S): at 15:26

## 2019-07-12 LAB
INR BLD: 2.36 RATIO — HIGH (ref 0.65–1.3)
PROTHROM AB SERPL-ACNC: 26.9 SEC — HIGH (ref 9.95–12.87)

## 2019-07-12 RX ORDER — WARFARIN SODIUM 2.5 MG/1
1 TABLET ORAL ONCE
Refills: 0 | Status: COMPLETED | OUTPATIENT
Start: 2019-07-12 | End: 2019-07-12

## 2019-07-12 RX ADMIN — Medication 20 MILLIGRAM(S): at 06:29

## 2019-07-12 RX ADMIN — Medication 100 MILLIGRAM(S): at 06:29

## 2019-07-12 RX ADMIN — TRAMADOL HYDROCHLORIDE 50 MILLIGRAM(S): 50 TABLET ORAL at 08:20

## 2019-07-12 RX ADMIN — Medication 325 MILLIGRAM(S): at 11:47

## 2019-07-12 RX ADMIN — Medication 1 APPLICATION(S): at 11:47

## 2019-07-12 RX ADMIN — WARFARIN SODIUM 1 MILLIGRAM(S): 2.5 TABLET ORAL at 21:55

## 2019-07-12 RX ADMIN — Medication 2000 UNIT(S): at 11:48

## 2019-07-12 RX ADMIN — ISOSORBIDE MONONITRATE 30 MILLIGRAM(S): 60 TABLET, EXTENDED RELEASE ORAL at 11:48

## 2019-07-12 RX ADMIN — Medication 650 MILLIGRAM(S): at 17:42

## 2019-07-12 RX ADMIN — ATORVASTATIN CALCIUM 10 MILLIGRAM(S): 80 TABLET, FILM COATED ORAL at 21:59

## 2019-07-12 RX ADMIN — Medication 650 MILLIGRAM(S): at 06:29

## 2019-07-12 RX ADMIN — SENNA PLUS 2 TABLET(S): 8.6 TABLET ORAL at 21:55

## 2019-07-12 RX ADMIN — Medication 650 MILLIGRAM(S): at 21:55

## 2019-07-12 RX ADMIN — Medication 50 MILLIGRAM(S): at 06:29

## 2019-07-12 RX ADMIN — PANTOPRAZOLE SODIUM 40 MILLIGRAM(S): 20 TABLET, DELAYED RELEASE ORAL at 06:29

## 2019-07-12 RX ADMIN — Medication 100 MILLIGRAM(S): at 17:42

## 2019-07-12 RX ADMIN — Medication 650 MILLIGRAM(S): at 17:43

## 2019-07-12 RX ADMIN — Medication 50 MICROGRAM(S): at 06:29

## 2019-07-13 LAB
INR BLD: 2.37 RATIO — HIGH (ref 0.65–1.3)
PROTHROM AB SERPL-ACNC: 27 SEC — HIGH (ref 9.95–12.87)

## 2019-07-13 RX ORDER — WARFARIN SODIUM 2.5 MG/1
1 TABLET ORAL ONCE
Refills: 0 | Status: COMPLETED | OUTPATIENT
Start: 2019-07-13 | End: 2019-07-13

## 2019-07-13 RX ADMIN — Medication 650 MILLIGRAM(S): at 16:23

## 2019-07-13 RX ADMIN — Medication 650 MILLIGRAM(S): at 14:50

## 2019-07-13 RX ADMIN — Medication 50 MILLIGRAM(S): at 06:17

## 2019-07-13 RX ADMIN — ISOSORBIDE MONONITRATE 30 MILLIGRAM(S): 60 TABLET, EXTENDED RELEASE ORAL at 11:57

## 2019-07-13 RX ADMIN — Medication 650 MILLIGRAM(S): at 07:02

## 2019-07-13 RX ADMIN — Medication 2000 UNIT(S): at 11:57

## 2019-07-13 RX ADMIN — ATORVASTATIN CALCIUM 10 MILLIGRAM(S): 80 TABLET, FILM COATED ORAL at 21:30

## 2019-07-13 RX ADMIN — Medication 650 MILLIGRAM(S): at 22:32

## 2019-07-13 RX ADMIN — Medication 1 APPLICATION(S): at 11:57

## 2019-07-13 RX ADMIN — PANTOPRAZOLE SODIUM 40 MILLIGRAM(S): 20 TABLET, DELAYED RELEASE ORAL at 06:17

## 2019-07-13 RX ADMIN — Medication 50 MICROGRAM(S): at 06:17

## 2019-07-13 RX ADMIN — Medication 15 MILLILITER(S): at 21:34

## 2019-07-13 RX ADMIN — Medication 20 MILLIGRAM(S): at 06:17

## 2019-07-13 RX ADMIN — WARFARIN SODIUM 1 MILLIGRAM(S): 2.5 TABLET ORAL at 21:31

## 2019-07-13 RX ADMIN — ALBUTEROL 2.5 MILLIGRAM(S): 90 AEROSOL, METERED ORAL at 21:10

## 2019-07-13 RX ADMIN — Medication 100 MILLIGRAM(S): at 06:17

## 2019-07-13 RX ADMIN — Medication 325 MILLIGRAM(S): at 11:57

## 2019-07-13 RX ADMIN — Medication 650 MILLIGRAM(S): at 06:18

## 2019-07-13 RX ADMIN — Medication 650 MILLIGRAM(S): at 21:31

## 2019-07-13 RX ADMIN — Medication 650 MILLIGRAM(S): at 07:03

## 2019-07-14 LAB
INR BLD: 2.54 RATIO — HIGH (ref 0.65–1.3)
PROTHROM AB SERPL-ACNC: 28.9 SEC — HIGH (ref 9.95–12.87)

## 2019-07-14 PROCEDURE — 93010 ELECTROCARDIOGRAM REPORT: CPT

## 2019-07-14 PROCEDURE — 71045 X-RAY EXAM CHEST 1 VIEW: CPT | Mod: 26

## 2019-07-14 RX ORDER — WARFARIN SODIUM 2.5 MG/1
1 TABLET ORAL ONCE
Refills: 0 | Status: COMPLETED | OUTPATIENT
Start: 2019-07-14 | End: 2019-07-14

## 2019-07-14 RX ORDER — WARFARIN SODIUM 2.5 MG/1
1 TABLET ORAL ONCE
Refills: 0 | Status: DISCONTINUED | OUTPATIENT
Start: 2019-07-14 | End: 2019-07-14

## 2019-07-14 RX ADMIN — Medication 50 MICROGRAM(S): at 05:52

## 2019-07-14 RX ADMIN — Medication 20 MILLIGRAM(S): at 05:52

## 2019-07-14 RX ADMIN — PANTOPRAZOLE SODIUM 40 MILLIGRAM(S): 20 TABLET, DELAYED RELEASE ORAL at 05:52

## 2019-07-14 RX ADMIN — Medication 2000 UNIT(S): at 11:42

## 2019-07-14 RX ADMIN — ISOSORBIDE MONONITRATE 30 MILLIGRAM(S): 60 TABLET, EXTENDED RELEASE ORAL at 11:42

## 2019-07-14 RX ADMIN — Medication 100 MILLIGRAM(S): at 05:52

## 2019-07-14 RX ADMIN — ATORVASTATIN CALCIUM 10 MILLIGRAM(S): 80 TABLET, FILM COATED ORAL at 21:48

## 2019-07-14 RX ADMIN — Medication 650 MILLIGRAM(S): at 21:48

## 2019-07-14 RX ADMIN — WARFARIN SODIUM 1 MILLIGRAM(S): 2.5 TABLET ORAL at 21:49

## 2019-07-14 RX ADMIN — ALBUTEROL 2.5 MILLIGRAM(S): 90 AEROSOL, METERED ORAL at 03:54

## 2019-07-14 RX ADMIN — Medication 650 MILLIGRAM(S): at 21:59

## 2019-07-14 RX ADMIN — Medication 650 MILLIGRAM(S): at 05:52

## 2019-07-14 RX ADMIN — Medication 650 MILLIGRAM(S): at 14:13

## 2019-07-14 RX ADMIN — ALBUTEROL 2.5 MILLIGRAM(S): 90 AEROSOL, METERED ORAL at 20:51

## 2019-07-14 RX ADMIN — Medication 650 MILLIGRAM(S): at 06:35

## 2019-07-14 RX ADMIN — Medication 50 MILLIGRAM(S): at 05:52

## 2019-07-14 RX ADMIN — Medication 100 MILLIGRAM(S): at 17:14

## 2019-07-14 RX ADMIN — SENNA PLUS 2 TABLET(S): 8.6 TABLET ORAL at 21:49

## 2019-07-14 RX ADMIN — Medication 1 APPLICATION(S): at 11:42

## 2019-07-14 RX ADMIN — Medication 325 MILLIGRAM(S): at 11:42

## 2019-07-15 LAB
ANION GAP SERPL CALC-SCNC: 9 MMOL/L — SIGNIFICANT CHANGE UP (ref 7–14)
BUN SERPL-MCNC: 43 MG/DL — HIGH (ref 10–20)
CALCIUM SERPL-MCNC: 8.4 MG/DL — LOW (ref 8.5–10.1)
CHLORIDE SERPL-SCNC: 102 MMOL/L — SIGNIFICANT CHANGE UP (ref 98–110)
CO2 SERPL-SCNC: 28 MMOL/L — SIGNIFICANT CHANGE UP (ref 17–32)
CREAT SERPL-MCNC: 1.3 MG/DL — SIGNIFICANT CHANGE UP (ref 0.7–1.5)
GLUCOSE SERPL-MCNC: 131 MG/DL — HIGH (ref 70–99)
HCT VFR BLD CALC: 23.9 % — LOW (ref 42–52)
HGB BLD-MCNC: 7.1 G/DL — LOW (ref 14–18)
INR BLD: 2.55 RATIO — HIGH (ref 0.65–1.3)
MCHC RBC-ENTMCNC: 20.9 PG — LOW (ref 27–31)
MCHC RBC-ENTMCNC: 29.7 G/DL — LOW (ref 32–37)
MCV RBC AUTO: 70.3 FL — LOW (ref 80–94)
NRBC # BLD: 0 /100 WBCS — SIGNIFICANT CHANGE UP (ref 0–0)
PLATELET # BLD AUTO: 268 K/UL — SIGNIFICANT CHANGE UP (ref 130–400)
POTASSIUM SERPL-MCNC: 4.4 MMOL/L — SIGNIFICANT CHANGE UP (ref 3.5–5)
POTASSIUM SERPL-SCNC: 4.4 MMOL/L — SIGNIFICANT CHANGE UP (ref 3.5–5)
PROTHROM AB SERPL-ACNC: 29.1 SEC — HIGH (ref 9.95–12.87)
RBC # BLD: 3.4 M/UL — LOW (ref 4.7–6.1)
RBC # FLD: 23.2 % — HIGH (ref 11.5–14.5)
SODIUM SERPL-SCNC: 139 MMOL/L — SIGNIFICANT CHANGE UP (ref 135–146)
WBC # BLD: 7.66 K/UL — SIGNIFICANT CHANGE UP (ref 4.8–10.8)
WBC # FLD AUTO: 7.66 K/UL — SIGNIFICANT CHANGE UP (ref 4.8–10.8)

## 2019-07-15 RX ORDER — WARFARIN SODIUM 2.5 MG/1
1 TABLET ORAL ONCE
Refills: 0 | Status: COMPLETED | OUTPATIENT
Start: 2019-07-15 | End: 2019-07-15

## 2019-07-15 RX ORDER — TRAMADOL HYDROCHLORIDE 50 MG/1
50 TABLET ORAL EVERY 6 HOURS
Refills: 0 | Status: DISCONTINUED | OUTPATIENT
Start: 2019-07-15 | End: 2019-07-15

## 2019-07-15 RX ADMIN — PANTOPRAZOLE SODIUM 40 MILLIGRAM(S): 20 TABLET, DELAYED RELEASE ORAL at 05:19

## 2019-07-15 RX ADMIN — ATORVASTATIN CALCIUM 10 MILLIGRAM(S): 80 TABLET, FILM COATED ORAL at 21:34

## 2019-07-15 RX ADMIN — Medication 650 MILLIGRAM(S): at 05:20

## 2019-07-15 RX ADMIN — Medication 1 APPLICATION(S): at 12:38

## 2019-07-15 RX ADMIN — Medication 20 MILLIGRAM(S): at 05:19

## 2019-07-15 RX ADMIN — ISOSORBIDE MONONITRATE 30 MILLIGRAM(S): 60 TABLET, EXTENDED RELEASE ORAL at 12:37

## 2019-07-15 RX ADMIN — Medication 100 MILLIGRAM(S): at 17:35

## 2019-07-15 RX ADMIN — Medication 650 MILLIGRAM(S): at 14:12

## 2019-07-15 RX ADMIN — Medication 325 MILLIGRAM(S): at 12:37

## 2019-07-15 RX ADMIN — Medication 100 MILLIGRAM(S): at 05:20

## 2019-07-15 RX ADMIN — ALBUTEROL 2.5 MILLIGRAM(S): 90 AEROSOL, METERED ORAL at 20:03

## 2019-07-15 RX ADMIN — WARFARIN SODIUM 1 MILLIGRAM(S): 2.5 TABLET ORAL at 21:34

## 2019-07-15 RX ADMIN — Medication 650 MILLIGRAM(S): at 16:57

## 2019-07-15 RX ADMIN — Medication 50 MILLIGRAM(S): at 05:20

## 2019-07-15 RX ADMIN — SENNA PLUS 2 TABLET(S): 8.6 TABLET ORAL at 21:34

## 2019-07-15 RX ADMIN — Medication 2000 UNIT(S): at 12:37

## 2019-07-15 RX ADMIN — Medication 50 MICROGRAM(S): at 05:20

## 2019-07-15 RX ADMIN — Medication 650 MILLIGRAM(S): at 21:34

## 2019-07-16 LAB
BLD GP AB SCN SERPL QL: SIGNIFICANT CHANGE UP
HCT VFR BLD CALC: 24.5 % — LOW (ref 42–52)
HGB BLD-MCNC: 7.4 G/DL — LOW (ref 14–18)
INR BLD: 2.58 RATIO — HIGH (ref 0.65–1.3)
MCHC RBC-ENTMCNC: 21.3 PG — LOW (ref 27–31)
MCHC RBC-ENTMCNC: 30.2 G/DL — LOW (ref 32–37)
MCV RBC AUTO: 70.4 FL — LOW (ref 80–94)
NRBC # BLD: 0 /100 WBCS — SIGNIFICANT CHANGE UP (ref 0–0)
PLATELET # BLD AUTO: 247 K/UL — SIGNIFICANT CHANGE UP (ref 130–400)
PROTHROM AB SERPL-ACNC: 29.4 SEC — HIGH (ref 9.95–12.87)
RBC # BLD: 3.48 M/UL — LOW (ref 4.7–6.1)
RBC # FLD: 23.4 % — HIGH (ref 11.5–14.5)
WBC # BLD: 7.63 K/UL — SIGNIFICANT CHANGE UP (ref 4.8–10.8)
WBC # FLD AUTO: 7.63 K/UL — SIGNIFICANT CHANGE UP (ref 4.8–10.8)

## 2019-07-16 RX ORDER — WARFARIN SODIUM 2.5 MG/1
1 TABLET ORAL ONCE
Refills: 0 | Status: COMPLETED | OUTPATIENT
Start: 2019-07-16 | End: 2019-07-16

## 2019-07-16 RX ADMIN — Medication 1 APPLICATION(S): at 12:36

## 2019-07-16 RX ADMIN — WARFARIN SODIUM 1 MILLIGRAM(S): 2.5 TABLET ORAL at 22:47

## 2019-07-16 RX ADMIN — Medication 650 MILLIGRAM(S): at 12:36

## 2019-07-16 RX ADMIN — Medication 650 MILLIGRAM(S): at 22:47

## 2019-07-16 RX ADMIN — ATORVASTATIN CALCIUM 10 MILLIGRAM(S): 80 TABLET, FILM COATED ORAL at 22:47

## 2019-07-16 RX ADMIN — Medication 100 MILLIGRAM(S): at 17:17

## 2019-07-16 RX ADMIN — SENNA PLUS 2 TABLET(S): 8.6 TABLET ORAL at 22:47

## 2019-07-16 RX ADMIN — ISOSORBIDE MONONITRATE 30 MILLIGRAM(S): 60 TABLET, EXTENDED RELEASE ORAL at 12:35

## 2019-07-16 RX ADMIN — Medication 650 MILLIGRAM(S): at 12:53

## 2019-07-16 RX ADMIN — Medication 20 MILLIGRAM(S): at 05:06

## 2019-07-16 RX ADMIN — Medication 50 MICROGRAM(S): at 05:05

## 2019-07-16 RX ADMIN — Medication 2000 UNIT(S): at 12:35

## 2019-07-16 RX ADMIN — Medication 650 MILLIGRAM(S): at 05:06

## 2019-07-16 RX ADMIN — Medication 50 MILLIGRAM(S): at 05:06

## 2019-07-16 RX ADMIN — Medication 325 MILLIGRAM(S): at 12:35

## 2019-07-16 RX ADMIN — Medication 100 MILLIGRAM(S): at 05:07

## 2019-07-16 RX ADMIN — PANTOPRAZOLE SODIUM 40 MILLIGRAM(S): 20 TABLET, DELAYED RELEASE ORAL at 06:23

## 2019-07-17 LAB
ANION GAP SERPL CALC-SCNC: 10 MMOL/L — SIGNIFICANT CHANGE UP (ref 7–14)
BLD GP AB SCN SERPL QL: SIGNIFICANT CHANGE UP
BUN SERPL-MCNC: 38 MG/DL — HIGH (ref 10–20)
CALCIUM SERPL-MCNC: 8.7 MG/DL — SIGNIFICANT CHANGE UP (ref 8.5–10.1)
CHLORIDE SERPL-SCNC: 101 MMOL/L — SIGNIFICANT CHANGE UP (ref 98–110)
CO2 SERPL-SCNC: 28 MMOL/L — SIGNIFICANT CHANGE UP (ref 17–32)
CREAT SERPL-MCNC: 1.2 MG/DL — SIGNIFICANT CHANGE UP (ref 0.7–1.5)
GLUCOSE SERPL-MCNC: 100 MG/DL — HIGH (ref 70–99)
HCT VFR BLD CALC: 26 % — LOW (ref 42–52)
HGB BLD-MCNC: 7.7 G/DL — LOW (ref 14–18)
INR BLD: 2.55 RATIO — HIGH (ref 0.65–1.3)
MCHC RBC-ENTMCNC: 20.9 PG — LOW (ref 27–31)
MCHC RBC-ENTMCNC: 29.6 G/DL — LOW (ref 32–37)
MCV RBC AUTO: 70.5 FL — LOW (ref 80–94)
NRBC # BLD: 0 /100 WBCS — SIGNIFICANT CHANGE UP (ref 0–0)
NT-PROBNP SERPL-SCNC: HIGH PG/ML (ref 0–300)
PLATELET # BLD AUTO: 281 K/UL — SIGNIFICANT CHANGE UP (ref 130–400)
POTASSIUM SERPL-MCNC: 4.7 MMOL/L — SIGNIFICANT CHANGE UP (ref 3.5–5)
POTASSIUM SERPL-SCNC: 4.7 MMOL/L — SIGNIFICANT CHANGE UP (ref 3.5–5)
PROTHROM AB SERPL-ACNC: 29.1 SEC — HIGH (ref 9.95–12.87)
RBC # BLD: 3.69 M/UL — LOW (ref 4.7–6.1)
RBC # FLD: 23.4 % — HIGH (ref 11.5–14.5)
SODIUM SERPL-SCNC: 139 MMOL/L — SIGNIFICANT CHANGE UP (ref 135–146)
WBC # BLD: 7.15 K/UL — SIGNIFICANT CHANGE UP (ref 4.8–10.8)
WBC # FLD AUTO: 7.15 K/UL — SIGNIFICANT CHANGE UP (ref 4.8–10.8)

## 2019-07-17 RX ORDER — WARFARIN SODIUM 2.5 MG/1
1 TABLET ORAL ONCE
Refills: 0 | Status: COMPLETED | OUTPATIENT
Start: 2019-07-17 | End: 2019-07-17

## 2019-07-17 RX ADMIN — Medication 650 MILLIGRAM(S): at 15:25

## 2019-07-17 RX ADMIN — WARFARIN SODIUM 1 MILLIGRAM(S): 2.5 TABLET ORAL at 22:23

## 2019-07-17 RX ADMIN — Medication 100 MILLIGRAM(S): at 06:12

## 2019-07-17 RX ADMIN — PANTOPRAZOLE SODIUM 40 MILLIGRAM(S): 20 TABLET, DELAYED RELEASE ORAL at 06:13

## 2019-07-17 RX ADMIN — Medication 650 MILLIGRAM(S): at 22:23

## 2019-07-17 RX ADMIN — SENNA PLUS 2 TABLET(S): 8.6 TABLET ORAL at 22:23

## 2019-07-17 RX ADMIN — Medication 650 MILLIGRAM(S): at 06:13

## 2019-07-17 RX ADMIN — Medication 50 MICROGRAM(S): at 06:13

## 2019-07-17 RX ADMIN — Medication 325 MILLIGRAM(S): at 13:53

## 2019-07-17 RX ADMIN — Medication 650 MILLIGRAM(S): at 18:53

## 2019-07-17 RX ADMIN — Medication 2000 UNIT(S): at 13:53

## 2019-07-17 RX ADMIN — Medication 650 MILLIGRAM(S): at 22:24

## 2019-07-17 RX ADMIN — Medication 100 MILLIGRAM(S): at 15:26

## 2019-07-17 RX ADMIN — ISOSORBIDE MONONITRATE 30 MILLIGRAM(S): 60 TABLET, EXTENDED RELEASE ORAL at 13:53

## 2019-07-17 RX ADMIN — Medication 650 MILLIGRAM(S): at 06:12

## 2019-07-17 RX ADMIN — Medication 20 MILLIGRAM(S): at 06:13

## 2019-07-17 RX ADMIN — Medication 1 APPLICATION(S): at 13:52

## 2019-07-17 RX ADMIN — Medication 50 MILLIGRAM(S): at 06:13

## 2019-07-17 RX ADMIN — ATORVASTATIN CALCIUM 10 MILLIGRAM(S): 80 TABLET, FILM COATED ORAL at 22:23

## 2019-07-18 LAB
INR BLD: 2.32 RATIO — HIGH (ref 0.65–1.3)
PROTHROM AB SERPL-ACNC: 26.5 SEC — HIGH (ref 9.95–12.87)

## 2019-07-18 RX ORDER — FUROSEMIDE 40 MG
20 TABLET ORAL
Refills: 0 | Status: DISCONTINUED | OUTPATIENT
Start: 2019-07-18 | End: 2019-07-19

## 2019-07-18 RX ORDER — WARFARIN SODIUM 2.5 MG/1
1 TABLET ORAL ONCE
Refills: 0 | Status: COMPLETED | OUTPATIENT
Start: 2019-07-18 | End: 2019-07-18

## 2019-07-18 RX ADMIN — Medication 650 MILLIGRAM(S): at 06:27

## 2019-07-18 RX ADMIN — ISOSORBIDE MONONITRATE 30 MILLIGRAM(S): 60 TABLET, EXTENDED RELEASE ORAL at 12:22

## 2019-07-18 RX ADMIN — Medication 15 MILLILITER(S): at 19:06

## 2019-07-18 RX ADMIN — Medication 20 MILLIGRAM(S): at 17:48

## 2019-07-18 RX ADMIN — Medication 650 MILLIGRAM(S): at 21:32

## 2019-07-18 RX ADMIN — Medication 100 MILLIGRAM(S): at 17:48

## 2019-07-18 RX ADMIN — Medication 650 MILLIGRAM(S): at 17:47

## 2019-07-18 RX ADMIN — Medication 650 MILLIGRAM(S): at 19:06

## 2019-07-18 RX ADMIN — Medication 2000 UNIT(S): at 12:23

## 2019-07-18 RX ADMIN — Medication 1 APPLICATION(S): at 12:23

## 2019-07-18 RX ADMIN — PANTOPRAZOLE SODIUM 40 MILLIGRAM(S): 20 TABLET, DELAYED RELEASE ORAL at 06:25

## 2019-07-18 RX ADMIN — Medication 50 MICROGRAM(S): at 06:25

## 2019-07-18 RX ADMIN — SENNA PLUS 2 TABLET(S): 8.6 TABLET ORAL at 21:33

## 2019-07-18 RX ADMIN — WARFARIN SODIUM 1 MILLIGRAM(S): 2.5 TABLET ORAL at 21:32

## 2019-07-18 RX ADMIN — ALBUTEROL 2.5 MILLIGRAM(S): 90 AEROSOL, METERED ORAL at 02:54

## 2019-07-18 RX ADMIN — Medication 650 MILLIGRAM(S): at 06:26

## 2019-07-18 RX ADMIN — Medication 325 MILLIGRAM(S): at 12:22

## 2019-07-18 RX ADMIN — Medication 50 MILLIGRAM(S): at 06:25

## 2019-07-18 RX ADMIN — Medication 20 MILLIGRAM(S): at 06:25

## 2019-07-18 RX ADMIN — ATORVASTATIN CALCIUM 10 MILLIGRAM(S): 80 TABLET, FILM COATED ORAL at 21:33

## 2019-07-19 LAB
INR BLD: 2.29 RATIO — HIGH (ref 0.65–1.3)
PROTHROM AB SERPL-ACNC: 26.1 SEC — HIGH (ref 9.95–12.87)

## 2019-07-19 RX ORDER — WARFARIN SODIUM 2.5 MG/1
1 TABLET ORAL ONCE
Refills: 0 | Status: COMPLETED | OUTPATIENT
Start: 2019-07-19 | End: 2019-07-19

## 2019-07-19 RX ORDER — FUROSEMIDE 40 MG
40 TABLET ORAL DAILY
Refills: 0 | Status: DISCONTINUED | OUTPATIENT
Start: 2019-07-19 | End: 2019-07-30

## 2019-07-19 RX ADMIN — Medication 50 MILLIGRAM(S): at 05:25

## 2019-07-19 RX ADMIN — WARFARIN SODIUM 1 MILLIGRAM(S): 2.5 TABLET ORAL at 21:24

## 2019-07-19 RX ADMIN — Medication 325 MILLIGRAM(S): at 14:46

## 2019-07-19 RX ADMIN — ATORVASTATIN CALCIUM 10 MILLIGRAM(S): 80 TABLET, FILM COATED ORAL at 21:23

## 2019-07-19 RX ADMIN — Medication 1 APPLICATION(S): at 14:48

## 2019-07-19 RX ADMIN — SENNA PLUS 2 TABLET(S): 8.6 TABLET ORAL at 21:24

## 2019-07-19 RX ADMIN — PANTOPRAZOLE SODIUM 40 MILLIGRAM(S): 20 TABLET, DELAYED RELEASE ORAL at 05:26

## 2019-07-19 RX ADMIN — Medication 650 MILLIGRAM(S): at 14:47

## 2019-07-19 RX ADMIN — Medication 650 MILLIGRAM(S): at 18:24

## 2019-07-19 RX ADMIN — Medication 650 MILLIGRAM(S): at 21:24

## 2019-07-19 RX ADMIN — ISOSORBIDE MONONITRATE 30 MILLIGRAM(S): 60 TABLET, EXTENDED RELEASE ORAL at 14:46

## 2019-07-19 RX ADMIN — ALBUTEROL 2.5 MILLIGRAM(S): 90 AEROSOL, METERED ORAL at 05:45

## 2019-07-19 RX ADMIN — Medication 2000 UNIT(S): at 14:45

## 2019-07-19 RX ADMIN — Medication 50 MICROGRAM(S): at 05:25

## 2019-07-19 RX ADMIN — Medication 20 MILLIGRAM(S): at 05:25

## 2019-07-19 RX ADMIN — Medication 650 MILLIGRAM(S): at 05:25

## 2019-07-19 RX ADMIN — Medication 100 MILLIGRAM(S): at 18:25

## 2019-07-19 RX ADMIN — Medication 650 MILLIGRAM(S): at 03:57

## 2019-07-19 RX ADMIN — Medication 100 MILLIGRAM(S): at 05:25

## 2019-07-20 LAB
INR BLD: 2.25 RATIO — HIGH (ref 0.65–1.3)
PROTHROM AB SERPL-ACNC: 25.7 SEC — HIGH (ref 9.95–12.87)

## 2019-07-20 RX ORDER — WARFARIN SODIUM 2.5 MG/1
1 TABLET ORAL ONCE
Refills: 0 | Status: COMPLETED | OUTPATIENT
Start: 2019-07-20 | End: 2019-07-20

## 2019-07-20 RX ADMIN — Medication 50 MICROGRAM(S): at 05:33

## 2019-07-20 RX ADMIN — Medication 1 APPLICATION(S): at 12:11

## 2019-07-20 RX ADMIN — Medication 100 MILLIGRAM(S): at 18:14

## 2019-07-20 RX ADMIN — Medication 650 MILLIGRAM(S): at 21:45

## 2019-07-20 RX ADMIN — Medication 325 MILLIGRAM(S): at 12:10

## 2019-07-20 RX ADMIN — Medication 650 MILLIGRAM(S): at 06:39

## 2019-07-20 RX ADMIN — ATORVASTATIN CALCIUM 10 MILLIGRAM(S): 80 TABLET, FILM COATED ORAL at 21:45

## 2019-07-20 RX ADMIN — Medication 650 MILLIGRAM(S): at 05:32

## 2019-07-20 RX ADMIN — ALBUTEROL 2.5 MILLIGRAM(S): 90 AEROSOL, METERED ORAL at 00:07

## 2019-07-20 RX ADMIN — Medication 100 MILLIGRAM(S): at 05:33

## 2019-07-20 RX ADMIN — Medication 50 MILLIGRAM(S): at 05:33

## 2019-07-20 RX ADMIN — WARFARIN SODIUM 1 MILLIGRAM(S): 2.5 TABLET ORAL at 21:45

## 2019-07-20 RX ADMIN — ALBUTEROL 2.5 MILLIGRAM(S): 90 AEROSOL, METERED ORAL at 19:46

## 2019-07-20 RX ADMIN — ISOSORBIDE MONONITRATE 30 MILLIGRAM(S): 60 TABLET, EXTENDED RELEASE ORAL at 12:11

## 2019-07-20 RX ADMIN — PANTOPRAZOLE SODIUM 40 MILLIGRAM(S): 20 TABLET, DELAYED RELEASE ORAL at 05:34

## 2019-07-20 RX ADMIN — Medication 2000 UNIT(S): at 12:10

## 2019-07-20 RX ADMIN — SENNA PLUS 2 TABLET(S): 8.6 TABLET ORAL at 21:45

## 2019-07-20 RX ADMIN — Medication 40 MILLIGRAM(S): at 05:34

## 2019-07-21 LAB
INR BLD: 2.25 RATIO — HIGH (ref 0.65–1.3)
PROTHROM AB SERPL-ACNC: 25.7 SEC — HIGH (ref 9.95–12.87)

## 2019-07-21 RX ORDER — WARFARIN SODIUM 2.5 MG/1
1 TABLET ORAL ONCE
Refills: 0 | Status: COMPLETED | OUTPATIENT
Start: 2019-07-21 | End: 2019-07-21

## 2019-07-21 RX ADMIN — ISOSORBIDE MONONITRATE 30 MILLIGRAM(S): 60 TABLET, EXTENDED RELEASE ORAL at 13:02

## 2019-07-21 RX ADMIN — Medication 50 MICROGRAM(S): at 05:29

## 2019-07-21 RX ADMIN — Medication 650 MILLIGRAM(S): at 13:03

## 2019-07-21 RX ADMIN — WARFARIN SODIUM 1 MILLIGRAM(S): 2.5 TABLET ORAL at 21:08

## 2019-07-21 RX ADMIN — Medication 1 APPLICATION(S): at 13:02

## 2019-07-21 RX ADMIN — Medication 100 MILLIGRAM(S): at 05:29

## 2019-07-21 RX ADMIN — Medication 325 MILLIGRAM(S): at 13:02

## 2019-07-21 RX ADMIN — Medication 650 MILLIGRAM(S): at 17:08

## 2019-07-21 RX ADMIN — Medication 650 MILLIGRAM(S): at 21:08

## 2019-07-21 RX ADMIN — ALBUTEROL 2.5 MILLIGRAM(S): 90 AEROSOL, METERED ORAL at 04:36

## 2019-07-21 RX ADMIN — ALBUTEROL 2.5 MILLIGRAM(S): 90 AEROSOL, METERED ORAL at 20:32

## 2019-07-21 RX ADMIN — Medication 650 MILLIGRAM(S): at 05:28

## 2019-07-21 RX ADMIN — Medication 650 MILLIGRAM(S): at 21:10

## 2019-07-21 RX ADMIN — Medication 650 MILLIGRAM(S): at 05:29

## 2019-07-21 RX ADMIN — ATORVASTATIN CALCIUM 10 MILLIGRAM(S): 80 TABLET, FILM COATED ORAL at 21:09

## 2019-07-21 RX ADMIN — SENNA PLUS 2 TABLET(S): 8.6 TABLET ORAL at 21:08

## 2019-07-21 RX ADMIN — Medication 40 MILLIGRAM(S): at 05:29

## 2019-07-21 RX ADMIN — Medication 2000 UNIT(S): at 13:02

## 2019-07-21 RX ADMIN — PANTOPRAZOLE SODIUM 40 MILLIGRAM(S): 20 TABLET, DELAYED RELEASE ORAL at 05:30

## 2019-07-21 RX ADMIN — Medication 50 MILLIGRAM(S): at 05:29

## 2019-07-22 LAB
ANION GAP SERPL CALC-SCNC: 11 MMOL/L — SIGNIFICANT CHANGE UP (ref 7–14)
BUN SERPL-MCNC: 39 MG/DL — HIGH (ref 10–20)
CALCIUM SERPL-MCNC: 8.4 MG/DL — LOW (ref 8.5–10.1)
CHLORIDE SERPL-SCNC: 100 MMOL/L — SIGNIFICANT CHANGE UP (ref 98–110)
CK MB CFR SERPL CALC: 2.3 NG/ML — SIGNIFICANT CHANGE UP (ref 0.6–6.3)
CK MB CFR SERPL CALC: 2.6 NG/ML — SIGNIFICANT CHANGE UP (ref 0.6–6.3)
CK SERPL-CCNC: 24 U/L — SIGNIFICANT CHANGE UP (ref 0–225)
CK SERPL-CCNC: 27 U/L — SIGNIFICANT CHANGE UP (ref 0–225)
CO2 SERPL-SCNC: 28 MMOL/L — SIGNIFICANT CHANGE UP (ref 17–32)
CREAT SERPL-MCNC: 1.5 MG/DL — SIGNIFICANT CHANGE UP (ref 0.7–1.5)
GLUCOSE SERPL-MCNC: 102 MG/DL — HIGH (ref 70–99)
HCT VFR BLD CALC: 25.2 % — LOW (ref 42–52)
HGB BLD-MCNC: 7.6 G/DL — LOW (ref 14–18)
INR BLD: 2.2 RATIO — HIGH (ref 0.65–1.3)
MCHC RBC-ENTMCNC: 20.9 PG — LOW (ref 27–31)
MCHC RBC-ENTMCNC: 30.2 G/DL — LOW (ref 32–37)
MCV RBC AUTO: 69.4 FL — LOW (ref 80–94)
NRBC # BLD: 0 /100 WBCS — SIGNIFICANT CHANGE UP (ref 0–0)
PLATELET # BLD AUTO: 223 K/UL — SIGNIFICANT CHANGE UP (ref 130–400)
POTASSIUM SERPL-MCNC: 4.7 MMOL/L — SIGNIFICANT CHANGE UP (ref 3.5–5)
POTASSIUM SERPL-SCNC: 4.7 MMOL/L — SIGNIFICANT CHANGE UP (ref 3.5–5)
PROTHROM AB SERPL-ACNC: 25.1 SEC — HIGH (ref 9.95–12.87)
RBC # BLD: 3.63 M/UL — LOW (ref 4.7–6.1)
RBC # FLD: 22.9 % — HIGH (ref 11.5–14.5)
SODIUM SERPL-SCNC: 139 MMOL/L — SIGNIFICANT CHANGE UP (ref 135–146)
TROPONIN T SERPL-MCNC: 0.02 NG/ML — HIGH
TROPONIN T SERPL-MCNC: 0.02 NG/ML — HIGH
WBC # BLD: 6.27 K/UL — SIGNIFICANT CHANGE UP (ref 4.8–10.8)
WBC # FLD AUTO: 6.27 K/UL — SIGNIFICANT CHANGE UP (ref 4.8–10.8)

## 2019-07-22 PROCEDURE — 93010 ELECTROCARDIOGRAM REPORT: CPT

## 2019-07-22 RX ORDER — WARFARIN SODIUM 2.5 MG/1
1 TABLET ORAL ONCE
Refills: 0 | Status: COMPLETED | OUTPATIENT
Start: 2019-07-22 | End: 2019-07-22

## 2019-07-22 RX ADMIN — Medication 100 MILLIGRAM(S): at 05:32

## 2019-07-22 RX ADMIN — Medication 2000 UNIT(S): at 11:49

## 2019-07-22 RX ADMIN — ALBUTEROL 2.5 MILLIGRAM(S): 90 AEROSOL, METERED ORAL at 23:56

## 2019-07-22 RX ADMIN — Medication 650 MILLIGRAM(S): at 05:31

## 2019-07-22 RX ADMIN — Medication 50 MICROGRAM(S): at 05:30

## 2019-07-22 RX ADMIN — Medication 100 MILLIGRAM(S): at 18:39

## 2019-07-22 RX ADMIN — Medication 650 MILLIGRAM(S): at 19:06

## 2019-07-22 RX ADMIN — Medication 650 MILLIGRAM(S): at 14:30

## 2019-07-22 RX ADMIN — MAGNESIUM HYDROXIDE 30 MILLILITER(S): 400 TABLET, CHEWABLE ORAL at 19:58

## 2019-07-22 RX ADMIN — SENNA PLUS 2 TABLET(S): 8.6 TABLET ORAL at 21:27

## 2019-07-22 RX ADMIN — Medication 650 MILLIGRAM(S): at 08:18

## 2019-07-22 RX ADMIN — WARFARIN SODIUM 1 MILLIGRAM(S): 2.5 TABLET ORAL at 21:27

## 2019-07-22 RX ADMIN — ISOSORBIDE MONONITRATE 30 MILLIGRAM(S): 60 TABLET, EXTENDED RELEASE ORAL at 11:49

## 2019-07-22 RX ADMIN — Medication 325 MILLIGRAM(S): at 11:49

## 2019-07-22 RX ADMIN — PANTOPRAZOLE SODIUM 40 MILLIGRAM(S): 20 TABLET, DELAYED RELEASE ORAL at 05:31

## 2019-07-22 RX ADMIN — Medication 40 MILLIGRAM(S): at 05:30

## 2019-07-22 RX ADMIN — Medication 650 MILLIGRAM(S): at 08:17

## 2019-07-22 RX ADMIN — Medication 650 MILLIGRAM(S): at 05:29

## 2019-07-22 RX ADMIN — Medication 650 MILLIGRAM(S): at 21:27

## 2019-07-22 RX ADMIN — ATORVASTATIN CALCIUM 10 MILLIGRAM(S): 80 TABLET, FILM COATED ORAL at 21:27

## 2019-07-22 RX ADMIN — Medication 50 MILLIGRAM(S): at 05:30

## 2019-07-23 LAB
FOLATE SERPL-MCNC: 18 NG/ML — SIGNIFICANT CHANGE UP
INR BLD: 2.38 RATIO — HIGH (ref 0.65–1.3)
IRON SATN MFR SERPL: 14 % — LOW (ref 15–50)
IRON SATN MFR SERPL: 28 UG/DL — LOW (ref 35–150)
PROTHROM AB SERPL-ACNC: 27.1 SEC — HIGH (ref 9.95–12.87)
RBC # BLD: 3.66 M/UL — LOW (ref 4.7–6.1)
RETICS #: 48.3 K/UL — SIGNIFICANT CHANGE UP (ref 25–125)
RETICS/RBC NFR: 1.3 % — SIGNIFICANT CHANGE UP (ref 0.5–1.5)
TIBC SERPL-MCNC: 205 UG/DL — LOW (ref 220–430)
UIBC SERPL-MCNC: 177 UG/DL — SIGNIFICANT CHANGE UP (ref 110–370)
VIT B12 SERPL-MCNC: 414 PG/ML — SIGNIFICANT CHANGE UP (ref 232–1245)

## 2019-07-23 RX ORDER — WARFARIN SODIUM 2.5 MG/1
1 TABLET ORAL ONCE
Refills: 0 | Status: COMPLETED | OUTPATIENT
Start: 2019-07-23 | End: 2019-07-23

## 2019-07-23 RX ADMIN — SENNA PLUS 2 TABLET(S): 8.6 TABLET ORAL at 21:06

## 2019-07-23 RX ADMIN — Medication 50 MILLIGRAM(S): at 06:03

## 2019-07-23 RX ADMIN — Medication 650 MILLIGRAM(S): at 06:03

## 2019-07-23 RX ADMIN — Medication 325 MILLIGRAM(S): at 12:22

## 2019-07-23 RX ADMIN — Medication 100 MILLIGRAM(S): at 06:03

## 2019-07-23 RX ADMIN — PANTOPRAZOLE SODIUM 40 MILLIGRAM(S): 20 TABLET, DELAYED RELEASE ORAL at 06:03

## 2019-07-23 RX ADMIN — Medication 40 MILLIGRAM(S): at 06:03

## 2019-07-23 RX ADMIN — Medication 100 MILLIGRAM(S): at 17:48

## 2019-07-23 RX ADMIN — Medication 1 APPLICATION(S): at 12:23

## 2019-07-23 RX ADMIN — Medication 650 MILLIGRAM(S): at 07:23

## 2019-07-23 RX ADMIN — ATORVASTATIN CALCIUM 10 MILLIGRAM(S): 80 TABLET, FILM COATED ORAL at 21:07

## 2019-07-23 RX ADMIN — Medication 50 MICROGRAM(S): at 06:03

## 2019-07-23 RX ADMIN — Medication 650 MILLIGRAM(S): at 21:07

## 2019-07-23 RX ADMIN — Medication 2000 UNIT(S): at 12:22

## 2019-07-23 RX ADMIN — Medication 650 MILLIGRAM(S): at 09:03

## 2019-07-23 RX ADMIN — ISOSORBIDE MONONITRATE 30 MILLIGRAM(S): 60 TABLET, EXTENDED RELEASE ORAL at 12:22

## 2019-07-23 RX ADMIN — WARFARIN SODIUM 1 MILLIGRAM(S): 2.5 TABLET ORAL at 21:07

## 2019-07-24 LAB
BLD GP AB SCN SERPL QL: SIGNIFICANT CHANGE UP
INR BLD: 1.99 RATIO — HIGH (ref 0.65–1.3)
PROTHROM AB SERPL-ACNC: 22.7 SEC — HIGH (ref 9.95–12.87)

## 2019-07-24 RX ORDER — FUROSEMIDE 40 MG
20 TABLET ORAL ONCE
Refills: 0 | Status: COMPLETED | OUTPATIENT
Start: 2019-07-24 | End: 2019-07-24

## 2019-07-24 RX ORDER — WARFARIN SODIUM 2.5 MG/1
1.5 TABLET ORAL ONCE
Refills: 0 | Status: COMPLETED | OUTPATIENT
Start: 2019-07-24 | End: 2019-07-24

## 2019-07-24 RX ADMIN — ISOSORBIDE MONONITRATE 30 MILLIGRAM(S): 60 TABLET, EXTENDED RELEASE ORAL at 12:09

## 2019-07-24 RX ADMIN — Medication 2000 UNIT(S): at 12:10

## 2019-07-24 RX ADMIN — ATORVASTATIN CALCIUM 10 MILLIGRAM(S): 80 TABLET, FILM COATED ORAL at 21:22

## 2019-07-24 RX ADMIN — PANTOPRAZOLE SODIUM 40 MILLIGRAM(S): 20 TABLET, DELAYED RELEASE ORAL at 08:18

## 2019-07-24 RX ADMIN — Medication 650 MILLIGRAM(S): at 05:27

## 2019-07-24 RX ADMIN — SENNA PLUS 2 TABLET(S): 8.6 TABLET ORAL at 21:22

## 2019-07-24 RX ADMIN — Medication 100 MILLIGRAM(S): at 05:26

## 2019-07-24 RX ADMIN — Medication 100 MILLIGRAM(S): at 17:11

## 2019-07-24 RX ADMIN — Medication 650 MILLIGRAM(S): at 21:23

## 2019-07-24 RX ADMIN — Medication 50 MILLIGRAM(S): at 05:26

## 2019-07-24 RX ADMIN — Medication 650 MILLIGRAM(S): at 14:53

## 2019-07-24 RX ADMIN — Medication 50 MICROGRAM(S): at 05:26

## 2019-07-24 RX ADMIN — Medication 40 MILLIGRAM(S): at 05:26

## 2019-07-24 RX ADMIN — Medication 1 APPLICATION(S): at 12:10

## 2019-07-24 RX ADMIN — Medication 650 MILLIGRAM(S): at 22:21

## 2019-07-24 RX ADMIN — WARFARIN SODIUM 1.5 MILLIGRAM(S): 2.5 TABLET ORAL at 21:23

## 2019-07-24 RX ADMIN — Medication 20 MILLIGRAM(S): at 17:40

## 2019-07-24 RX ADMIN — Medication 325 MILLIGRAM(S): at 12:10

## 2019-07-25 LAB
INR BLD: 2.01 RATIO — HIGH (ref 0.65–1.3)
PROTHROM AB SERPL-ACNC: 23 SEC — HIGH (ref 9.95–12.87)

## 2019-07-25 RX ORDER — WARFARIN SODIUM 2.5 MG/1
1.5 TABLET ORAL ONCE
Refills: 0 | Status: COMPLETED | OUTPATIENT
Start: 2019-07-25 | End: 2019-07-25

## 2019-07-25 RX ORDER — WARFARIN SODIUM 2.5 MG/1
1 TABLET ORAL ONCE
Refills: 0 | Status: DISCONTINUED | OUTPATIENT
Start: 2019-07-25 | End: 2019-07-25

## 2019-07-25 RX ORDER — IPRATROPIUM/ALBUTEROL SULFATE 18-103MCG
3 AEROSOL WITH ADAPTER (GRAM) INHALATION
Refills: 0 | Status: DISCONTINUED | OUTPATIENT
Start: 2019-07-25 | End: 2019-07-30

## 2019-07-25 RX ADMIN — Medication 50 MICROGRAM(S): at 05:02

## 2019-07-25 RX ADMIN — ALBUTEROL 2.5 MILLIGRAM(S): 90 AEROSOL, METERED ORAL at 00:16

## 2019-07-25 RX ADMIN — Medication 650 MILLIGRAM(S): at 05:04

## 2019-07-25 RX ADMIN — Medication 650 MILLIGRAM(S): at 16:04

## 2019-07-25 RX ADMIN — Medication 650 MILLIGRAM(S): at 17:03

## 2019-07-25 RX ADMIN — WARFARIN SODIUM 1.5 MILLIGRAM(S): 2.5 TABLET ORAL at 21:38

## 2019-07-25 RX ADMIN — Medication 100 MILLIGRAM(S): at 05:02

## 2019-07-25 RX ADMIN — Medication 100 MILLIGRAM(S): at 18:20

## 2019-07-25 RX ADMIN — SENNA PLUS 2 TABLET(S): 8.6 TABLET ORAL at 21:37

## 2019-07-25 RX ADMIN — Medication 650 MILLIGRAM(S): at 06:32

## 2019-07-25 RX ADMIN — PANTOPRAZOLE SODIUM 40 MILLIGRAM(S): 20 TABLET, DELAYED RELEASE ORAL at 05:02

## 2019-07-25 RX ADMIN — ATORVASTATIN CALCIUM 10 MILLIGRAM(S): 80 TABLET, FILM COATED ORAL at 21:37

## 2019-07-25 RX ADMIN — Medication 40 MILLIGRAM(S): at 05:02

## 2019-07-25 RX ADMIN — ISOSORBIDE MONONITRATE 30 MILLIGRAM(S): 60 TABLET, EXTENDED RELEASE ORAL at 12:16

## 2019-07-25 RX ADMIN — Medication 50 MILLIGRAM(S): at 05:02

## 2019-07-25 RX ADMIN — Medication 650 MILLIGRAM(S): at 21:39

## 2019-07-25 RX ADMIN — Medication 650 MILLIGRAM(S): at 21:37

## 2019-07-25 RX ADMIN — Medication 325 MILLIGRAM(S): at 12:17

## 2019-07-25 RX ADMIN — Medication 2000 UNIT(S): at 12:17

## 2019-07-25 RX ADMIN — Medication 3 MILLILITER(S): at 20:01

## 2019-07-26 LAB
ANION GAP SERPL CALC-SCNC: 13 MMOL/L — SIGNIFICANT CHANGE UP (ref 7–14)
BUN SERPL-MCNC: 44 MG/DL — HIGH (ref 10–20)
CALCIUM SERPL-MCNC: 8 MG/DL — LOW (ref 8.5–10.1)
CHLORIDE SERPL-SCNC: 100 MMOL/L — SIGNIFICANT CHANGE UP (ref 98–110)
CO2 SERPL-SCNC: 27 MMOL/L — SIGNIFICANT CHANGE UP (ref 17–32)
CREAT SERPL-MCNC: 1.5 MG/DL — SIGNIFICANT CHANGE UP (ref 0.7–1.5)
GLUCOSE SERPL-MCNC: 109 MG/DL — HIGH (ref 70–99)
HCT VFR BLD CALC: 27.6 % — LOW (ref 42–52)
HGB BLD-MCNC: 8.3 G/DL — LOW (ref 14–18)
INR BLD: 2.45 RATIO — HIGH (ref 0.65–1.3)
MCHC RBC-ENTMCNC: 21.4 PG — LOW (ref 27–31)
MCHC RBC-ENTMCNC: 30.1 G/DL — LOW (ref 32–37)
MCV RBC AUTO: 71.3 FL — LOW (ref 80–94)
NRBC # BLD: 0 /100 WBCS — SIGNIFICANT CHANGE UP (ref 0–0)
PLATELET # BLD AUTO: 198 K/UL — SIGNIFICANT CHANGE UP (ref 130–400)
POTASSIUM SERPL-MCNC: 4 MMOL/L — SIGNIFICANT CHANGE UP (ref 3.5–5)
POTASSIUM SERPL-SCNC: 4 MMOL/L — SIGNIFICANT CHANGE UP (ref 3.5–5)
PROTHROM AB SERPL-ACNC: 27.9 SEC — HIGH (ref 9.95–12.87)
RBC # BLD: 3.87 M/UL — LOW (ref 4.7–6.1)
RBC # FLD: 22.9 % — HIGH (ref 11.5–14.5)
SODIUM SERPL-SCNC: 140 MMOL/L — SIGNIFICANT CHANGE UP (ref 135–146)
WBC # BLD: 5.98 K/UL — SIGNIFICANT CHANGE UP (ref 4.8–10.8)
WBC # FLD AUTO: 5.98 K/UL — SIGNIFICANT CHANGE UP (ref 4.8–10.8)

## 2019-07-26 RX ORDER — WARFARIN SODIUM 2.5 MG/1
1 TABLET ORAL ONCE
Refills: 0 | Status: COMPLETED | OUTPATIENT
Start: 2019-07-26 | End: 2019-07-26

## 2019-07-26 RX ADMIN — Medication 100 MILLIGRAM(S): at 17:24

## 2019-07-26 RX ADMIN — Medication 325 MILLIGRAM(S): at 11:27

## 2019-07-26 RX ADMIN — Medication 3 MILLILITER(S): at 08:09

## 2019-07-26 RX ADMIN — ATORVASTATIN CALCIUM 10 MILLIGRAM(S): 80 TABLET, FILM COATED ORAL at 21:42

## 2019-07-26 RX ADMIN — WARFARIN SODIUM 1 MILLIGRAM(S): 2.5 TABLET ORAL at 21:41

## 2019-07-26 RX ADMIN — Medication 650 MILLIGRAM(S): at 06:00

## 2019-07-26 RX ADMIN — Medication 100 MILLIGRAM(S): at 05:16

## 2019-07-26 RX ADMIN — ISOSORBIDE MONONITRATE 30 MILLIGRAM(S): 60 TABLET, EXTENDED RELEASE ORAL at 11:27

## 2019-07-26 RX ADMIN — Medication 40 MILLIGRAM(S): at 05:16

## 2019-07-26 RX ADMIN — Medication 1 APPLICATION(S): at 11:27

## 2019-07-26 RX ADMIN — Medication 2000 UNIT(S): at 11:27

## 2019-07-26 RX ADMIN — Medication 650 MILLIGRAM(S): at 16:02

## 2019-07-26 RX ADMIN — Medication 650 MILLIGRAM(S): at 21:41

## 2019-07-26 RX ADMIN — ALBUTEROL 2.5 MILLIGRAM(S): 90 AEROSOL, METERED ORAL at 17:03

## 2019-07-26 RX ADMIN — PANTOPRAZOLE SODIUM 40 MILLIGRAM(S): 20 TABLET, DELAYED RELEASE ORAL at 06:00

## 2019-07-26 RX ADMIN — Medication 650 MILLIGRAM(S): at 21:45

## 2019-07-26 RX ADMIN — SENNA PLUS 2 TABLET(S): 8.6 TABLET ORAL at 21:42

## 2019-07-26 RX ADMIN — Medication 650 MILLIGRAM(S): at 16:04

## 2019-07-26 RX ADMIN — Medication 50 MILLIGRAM(S): at 05:16

## 2019-07-26 RX ADMIN — Medication 650 MILLIGRAM(S): at 05:16

## 2019-07-26 RX ADMIN — Medication 50 MICROGRAM(S): at 05:16

## 2019-07-27 LAB
INR BLD: 2.66 RATIO — HIGH (ref 0.65–1.3)
PROTHROM AB SERPL-ACNC: 30.3 SEC — HIGH (ref 9.95–12.87)

## 2019-07-27 RX ORDER — WARFARIN SODIUM 2.5 MG/1
1 TABLET ORAL ONCE
Refills: 0 | Status: COMPLETED | OUTPATIENT
Start: 2019-07-27 | End: 2019-07-27

## 2019-07-27 RX ADMIN — Medication 650 MILLIGRAM(S): at 22:25

## 2019-07-27 RX ADMIN — Medication 15 MILLILITER(S): at 14:20

## 2019-07-27 RX ADMIN — Medication 1 APPLICATION(S): at 12:53

## 2019-07-27 RX ADMIN — Medication 3 MILLILITER(S): at 13:35

## 2019-07-27 RX ADMIN — Medication 650 MILLIGRAM(S): at 14:16

## 2019-07-27 RX ADMIN — Medication 650 MILLIGRAM(S): at 05:37

## 2019-07-27 RX ADMIN — Medication 100 MILLIGRAM(S): at 18:40

## 2019-07-27 RX ADMIN — Medication 650 MILLIGRAM(S): at 18:13

## 2019-07-27 RX ADMIN — ISOSORBIDE MONONITRATE 30 MILLIGRAM(S): 60 TABLET, EXTENDED RELEASE ORAL at 12:54

## 2019-07-27 RX ADMIN — Medication 40 MILLIGRAM(S): at 05:36

## 2019-07-27 RX ADMIN — Medication 100 MILLIGRAM(S): at 05:36

## 2019-07-27 RX ADMIN — SENNA PLUS 2 TABLET(S): 8.6 TABLET ORAL at 22:04

## 2019-07-27 RX ADMIN — PANTOPRAZOLE SODIUM 40 MILLIGRAM(S): 20 TABLET, DELAYED RELEASE ORAL at 05:37

## 2019-07-27 RX ADMIN — Medication 325 MILLIGRAM(S): at 12:54

## 2019-07-27 RX ADMIN — Medication 2000 UNIT(S): at 12:54

## 2019-07-27 RX ADMIN — Medication 650 MILLIGRAM(S): at 22:05

## 2019-07-27 RX ADMIN — ATORVASTATIN CALCIUM 10 MILLIGRAM(S): 80 TABLET, FILM COATED ORAL at 22:03

## 2019-07-27 RX ADMIN — Medication 3 MILLILITER(S): at 20:20

## 2019-07-27 RX ADMIN — Medication 3 MILLILITER(S): at 07:40

## 2019-07-27 RX ADMIN — Medication 50 MICROGRAM(S): at 05:36

## 2019-07-27 RX ADMIN — Medication 50 MILLIGRAM(S): at 05:36

## 2019-07-27 RX ADMIN — WARFARIN SODIUM 1 MILLIGRAM(S): 2.5 TABLET ORAL at 22:05

## 2019-07-28 LAB
INR BLD: 2.38 RATIO — HIGH (ref 0.65–1.3)
PROTHROM AB SERPL-ACNC: 27.1 SEC — HIGH (ref 9.95–12.87)

## 2019-07-28 RX ORDER — WARFARIN SODIUM 2.5 MG/1
1 TABLET ORAL ONCE
Refills: 0 | Status: COMPLETED | OUTPATIENT
Start: 2019-07-28 | End: 2019-07-28

## 2019-07-28 RX ADMIN — ISOSORBIDE MONONITRATE 30 MILLIGRAM(S): 60 TABLET, EXTENDED RELEASE ORAL at 12:21

## 2019-07-28 RX ADMIN — Medication 650 MILLIGRAM(S): at 22:21

## 2019-07-28 RX ADMIN — Medication 650 MILLIGRAM(S): at 21:47

## 2019-07-28 RX ADMIN — ATORVASTATIN CALCIUM 10 MILLIGRAM(S): 80 TABLET, FILM COATED ORAL at 21:46

## 2019-07-28 RX ADMIN — Medication 2000 UNIT(S): at 12:21

## 2019-07-28 RX ADMIN — Medication 1 APPLICATION(S): at 12:21

## 2019-07-28 RX ADMIN — Medication 325 MILLIGRAM(S): at 12:21

## 2019-07-28 RX ADMIN — Medication 50 MICROGRAM(S): at 05:25

## 2019-07-28 RX ADMIN — Medication 50 MILLIGRAM(S): at 05:25

## 2019-07-28 RX ADMIN — Medication 40 MILLIGRAM(S): at 05:25

## 2019-07-28 RX ADMIN — Medication 100 MILLIGRAM(S): at 17:54

## 2019-07-28 RX ADMIN — Medication 650 MILLIGRAM(S): at 05:26

## 2019-07-28 RX ADMIN — Medication 3 MILLILITER(S): at 20:00

## 2019-07-28 RX ADMIN — PANTOPRAZOLE SODIUM 40 MILLIGRAM(S): 20 TABLET, DELAYED RELEASE ORAL at 05:25

## 2019-07-28 RX ADMIN — SENNA PLUS 2 TABLET(S): 8.6 TABLET ORAL at 21:46

## 2019-07-28 RX ADMIN — Medication 3 MILLILITER(S): at 02:19

## 2019-07-28 RX ADMIN — Medication 650 MILLIGRAM(S): at 17:55

## 2019-07-28 RX ADMIN — WARFARIN SODIUM 1 MILLIGRAM(S): 2.5 TABLET ORAL at 21:47

## 2019-07-28 RX ADMIN — Medication 3 MILLILITER(S): at 07:52

## 2019-07-28 RX ADMIN — Medication 650 MILLIGRAM(S): at 05:55

## 2019-07-29 ENCOUNTER — TRANSCRIPTION ENCOUNTER (OUTPATIENT)
Age: 84
End: 2019-07-29

## 2019-07-29 LAB
ANION GAP SERPL CALC-SCNC: 12 MMOL/L — SIGNIFICANT CHANGE UP (ref 7–14)
BUN SERPL-MCNC: 45 MG/DL — HIGH (ref 10–20)
CALCIUM SERPL-MCNC: 8.2 MG/DL — LOW (ref 8.5–10.1)
CHLORIDE SERPL-SCNC: 100 MMOL/L — SIGNIFICANT CHANGE UP (ref 98–110)
CO2 SERPL-SCNC: 28 MMOL/L — SIGNIFICANT CHANGE UP (ref 17–32)
CREAT SERPL-MCNC: 1.5 MG/DL — SIGNIFICANT CHANGE UP (ref 0.7–1.5)
GLUCOSE SERPL-MCNC: 104 MG/DL — HIGH (ref 70–99)
HCT VFR BLD CALC: 29.6 % — LOW (ref 42–52)
HGB BLD-MCNC: 8.9 G/DL — LOW (ref 14–18)
INR BLD: 2.28 RATIO — HIGH (ref 0.65–1.3)
MCHC RBC-ENTMCNC: 21.2 PG — LOW (ref 27–31)
MCHC RBC-ENTMCNC: 30.1 G/DL — LOW (ref 32–37)
MCV RBC AUTO: 70.6 FL — LOW (ref 80–94)
NRBC # BLD: 0 /100 WBCS — SIGNIFICANT CHANGE UP (ref 0–0)
PLATELET # BLD AUTO: 205 K/UL — SIGNIFICANT CHANGE UP (ref 130–400)
POTASSIUM SERPL-MCNC: 4.5 MMOL/L — SIGNIFICANT CHANGE UP (ref 3.5–5)
POTASSIUM SERPL-SCNC: 4.5 MMOL/L — SIGNIFICANT CHANGE UP (ref 3.5–5)
PROTHROM AB SERPL-ACNC: 26 SEC — HIGH (ref 9.95–12.87)
RBC # BLD: 4.19 M/UL — LOW (ref 4.7–6.1)
RBC # FLD: 23.1 % — HIGH (ref 11.5–14.5)
SODIUM SERPL-SCNC: 140 MMOL/L — SIGNIFICANT CHANGE UP (ref 135–146)
WBC # BLD: 6.41 K/UL — SIGNIFICANT CHANGE UP (ref 4.8–10.8)
WBC # FLD AUTO: 6.41 K/UL — SIGNIFICANT CHANGE UP (ref 4.8–10.8)

## 2019-07-29 RX ORDER — METOPROLOL TARTRATE 50 MG
1 TABLET ORAL
Qty: 0 | Refills: 0 | DISCHARGE
Start: 2019-07-29

## 2019-07-29 RX ORDER — CHOLECALCIFEROL (VITAMIN D3) 125 MCG
2000 CAPSULE ORAL
Qty: 0 | Refills: 0 | DISCHARGE
Start: 2019-07-29

## 2019-07-29 RX ORDER — FUROSEMIDE 40 MG
1 TABLET ORAL
Qty: 0 | Refills: 0 | DISCHARGE
Start: 2019-07-29

## 2019-07-29 RX ORDER — ALBUTEROL 90 UG/1
0 AEROSOL, METERED ORAL
Qty: 75 | Refills: 0 | DISCHARGE

## 2019-07-29 RX ORDER — ISOSORBIDE MONONITRATE 60 MG/1
1 TABLET, EXTENDED RELEASE ORAL
Qty: 0 | Refills: 0 | DISCHARGE
Start: 2019-07-29

## 2019-07-29 RX ORDER — WARFARIN SODIUM 2.5 MG/1
1 TABLET ORAL
Qty: 0 | Refills: 0 | DISCHARGE
Start: 2019-07-29

## 2019-07-29 RX ORDER — ATORVASTATIN CALCIUM 80 MG/1
1 TABLET, FILM COATED ORAL
Qty: 0 | Refills: 0 | DISCHARGE
Start: 2019-07-29

## 2019-07-29 RX ORDER — ALBUTEROL 90 UG/1
1 AEROSOL, METERED ORAL
Qty: 0 | Refills: 0 | DISCHARGE
Start: 2019-07-29

## 2019-07-29 RX ORDER — PANTOPRAZOLE SODIUM 20 MG/1
1 TABLET, DELAYED RELEASE ORAL
Qty: 0 | Refills: 0 | DISCHARGE
Start: 2019-07-29

## 2019-07-29 RX ORDER — WARFARIN SODIUM 2.5 MG/1
1 TABLET ORAL ONCE
Refills: 0 | Status: COMPLETED | OUTPATIENT
Start: 2019-07-29 | End: 2019-07-29

## 2019-07-29 RX ORDER — FERROUS SULFATE 325(65) MG
1 TABLET ORAL
Qty: 30 | Refills: 0
Start: 2019-07-29 | End: 2019-08-27

## 2019-07-29 RX ORDER — LEVOTHYROXINE SODIUM 125 MCG
1 TABLET ORAL
Qty: 0 | Refills: 0 | DISCHARGE
Start: 2019-07-29

## 2019-07-29 RX ADMIN — PANTOPRAZOLE SODIUM 40 MILLIGRAM(S): 20 TABLET, DELAYED RELEASE ORAL at 05:39

## 2019-07-29 RX ADMIN — Medication 1 APPLICATION(S): at 11:53

## 2019-07-29 RX ADMIN — Medication 40 MILLIGRAM(S): at 05:39

## 2019-07-29 RX ADMIN — Medication 3 MILLILITER(S): at 08:07

## 2019-07-29 RX ADMIN — Medication 650 MILLIGRAM(S): at 05:39

## 2019-07-29 RX ADMIN — Medication 50 MICROGRAM(S): at 05:39

## 2019-07-29 RX ADMIN — Medication 650 MILLIGRAM(S): at 21:55

## 2019-07-29 RX ADMIN — Medication 2000 UNIT(S): at 11:53

## 2019-07-29 RX ADMIN — SENNA PLUS 2 TABLET(S): 8.6 TABLET ORAL at 21:56

## 2019-07-29 RX ADMIN — WARFARIN SODIUM 1 MILLIGRAM(S): 2.5 TABLET ORAL at 21:56

## 2019-07-29 RX ADMIN — Medication 650 MILLIGRAM(S): at 16:55

## 2019-07-29 RX ADMIN — Medication 50 MILLIGRAM(S): at 05:39

## 2019-07-29 RX ADMIN — Medication 100 MILLIGRAM(S): at 17:36

## 2019-07-29 RX ADMIN — ISOSORBIDE MONONITRATE 30 MILLIGRAM(S): 60 TABLET, EXTENDED RELEASE ORAL at 11:54

## 2019-07-29 RX ADMIN — Medication 650 MILLIGRAM(S): at 07:38

## 2019-07-29 RX ADMIN — Medication 3 MILLILITER(S): at 20:59

## 2019-07-29 RX ADMIN — Medication 650 MILLIGRAM(S): at 14:24

## 2019-07-29 RX ADMIN — Medication 100 MILLIGRAM(S): at 05:39

## 2019-07-29 RX ADMIN — ATORVASTATIN CALCIUM 10 MILLIGRAM(S): 80 TABLET, FILM COATED ORAL at 21:56

## 2019-07-29 RX ADMIN — Medication 650 MILLIGRAM(S): at 23:43

## 2019-07-29 RX ADMIN — Medication 325 MILLIGRAM(S): at 11:54

## 2019-07-29 NOTE — DISCHARGE NOTE PROVIDER - CARE PROVIDER_API CALL
Jason Davis)  Cardiovascular Disease  [address] [place] [address]  Phone: [phone]  Fax: [phone]  Follow Up Time: 2 weeks    Иван Medina)  Orthopaedic Surgery  [address] [place] [address]  Phone: [phone]  Fax: [phone]  Follow Up Time: 1 week

## 2019-07-29 NOTE — DISCHARGE NOTE PROVIDER - PROVIDER TOKENS
PROVIDER:[TOKEN:[[id number]],FOLLOWUP:[2 weeks]],PROVIDER:[TOKEN:[[id number]],FOLLOWUP:[1 week]]

## 2019-07-29 NOTE — DISCHARGE NOTE PROVIDER - NSDCCPCAREPLAN_GEN_ALL_CORE_FT
PRINCIPAL DISCHARGE DIAGNOSIS  Diagnosis: Hip fracture, left  Assessment and Plan of Treatment: -s/p hemiarthroplasty left hip  -completed course of inpatient rehab and stable for discharge home  -f/u with orthopedics outpatient      SECONDARY DISCHARGE DIAGNOSES  Diagnosis: Hypothyroid  Assessment and Plan of Treatment: continue synthroid  follow up with primary care physician    Diagnosis: Afib  Assessment and Plan of Treatment: -continue coumadin as perscribed  -continue metoprolol   -follow up with [facility]    Diagnosis: Anemia  Assessment and Plan of Treatment: -s/p 1 unit PRBC    Diagnosis: Chronic heart failure  Assessment and Plan of Treatment: -continue lasix 40mg daily  -continue metoprolol  -f/u with cardiologist PRINCIPAL DISCHARGE DIAGNOSIS  Diagnosis: Hip fracture, left  Assessment and Plan of Treatment: -s/p hemiarthroplasty left hip  -completed course of inpatient rehab and stable for discharge home  -f/u with orthopedics outpatient      SECONDARY DISCHARGE DIAGNOSES  Diagnosis: Hypothyroid  Assessment and Plan of Treatment: continue synthroid  follow up with primary care physician    Diagnosis: Afib  Assessment and Plan of Treatment: -continue coumadin as perscribed  -continue metoprolol   -follow up with [facility] clinic    Diagnosis: Anemia  Assessment and Plan of Treatment: -s/p 1 unit PRBC  -continue iron supplement    Diagnosis: Chronic heart failure  Assessment and Plan of Treatment: -continue lasix 40mg daily  -continue metoprolol  -continue Imdur  -continue statin  -f/u with cardiologist

## 2019-07-29 NOTE — DISCHARGE NOTE PROVIDER - HOSPITAL COURSE
HPI: 89 year old male with past medical history of CHFREF 40-45% on 2L home O2, afib on coumadin, AAA s/p repair, HTN, CAD, DLD, and anemia presented to [facility] with left hip pain for 2 weeks. Patient was found to have a displaced femoral neck fracture and had a left hip hemiarthroplasty. Hospital course was complicated by episodes of hypotension requiring IV hydration and anemia requiring transfusion. Patient was stable and cleared for WBAT. Patient tolerated physical therapy and was evaluated to be a good rehab candidate and was transferred     to .         Prior functional status: independent with ADLs and ambulation with cane    Admission Physical Exam:    Vital signs stable. Afebrile    Gen: alert, NAD    Cardio: RRR    Lungs: clear    Abd: soft, NT    Extremities: left thigh swelling. PROM wnl.    Neuro: no focal deficits, Alert and oriented x3    Motor Power:  4/5 throughout except left hip 2/5 secondary to pain    Sensation: intact        Hospital Course: The patient was admitted to the acute inpatient rehab unit presenting with a decline in functional status.     Patient complained of fatigue and difficulty sleeping. Per cardiology recommendations patient received 1 unit of packed red blood cells for symptomatic anemia. patient lasix dose was increased to home dose of 40mg daily for increased bibasilar pleural effusion.     The patient participated in three hours of multidisciplinary therapy 5-6 days per week. The patient was continued on all home medications or equivalent alternatives as deemed appropriate. The patient received prophylactic anticoagulation medication and was monitored closely with no complications. During the stay on the inpatient unit, the patient showed as much progress as had been anticipated and was cleared for discharge by a multidisciplinary team.        Discharge functional status:  ambulated 100 feet with rolling walker  with contact guard assist. supervision set up for bed transfers and bed mobility, lower body dressing and commode transfers.         Discharge disposition: home with home care

## 2019-07-29 NOTE — DISCHARGE NOTE PROVIDER - CARE PROVIDERS DIRECT ADDRESSES
,DirectAddress_Unknown,[email or web address]

## 2019-07-30 ENCOUNTER — TRANSCRIPTION ENCOUNTER (OUTPATIENT)
Age: 84
End: 2019-07-30

## 2019-07-30 LAB
INR BLD: 2.09 RATIO — HIGH (ref 0.65–1.3)
PROTHROM AB SERPL-ACNC: 23.9 SEC — HIGH (ref 9.95–12.87)

## 2019-07-30 RX ORDER — FERROUS SULFATE 325(65) MG
1 TABLET ORAL
Qty: 30 | Refills: 0
Start: 2019-07-30 | End: 2019-08-28

## 2019-07-30 RX ORDER — METOPROLOL TARTRATE 50 MG
1 TABLET ORAL
Qty: 30 | Refills: 0
Start: 2019-07-30

## 2019-07-30 RX ORDER — IPRATROPIUM/ALBUTEROL SULFATE 18-103MCG
3 AEROSOL WITH ADAPTER (GRAM) INHALATION
Qty: 120 | Refills: 0
Start: 2019-07-30

## 2019-07-30 RX ORDER — ISOSORBIDE MONONITRATE 60 MG/1
1 TABLET, EXTENDED RELEASE ORAL
Qty: 30 | Refills: 0
Start: 2019-07-30

## 2019-07-30 RX ORDER — ATORVASTATIN CALCIUM 80 MG/1
1 TABLET, FILM COATED ORAL
Qty: 30 | Refills: 0
Start: 2019-07-30

## 2019-07-30 RX ORDER — WARFARIN SODIUM 2.5 MG/1
1 TABLET ORAL
Qty: 45 | Refills: 0
Start: 2019-07-30

## 2019-07-30 RX ORDER — FUROSEMIDE 40 MG
1 TABLET ORAL
Qty: 30 | Refills: 0
Start: 2019-07-30

## 2019-07-30 RX ORDER — SENNA PLUS 8.6 MG/1
2 TABLET ORAL
Qty: 0 | Refills: 0 | DISCHARGE
Start: 2019-07-30

## 2019-07-30 RX ORDER — LEVOTHYROXINE SODIUM 125 MCG
1 TABLET ORAL
Qty: 30 | Refills: 0
Start: 2019-07-30

## 2019-07-30 RX ADMIN — Medication 100 MILLIGRAM(S): at 05:53

## 2019-07-30 RX ADMIN — Medication 50 MICROGRAM(S): at 05:53

## 2019-07-30 RX ADMIN — Medication 650 MILLIGRAM(S): at 06:29

## 2019-07-30 RX ADMIN — Medication 3 MILLILITER(S): at 08:37

## 2019-07-30 RX ADMIN — Medication 50 MILLIGRAM(S): at 05:53

## 2019-07-30 RX ADMIN — Medication 325 MILLIGRAM(S): at 11:07

## 2019-07-30 RX ADMIN — PANTOPRAZOLE SODIUM 40 MILLIGRAM(S): 20 TABLET, DELAYED RELEASE ORAL at 05:54

## 2019-07-30 RX ADMIN — Medication 1 APPLICATION(S): at 11:12

## 2019-07-30 RX ADMIN — Medication 3 MILLILITER(S): at 02:37

## 2019-07-30 RX ADMIN — ISOSORBIDE MONONITRATE 30 MILLIGRAM(S): 60 TABLET, EXTENDED RELEASE ORAL at 11:07

## 2019-07-30 RX ADMIN — Medication 2000 UNIT(S): at 11:07

## 2019-07-30 RX ADMIN — Medication 650 MILLIGRAM(S): at 05:53

## 2019-07-30 RX ADMIN — Medication 40 MILLIGRAM(S): at 05:53

## 2019-08-05 ENCOUNTER — OUTPATIENT (OUTPATIENT)
Dept: OUTPATIENT SERVICES | Facility: HOSPITAL | Age: 84
LOS: 1 days | Discharge: HOME | End: 2019-08-05

## 2019-08-05 DIAGNOSIS — R79.1 ABNORMAL COAGULATION PROFILE: ICD-10-CM

## 2019-08-05 DIAGNOSIS — I48.91 UNSPECIFIED ATRIAL FIBRILLATION: ICD-10-CM

## 2019-08-05 DIAGNOSIS — Z98.890 OTHER SPECIFIED POSTPROCEDURAL STATES: Chronic | ICD-10-CM

## 2019-08-12 ENCOUNTER — OUTPATIENT (OUTPATIENT)
Dept: OUTPATIENT SERVICES | Facility: HOSPITAL | Age: 84
LOS: 1 days | Discharge: HOME | End: 2019-08-12

## 2019-08-12 DIAGNOSIS — I48.91 UNSPECIFIED ATRIAL FIBRILLATION: ICD-10-CM

## 2019-08-12 DIAGNOSIS — I50.22 CHRONIC SYSTOLIC (CONGESTIVE) HEART FAILURE: ICD-10-CM

## 2019-08-12 DIAGNOSIS — E03.9 HYPOTHYROIDISM, UNSPECIFIED: ICD-10-CM

## 2019-08-12 DIAGNOSIS — E78.5 HYPERLIPIDEMIA, UNSPECIFIED: ICD-10-CM

## 2019-08-12 DIAGNOSIS — Z96.642 PRESENCE OF LEFT ARTIFICIAL HIP JOINT: ICD-10-CM

## 2019-08-12 DIAGNOSIS — Z79.01 LONG TERM (CURRENT) USE OF ANTICOAGULANTS: ICD-10-CM

## 2019-08-12 DIAGNOSIS — I25.10 ATHEROSCLEROTIC HEART DISEASE OF NATIVE CORONARY ARTERY WITHOUT ANGINA PECTORIS: ICD-10-CM
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
## 2019-08-12 DIAGNOSIS — Z99.81 DEPENDENCE ON SUPPLEMENTAL OXYGEN: ICD-10-CM
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
## 2019-08-12 DIAGNOSIS — I27.20 PULMONARY HYPERTENSION, UNSPECIFIED: ICD-10-CM

## 2019-08-12 DIAGNOSIS — I42.9 CARDIOMYOPATHY, UNSPECIFIED: ICD-10-CM

## 2019-08-12 DIAGNOSIS — D64.9 ANEMIA, UNSPECIFIED: ICD-10-CM

## 2019-08-12 DIAGNOSIS — I13.0 HYPERTENSIVE HEART AND CHRONIC KIDNEY DISEASE WITH HEART FAILURE AND STAGE 1 THROUGH STAGE 4 CHRONIC KIDNEY DISEASE, OR UNSPECIFIED CHRONIC KIDNEY DISEASE: ICD-10-CM

## 2019-08-12 DIAGNOSIS — E55.9 VITAMIN D DEFICIENCY, UNSPECIFIED: ICD-10-CM

## 2019-08-12 DIAGNOSIS — Z98.890 OTHER SPECIFIED POSTPROCEDURAL STATES: Chronic | ICD-10-CM

## 2019-08-12 DIAGNOSIS — G89.18 OTHER ACUTE POSTPROCEDURAL PAIN: ICD-10-CM

## 2019-08-12 DIAGNOSIS — H91.90 UNSPECIFIED HEARING LOSS, UNSPECIFIED EAR: ICD-10-CM

## 2019-08-12 DIAGNOSIS — Z87.891 PERSONAL HISTORY OF NICOTINE DEPENDENCE: ICD-10-CM

## 2019-08-12 DIAGNOSIS — S72.012D UNSPECIFIED INTRACAPSULAR FRACTURE OF LEFT FEMUR, SUBSEQUENT ENCOUNTER FOR CLOSED FRACTURE WITH ROUTINE HEALING: ICD-10-CM

## 2019-08-12 DIAGNOSIS — N18.9 CHRONIC KIDNEY DISEASE, UNSPECIFIED: ICD-10-CM

## 2019-08-19 ENCOUNTER — OUTPATIENT (OUTPATIENT)
Dept: OUTPATIENT SERVICES | Facility: HOSPITAL | Age: 84
LOS: 1 days | Discharge: HOME | End: 2019-08-19

## 2019-08-19 DIAGNOSIS — Z98.890 OTHER SPECIFIED POSTPROCEDURAL STATES: Chronic | ICD-10-CM

## 2019-08-19 DIAGNOSIS — I48.91 UNSPECIFIED ATRIAL FIBRILLATION: ICD-10-CM

## 2019-08-26 ENCOUNTER — OUTPATIENT (OUTPATIENT)
Dept: OUTPATIENT SERVICES | Facility: HOSPITAL | Age: 84
LOS: 1 days | Discharge: HOME | End: 2019-08-26

## 2019-08-26 DIAGNOSIS — Z98.890 OTHER SPECIFIED POSTPROCEDURAL STATES: Chronic | ICD-10-CM

## 2019-08-26 DIAGNOSIS — I48.91 UNSPECIFIED ATRIAL FIBRILLATION: ICD-10-CM

## 2019-09-03 ENCOUNTER — OUTPATIENT (OUTPATIENT)
Dept: OUTPATIENT SERVICES | Facility: HOSPITAL | Age: 84
LOS: 1 days | Discharge: HOME | End: 2019-09-03

## 2019-09-03 DIAGNOSIS — Z98.890 OTHER SPECIFIED POSTPROCEDURAL STATES: Chronic | ICD-10-CM

## 2019-09-03 DIAGNOSIS — I48.91 UNSPECIFIED ATRIAL FIBRILLATION: ICD-10-CM

## 2019-09-09 ENCOUNTER — OUTPATIENT (OUTPATIENT)
Dept: OUTPATIENT SERVICES | Facility: HOSPITAL | Age: 84
LOS: 1 days | Discharge: HOME | End: 2019-09-09

## 2019-09-09 DIAGNOSIS — Z98.890 OTHER SPECIFIED POSTPROCEDURAL STATES: Chronic | ICD-10-CM

## 2019-09-09 DIAGNOSIS — I48.91 UNSPECIFIED ATRIAL FIBRILLATION: ICD-10-CM

## 2019-09-16 ENCOUNTER — OUTPATIENT (OUTPATIENT)
Dept: OUTPATIENT SERVICES | Facility: HOSPITAL | Age: 84
LOS: 1 days | Discharge: HOME | End: 2019-09-16

## 2019-09-16 DIAGNOSIS — Z98.890 OTHER SPECIFIED POSTPROCEDURAL STATES: Chronic | ICD-10-CM

## 2019-09-16 DIAGNOSIS — I48.91 UNSPECIFIED ATRIAL FIBRILLATION: ICD-10-CM

## 2019-09-23 ENCOUNTER — OUTPATIENT (OUTPATIENT)
Dept: OUTPATIENT SERVICES | Facility: HOSPITAL | Age: 84
LOS: 1 days | Discharge: HOME | End: 2019-09-23

## 2019-09-23 DIAGNOSIS — I48.91 UNSPECIFIED ATRIAL FIBRILLATION: ICD-10-CM

## 2019-09-23 DIAGNOSIS — Z98.890 OTHER SPECIFIED POSTPROCEDURAL STATES: Chronic | ICD-10-CM

## 2019-09-24 ENCOUNTER — OUTPATIENT (OUTPATIENT)
Dept: OUTPATIENT SERVICES | Facility: HOSPITAL | Age: 84
LOS: 1 days | Discharge: HOME | End: 2019-09-24

## 2019-09-24 DIAGNOSIS — Z98.890 OTHER SPECIFIED POSTPROCEDURAL STATES: Chronic | ICD-10-CM

## 2019-09-24 DIAGNOSIS — I48.91 UNSPECIFIED ATRIAL FIBRILLATION: ICD-10-CM

## 2019-09-27 NOTE — ED PROVIDER NOTE - OBJECTIVE STATEMENT
pt  a 89 yom w/ afib on coumadin, chf, HTN, AAA repair 10 years ago, here for abdominal pain, sob, feeling weak for the past week. pt denies chest pain, dizziness, paresthesia, cough, fever, AMS.
36.2

## 2019-09-30 ENCOUNTER — OUTPATIENT (OUTPATIENT)
Dept: OUTPATIENT SERVICES | Facility: HOSPITAL | Age: 84
LOS: 1 days | Discharge: HOME | End: 2019-09-30

## 2019-09-30 DIAGNOSIS — Z98.890 OTHER SPECIFIED POSTPROCEDURAL STATES: Chronic | ICD-10-CM

## 2019-09-30 DIAGNOSIS — I48.91 UNSPECIFIED ATRIAL FIBRILLATION: ICD-10-CM

## 2019-10-07 ENCOUNTER — OUTPATIENT (OUTPATIENT)
Dept: OUTPATIENT SERVICES | Facility: HOSPITAL | Age: 84
LOS: 1 days | Discharge: HOME | End: 2019-10-07

## 2019-10-07 DIAGNOSIS — Z98.890 OTHER SPECIFIED POSTPROCEDURAL STATES: Chronic | ICD-10-CM

## 2019-10-07 DIAGNOSIS — I48.91 UNSPECIFIED ATRIAL FIBRILLATION: ICD-10-CM

## 2019-10-14 ENCOUNTER — OUTPATIENT (OUTPATIENT)
Dept: OUTPATIENT SERVICES | Facility: HOSPITAL | Age: 84
LOS: 1 days | Discharge: HOME | End: 2019-10-14

## 2019-10-14 DIAGNOSIS — I48.91 UNSPECIFIED ATRIAL FIBRILLATION: ICD-10-CM

## 2019-10-14 DIAGNOSIS — Z98.890 OTHER SPECIFIED POSTPROCEDURAL STATES: Chronic | ICD-10-CM

## 2019-10-21 ENCOUNTER — OUTPATIENT (OUTPATIENT)
Dept: OUTPATIENT SERVICES | Facility: HOSPITAL | Age: 84
LOS: 1 days | Discharge: HOME | End: 2019-10-21

## 2019-10-21 DIAGNOSIS — Z98.890 OTHER SPECIFIED POSTPROCEDURAL STATES: Chronic | ICD-10-CM

## 2019-10-21 DIAGNOSIS — I48.91 UNSPECIFIED ATRIAL FIBRILLATION: ICD-10-CM

## 2019-10-22 ENCOUNTER — INPATIENT (INPATIENT)
Facility: HOSPITAL | Age: 84
LOS: 7 days | Discharge: HOME HEALTH | End: 2019-10-30
Attending: HOSPITALIST | Admitting: HOSPITALIST
Payer: MEDICARE

## 2019-10-22 VITALS
DIASTOLIC BLOOD PRESSURE: 64 MMHG | SYSTOLIC BLOOD PRESSURE: 136 MMHG | WEIGHT: 138.01 LBS | RESPIRATION RATE: 32 BRPM | HEART RATE: 105 BPM | OXYGEN SATURATION: 93 %

## 2019-10-22 DIAGNOSIS — Z98.890 OTHER SPECIFIED POSTPROCEDURAL STATES: Chronic | ICD-10-CM

## 2019-10-22 LAB
ALBUMIN SERPL ELPH-MCNC: 4.3 G/DL — SIGNIFICANT CHANGE UP (ref 3.5–5.2)
ALP SERPL-CCNC: 149 U/L — HIGH (ref 30–115)
ALT FLD-CCNC: 13 U/L — SIGNIFICANT CHANGE UP (ref 0–41)
ANION GAP SERPL CALC-SCNC: 17 MMOL/L — HIGH (ref 7–14)
ANISOCYTOSIS BLD QL: SIGNIFICANT CHANGE UP
APPEARANCE UR: CLEAR — SIGNIFICANT CHANGE UP
APTT BLD: 41.7 SEC — HIGH (ref 27–39.2)
AST SERPL-CCNC: 17 U/L — SIGNIFICANT CHANGE UP (ref 0–41)
BACTERIA # UR AUTO: ABNORMAL
BASE EXCESS BLDV CALC-SCNC: 4.9 MMOL/L — HIGH (ref -2–2)
BASO STIPL BLD QL SMEAR: PRESENT — SIGNIFICANT CHANGE UP
BASOPHILS # BLD AUTO: 0.02 K/UL — SIGNIFICANT CHANGE UP (ref 0–0.2)
BASOPHILS NFR BLD AUTO: 0.1 % — SIGNIFICANT CHANGE UP (ref 0–1)
BILIRUB SERPL-MCNC: 0.8 MG/DL — SIGNIFICANT CHANGE UP (ref 0.2–1.2)
BILIRUB UR-MCNC: NEGATIVE — SIGNIFICANT CHANGE UP
BLD GP AB SCN SERPL QL: SIGNIFICANT CHANGE UP
BUN SERPL-MCNC: 50 MG/DL — HIGH (ref 10–20)
BURR CELLS BLD QL SMEAR: SLIGHT — SIGNIFICANT CHANGE UP
CA-I SERPL-SCNC: 1.2 MMOL/L — SIGNIFICANT CHANGE UP (ref 1.12–1.3)
CALCIUM SERPL-MCNC: 9.7 MG/DL — SIGNIFICANT CHANGE UP (ref 8.5–10.1)
CHLORIDE SERPL-SCNC: 95 MMOL/L — LOW (ref 98–110)
CO2 SERPL-SCNC: 27 MMOL/L — SIGNIFICANT CHANGE UP (ref 17–32)
COLOR SPEC: YELLOW — SIGNIFICANT CHANGE UP
CREAT SERPL-MCNC: 1.5 MG/DL — SIGNIFICANT CHANGE UP (ref 0.7–1.5)
DACRYOCYTES BLD QL SMEAR: SLIGHT — SIGNIFICANT CHANGE UP
DIFF PNL FLD: NEGATIVE — SIGNIFICANT CHANGE UP
ELLIPTOCYTES BLD QL SMEAR: SLIGHT — SIGNIFICANT CHANGE UP
EOSINOPHIL # BLD AUTO: 0.01 K/UL — SIGNIFICANT CHANGE UP (ref 0–0.7)
EOSINOPHIL NFR BLD AUTO: 0.1 % — SIGNIFICANT CHANGE UP (ref 0–8)
EPI CELLS # UR: ABNORMAL /HPF
GAS PNL BLDV: 141 MMOL/L — SIGNIFICANT CHANGE UP (ref 136–145)
GAS PNL BLDV: SIGNIFICANT CHANGE UP
GLUCOSE SERPL-MCNC: 127 MG/DL — HIGH (ref 70–99)
GLUCOSE UR QL: NEGATIVE MG/DL — SIGNIFICANT CHANGE UP
HCO3 BLDV-SCNC: 32 MMOL/L — HIGH (ref 22–29)
HCT VFR BLD CALC: 35.7 % — LOW (ref 42–52)
HCT VFR BLDA CALC: 35.5 % — SIGNIFICANT CHANGE UP (ref 34–44)
HGB BLD CALC-MCNC: 11.6 G/DL — LOW (ref 14–18)
HGB BLD-MCNC: 10.2 G/DL — LOW (ref 14–18)
HOROWITZ INDEX BLDV+IHG-RTO: 100 — SIGNIFICANT CHANGE UP
HYPOCHROMIA BLD QL: SLIGHT — SIGNIFICANT CHANGE UP
IMM GRANULOCYTES NFR BLD AUTO: 0.7 % — HIGH (ref 0.1–0.3)
INR BLD: 3.55 RATIO — HIGH (ref 0.65–1.3)
KETONES UR-MCNC: NEGATIVE — SIGNIFICANT CHANGE UP
LACTATE BLDV-MCNC: 2.4 MMOL/L — HIGH (ref 0.5–1.6)
LACTATE SERPL-SCNC: 2.2 MMOL/L — SIGNIFICANT CHANGE UP (ref 0.5–2.2)
LACTATE SERPL-SCNC: 2.7 MMOL/L — HIGH (ref 0.5–2.2)
LEUKOCYTE ESTERASE UR-ACNC: ABNORMAL
LIDOCAIN IGE QN: 18 U/L — SIGNIFICANT CHANGE UP (ref 7–60)
LYMPHOCYTES # BLD AUTO: 0.69 K/UL — LOW (ref 1.2–3.4)
LYMPHOCYTES # BLD AUTO: 4.9 % — LOW (ref 20.5–51.1)
MAGNESIUM SERPL-MCNC: 1.7 MG/DL — LOW (ref 1.8–2.4)
MANUAL SMEAR VERIFICATION: SIGNIFICANT CHANGE UP
MCHC RBC-ENTMCNC: 18.5 PG — LOW (ref 27–31)
MCHC RBC-ENTMCNC: 28.6 G/DL — LOW (ref 32–37)
MCV RBC AUTO: 64.8 FL — LOW (ref 80–94)
MICROCYTES BLD QL: SIGNIFICANT CHANGE UP
MONOCYTES # BLD AUTO: 0.96 K/UL — HIGH (ref 0.1–0.6)
MONOCYTES NFR BLD AUTO: 6.9 % — SIGNIFICANT CHANGE UP (ref 1.7–9.3)
NEUTROPHILS # BLD AUTO: 12.21 K/UL — HIGH (ref 1.4–6.5)
NEUTROPHILS NFR BLD AUTO: 87.3 % — HIGH (ref 42.2–75.2)
NITRITE UR-MCNC: NEGATIVE — SIGNIFICANT CHANGE UP
NRBC # BLD: 0 /100 WBCS — SIGNIFICANT CHANGE UP (ref 0–0)
NT-PROBNP SERPL-SCNC: HIGH PG/ML (ref 0–300)
PCO2 BLDV: 58 MMHG — HIGH (ref 41–51)
PH BLDV: 7.35 — SIGNIFICANT CHANGE UP (ref 7.26–7.43)
PH UR: 6 — SIGNIFICANT CHANGE UP (ref 5–8)
PLAT MORPH BLD: NORMAL — SIGNIFICANT CHANGE UP
PLATELET # BLD AUTO: 158 K/UL — SIGNIFICANT CHANGE UP (ref 130–400)
PLATELET COUNT - ESTIMATE: NORMAL — SIGNIFICANT CHANGE UP
PO2 BLDV: 14 MMHG — LOW (ref 20–40)
POIKILOCYTOSIS BLD QL AUTO: SLIGHT — SIGNIFICANT CHANGE UP
POTASSIUM BLDV-SCNC: 4.3 MMOL/L — SIGNIFICANT CHANGE UP (ref 3.3–5.6)
POTASSIUM SERPL-MCNC: 4.6 MMOL/L — SIGNIFICANT CHANGE UP (ref 3.5–5)
POTASSIUM SERPL-SCNC: 4.6 MMOL/L — SIGNIFICANT CHANGE UP (ref 3.5–5)
PROT SERPL-MCNC: 8.4 G/DL — HIGH (ref 6–8)
PROT UR-MCNC: 30 MG/DL
PROTHROM AB SERPL-ACNC: >40 SEC — HIGH (ref 9.95–12.87)
RBC # BLD: 5.51 M/UL — SIGNIFICANT CHANGE UP (ref 4.7–6.1)
RBC # FLD: 19.1 % — HIGH (ref 11.5–14.5)
RBC BLD AUTO: ABNORMAL
SAO2 % BLDV: 12 % — SIGNIFICANT CHANGE UP
SCHISTOCYTES BLD QL AUTO: SLIGHT — SIGNIFICANT CHANGE UP
SODIUM SERPL-SCNC: 139 MMOL/L — SIGNIFICANT CHANGE UP (ref 135–146)
SP GR SPEC: 1.01 — SIGNIFICANT CHANGE UP (ref 1.01–1.03)
TROPONIN T SERPL-MCNC: 0.03 NG/ML — CRITICAL HIGH
TROPONIN T SERPL-MCNC: 0.2 NG/ML — CRITICAL HIGH
TROPONIN T SERPL-MCNC: 0.2 NG/ML — CRITICAL HIGH
UROBILINOGEN FLD QL: 0.2 MG/DL — SIGNIFICANT CHANGE UP (ref 0.2–0.2)
WBC # BLD: 13.99 K/UL — HIGH (ref 4.8–10.8)
WBC # FLD AUTO: 13.99 K/UL — HIGH (ref 4.8–10.8)
WBC UR QL: ABNORMAL /HPF

## 2019-10-22 PROCEDURE — 71045 X-RAY EXAM CHEST 1 VIEW: CPT | Mod: 26

## 2019-10-22 PROCEDURE — 99291 CRITICAL CARE FIRST HOUR: CPT

## 2019-10-22 PROCEDURE — 93970 EXTREMITY STUDY: CPT | Mod: 26

## 2019-10-22 PROCEDURE — 99223 1ST HOSP IP/OBS HIGH 75: CPT | Mod: AI

## 2019-10-22 RX ORDER — PANTOPRAZOLE SODIUM 20 MG/1
40 TABLET, DELAYED RELEASE ORAL DAILY
Refills: 0 | Status: DISCONTINUED | OUTPATIENT
Start: 2019-10-22 | End: 2019-10-23

## 2019-10-22 RX ORDER — FUROSEMIDE 40 MG
20 TABLET ORAL ONCE
Refills: 0 | Status: DISCONTINUED | OUTPATIENT
Start: 2019-10-22 | End: 2019-10-22

## 2019-10-22 RX ORDER — METOPROLOL TARTRATE 50 MG
50 TABLET ORAL DAILY
Refills: 0 | Status: DISCONTINUED | OUTPATIENT
Start: 2019-10-22 | End: 2019-10-22

## 2019-10-22 RX ORDER — ACETAMINOPHEN 500 MG
650 TABLET ORAL ONCE
Refills: 0 | Status: DISCONTINUED | OUTPATIENT
Start: 2019-10-22 | End: 2019-10-30

## 2019-10-22 RX ORDER — IPRATROPIUM/ALBUTEROL SULFATE 18-103MCG
3 AEROSOL WITH ADAPTER (GRAM) INHALATION EVERY 6 HOURS
Refills: 0 | Status: DISCONTINUED | OUTPATIENT
Start: 2019-10-22 | End: 2019-10-30

## 2019-10-22 RX ORDER — AZITHROMYCIN 500 MG/1
500 TABLET, FILM COATED ORAL ONCE
Refills: 0 | Status: COMPLETED | OUTPATIENT
Start: 2019-10-22 | End: 2019-10-22

## 2019-10-22 RX ORDER — CEFTRIAXONE 500 MG/1
1000 INJECTION, POWDER, FOR SOLUTION INTRAMUSCULAR; INTRAVENOUS ONCE
Refills: 0 | Status: COMPLETED | OUTPATIENT
Start: 2019-10-22 | End: 2019-10-22

## 2019-10-22 RX ORDER — CEFEPIME 1 G/1
1000 INJECTION, POWDER, FOR SOLUTION INTRAMUSCULAR; INTRAVENOUS EVERY 24 HOURS
Refills: 0 | Status: DISCONTINUED | OUTPATIENT
Start: 2019-10-22 | End: 2019-10-29

## 2019-10-22 RX ORDER — TIOTROPIUM BROMIDE 18 UG/1
1 CAPSULE ORAL; RESPIRATORY (INHALATION) DAILY
Refills: 0 | Status: DISCONTINUED | OUTPATIENT
Start: 2019-10-22 | End: 2019-10-30

## 2019-10-22 RX ORDER — MAGNESIUM SULFATE 500 MG/ML
2 VIAL (ML) INJECTION ONCE
Refills: 0 | Status: COMPLETED | OUTPATIENT
Start: 2019-10-22 | End: 2019-10-22

## 2019-10-22 RX ORDER — METOPROLOL TARTRATE 50 MG
12.5 TABLET ORAL
Refills: 0 | Status: DISCONTINUED | OUTPATIENT
Start: 2019-10-22 | End: 2019-10-30

## 2019-10-22 RX ORDER — SODIUM CHLORIDE 9 MG/ML
1000 INJECTION INTRAMUSCULAR; INTRAVENOUS; SUBCUTANEOUS
Refills: 0 | Status: DISCONTINUED | OUTPATIENT
Start: 2019-10-22 | End: 2019-10-22

## 2019-10-22 RX ORDER — CHOLECALCIFEROL (VITAMIN D3) 125 MCG
1000 CAPSULE ORAL DAILY
Refills: 0 | Status: DISCONTINUED | OUTPATIENT
Start: 2019-10-22 | End: 2019-10-30

## 2019-10-22 RX ORDER — ALBUTEROL 90 UG/1
2 AEROSOL, METERED ORAL EVERY 6 HOURS
Refills: 0 | Status: DISCONTINUED | OUTPATIENT
Start: 2019-10-22 | End: 2019-10-22

## 2019-10-22 RX ORDER — ISOSORBIDE MONONITRATE 60 MG/1
30 TABLET, EXTENDED RELEASE ORAL DAILY
Refills: 0 | Status: DISCONTINUED | OUTPATIENT
Start: 2019-10-22 | End: 2019-10-22

## 2019-10-22 RX ORDER — LEVOTHYROXINE SODIUM 125 MCG
50 TABLET ORAL DAILY
Refills: 0 | Status: DISCONTINUED | OUTPATIENT
Start: 2019-10-22 | End: 2019-10-30

## 2019-10-22 RX ORDER — AZITHROMYCIN 500 MG/1
500 TABLET, FILM COATED ORAL EVERY 24 HOURS
Refills: 0 | Status: DISCONTINUED | OUTPATIENT
Start: 2019-10-22 | End: 2019-10-25

## 2019-10-22 RX ORDER — ATORVASTATIN CALCIUM 80 MG/1
10 TABLET, FILM COATED ORAL AT BEDTIME
Refills: 0 | Status: DISCONTINUED | OUTPATIENT
Start: 2019-10-22 | End: 2019-10-30

## 2019-10-22 RX ADMIN — CEFEPIME 100 MILLIGRAM(S): 1 INJECTION, POWDER, FOR SOLUTION INTRAMUSCULAR; INTRAVENOUS at 15:17

## 2019-10-22 RX ADMIN — CEFTRIAXONE 100 MILLIGRAM(S): 500 INJECTION, POWDER, FOR SOLUTION INTRAMUSCULAR; INTRAVENOUS at 09:14

## 2019-10-22 RX ADMIN — Medication 40 MILLIGRAM(S): at 15:15

## 2019-10-22 RX ADMIN — Medication 3 MILLILITER(S): at 19:47

## 2019-10-22 RX ADMIN — Medication 40 MILLIGRAM(S): at 21:46

## 2019-10-22 RX ADMIN — AZITHROMYCIN 255 MILLIGRAM(S): 500 TABLET, FILM COATED ORAL at 10:04

## 2019-10-22 RX ADMIN — PANTOPRAZOLE SODIUM 40 MILLIGRAM(S): 20 TABLET, DELAYED RELEASE ORAL at 15:15

## 2019-10-22 RX ADMIN — Medication 12.5 MILLIGRAM(S): at 17:35

## 2019-10-22 RX ADMIN — Medication 50 GRAM(S): at 10:53

## 2019-10-22 RX ADMIN — ATORVASTATIN CALCIUM 10 MILLIGRAM(S): 80 TABLET, FILM COATED ORAL at 21:46

## 2019-10-22 RX ADMIN — Medication 1000 UNIT(S): at 17:24

## 2019-10-22 NOTE — ED PROVIDER NOTE - NS ED ROS FT
Constitutional:  (-) fever, (-) chills, (-) lethargy  Eyes:  (-) eye pain (-) visual changes  ENMT: (-) nasal discharge, (-) sore throat. (-) neck pain or stiffness  Cardiac: (-) chest pain (-) palpitations  Respiratory:  (+) cough (+) respiratory distress.   GI:  (+) nausea (+) vomiting (-) diarrhea (-) abdominal pain.  :  (-) dysuria (-) frequency (-) burning.  MS:  (-) back pain (-) joint pain.  Neuro:  (-) headache (-) numbness (-) tingling (-) focal weakness  Skin:  (-) rash  Except as documented in the HPI,  all other systems are negative

## 2019-10-22 NOTE — CONSULT NOTE ADULT - ASSESSMENT
impressioin:    acute on chronic hypoxemic respiratory failure  Cant/ r/o PNA grame -ve(hospitalization in july/fever/WBC)  Severe Sepsis  Syncope    PLAN:    CNS: Avoid sedation    HEENT:  Oral care    PULMONARY:  HOB @ 45 degrees, keep on Bipap 4on/off and at night. Wean as toelrated.  repeat ABGs(Baseline CO2)    CARDIOVASCULAR: consult cardiology([facility]), start IV lasix 40 mg BID, strict I&Os, continue optimal HF therapy.  Check 2d echo, LE duplex, trend CE  Keep mg>2, K>4  GI: GI prophylaxis                                          Feeding low sodium    RENAL:  F/u  lytes.  Correct as needed. accurate I/O    INFECTIOUS DISEASE: start on cefepime/azithro, pan culture    HEMATOLOGICAL:  DVT prophylaxis. check INR    ENDOCRINE:  Follow up FS.  Insulin protocol if needed.    CODE STATUS: FULL CODE    DISPOSITION: Pt requires monitoring in the MICU impressioin:    acute on chronic hypoxemic respiratory failure  Cant/ r/o PNA grame -ve(hospitalization in july/fever/WBC)  Severe Sepsis  Syncope    PLAN:    CNS: Avoid sedation    HEENT:  Oral care    PULMONARY:  HOB @ 45 degrees, keep on Bipap 4on/off and at night. Wean as toelrated.  repeat ABGs(Baseline CO2/lactate)    CARDIOVASCULAR: consult cardiology(vtach), start IV lasix 40 mg BID, strict I&Os, continue optimal HF therapy.  Check 2d echo, LE duplex, trend CE  Keep mg>2, K>4  GI: GI prophylaxis                                          Feeding low sodium    RENAL:  F/u  lytes.  Correct as needed. accurate I/O    INFECTIOUS DISEASE: start on cefepime/azithro, pan culture    HEMATOLOGICAL:  DVT prophylaxis. check INR    ENDOCRINE:  Follow up FS.  Insulin protocol if needed.    CODE STATUS: FULL CODE    DISPOSITION: Pt requires monitoring in the MICU

## 2019-10-22 NOTE — H&P ADULT - HISTORY OF PRESENT ILLNESS
No family at bedside so I called his emergency contact listed Tammy at [phone] and discussed care and goals of care with her.   Patient on BIPAP and a poor historian and her daughter Tammy.   Patient is a  89 y.o M with history of advanced CHF on home O2 ( 2L), base line O2 at home in 80s, CAD, HTN, HLD, afib on coumadin, hypothyroid, AAA repair, anemia BIBEMS for evaluation of SOB with hypoxia of 70s. He was in his usual state of health until this morning when he became very sob with associated N/V, so daughter called EMS. On the way to ER patient had a syncopal episode and was found to have VTACH which resolved spontaneously without treatment.   He had no chest pain, fevers, or chills.     In The ER he received IV antibiotics for PNA and placed on BIPAP No family at bedside so I called his emergency contact listed Tammy at [phone] and discussed care and goals of care with her.   Patient on BIPAP and a poor historian and her daughter Tammy.   Patient is a  89 y.o M with history of advanced CHF and COPD on home O2 ( 2L), base line O2 at home in 80s, CAD, HTN, HLD, afib on coumadin, hypothyroid, AAA repair, anemia, BIBEMS for evaluation of SOB with hypoxia of 70s. He was in his usual state of health until this morning when he became very sob with associated N/V, so daughter called EMS. On the way to ER patient had a syncopal episode and was found to have VTACH which resolved spontaneously without treatment.   He had no chest pain, fevers, or chills.     In The ER he received IV antibiotics for PNA and placed on BIPAP

## 2019-10-22 NOTE — PROGRESS NOTE ADULT - ASSESSMENT
#) Acute on chronic hypoxemic respiratory failure 2/2 severe sepsis 2/2 suspected GNR bilateral PNA and CHF Exacerbation  -last 2D (02/01/2019)- EF-40-45%, mod. TR, sev pulm HTN  -BIPAP 4hrs on/off and at bedtime  -titrate O2>92%  -IV steroids (solumedrol 40 q8h)  -IV abx (azithromycin and cefepime)  -duonebs PRN  -BCx, UCx, Sputum Cx sent  -strict I's and O's, fluid restriction 1L/day, DASH diet    #) Chronic atrial fibrillation rate uncontrolled w/ NSVT  -holding coumadin, will start Hep Drip for now  -BP borderline low, on metoprolol 50mg daily at home, will do metoprolol 12.5 q12h to prevent rebound tachy  -will hold other BP lowering drugs    #) Anemia of chronic disease  - monitor and transfuse as needed if hgb<7    #) CKD stage 3  - monitor renal function    #) HLD  - statin    #) Hypothyroidism  -synthroid (50mcg)    #) DVT/ GI ppx  -Hep Drip, protonix    FULL CODE #) Acute on chronic hypoxemic respiratory failure 2/2 sepsis 2/2 suspected GNR bilateral PNA and CHF Exacerbation  -on admission, T-101.5, WBC-13.99, HR-105, RR-32, lactate- 2.7  -CXR- bibasilar opacities/ effusion  -last 2D (02/01/2019)- EF-40-45%, mod. TR, sev pulm HTN  -BIPAP 4hrs on/off and at bedtime  -titrate O2>92%  -IV steroids (solumedrol 40 q8h)  -IV abx (azithromycin and cefepime)  -duonebs PRN  -BCx, UCx, Sputum Cx sent  -strict I's and O's, fluid restriction 1L/day, DASH diet    #) Chronic atrial fibrillation rate uncontrolled w/ NSVT  -holding coumadin, will start Hep Drip for now  -BP borderline low, on metoprolol 50mg daily at home, will do metoprolol 12.5 q12h to prevent rebound tachy  -will hold other BP lowering drugs    #) Hypomagnesemia  -Mg-1.7, s/p 2gm Mg rider in ED  -repeat Mg level pending    #) Anemia of chronic disease  - monitor and transfuse as needed if hgb<7    #) CKD stage 3  - monitor renal function    #) HLD  - statin    #) Hypothyroidism  -synthroid (50mcg)    #) DVT/ GI ppx  -Hep Drip, protonix    FULL CODE #) Acute on chronic hypoxemic respiratory failure 2/2 sepsis 2/2 suspected GNR bilateral PNA and CHF Exacerbation  -on home oxygen (2L), no history of COPD, nonsmoker  -on admission, T-101.5, WBC-13.99, HR-105, RR-32, lactate- 2.7  -CXR- bibasilar opacities/ effusion  -last 2D (02/01/2019)- EF-40-45%, mod. TR, sev pulm HTN  -BIPAP 4hrs on/off and at bedtime  -titrate O2>92%  -IV steroids (solumedrol 40 q8h)  -IV abx (azithromycin and cefepime)  -duonebs PRN  -BCx, UCx, Sputum Cx sent  -strict I's and O's, fluid restriction 1L/day, DASH diet    #) Chronic atrial fibrillation rate uncontrolled w/ NSVT  -holding coumadin, will start Hep Drip for now  -BP borderline low, on metoprolol 50mg daily at home, will do metoprolol 12.5 q12h to prevent rebound tachy  -will hold other BP lowering drugs    #) Hypomagnesemia  -Mg-1.7, s/p 2gm Mg rider in ED  -repeat Mg level pending    #) Anemia of chronic disease  - monitor and transfuse as needed if hgb<7    #) CKD stage 3  - monitor renal function    #) HLD  - statin    #) Hypothyroidism  -synthroid (50mcg)    #) DVT/ GI ppx  -Hep Drip, protonix    #) Dispo  -from home, at baseline AOx4, ADL's independently  -uses wheelchair/ walker after femur fx surgery on 06/2019

## 2019-10-22 NOTE — ED PROVIDER NOTE - PROGRESS NOTE DETAILS
pt with improved wob on bipap, is febrile, will give tylenol and cover lung pathology, will hold off on fluids as in failure labs and studies reviewed, pt much more comfortable, fewer PVCs, d/w Dr Coleman, will admit ICU

## 2019-10-22 NOTE — H&P ADULT - NSHPLABSRESULTS_GEN_ALL_CORE
CBC               10.2   13.99 )-----------( 158      ( 22 Oct 2019 09:27 )             35.7     10-22    139  |  95<L>  |  50<H>  ----------------------------<  127<H>  4.6   |  27  |  1.5    Ca    9.7      22 Oct 2019 09:27  Mg     1.7     10-22    TPro  8.4<H>  /  Alb  4.3  /  TBili  0.8  /  DBili  x   /  AST  17  /  ALT  13  /  AlkPhos  149<H>  10-22    Troponin T, Serum: 0.03: Critical value: Result called and read back by   Dr. Lowe. 10/22/19 10:10 ng/mL (10.22.19 @ 09:27)        CXR    Impression:      New bibasilar opacities/pleural effusions with pulmonary vascular   congestion.

## 2019-10-22 NOTE — ED PROVIDER NOTE - OBJECTIVE STATEMENT
89 y.o M w/ PMHx CHF on 2L o2 at home, CAD, HTN, HLD, afib on coumadin, hypothyroid, AAA repair, anemia p/w SOB wince this AM. History provided by daughter at bedside. + cough, no fevers, no CP, + N/V, + emesis x1, no abd pain, no dysuria, no diarrhea. En route on ambulance pt sustained syncopal episode with non-sustained vtach that resolved spontaneously without defibrillation.

## 2019-10-22 NOTE — ED PROVIDER NOTE - CRITICAL CARE PROVIDED
consult w/ pt's family directly relating to pts condition/additional history taking/conducted a detailed discussion of DNR status/interpretation of diagnostic studies/consultation with other physicians/direct patient care (not related to procedure)/documentation

## 2019-10-22 NOTE — H&P ADULT - ASSESSMENT
89 yo male with significant comorbid conditions admitted for acute on chronic hypoxic respiratory failure due to PNA    ASSESSMENT AND PLAN:    1. Acute on chronic hypoxic  respiratory failure multifactorial  -Keep NPO for now until stable and off BIPAP  - Sepsis due to bilateral pneumonia suspect gram neg/ acute on chronic chf unspecified at this time/COPD exacerbation  - IV NS gentle hydration due to chf ( appears dry), may benefit from vasopressors due to CHF, hold BP medications due to borderline to low BP, bronchodilators, IV steroid, IV antibiotic  - check cultures, daily weight, strict I and Os, monitor vitals and check am labs      2. Chronic atrial fibrillation rate uncontrolled. NSVT  - on daily coumadin- check INR level  - Telemetry monitoring  -- Cycle troponin, echocardiogram, cardiology consult  - cardiac medications on hold due to low to borderline BP and sepsis    3. HTN- fluctuant  - hold BP medication  - monitor   - Consider vasopressors     4. Anemia of chronic disease  - monitor and transfuse as needed if hgb<7    5. CKD stage 3  - monitor renal function    6. HLD  - statin    Hypothyroidism  -synthroid    DVT prophylaxis  - already anticoagulated on coumadin    #Progress Note Handoff  Pending (specify):  Consults_cardiology Tests__troponin, blood cultures, cbc, cmp, echo,  _  Family discussion: called and discussed renato Munoz [facility], patient is full code and has no living will  Disposition: Home___/SNF___/Other________/Unknown at this time___X_____ 89 yo male with significant comorbid conditions admitted for acute on chronic hypoxic respiratory failure due to PNA    ASSESSMENT AND PLAN:    1. Acute on chronic hypoxic  respiratory failure multifactorial  -Keep NPO for now until stable and off BIPAP  - Sepsis due to bilateral pneumonia suspect gram neg/ acute on chronic chf unspecified at this time/COPD exacerbation  - IV NS gentle hydration due to chf ( appears dry), may benefit from vasopressors due to CHF, IV lasix BID,  bronchodilators, IV steroid, IV antibiotic  - check cultures, daily weight, strict I and Os, monitor vitals and check am labs  - ICU consulted and admit to MICU      2. Chronic atrial fibrillation rate uncontrolled. NSVT  - on daily coumadin- check INR level  - Telemetry monitoring  -- Cycle troponin, echocardiogram, cardiology consult  - cardiac medications on hold due to low to borderline BP and sepsis    3. HTN- fluctuant  - hold BP medication  - monitor   - Consider vasopressors     4. Anemia of chronic disease  - monitor and transfuse as needed if hgb<7    5. CKD stage 3  - monitor renal function    6. HLD  - statin    Hypothyroidism  -synthroid    DVT prophylaxis  - already anticoagulated on coumadin    #Progress Note Handoff  Pending (specify):  Consults_cardiology Tests__troponin, blood cultures, cbc, cmp, echo,  _  Family discussion: called and discussed renato Munoz [facility], patient is full code and has no living will  Disposition: Home___/SNF___/Other________/Unknown at this time___X_____ 89 yo male with significant comorbid conditions admitted for acute on chronic hypoxic respiratory failure due to PNA    ASSESSMENT AND PLAN:    1. Acute on chronic hypoxic  respiratory failure multifactorial  -Keep NPO for now until stable and off BIPAP  - Sepsis due to bilateral pneumonia suspect gram neg/ acute on chronic chf unspecified at this time/COPD exacerbation  - IV NS gentle hydration due to chf ( appears dry), may benefit from vasopressors due to CHF, IV lasix BID as noted on ICU consult)  ,  bronchodilators, IV steroid, IV antibiotic  - check cultures, daily weight, strict I and Os, monitor vitals and check am labs  - ICU consulted and admit to MICU      2. Chronic atrial fibrillation rate uncontrolled. NSVT  - on daily coumadin- check INR level  - Telemetry monitoring  -- Cycle troponin, echocardiogram, cardiology consult  - cardiac medications on hold due to low to borderline BP and sepsis    3. HTN- fluctuant  - hold BP medication  - monitor   - Consider vasopressors     4. Anemia of chronic disease  - monitor and transfuse as needed if hgb<7    5. CKD stage 3  - monitor renal function    6. HLD  - statin    Hypothyroidism  -synthroid    DVT prophylaxis  - already anticoagulated on coumadin    #Progress Note Handoff  Pending (specify):  Consults_cardiology Tests__troponin, blood cultures, cbc, cmp, echo,  _  Family discussion: called and discussed renato Munoz [facility], patient is full code and has no living will  Disposition: Home___/SNF___/Other________/Unknown at this time___X_____

## 2019-10-22 NOTE — ED ADULT TRIAGE NOTE - CHIEF COMPLAINT QUOTE
BIBA for SOB. c/o blood. EMS states pt was on their bus and went into Vtach then went back to sinus .  Pt arrived with NRB. Pt pulse ox 93%

## 2019-10-22 NOTE — H&P ADULT - NSHPPHYSICALEXAM_GEN_ALL_CORE
ICU Vital Signs Last 24 Hrs  T(C): 38.6 (22 Oct 2019 10:59), Max: 38.8 (22 Oct 2019 08:28)  T(F): 101.5 (22 Oct 2019 10:59), Max: 101.9 (22 Oct 2019 08:28)  HR: 94 (22 Oct 2019 10:59) (94 - 106)  BP: 106/65 (22 Oct 2019 10:59) (106/65 - 136/64)  BP(mean): --  ABP: --  ABP(mean): --  RR: 24 (22 Oct 2019 10:59) (24 - 32)  SpO2: 94% (22 Oct 2019 08:20) (93% - 94%)    General: WN/WD NAD  Neurology: febrile, lethargic, on BIPAP  Eyes: PERRLA/ dry MM  ENT/Neck: Neck supple, trachea midline, No JVD, Gross hearing intact  Respiratory: Decreased breath sound, mild rhonchi  CV: Tachycardic Irr Irr S1S2,  no murmurs, rubs or gallops  Abdominal: Soft, NT, ND +BS,   Extremities: No edema, + peripheral pulses  Skin: No Rashes, Hematoma, Ecchymosis

## 2019-10-22 NOTE — ED PROVIDER NOTE - ATTENDING CONTRIBUTION TO CARE
89y male former smoker PMH af on coumadin, CHF on 2L O2, no COPD per daughter however pt/family poor historians, BIBEMS for SOB/cough, saturating in 70s on 2L/90s on NRB, while in house pt briefly syncopized assoc with run of nonsustained VT, on arrival in resp distress but awake, febrile rectally, normotensive, HR 110s with pvcs/couplets, conj pink nekc supple cor tachy, irreg, lugns with diffuse rales and rhonchi, abd snt calves nontender pulses equal no c/c/e neuro nonfocal, placed on bipap, given tylenol/abx, fluids held due to CHF, much improved, labs and studies reviewed, admit ICU

## 2019-10-22 NOTE — ED PROVIDER NOTE - SECONDARY DIAGNOSIS.
Sepsis with acute hypoxic respiratory failure without septic shock, due to unspecified organism Nonsustained ventricular tachycardia Other hypervolemia

## 2019-10-22 NOTE — H&P ADULT - NSTOBACCOSCREENHP_GEN_A_NCS
TRANSFER - OUT REPORT: 
 
Verbal report given to Divya(name) on Jesse Garcia  being transferred to (unit) for routine progression of care Report consisted of patients Situation, Background, Assessment and  
Recommendations(SBAR). Information from the following report(s) SBAR was reviewed with the receiving nurse. Lines:  
Peripheral IV 10/08/19 Right Antecubital (Active) Site Assessment Clean, dry, & intact 10/8/2019 12:11 PM  
Phlebitis Assessment 0 10/8/2019 12:11 PM  
Infiltration Assessment 0 10/8/2019 12:11 PM  
Dressing Status Clean, dry, & intact 10/8/2019 12:11 PM  
Dressing Type Tape;Transparent 10/8/2019 12:11 PM  
Hub Color/Line Status Pink;Flushed;Patent 10/8/2019 12:11 PM  
Action Taken Blood drawn 10/8/2019 12:11 PM  
Alcohol Cap Used No 10/8/2019 12:11 PM  
  
 
Opportunity for questions and clarification was provided. Patient transported with: 
 Registered Nurse No

## 2019-10-22 NOTE — ED PROVIDER NOTE - CARE PLAN
Principal Discharge DX:	Pneumonia due to infectious organism, unspecified laterality, unspecified part of lung  Secondary Diagnosis:	Sepsis with acute hypoxic respiratory failure without septic shock, due to unspecified organism  Secondary Diagnosis:	Nonsustained ventricular tachycardia  Secondary Diagnosis:	Other hypervolemia

## 2019-10-22 NOTE — CONSULT NOTE ADULT - ATTENDING COMMENTS
patient seen and examined with the fellow independently agree with above note except will give lasix 20 mg for now   continue broad spectrum abx   cxr angel   owen cx   cardiology evaluation   follow CE   ICU monitoring

## 2019-10-22 NOTE — CONSULT NOTE ADULT - SUBJECTIVE AND OBJECTIVE BOX
Patient is a 89y old  Male who presents with a chief complaint of Acute on chronic hypoxic respiratory failure (22 Oct 2019 10:39)      HPI:    Patient is a  89 y.o M with history of advanced CHF on home O2 ( 2L), base line O2 at home in 80s, CAD, HTN, HLD, afib on coumadin, hypothyroid, AAA repair, anemia BIBEMS for evaluation of SOB with hypoxia of 70s. He was in his usual state of health until this morning when he became very sob with associated N/V, so daughter called EMS. On the way to ER patient had a syncopal episode and was found to have VTACH which resolved spontaneously without treatment.   He had no chest pain, fevers, or chills.     In The ER he received IV antibiotics for PNA and placed on BIPAP (22 Oct 2019 10:39)      PAST MEDICAL & SURGICAL HISTORY:  Hyperlipidemia  CHF (congestive heart failure)  Afib  Hypertension  S/P AAA (abdominal aortic aneurysm) repair      SOCIAL HX:   Smoking    former smoker                     ETOH      -ve                      Other -ve    FAMILY HISTORY:  FH: congestive heart failure: sister  :  No known cardiovacular family hisotry     ROS:  See HPI     Allergies    No Known Allergies    Intolerances          PHYSICAL EXAM    ICU Vital Signs Last 24 Hrs  T(C): 38.8 (22 Oct 2019 08:28), Max: 38.8 (22 Oct 2019 08:28)  T(F): 101.9 (22 Oct 2019 08:28), Max: 101.9 (22 Oct 2019 08:28)  HR: 106 (22 Oct 2019 08:28) (103 - 106)  BP: 136/64 (22 Oct 2019 08:28) (136/64 - 136/64)  RR: 32 (22 Oct 2019 08:14) (32 - 32)  SpO2: 94% (22 Oct 2019 08:20) (93% - 94%)      General: In mild respiratory distress feels better on BIPAP  HEENT:  RACHEL              Lungs: B/l crackles  Cardiovascular: Regular  Gastrointestinal: Soft, Positive BS  Musculoskeletal: No clubbing.  Moves all extremities.  Full range of motion   Skin: Warm.  Intact  Neurological: No motor or sensory deficit         LABS:                          10.2   13.99 )-----------( 158      ( 22 Oct 2019 09:27 )             35.7                                               10-22    139  |  95<L>  |  50<H>  ----------------------------<  127<H>  4.6   |  27  |  1.5    Ca    9.7      22 Oct 2019 09:27  Mg     1.7     10    TPro  8.4<H>  /  Alb  4.3  /  TBili  0.8  /  DBili  x   /  AST  17  /  ALT  13  /  AlkPhos  149<H>  10                                             Urinalysis Basic - ( 22 Oct 2019 10:47 )    Color: Yellow / Appearance: Clear / S.010 / pH: x  Gluc: x / Ketone: Negative  / Bili: Negative / Urobili: 0.2 mg/dL   Blood: x / Protein: 30 mg/dL / Nitrite: Negative   Leuk Esterase: Small / RBC: x / WBC x   Sq Epi: x / Non Sq Epi: x / Bacteria: x        CARDIAC MARKERS ( 22 Oct 2019 09:27 )  x     / 0.03 ng/mL / x     / x     / x                                                LIVER FUNCTIONS - ( 22 Oct 2019 09:27 )  Alb: 4.3 g/dL / Pro: 8.4 g/dL / ALK PHOS: 149 U/L / ALT: 13 U/L / AST: 17 U/L / GGT: x                                                                                                                                       X-Rays                                                                                     ECHO    MEDICATIONS  (STANDING):  acetaminophen  Suppository .. 650 milliGRAM(s) Rectal once  ALBUTerol    90 MICROgram(s) HFA Inhaler 2 Puff(s) Inhalation every 6 hours  atorvastatin 10 milliGRAM(s) Oral at bedtime  cholecalciferol Oral Tab/Cap - Peds 1000 Unit(s) Oral daily  furosemide   Injectable 20 milliGRAM(s) IV Push once  isosorbide   mononitrate ER Tablet (IMDUR) 30 milliGRAM(s) Oral daily  levothyroxine 50 MICROGram(s) Oral daily  metoprolol succinate ER 50 milliGRAM(s) Oral daily  tiotropium 18 MICROgram(s) Capsule 1 Capsule(s) Inhalation daily    MEDICATIONS  (PRN): Patient is a 89y old  Male who presents with a chief complaint of Acute on chronic hypoxic respiratory failure (22 Oct 2019 10:39)      HPI:    Patient is a  89 y.o M with history of advanced CHF on home O2 ( 2L), base line O2 at home in 80s, CAD, HTN, HLD, afib on coumadin, hypothyroid, AAA repair, anemia BIBEMS for evaluation of SOB with hypoxia of 70s. He was in his usual state of health until this morning when he became very sob with associated N/V, so daughter called EMS. On the way to ER patient had a syncopal episode and was found to have VTACH which resolved spontaneously without treatment.   He had no chest pain, fevers, or chills.     In The ER he received IV antibiotics for PNA and placed on BIPAP (22 Oct 2019 10:39)      PAST MEDICAL & SURGICAL HISTORY:  Hyperlipidemia  CHF (congestive heart failure)  Afib  Hypertension  S/P AAA (abdominal aortic aneurysm) repair      SOCIAL HX:   Smoking    former smoker                     ETOH      -ve                      Other -ve    FAMILY HISTORY:  FH: congestive heart failure: sister      ROS:  See HPI     Allergies    No Known Allergies    Intolerances          PHYSICAL EXAM    ICU Vital Signs Last 24 Hrs  T(C): 38.8 (22 Oct 2019 08:28), Max: 38.8 (22 Oct 2019 08:28)  T(F): 101.9 (22 Oct 2019 08:28), Max: 101.9 (22 Oct 2019 08:28)  HR: 106 (22 Oct 2019 08:28) (103 - 106)  BP: 136/64 (22 Oct 2019 08:28) (136/64 - 136/64)  RR: 32 (22 Oct 2019 08:14) (32 - 32)  SpO2: 94% (22 Oct 2019 08:20) (93% - 94%)      General: In mild respiratory distress feels better on BIPAP  HEENT:  RACHEL              Lungs: B/l crackles  Cardiovascular: Regular  Gastrointestinal: Soft, Positive BS  Musculoskeletal: No clubbing.  Moves all extremities.  Full range of motion   Skin: Warm.  Intact  Neurological: No motor or sensory deficit         LABS:                          10.2   13.99 )-----------( 158      ( 22 Oct 2019 09:27 )             35.7                                               10-    139  |  95<L>  |  50<H>  ----------------------------<  127<H>  4.6   |  27  |  1.5    Ca    9.7      22 Oct 2019 09:27  Mg     1.7     10    TPro  8.4<H>  /  Alb  4.3  /  TBili  0.8  /  DBili  x   /  AST  17  /  ALT  13  /  AlkPhos  149<H>  10                                             Urinalysis Basic - ( 22 Oct 2019 10:47 )    Color: Yellow / Appearance: Clear / S.010 / pH: x  Gluc: x / Ketone: Negative  / Bili: Negative / Urobili: 0.2 mg/dL   Blood: x / Protein: 30 mg/dL / Nitrite: Negative   Leuk Esterase: Small / RBC: x / WBC x   Sq Epi: x / Non Sq Epi: x / Bacteria: x        CARDIAC MARKERS ( 22 Oct 2019 09:27 )  x     / 0.03 ng/mL / x     / x     / x                                                LIVER FUNCTIONS - ( 22 Oct 2019 09:27 )  Alb: 4.3 g/dL / Pro: 8.4 g/dL / ALK PHOS: 149 U/L / ALT: 13 U/L / AST: 17 U/L / GGT: x                                                                                                                                       X-Rays        b/l infiltrates                                                                             ECHO    MEDICATIONS  (STANDING):  acetaminophen  Suppository .. 650 milliGRAM(s) Rectal once  ALBUTerol    90 MICROgram(s) HFA Inhaler 2 Puff(s) Inhalation every 6 hours  atorvastatin 10 milliGRAM(s) Oral at bedtime  cholecalciferol Oral Tab/Cap - Peds 1000 Unit(s) Oral daily  furosemide   Injectable 20 milliGRAM(s) IV Push once  isosorbide   mononitrate ER Tablet (IMDUR) 30 milliGRAM(s) Oral daily  levothyroxine 50 MICROGram(s) Oral daily  metoprolol succinate ER 50 milliGRAM(s) Oral daily  tiotropium 18 MICROgram(s) Capsule 1 Capsule(s) Inhalation daily    MEDICATIONS  (PRN): Patient is a 89y old  Male who presents with a chief complaint of Acute on chronic hypoxic respiratory failure (22 Oct 2019 10:39)      HPI:    Patient is a  89 y.o M with history of advanced CHF on home O2 ( 2L), base line O2 at home in 80s, CAD, HTN, HLD, afib on coumadin, hypothyroid, AAA repair, anemia BIBEMS for evaluation of SOB with hypoxia of 70s. He was in his usual state of health until this morning when he became very sob with associated N/V, so daughter called EMS. On the way to ER patient had a syncopal episode and was found to have VTACH which resolved spontaneously without treatment.   He had no chest pain, fevers, or chills.     In The ER he received IV antibiotics for PNA and placed on BIPAP (22 Oct 2019 10:39)      PAST MEDICAL & SURGICAL HISTORY:  Hyperlipidemia  CHF (congestive heart failure)  Afib  Hypertension  S/P AAA (abdominal aortic aneurysm) repair      SOCIAL HX:   Smoking    former smoker                     ETOH      -ve                      Other -ve    FAMILY HISTORY:  FH: congestive heart failure: sister      ROS:  See HPI     Allergies    No Known Allergies    Intolerances          PHYSICAL EXAM    ICU Vital Signs Last 24 Hrs  T(C): 38.8 (22 Oct 2019 08:28), Max: 38.8 (22 Oct 2019 08:28)  T(F): 101.9 (22 Oct 2019 08:28), Max: 101.9 (22 Oct 2019 08:28)  HR: 106 (22 Oct 2019 08:28) (103 - 106)  BP: 136/64 (22 Oct 2019 08:28) (136/64 - 136/64)  RR: 32 (22 Oct 2019 08:14) (32 - 32)  SpO2: 94% (22 Oct 2019 08:20) (93% - 94%)      General: In mild respiratory distress feels better on BIPAP  HEENT:  RACHEL              Lungs: B/l crackles  Cardiovascular: Regular  Gastrointestinal: Soft, Positive BS  Musculoskeletal: +ve Le edema  Skin: Warm.  Intact  Neurological: No motor or sensory deficit         LABS:                          10.2   13.99 )-----------( 158      ( 22 Oct 2019 09:27 )             35.7                                               10-22    139  |  95<L>  |  50<H>  ----------------------------<  127<H>  4.6   |  27  |  1.5    Ca    9.7      22 Oct 2019 09:27  Mg     1.7     10    TPro  8.4<H>  /  Alb  4.3  /  TBili  0.8  /  DBili  x   /  AST  17  /  ALT  13  /  AlkPhos  149<H>  10                                             Urinalysis Basic - ( 22 Oct 2019 10:47 )    Color: Yellow / Appearance: Clear / S.010 / pH: x  Gluc: x / Ketone: Negative  / Bili: Negative / Urobili: 0.2 mg/dL   Blood: x / Protein: 30 mg/dL / Nitrite: Negative   Leuk Esterase: Small / RBC: x / WBC x   Sq Epi: x / Non Sq Epi: x / Bacteria: x        CARDIAC MARKERS ( 22 Oct 2019 09:27 )  x     / 0.03 ng/mL / x     / x     / x                                                LIVER FUNCTIONS - ( 22 Oct 2019 09:27 )  Alb: 4.3 g/dL / Pro: 8.4 g/dL / ALK PHOS: 149 U/L / ALT: 13 U/L / AST: 17 U/L / GGT: x                                                                                                                                       X-Rays        b/l infiltrates                                                                             ECHO    MEDICATIONS  (STANDING):  acetaminophen  Suppository .. 650 milliGRAM(s) Rectal once  ALBUTerol    90 MICROgram(s) HFA Inhaler 2 Puff(s) Inhalation every 6 hours  atorvastatin 10 milliGRAM(s) Oral at bedtime  cholecalciferol Oral Tab/Cap - Peds 1000 Unit(s) Oral daily  furosemide   Injectable 20 milliGRAM(s) IV Push once  isosorbide   mononitrate ER Tablet (IMDUR) 30 milliGRAM(s) Oral daily  levothyroxine 50 MICROGram(s) Oral daily  metoprolol succinate ER 50 milliGRAM(s) Oral daily  tiotropium 18 MICROgram(s) Capsule 1 Capsule(s) Inhalation daily    MEDICATIONS  (PRN): Patient is a 89y old  Male who presents with a chief complaint of Acute on chronic hypoxic respiratory failure (22 Oct 2019 10:39)      HPI:    Patient is a  89 y.o M with history of advanced CHF on home O2 ( 2L), base line O2 at home in 80s, CAD, HTN, HLD, afib on coumadin, hypothyroid, AAA repair, anemia BIBEMS for evaluation of SOB with hypoxia of 70s. He was in his usual state of health until this morning when he became very sob with associated N/V, so daughter called EMS. On the way to ER patient had a syncopal episode and was found to have VTACH which resolved spontaneously without treatment.   He had no chest pain, or chills. been complaining of SOb , cough productive mild clear , in ED had fever   no cp    In The ER he received IV antibiotics for PNA and placed on BIPAP (22 Oct 2019 10:39)      PAST MEDICAL & SURGICAL HISTORY:  Hyperlipidemia  CHF (congestive heart failure)  Afib  Hypertension  S/P AAA (abdominal aortic aneurysm) repair      SOCIAL HX:   Smoking    former smoker                     ETOH      -ve                      Other -ve    FAMILY HISTORY:  FH: congestive heart failure: sister      ROS:  See HPI     Allergies    No Known Allergies    Intolerances          PHYSICAL EXAM    ICU Vital Signs Last 24 Hrs  T(C): 38.8 (22 Oct 2019 08:28), Max: 38.8 (22 Oct 2019 08:28)  T(F): 101.9 (22 Oct 2019 08:28), Max: 101.9 (22 Oct 2019 08:28)  HR: 106 (22 Oct 2019 08:28) (103 - 106)  BP: 136/64 (22 Oct 2019 08:28) (136/64 - 136/64)  RR: 32 (22 Oct 2019 08:14) (32 - 32)  SpO2: 94% (22 Oct 2019 08:20) (93% - 94%)      General: In mild respiratory distress feels better on BIPAP  HEENT:  RACHEL              Lungs: B/l crackles  Cardiovascular: Regular  Gastrointestinal: Soft, Positive BS  Musculoskeletal: +ve Le edema  Skin: Warm.  Intact  Neurological: No motor or sensory deficit         LABS:                          10.2   13.99 )-----------( 158      ( 22 Oct 2019 09:27 )             35.7                                               10-22    139  |  95<L>  |  50<H>  ----------------------------<  127<H>  4.6   |  27  |  1.5    Ca    9.7      22 Oct 2019 09:27  Mg     1.7     10    TPro  8.4<H>  /  Alb  4.3  /  TBili  0.8  /  DBili  x   /  AST  17  /  ALT  13  /  AlkPhos  149<H>  10                                             Urinalysis Basic - ( 22 Oct 2019 10:47 )    Color: Yellow / Appearance: Clear / S.010 / pH: x  Gluc: x / Ketone: Negative  / Bili: Negative / Urobili: 0.2 mg/dL   Blood: x / Protein: 30 mg/dL / Nitrite: Negative   Leuk Esterase: Small / RBC: x / WBC x   Sq Epi: x / Non Sq Epi: x / Bacteria: x        CARDIAC MARKERS ( 22 Oct 2019 09:27 )  x     / 0.03 ng/mL / x     / x     / x                                                LIVER FUNCTIONS - ( 22 Oct 2019 09:27 )  Alb: 4.3 g/dL / Pro: 8.4 g/dL / ALK PHOS: 149 U/L / ALT: 13 U/L / AST: 17 U/L / GGT: x                                                                                                                                       X-Rays        b/l infiltrates  reviewed by me                                                                            ECHO    MEDICATIONS  (STANDING):  acetaminophen  Suppository .. 650 milliGRAM(s) Rectal once  ALBUTerol    90 MICROgram(s) HFA Inhaler 2 Puff(s) Inhalation every 6 hours  atorvastatin 10 milliGRAM(s) Oral at bedtime  cholecalciferol Oral Tab/Cap - Peds 1000 Unit(s) Oral daily  furosemide   Injectable 20 milliGRAM(s) IV Push once  isosorbide   mononitrate ER Tablet (IMDUR) 30 milliGRAM(s) Oral daily  levothyroxine 50 MICROGram(s) Oral daily  metoprolol succinate ER 50 milliGRAM(s) Oral daily  tiotropium 18 MICROgram(s) Capsule 1 Capsule(s) Inhalation daily    MEDICATIONS  (PRN):

## 2019-10-22 NOTE — H&P ADULT - NSHPREVIEWOFSYSTEMS_GEN_ALL_CORE
REVIEW OF SYSTEMS:    CONSTITUTIONAL: No weakness, fevers or chills  EYES/ENT: No visual changes;  No vertigo or throat pain   NECK: No pain or stiffness  RESPIRATORY: POSITIVE cough, and shortness of breath, NEG wheezing  CARDIOVASCULAR: No chest pain or palpitations  GASTROINTESTINAL: POSITIVE  nausea and vomiting, No abdominal or epigastric pain.  NO  hematemesis; No diarrhea or constipation. No melena or hematochezia.  GENITOURINARY: No dysuria, frequency or hematuria  NEUROLOGICAL: Negative   SKIN: No itching, rashes

## 2019-10-23 LAB
ALBUMIN SERPL ELPH-MCNC: 3.6 G/DL — SIGNIFICANT CHANGE UP (ref 3.5–5.2)
ALP SERPL-CCNC: 104 U/L — SIGNIFICANT CHANGE UP (ref 30–115)
ALT FLD-CCNC: 10 U/L — SIGNIFICANT CHANGE UP (ref 0–41)
ANION GAP SERPL CALC-SCNC: 14 MMOL/L — SIGNIFICANT CHANGE UP (ref 7–14)
APTT BLD: 43.5 SEC — HIGH (ref 27–39.2)
AST SERPL-CCNC: 20 U/L — SIGNIFICANT CHANGE UP (ref 0–41)
BILIRUB SERPL-MCNC: 0.6 MG/DL — SIGNIFICANT CHANGE UP (ref 0.2–1.2)
BUN SERPL-MCNC: 59 MG/DL — HIGH (ref 10–20)
CALCIUM SERPL-MCNC: 9.3 MG/DL — SIGNIFICANT CHANGE UP (ref 8.5–10.1)
CHLORIDE SERPL-SCNC: 97 MMOL/L — LOW (ref 98–110)
CO2 SERPL-SCNC: 28 MMOL/L — SIGNIFICANT CHANGE UP (ref 17–32)
CREAT SERPL-MCNC: 1.5 MG/DL — SIGNIFICANT CHANGE UP (ref 0.7–1.5)
CULTURE RESULTS: SIGNIFICANT CHANGE UP
GLUCOSE SERPL-MCNC: 158 MG/DL — HIGH (ref 70–99)
HCT VFR BLD CALC: 31.1 % — LOW (ref 42–52)
HGB BLD-MCNC: 8.9 G/DL — LOW (ref 14–18)
INR BLD: 4.48 RATIO — HIGH (ref 0.65–1.3)
MAGNESIUM SERPL-MCNC: 2.5 MG/DL — HIGH (ref 1.8–2.4)
MCHC RBC-ENTMCNC: 18.5 PG — LOW (ref 27–31)
MCHC RBC-ENTMCNC: 28.6 G/DL — LOW (ref 32–37)
MCV RBC AUTO: 64.5 FL — LOW (ref 80–94)
NIGHT BLUE STAIN TISS: SIGNIFICANT CHANGE UP
NRBC # BLD: 0 /100 WBCS — SIGNIFICANT CHANGE UP (ref 0–0)
NT-PROBNP SERPL-SCNC: HIGH PG/ML (ref 0–300)
PLATELET # BLD AUTO: 128 K/UL — LOW (ref 130–400)
POTASSIUM SERPL-MCNC: 4.5 MMOL/L — SIGNIFICANT CHANGE UP (ref 3.5–5)
POTASSIUM SERPL-SCNC: 4.5 MMOL/L — SIGNIFICANT CHANGE UP (ref 3.5–5)
PROT SERPL-MCNC: 7.5 G/DL — SIGNIFICANT CHANGE UP (ref 6–8)
PROTHROM AB SERPL-ACNC: >40 SEC — HIGH (ref 9.95–12.87)
RBC # BLD: 4.82 M/UL — SIGNIFICANT CHANGE UP (ref 4.7–6.1)
RBC # FLD: 19.6 % — HIGH (ref 11.5–14.5)
SODIUM SERPL-SCNC: 139 MMOL/L — SIGNIFICANT CHANGE UP (ref 135–146)
SPECIMEN SOURCE: SIGNIFICANT CHANGE UP
SPECIMEN SOURCE: SIGNIFICANT CHANGE UP
TROPONIN T SERPL-MCNC: 0.14 NG/ML — CRITICAL HIGH
TROPONIN T SERPL-MCNC: 0.19 NG/ML — CRITICAL HIGH
WBC # BLD: 16.26 K/UL — HIGH (ref 4.8–10.8)
WBC # FLD AUTO: 16.26 K/UL — HIGH (ref 4.8–10.8)

## 2019-10-23 PROCEDURE — 71045 X-RAY EXAM CHEST 1 VIEW: CPT | Mod: 26

## 2019-10-23 PROCEDURE — 99233 SBSQ HOSP IP/OBS HIGH 50: CPT

## 2019-10-23 RX ORDER — FUROSEMIDE 40 MG
20 TABLET ORAL ONCE
Refills: 0 | Status: COMPLETED | OUTPATIENT
Start: 2019-10-23 | End: 2019-10-23

## 2019-10-23 RX ORDER — PANTOPRAZOLE SODIUM 20 MG/1
40 TABLET, DELAYED RELEASE ORAL
Refills: 0 | Status: DISCONTINUED | OUTPATIENT
Start: 2019-10-23 | End: 2019-10-30

## 2019-10-23 RX ADMIN — Medication 3 MILLILITER(S): at 08:06

## 2019-10-23 RX ADMIN — ATORVASTATIN CALCIUM 10 MILLIGRAM(S): 80 TABLET, FILM COATED ORAL at 22:06

## 2019-10-23 RX ADMIN — Medication 12.5 MILLIGRAM(S): at 06:09

## 2019-10-23 RX ADMIN — Medication 1000 UNIT(S): at 13:22

## 2019-10-23 RX ADMIN — AZITHROMYCIN 255 MILLIGRAM(S): 500 TABLET, FILM COATED ORAL at 13:22

## 2019-10-23 RX ADMIN — Medication 40 MILLIGRAM(S): at 06:09

## 2019-10-23 RX ADMIN — TIOTROPIUM BROMIDE 1 CAPSULE(S): 18 CAPSULE ORAL; RESPIRATORY (INHALATION) at 10:16

## 2019-10-23 RX ADMIN — CEFEPIME 100 MILLIGRAM(S): 1 INJECTION, POWDER, FOR SOLUTION INTRAMUSCULAR; INTRAVENOUS at 14:29

## 2019-10-23 RX ADMIN — Medication 50 MICROGRAM(S): at 06:09

## 2019-10-23 RX ADMIN — Medication 20 MILLIGRAM(S): at 10:21

## 2019-10-23 RX ADMIN — Medication 3 MILLILITER(S): at 02:28

## 2019-10-23 RX ADMIN — Medication 3 MILLILITER(S): at 19:33

## 2019-10-23 RX ADMIN — PANTOPRAZOLE SODIUM 40 MILLIGRAM(S): 20 TABLET, DELAYED RELEASE ORAL at 10:21

## 2019-10-23 RX ADMIN — Medication 12.5 MILLIGRAM(S): at 17:48

## 2019-10-23 NOTE — PHYSICAL THERAPY INITIAL EVALUATION ADULT - GAIT DEVIATIONS NOTED, PT EVAL
stooped posture, dec heel strike/pushoff/decreased step length/decreased deisy/decreased weight-shifting ability

## 2019-10-23 NOTE — PROGRESS NOTE ADULT - SUBJECTIVE AND OBJECTIVE BOX
SUBJECTIVE:    Patient is a 89y old Male who presents with a chief complaint of Acute on chronic hypoxic respiratory failure (23 Oct 2019 08:28)    Currently admitted to medicine with the primary diagnosis of Pneumonia due to infectious organism, unspecified laterality, unspecified part of lung     Today is hospital day 1d. This morning he is resting comfortably in bed and reports no new issues or overnight events.     PAST MEDICAL & SURGICAL HISTORY  Hyperlipidemia  CHF (congestive heart failure)  Afib  Hypertension  History of hip surgery  S/P AAA (abdominal aortic aneurysm) repair    SOCIAL HISTORY:  Negative for smoking/alcohol/drug use.     ALLERGIES:  No Known Allergies    MEDICATIONS:  STANDING MEDICATIONS  acetaminophen  Suppository .. 650 milliGRAM(s) Rectal once  albuterol/ipratropium for Nebulization 3 milliLiter(s) Nebulizer every 6 hours  atorvastatin 10 milliGRAM(s) Oral at bedtime  azithromycin  IVPB 500 milliGRAM(s) IV Intermittent every 24 hours  cefepime   IVPB 1000 milliGRAM(s) IV Intermittent every 24 hours  cholecalciferol Oral Tab/Cap - Peds 1000 Unit(s) Oral daily  furosemide   Injectable 20 milliGRAM(s) IV Push once  levothyroxine 50 MICROGram(s) Oral daily  metoprolol tartrate 12.5 milliGRAM(s) Oral two times a day  pantoprazole  Injectable 40 milliGRAM(s) IV Push daily  tiotropium 18 MICROgram(s) Capsule 1 Capsule(s) Inhalation daily    PRN MEDICATIONS    VITALS:   T(F): 97.2  HR: 74  BP: 114/59  RR: 24  SpO2: 97%    LABS:                        8.9    16.26 )-----------( 128      ( 23 Oct 2019 05:33 )             31.1     10-23    139  |  97<L>  |  59<H>  ----------------------------<  158<H>  4.5   |  28  |  1.5    Ca    9.3      23 Oct 2019 05:33  Mg     2.5     10    TPro  7.5  /  Alb  3.6  /  TBili  0.6  /  DBili  x   /  AST  20  /  ALT  10  /  AlkPhos  104  10-    PT/INR - ( 22 Oct 2019 15:51 )   PT: >40.00 sec;   INR: 3.55 ratio         PTT - ( 22 Oct 2019 15:51 )  PTT:41.7 sec  Urinalysis Basic - ( 22 Oct 2019 10:47 )    Color: Yellow / Appearance: Clear / S.010 / pH: x  Gluc: x / Ketone: Negative  / Bili: Negative / Urobili: 0.2 mg/dL   Blood: x / Protein: 30 mg/dL / Nitrite: Negative   Leuk Esterase: Small / RBC: x / WBC 10-25 /HPF   Sq Epi: x / Non Sq Epi: Occasional /HPF / Bacteria: Few        Troponin T, Serum: 0.19 ng/mL <HH> (10-23-19 @ 05:33)  Troponin T, Serum: 0.20 ng/mL <HH> (10-22-19 @ 21:55)  Troponin T, Serum: 0.20 ng/mL <HH> (10-22-19 @ 15:51)  Lactate, Blood: 2.2 mmol/L (10-22-19 @ 10:47)      CARDIAC MARKERS ( 23 Oct 2019 05:33 )  x     / 0.19 ng/mL / x     / x     / x      CARDIAC MARKERS ( 22 Oct 2019 21:55 )  x     / 0.20 ng/mL / x     / x     / x      CARDIAC MARKERS ( 22 Oct 2019 15:51 )  x     / 0.20 ng/mL / x     / x     / x      CARDIAC MARKERS ( 22 Oct 2019 09:27 )  x     / 0.03 ng/mL / x     / x     / x          RADIOLOGY:    PHYSICAL EXAM:  GEN: No acute distress  LUNGS: Clear to auscultation bilaterally   HEART: S1/S2 present. RRR.   ABD: Soft, non-tender, non-distended. Bowel sounds present  EXT: NC/NC/NE/2+PP/LANE  NEURO: AAOX3

## 2019-10-23 NOTE — PROGRESS NOTE ADULT - SUBJECTIVE AND OBJECTIVE BOX
Patient is a 89y old  Male who presents with a chief complaint of Acute on chronic hypoxic respiratory failure (23 Oct 2019 07:37)      Over Night Events:  Patient seen and examined feel  better today not on distress sitting in chair       ROS:  See HPI    PHYSICAL EXAM    ICU Vital Signs Last 24 Hrs  T(C): 36.2 (23 Oct 2019 07:01), Max: 38.6 (22 Oct 2019 10:59)  T(F): 97.2 (23 Oct 2019 07:01), Max: 101.5 (22 Oct 2019 10:59)  HR: 74 (23 Oct 2019 05:04) (64 - 102)  BP: 114/59 (23 Oct 2019 05:04) (103/56 - 161/62)  BP(mean): 82 (23 Oct 2019 05:04) (76 - 89)  ABP: --  ABP(mean): --  RR: 24 (23 Oct 2019 07:01) (18 - 59)  SpO2: 97% (23 Oct 2019 05:04) (92% - 100%)      General: Aox3  HEENT:       per         Lymph Nodes: NO cervical LN   Lungs: Bilateral BS  Cardiovascular: Regular   Abdomen: Soft, Positive BS  Extremities: No clubbing   Skin: warm   Neurological: no focal deficit   Musculoskeletal: move all ext     I&O's Detail    22 Oct 2019 07:01  -  23 Oct 2019 07:00  --------------------------------------------------------  IN:    IV PiggyBack: 50 mL  Total IN: 50 mL    OUT:    Voided: 350 mL  Total OUT: 350 mL    Total NET: -300 mL          LABS:                          8.9    16.26 )-----------( 128      ( 23 Oct 2019 05:33 )             31.1         23 Oct 2019 05:33    139    |  97     |  59     ----------------------------<  158    4.5     |  28     |  1.5      Ca    9.3        23 Oct 2019 05:33  Mg     2.5       23 Oct 2019 05:33    TPro  7.5    /  Alb  3.6    /  TBili  0.6    /  DBili  x      /  AST  20     /  ALT  10     /  AlkPhos  104    23 Oct 2019 05:33  Amylase x     lipase x                                                 PT/INR - ( 22 Oct 2019 15:51 )   PT: >40.00 sec;   INR: 3.55 ratio         PTT - ( 22 Oct 2019 15:51 )  PTT:41.7 sec                                       Urinalysis Basic - ( 22 Oct 2019 10:47 )    Color: Yellow / Appearance: Clear / S.010 / pH: x  Gluc: x / Ketone: Negative  / Bili: Negative / Urobili: 0.2 mg/dL   Blood: x / Protein: 30 mg/dL / Nitrite: Negative   Leuk Esterase: Small / RBC: x / WBC 10-25 /HPF   Sq Epi: x / Non Sq Epi: Occasional /HPF / Bacteria: Few        Lactate, Blood: 2.2 mmol/L (10-22-19 @ 10:47)  Lactate, Blood: 2.7 mmol/L (10-22-19 @ 09:27)    CARDIAC MARKERS ( 23 Oct 2019 05:33 )  x     / 0.19 ng/mL / x     / x     / x      CARDIAC MARKERS ( 22 Oct 2019 21:55 )  x     / 0.20 ng/mL / x     / x     / x      CARDIAC MARKERS ( 22 Oct 2019 15:51 )  x     / 0.20 ng/mL / x     / x     / x      CARDIAC MARKERS ( 22 Oct 2019 09:27 )  x     / 0.03 ng/mL / x     / x     / x                                                                                                                                                 MEDICATIONS  (STANDING):  acetaminophen  Suppository .. 650 milliGRAM(s) Rectal once  albuterol/ipratropium for Nebulization 3 milliLiter(s) Nebulizer every 6 hours  atorvastatin 10 milliGRAM(s) Oral at bedtime  azithromycin  IVPB 500 milliGRAM(s) IV Intermittent every 24 hours  cefepime   IVPB 1000 milliGRAM(s) IV Intermittent every 24 hours  cholecalciferol Oral Tab/Cap - Peds 1000 Unit(s) Oral daily  levothyroxine 50 MICROGram(s) Oral daily  methylPREDNISolone sodium succinate Injectable 40 milliGRAM(s) IV Push every 8 hours  metoprolol tartrate 12.5 milliGRAM(s) Oral two times a day  pantoprazole  Injectable 40 milliGRAM(s) IV Push daily  tiotropium 18 MICROgram(s) Capsule 1 Capsule(s) Inhalation daily    MEDICATIONS  (PRN):          Xrays:  TLC:  OG:  ET tube:                                                                                    improve in b/l lower effusion opacity johnny RLL   ECHO:  [facility] ICU:

## 2019-10-23 NOTE — CONSULT NOTE ADULT - ASSESSMENT
IMPRESSION: Rehab of 89 y/o  m  rehab  for  gd  debility      PRECAUTIONS: [ x ] Cardiac  [  ] Respiratory  [  ] Seizures [  ] Contact Isolation  [  ] Droplet Isolation  [ FALL ] Other    Weight Bearing Status:     RECOMMENDATION:    Out of Bed to Chair     DVT/Decubiti Prophylaxis    REHAB PLAN:     [ xx ] Bedside P/T 3-5 times a week   [   ]   Bedside O/T  2-3 times a week             [   ] No Rehab Therapy Indicated                   [   ]  Speech Therapy   Conditioning/ROM                                    ADL  Bed Mobility                                               Conditioning/ROM  Transfers                                                     Bed Mobility  Sitting /Standing Balance                         Transfers                                        Gait Training                                               Sitting/Standing Balance  Stair Training [   ]Applicable                    Home equipment Eval                                                                        Splinting  [   ] Only      GOALS:   ADL   [   ]   Independent                    Transfers  [   ] Independent                          Ambulation  [   ] Independent     [    ] With device                            [   ]  CG                                                         [   ]  CG                                                                  [   ] CG                            [    ] Min A                                                   [   ] Min A                                                              [   ] Min  A          DISCHARGE PLAN:   [   ]  Good candidate for Intensive Rehabilitation/Hospital based-[facility]A [facility]                                             Will tolerate 3hrs Intensive Rehab Daily                                       [    ]  Short Term Rehab in Skilled Nursing Facility                                       [  xx  ]  Home with Outpatient or VN services d/w  ptn DTR                                          [    ]  Possible Candidate for Intensive Hospital based Rehab

## 2019-10-23 NOTE — CONSULT NOTE ADULT - ASSESSMENT
Patient with afib on coumadin. reported chf in past on O2. Patient sob. No chest pain. Patient with reported v-tach. No strip available. Possible afib with aberrancy. Chf improving. Medical rx. Echo. Coumadin. Prognosis guarded

## 2019-10-23 NOTE — CONSULT NOTE ADULT - SUBJECTIVE AND OBJECTIVE BOX
CARDIOLOGY CONSULT NOTE     CHIEF COMPLAINT/REASON FOR CONSULT:    HPI:    Patient is a  89 y.o M with history of advanced CHF and COPD on home O2 ( 2L), base line O2 at home in 80s, CAD, HTN, HLD, afib on coumadin, hypothyroid, AAA repair, anemia, BIBEMS for evaluation of SOB with hypoxia of 70s. He was in his usual state of health until this morning when he became very sob with associated N/V, so daughter called EMS. On the way to ER patient had a syncopal episode and was found to have VTACH which resolved spontaneously without treatment.   He had no chest pain, fevers, or chills.     In The ER he received IV antibiotics for PNA and placed on BIPAP (22 Oct 2019 10:39)      PAST MEDICAL & SURGICAL HISTORY:  Hyperlipidemia  CHF (congestive heart failure)  Afib  Hypertension  History of hip surgery  S/P AAA (abdominal aortic aneurysm) repair      Cardiac Risks:   [ x]HTN, [ ] DM, [ ] Smoking, [ ] FH,  [ x] Lipids        MEDICATIONS:  MEDICATIONS  (STANDING):  acetaminophen  Suppository .. 650 milliGRAM(s) Rectal once  albuterol/ipratropium for Nebulization 3 milliLiter(s) Nebulizer every 6 hours  atorvastatin 10 milliGRAM(s) Oral at bedtime  azithromycin  IVPB 500 milliGRAM(s) IV Intermittent every 24 hours  cefepime   IVPB 1000 milliGRAM(s) IV Intermittent every 24 hours  cholecalciferol Oral Tab/Cap - Peds 1000 Unit(s) Oral daily  levothyroxine 50 MICROGram(s) Oral daily  methylPREDNISolone sodium succinate Injectable 40 milliGRAM(s) IV Push every 8 hours  metoprolol tartrate 12.5 milliGRAM(s) Oral two times a day  pantoprazole  Injectable 40 milliGRAM(s) IV Push daily  tiotropium 18 MICROgram(s) Capsule 1 Capsule(s) Inhalation daily      FAMILY HISTORY:  FH: congestive heart failure: sister      SOCIAL HISTORY:      [ ] Marital status    Allergies    No Known Allergies      	    REVIEW OF SYSTEMS:  CONSTITUTIONAL: No fever, weight loss, or fatigue  EYES: No eye pain, visual disturbances, or discharge  ENMT:  No difficulty hearing, tinnitus, vertigo; No sinus or throat pain  NECK: No pain or stiffness  RESPIRATORY: No cough, wheezing, chills or hemoptysis; No Shortness of Breath  CARDIOVASCULAR: See above  GASTROINTESTINAL: No abdominal or epigastric pain. No nausea, vomiting, or hematemesis; No diarrhea or constipation. No melena or hematochezia.  GENITOURINARY: No dysuria, frequency, hematuria, or incontinence  NEUROLOGICAL: No headaches, memory loss, loss of strength, numbness, or tremors  SKIN: No itching, burning, rashes, or lesions   	      PHYSICAL EXAM:  T(C): 36.2 (10-23-19 @ 07:01), Max: 38.8 (10-22-19 @ 08:28)  HR: 74 (10-23-19 @ 05:04) (64 - 106)  BP: 114/59 (10-23-19 @ 05:04) (103/56 - 161/62)  RR: 24 (10-23-19 @ 07:01) (18 - 59)  SpO2: 97% (10-23-19 @ 05:04) (92% - 100%)  Wt(kg): --  I&O's Summary    22 Oct 2019 07:01  -  23 Oct 2019 07:00  --------------------------------------------------------  IN: 50 mL / OUT: 350 mL / NET: -300 mL        Appearance: Normal	  Psychiatry: A & O x 3, Mood & affect appropriate  HEENT:   Normal oral mucosa, PERRL, EOMI	  Lymphatic: No lymphadenopathy  Cardiovascular: Normal S1 S2,RRR, No JVD, No murmurs  Respiratory: Lungs clear to auscultation	  Gastrointestinal:  Soft, Non-tender, + BS	  Skin: No rashes, No ecchymoses, No cyanosis	  Neurologic: Non-focal  Extremities: Normal range of motion, No clubbing, cyanosis or edema  Vascular: Peripheral pulses palpable 2+ bilaterally      ECG:  	< from: 12 Lead ECG (10.22.19 @ 08:21) >  Diagnosis Line Atrial fibrillation with rapid ventricular response with premature ventricular  or aberrantly conducted complexes    Confirmed by VAUGHN CHAVEZ MD ([id number]) on 10/22/2019 12:12:40 PM    < end of copied text >      	  LABS:	 	    CARDIAC MARKERS:          Serum Pro-Brain Natriuretic Peptide: 94811 pg/mL (10-22 @ 09:27)                            8.9    16.26 )-----------( 128      ( 23 Oct 2019 05:33 )             31.1     10-23    139  |  97<L>  |  59<H>  ----------------------------<  158<H>  4.5   |  28  |  1.5    Ca    9.3      23 Oct 2019 05:33  Mg     2.5     10-23    TPro  7.5  /  Alb  3.6  /  TBili  0.6  /  DBili  x   /  AST  20  /  ALT  10  /  AlkPhos  104  10-23    PT/INR - ( 22 Oct 2019 15:51 )   PT: >40.00 sec;   INR: 3.55 ratio         PTT - ( 22 Oct 2019 15:51 )  PTT:41.7 sec  proBNP: Serum Pro-Brain Natriuretic Peptide: 58441 pg/mL (10-22 @ 09:27)

## 2019-10-23 NOTE — PHYSICAL THERAPY INITIAL EVALUATION ADULT - GENERAL OBSERVATIONS, REHAB EVAL
11:30-11:53 Chart reviewed. Pt encountered reclined in chair, may be seen by Physical Therapist as confirmed with Nurse. Patient denied unusual pain at rest and ready to try to walk; +O2 via NC; +tele

## 2019-10-23 NOTE — PROGRESS NOTE ADULT - ATTENDING COMMENTS
Patient seen and evaluated independently medical resident note reviewed, I agree with plan and management, except as I have noted. DC steroids

## 2019-10-23 NOTE — PROGRESS NOTE ADULT - ASSESSMENT
impressioin:    acute on chronic hypoxemic respiratory failure  Cant/ r/o PNA grame -ve(hospitalization in july/fever/WBC)  Severe Sepsis  Syncope    PLAN:    CNS: Avoid sedation    HEENT:  Oral care    PULMONARY:  HOB @ 45 degrees,  keep on oxygen keep pox > 92% bipap as needed   lower steroids switch to prednisone 40 mg Q 24 hrs     CARDIOVASCULAR: follow cardiology   echo   repeat BNP   give lasix 40 mg once now keep IS < OS     GI: GI prophylaxis                                          Feeding low sodium    RENAL:  F/u  lytes.  Correct as needed. accurate I/O    INFECTIOUS DISEASE:  on cefepime/azithro, follow  culture    HEMATOLOGICAL:  DVT prophylaxis. follow INR   ENDOCRINE:  Follow up FS.  Insulin protocol if needed.    CODE STATUS: FULL CODE    DISPOSITION: downgrade to tele if ok by cardiology impressioin:    acute on chronic hypoxemic respiratory failure  Cant/ r/o PNA grame -ve(hospitalization in july/fever/WBC)  Severe Sepsis  Syncope  afib on coumadin     PLAN:    CNS: Avoid sedation    HEENT:  Oral care    PULMONARY:  HOB @ 45 degrees,  keep on oxygen keep pox > 92% bipap as needed   d/c steroids     CARDIOVASCULAR: follow cardiology   echo   repeat BNP    keep IS = OS   lasix 20 mg once     GI: GI prophylaxis                                          Feeding low sodium    RENAL:  F/u  lytes.  Correct as needed. accurate I/O    INFECTIOUS DISEASE:  on cefepime/azithro, follow  culture    HEMATOLOGICAL:  DVT prophylaxis. follow INR   ENDOCRINE:  Follow up FS.  Insulin protocol if needed.    CODE STATUS: FULL CODE    DISPOSITION: downgrade to tele if ok by cardiology

## 2019-10-23 NOTE — CONSULT NOTE ADULT - SUBJECTIVE AND OBJECTIVE BOX
HPI: ptn  DTR  at bedside Patient on BIPAP and a poor historian and his daughter Tammy. give  hx    Patient is a  89 y.o M with history of advanced CHF and COPD on home O2 ( 2L), base line O2 at home in 80s, CAD, HTN, HLD, afib on coumadin, hypothyroid, AAA repair, anemia, BIBEMS for evaluation of SOB with hypoxia of 70s. He was in his usual state of health until this morning when he became very sob with associated N/V, so daughter called EMS. On the way to ER patient had a syncopal episode and was found to have VTACH which resolved spontaneously without treatment.   He had no chest pain, fevers, or chills.   In The ER he received IV antibiotics for PNA and placed on BIPAP   PTN  REFERRED TO ACUTE  REHAB  FOR  EVAL AND  TX   PAST MEDICAL & SURGICAL HISTORY:  Hyperlipidemia  CHF (congestive heart failure)  Afib  Hypertension  History of hip surgery  S/P AAA (abdominal aortic aneurysm) repair      Hospital Course:    TODAY'S SUBJECTIVE & REVIEW OF SYMPTOMS:     Constitutional WNL   Cardio WNL   Resp WNL   GI WNL  Heme WNL  Endo WNL  Skin WNL  MSK WNL  Neuro WNL  Cognitive WNL  Psych WNL      MEDICATIONS  (STANDING):  acetaminophen  Suppository .. 650 milliGRAM(s) Rectal once  albuterol/ipratropium for Nebulization 3 milliLiter(s) Nebulizer every 6 hours  atorvastatin 10 milliGRAM(s) Oral at bedtime  azithromycin  IVPB 500 milliGRAM(s) IV Intermittent every 24 hours  cefepime   IVPB 1000 milliGRAM(s) IV Intermittent every 24 hours  cholecalciferol Oral Tab/Cap - Peds 1000 Unit(s) Oral daily  levothyroxine 50 MICROGram(s) Oral daily  metoprolol tartrate 12.5 milliGRAM(s) Oral two times a day  pantoprazole    Tablet 40 milliGRAM(s) Oral before breakfast  tiotropium 18 MICROgram(s) Capsule 1 Capsule(s) Inhalation daily    MEDICATIONS  (PRN):      FAMILY HISTORY:  FH: congestive heart failure: sister      Allergies    No Known Allergies    Intolerances        SOCIAL HISTORY:    [  ] Etoh  [  ] Smoking  [  ] Substance abuse     Home Environment:  [  ] Home Alone  [ x ] Lives with Family  [  ] Home Health Aid    Dwelling:  [  ] Apartment  [ x ] Private House  [  ] Adult Home  [  ] Skilled Nursing Facility      [  ] Short Term  [  ] Long Term  [  x] Stairs       Elevator [  ]    FUNCTIONAL STATUS PTA: (Check all that apply)  Ambulation: [ x  ]Independent    [  x] Dependent     [  ] Non-Ambulatory  Assistive Device: [  ] SA Cane  [  ]  Q Cane  [ x ] Walker  [ x ]  Wheelchair  ADL : [ x ] Independent  [x  ]  Dependent       Vital Signs Last 24 Hrs  T(C): 36.2 (23 Oct 2019 16:00), Max: 36.2 (23 Oct 2019 07:01)  T(F): 97.1 (23 Oct 2019 16:00), Max: 97.2 (23 Oct 2019 07:01)  HR: 70 (23 Oct 2019 16:26) (64 - 76)  BP: 131/69 (23 Oct 2019 15:08) (99/53 - 161/62)  BP(mean): 94 (23 Oct 2019 15:08) (71 - 94)  RR: 18 (23 Oct 2019 16:00) (18 - 59)  SpO2: 96% (23 Oct 2019 16:26) (94% - 100%)      PHYSICAL EXAM: Alert & Oriented X 2  GENERAL: NAD, well-groomed, well-developed  HEAD:  Atraumatic, Normocephalic  EYES: EOMI, PERRLA, conjunctiva and sclera clear  NECK: Supple, No JVD, Normal thyroid  CHEST/LUNG: Clear to percussion bilaterally; No rales, rhonchi, wheezing, or rubs  HEART: Regular rate and rhythm; No murmurs, rubs, or gallops  ABDOMEN: Soft, Nontender, Nondistended; Bowel sounds present  EXTREMITIES:  2+ Peripheral Pulses, No clubbing, cyanosis, or edema    NERVOUS SYSTEM:  Cranial Nerves 2-12 intact [ x ] Abnormal  [  ]  ROM: WFL all extremities [  ]  Abnormal [ x ]  Motor Strength: WFL all extremities  [  ]  Abnormal [ x ]  Sensation: intact to light touch [  ] Abnormal [x  ]  Reflexes: Symmetric [  ]  Abnormal [ x ]    FUNCTIONAL STATUS:  Bed Mobility: Independent [  ]  Supervision [  ]  Needs Assistance [xx  ]  N/A [  ]  Transfers: Independent [  ]  Supervision [  ]  Needs Assistance [x]  N/A [  ]   Ambulation: Independent [  ]  Supervision [  ]  Needs Assistance [x  ]  N/A [  ]  ADL: Independent [  ] Requires Assistance [  ] N/A [ x ]  SEE PT/OT IE NOTES    LABS:                        8.9    16.26 )-----------( 128      ( 23 Oct 2019 05:33 )             31.1     10-23    139  |  97<L>  |  59<H>  ----------------------------<  158<H>  4.5   |  28  |  1.5    Ca    9.3      23 Oct 2019 05:33  Mg     2.5     10-23    TPro  7.5  /  Alb  3.6  /  TBili  0.6  /  DBili  x   /  AST  20  /  ALT  10  /  AlkPhos  104  10    PT/INR - ( 23 Oct 2019 11:21 )   PT: >40.00 sec;   INR: 4.48 ratio         PTT - ( 23 Oct 2019 11:21 )  PTT:43.5 sec  Urinalysis Basic - ( 22 Oct 2019 10:47 )    Color: Yellow / Appearance: Clear / S.010 / pH: x  Gluc: x / Ketone: Negative  / Bili: Negative / Urobili: 0.2 mg/dL   Blood: x / Protein: 30 mg/dL / Nitrite: Negative   Leuk Esterase: Small / RBC: x / WBC 10-25 /HPF   Sq Epi: x / Non Sq Epi: Occasional /HPF / Bacteria: Few        RADIOLOGY & ADDITIONAL STUDIES:    Assesment:

## 2019-10-23 NOTE — PROGRESS NOTE ADULT - ASSESSMENT
#) Acute on chronic hypoxemic respiratory failure 2/2 sepsis 2/2 suspected GNR bilateral PNA and CHF Exacerbation  -on home oxygen (2L), no history of COPD, nonsmoker  -on admission, T-101.5, WBC-13.99, HR-105, RR-32, lactate- 2.7, CXR- bibasilar opacities/ effusion  -last 2D (02/01/2019)- EF-40-45%, mod. TR, sev pulm HTN  -BIPAP 4hrs on/off and at bedtime- patient noncompliant w/ bipap  -titrate O2>92%  -IV steroids (solumedrol 40 q8h)  -IV abx (azithromycin and cefepime)- started 10/22  -duonebs PRN  -BCx, UCx, Sputum Cx pending  -strict I's and O's, fluid restriction 1L/day, DASH diet  -venous duplex- negative    #) Chronic atrial fibrillation rate uncontrolled w/ NSVT  -holding coumadin, will start Hep Drip when INR<1.5  -BP borderline low, on metoprolol 50mg daily at home, will do metoprolol 12.5 q12h to prevent rebound tachy  -will hold other BP lowering drugs  -cardio c/s (Dr. Flores)- Possible afib with aberrancy. Chf improving. Medical rx. Echo. Coumadin. Prognosis guarded    #) Anemia of chronic disease    #) CKD stage 3  - monitor renal function    #) HLD  - statin    #) Hypothyroidism  -synthroid (50mcg)    #) DVT/ GI ppx  -Hep Drip when INR<1.5, protonix    #) Dispo  -from home, at baseline AOx4, ADL's independently  -uses wheelchair/ walker after femur fx surgery on 06/2019  -Physiatry/ PT eval pending #) Acute on chronic hypoxemic respiratory failure 2/2 sepsis 2/2 suspected GNR bilateral PNA and CHF Exacerbation  -on home oxygen (2L), no history of COPD, nonsmoker  -on admission, T-101.5, WBC-13.99, HR-105, RR-32, lactate- 2.7, CXR- bibasilar opacities/ effusion  -last 2D (02/01/2019)- EF-40-45%, mod. TR, sev pulm HTN  -BIPAP 4hrs on/off and at bedtime- patient noncompliant w/ bipap  -titrate O2>92%  -d/c'd IV steroids today (10/23)  -IV abx (azithromycin and cefepime)- started 10/22  -duonebs PRN  -BCx, UCx, Sputum Cx pending  -strict I's and O's, fluid restriction 1L/day, DASH diet  -venous duplex- negative    #) Chronic atrial fibrillation rate uncontrolled w/ NSVT  -holding coumadin, will start Hep Drip when INR<1.5  -BP borderline low, on metoprolol 50mg daily at home, will do metoprolol 12.5 q12h to prevent rebound tachy  -will hold other BP lowering drugs  -cardio c/s (Dr. Flores)- Possible afib with aberrancy. Chf improving. Medical rx. Echo. Coumadin. Prognosis guarded    #) Anemia of chronic disease    #) CKD stage 3  - monitor renal function    #) HLD  - statin    #) Hypothyroidism  -synthroid (50mcg)    #) DVT/ GI ppx  -Hep Drip when INR<1.5, protonix    #) Dispo  -from home, at baseline AOx4, ADL's independently  -uses wheelchair/ walker after femur fx surgery on 06/2019  -Physiatry/ PT eval pending

## 2019-10-24 LAB
ALBUMIN SERPL ELPH-MCNC: 3.6 G/DL — SIGNIFICANT CHANGE UP (ref 3.5–5.2)
ALP SERPL-CCNC: 103 U/L — SIGNIFICANT CHANGE UP (ref 30–115)
ALT FLD-CCNC: 14 U/L — SIGNIFICANT CHANGE UP (ref 0–41)
ANION GAP SERPL CALC-SCNC: 16 MMOL/L — HIGH (ref 7–14)
AST SERPL-CCNC: 18 U/L — SIGNIFICANT CHANGE UP (ref 0–41)
BILIRUB SERPL-MCNC: 0.6 MG/DL — SIGNIFICANT CHANGE UP (ref 0.2–1.2)
BUN SERPL-MCNC: 77 MG/DL — CRITICAL HIGH (ref 10–20)
CALCIUM SERPL-MCNC: 9.8 MG/DL — SIGNIFICANT CHANGE UP (ref 8.5–10.1)
CHLORIDE SERPL-SCNC: 97 MMOL/L — LOW (ref 98–110)
CO2 SERPL-SCNC: 28 MMOL/L — SIGNIFICANT CHANGE UP (ref 17–32)
CREAT SERPL-MCNC: 1.7 MG/DL — HIGH (ref 0.7–1.5)
GLUCOSE SERPL-MCNC: 160 MG/DL — HIGH (ref 70–99)
HCT VFR BLD CALC: 31.1 % — LOW (ref 42–52)
HGB BLD-MCNC: 9 G/DL — LOW (ref 14–18)
INR BLD: 4.92 RATIO — HIGH (ref 0.65–1.3)
MAGNESIUM SERPL-MCNC: 2.5 MG/DL — HIGH (ref 1.8–2.4)
MCHC RBC-ENTMCNC: 18.5 PG — LOW (ref 27–31)
MCHC RBC-ENTMCNC: 28.9 G/DL — LOW (ref 32–37)
MCV RBC AUTO: 64 FL — LOW (ref 80–94)
NRBC # BLD: 0 /100 WBCS — SIGNIFICANT CHANGE UP (ref 0–0)
NT-PROBNP SERPL-SCNC: HIGH PG/ML (ref 0–300)
PLATELET # BLD AUTO: 130 K/UL — SIGNIFICANT CHANGE UP (ref 130–400)
POTASSIUM SERPL-MCNC: 4.7 MMOL/L — SIGNIFICANT CHANGE UP (ref 3.5–5)
POTASSIUM SERPL-SCNC: 4.7 MMOL/L — SIGNIFICANT CHANGE UP (ref 3.5–5)
PROT SERPL-MCNC: 7.6 G/DL — SIGNIFICANT CHANGE UP (ref 6–8)
PROTHROM AB SERPL-ACNC: >40 SEC — HIGH (ref 9.95–12.87)
RBC # BLD: 4.86 M/UL — SIGNIFICANT CHANGE UP (ref 4.7–6.1)
RBC # FLD: 19.7 % — HIGH (ref 11.5–14.5)
SODIUM SERPL-SCNC: 141 MMOL/L — SIGNIFICANT CHANGE UP (ref 135–146)
TROPONIN T SERPL-MCNC: 0.14 NG/ML — CRITICAL HIGH
WBC # BLD: 18.87 K/UL — HIGH (ref 4.8–10.8)
WBC # FLD AUTO: 18.87 K/UL — HIGH (ref 4.8–10.8)

## 2019-10-24 PROCEDURE — 71045 X-RAY EXAM CHEST 1 VIEW: CPT | Mod: 26

## 2019-10-24 PROCEDURE — 99233 SBSQ HOSP IP/OBS HIGH 50: CPT

## 2019-10-24 RX ORDER — ONDANSETRON 8 MG/1
4 TABLET, FILM COATED ORAL ONCE
Refills: 0 | Status: COMPLETED | OUTPATIENT
Start: 2019-10-24 | End: 2019-10-24

## 2019-10-24 RX ADMIN — ATORVASTATIN CALCIUM 10 MILLIGRAM(S): 80 TABLET, FILM COATED ORAL at 21:59

## 2019-10-24 RX ADMIN — Medication 12.5 MILLIGRAM(S): at 06:45

## 2019-10-24 RX ADMIN — AZITHROMYCIN 255 MILLIGRAM(S): 500 TABLET, FILM COATED ORAL at 12:58

## 2019-10-24 RX ADMIN — Medication 50 MICROGRAM(S): at 06:45

## 2019-10-24 RX ADMIN — ONDANSETRON 4 MILLIGRAM(S): 8 TABLET, FILM COATED ORAL at 14:36

## 2019-10-24 RX ADMIN — PANTOPRAZOLE SODIUM 40 MILLIGRAM(S): 20 TABLET, DELAYED RELEASE ORAL at 06:54

## 2019-10-24 RX ADMIN — Medication 3 MILLILITER(S): at 08:04

## 2019-10-24 RX ADMIN — Medication 1000 UNIT(S): at 12:58

## 2019-10-24 RX ADMIN — Medication 12.5 MILLIGRAM(S): at 17:18

## 2019-10-24 RX ADMIN — Medication 3 MILLILITER(S): at 19:45

## 2019-10-24 RX ADMIN — CEFEPIME 100 MILLIGRAM(S): 1 INJECTION, POWDER, FOR SOLUTION INTRAMUSCULAR; INTRAVENOUS at 14:25

## 2019-10-24 RX ADMIN — Medication 3 MILLILITER(S): at 00:18

## 2019-10-24 RX ADMIN — TIOTROPIUM BROMIDE 1 CAPSULE(S): 18 CAPSULE ORAL; RESPIRATORY (INHALATION) at 08:05

## 2019-10-24 NOTE — PROGRESS NOTE ADULT - SUBJECTIVE AND OBJECTIVE BOX
Patient is a 89y old  Male who presents with a chief complaint of Acute on chronic hypoxic respiratory failure (24 Oct 2019 07:49)      T(F): 95 (10-24-19 @ 07:01), Max: 97.1 (10-23-19 @ 16:00)  HR: 66 (10-24-19 @ 07:03)  BP: 132/60 (10-24-19 @ 07:03)  RR: 27 (10-24-19 @ 07:03)  SpO2: 96% (10-24-19 @ 07:03) (96% - 98%)    PHYSICAL EXAM:  GENERAL: NAD, well-groomed, well-developed  HEAD:  Atraumatic, Normocephalic  EYES: EOMI, PERRLA, conjunctiva and sclera clear  ENMT: No tonsillar erythema, exudates, or enlargement; Moist mucous membranes, Good dentition, No lesions  NECK: Supple, No JVD, Normal thyroid  NERVOUS SYSTEM:  Alert & Oriented X3,  Motor Strength 5/5 B/L upper and lower extremities  CHEST/LUNG: Clear to percussion bilaterally; No rales, rhonchi, wheezing, or rubs Decreased bs at bases  HEART: Regular rate and rhythm; No murmurs, rubs, or gallops  ABDOMEN: Soft, Nontender, Nondistended; Bowel sounds present  EXTREMITIES:   No clubbing, cyanosis, or edema  LYMPH: No lymphadenopathy noted  SKIN: No rashes or lesions    labs  10-24    141  |  97<L>  |  77<HH>  ----------------------------<  160<H>  4.7   |  28  |  1.7<H>    Ca    9.8      24 Oct 2019 05:31  Mg     2.5     10-24    TPro  7.6  /  Alb  3.6  /  TBili  0.6  /  DBili  x   /  AST  18  /  ALT  14  /  AlkPhos  103  10-24                          9.0    18.87 )-----------( 130      ( 24 Oct 2019 05:31 )             31.1       Culture - Acid Fast - Sputum w/Smear (collected 22 Oct 2019 13:58)  Source: .Sputum Sputum    Culture - Urine (collected 22 Oct 2019 10:47)  Source: .Urine Clean Catch (Midstream)  Final Report (23 Oct 2019 19:13):    <10,000 CFU/mL Normal Urogenital Renetta    Culture - Blood (collected 22 Oct 2019 09:29)  Source: .Blood Blood  Preliminary Report (23 Oct 2019 18:01):    No growth to date.    Culture - Blood (collected 22 Oct 2019 09:28)  Source: .Blood Blood  Preliminary Report (23 Oct 2019 18:01):    No growth to date.      PT/INR - ( 23 Oct 2019 11:21 )   PT: >40.00 sec;   INR: 4.48 ratio         PTT - ( 23 Oct 2019 11:21 )  PTT:43.5 sec    Troponin T, Serum: 0.14 ng/mL <HH> (10-24-19 @ 05:31)  Troponin T, Serum: 0.14 ng/mL <HH> (10-23-19 @ 11:21)      acetaminophen  Suppository .. 650 milliGRAM(s) Rectal once  albuterol/ipratropium for Nebulization 3 milliLiter(s) Nebulizer every 6 hours  atorvastatin 10 milliGRAM(s) Oral at bedtime  azithromycin  IVPB 500 milliGRAM(s) IV Intermittent every 24 hours  cefepime   IVPB 1000 milliGRAM(s) IV Intermittent every 24 hours  cholecalciferol Oral Tab/Cap - Peds 1000 Unit(s) Oral daily  levothyroxine 50 MICROGram(s) Oral daily  metoprolol tartrate 12.5 milliGRAM(s) Oral two times a day  pantoprazole    Tablet 40 milliGRAM(s) Oral before breakfast  tiotropium 18 MICROgram(s) Capsule 1 Capsule(s) Inhalation daily

## 2019-10-24 NOTE — PROGRESS NOTE ADULT - ASSESSMENT
impressioin:    acute on chronic hypoxemic respiratory failure  Cant/ r/o PNA grame -ve(hospitalization in july/fever/WBC)  Severe Sepsis  Syncope  afib on coumadin     PLAN:    CNS: Avoid sedation    HEENT:  Oral care    PULMONARY:  HOB @ 45 degrees,  keep on oxygen keep pox > 92% bipap as needed       CARDIOVASCULAR: follow cardiology   echo   repeat BNP    keep IS<OS   need lasix need to to keep negative balance cxr increase b/l effusion   BNP > 04848 follow cardiology     GI: GI prophylaxis                                          Feeding low sodium    RENAL:  F/u  lytes.  Correct as needed. accurate I/O  renal consult     INFECTIOUS DISEASE:  on cefepime/azithro, follow  culture    HEMATOLOGICAL:  DVT prophylaxis. follow INR still elevated   ENDOCRINE:  Follow up FS.  Insulin protocol if needed.    CODE STATUS: FULL CODE    DISPOSITION:

## 2019-10-24 NOTE — PROGRESS NOTE ADULT - SUBJECTIVE AND OBJECTIVE BOX
SUBJECTIVE:    Patient is a 89y old Male who presents with a chief complaint of Acute on chronic hypoxic respiratory failure (24 Oct 2019 08:50)    Currently admitted to medicine with the primary diagnosis of Pneumonia due to infectious organism, unspecified laterality, unspecified part of lung     Today is hospital day 2d. This morning he is resting comfortably in bed and reports no new issues or overnight events.     PAST MEDICAL & SURGICAL HISTORY  Hyperlipidemia  CHF (congestive heart failure)  Afib  Hypertension  History of hip surgery  S/P AAA (abdominal aortic aneurysm) repair    SOCIAL HISTORY:  Negative for smoking/alcohol/drug use.     ALLERGIES:  No Known Allergies    MEDICATIONS:  STANDING MEDICATIONS  acetaminophen  Suppository .. 650 milliGRAM(s) Rectal once  albuterol/ipratropium for Nebulization 3 milliLiter(s) Nebulizer every 6 hours  atorvastatin 10 milliGRAM(s) Oral at bedtime  azithromycin  IVPB 500 milliGRAM(s) IV Intermittent every 24 hours  cefepime   IVPB 1000 milliGRAM(s) IV Intermittent every 24 hours  cholecalciferol Oral Tab/Cap - Peds 1000 Unit(s) Oral daily  levothyroxine 50 MICROGram(s) Oral daily  metoprolol tartrate 12.5 milliGRAM(s) Oral two times a day  pantoprazole    Tablet 40 milliGRAM(s) Oral before breakfast  tiotropium 18 MICROgram(s) Capsule 1 Capsule(s) Inhalation daily    PRN MEDICATIONS    VITALS:   T(F): 95  HR: 66  BP: 132/60  RR: 27  SpO2: 96%    LABS:                        9.0    18.87 )-----------( 130      ( 24 Oct 2019 05:31 )             31.1     10-    141  |  97<L>  |  77<HH>  ----------------------------<  160<H>  4.7   |  28  |  1.7<H>    Ca    9.8      24 Oct 2019 05:31  Mg     2.5     10-    TPro  7.6  /  Alb  3.6  /  TBili  0.6  /  DBili  x   /  AST  18  /  ALT  14  /  AlkPhos  103  10-    PT/INR - ( 23 Oct 2019 11:21 )   PT: >40.00 sec;   INR: 4.48 ratio         PTT - ( 23 Oct 2019 11:21 )  PTT:43.5 sec  Urinalysis Basic - ( 22 Oct 2019 10:47 )    Color: Yellow / Appearance: Clear / S.010 / pH: x  Gluc: x / Ketone: Negative  / Bili: Negative / Urobili: 0.2 mg/dL   Blood: x / Protein: 30 mg/dL / Nitrite: Negative   Leuk Esterase: Small / RBC: x / WBC 10-25 /HPF   Sq Epi: x / Non Sq Epi: Occasional /HPF / Bacteria: Few        Troponin T, Serum: 0.14 ng/mL <HH> (10-24-19 @ 05:31)  Troponin T, Serum: 0.14 ng/mL <HH> (10-23-19 @ 11:21)      Culture - Acid Fast - Sputum w/Smear (collected 22 Oct 2019 13:58)  Source: .Sputum Sputum    Culture - Urine (collected 22 Oct 2019 10:47)  Source: .Urine Clean Catch (Midstream)  Final Report (23 Oct 2019 19:13):    <10,000 CFU/mL Normal Urogenital Renetta    Culture - Blood (collected 22 Oct 2019 09:29)  Source: .Blood Blood  Preliminary Report (23 Oct 2019 18:01):    No growth to date.    Culture - Blood (collected 22 Oct 2019 09:28)  Source: .Blood Blood  Preliminary Report (23 Oct 2019 18:01):    No growth to date.      CARDIAC MARKERS ( 24 Oct 2019 05:31 )  x     / 0.14 ng/mL / x     / x     / x      CARDIAC MARKERS ( 23 Oct 2019 11:21 )  x     / 0.14 ng/mL / x     / x     / x      CARDIAC MARKERS ( 23 Oct 2019 05:33 )  x     / 0.19 ng/mL / x     / x     / x      CARDIAC MARKERS ( 22 Oct 2019 21:55 )  x     / 0.20 ng/mL / x     / x     / x      CARDIAC MARKERS ( 22 Oct 2019 15:51 )  x     / 0.20 ng/mL / x     / x     / x          RADIOLOGY:    PHYSICAL EXAM:  GEN: No acute distress  LUNGS: Clear to auscultation bilaterally   HEART: S1/S2 present. RRR.   ABD: Soft, non-tender, non-distended. Bowel sounds present  EXT: NC/NC/NE/2+PP/LANE  NEURO: [id number]

## 2019-10-24 NOTE — PROGRESS NOTE ADULT - ASSESSMENT
Note wbc increasing . Renal function worse, Echo mod severe mr. Effusions. But not clear if chf.  Hold lasix. Medical rx. Prognosis is poor.

## 2019-10-24 NOTE — PROGRESS NOTE ADULT - ASSESSMENT
#) Acute on chronic hypoxemic respiratory failure 2/2 sepsis 2/2 suspected GNR bilateral PNA and CHF Exacerbation  -resolved, patient states he feels better  -on home oxygen (2L), no history of COPD, nonsmoker  -on admission, T-101.5, WBC-13.99, HR-105, RR-32, lactate- 2.7, CXR- bibasilar opacities/ effusion  -last 2D (10/22/2019)-normal EF, mod. TR/ MR, sev pulm HTN  -BIPAP 4hrs on/off and at bedtime- patient noncompliant w/ bipap  -titrate O2>92%  -d/c'd IV steroids 10/23  -IV abx (azithromycin and cefepime)- started 10/22  -duonebs PRN  -BCx/ UCx (10/22)- negative, sputum cx pending  -strict I's and O's, fluid restriction 1L/day, DASH diet  -venous duplex- negative    #) Chronic atrial fibrillation rate uncontrolled w/ NSVT  -holding coumadin, will start Hep Drip when INR<1.5  -BP borderline low, on metoprolol 50mg daily at home, will do metoprolol 12.5 q12h to prevent rebound tachy  -will hold other BP lowering drugs  -cardio c/s (Dr. Flores)- Possible afib with aberrancy. Chf improving. Medical rx. Echo. Coumadin. Prognosis guarded    #) Anemia of chronic disease    #) CKD stage 3  - monitor renal function    #) HLD  - statin    #) Hypothyroidism  -synthroid (50mcg)    #) DVT/ GI ppx  -Hep Drip when INR<1.5, protonix    #) Dispo  -from home, at baseline AOx4, ADL's independently  -uses wheelchair/ walker after femur fx surgery on 06/2019  -Physiatry- home w/ OP/ VN services

## 2019-10-24 NOTE — PROGRESS NOTE ADULT - SUBJECTIVE AND OBJECTIVE BOX
Patient is a 89y old  Male who presents with a chief complaint of Acute on chronic hypoxic respiratory failure (23 Oct 2019 18:46)      Over Night Events:  Patient seen and examined feel good no pressors not on distress       ROS:  See HPI    PHYSICAL EXAM    ICU Vital Signs Last 24 Hrs  T(C): 35 (24 Oct 2019 07:01), Max: 36.2 (23 Oct 2019 16:00)  T(F): 95 (24 Oct 2019 07:01), Max: 97.1 (23 Oct 2019 16:00)  HR: 69 (24 Oct 2019 05:33) (65 - 77)  BP: 123/61 (24 Oct 2019 05:33) (99/53 - 141/67)  BP(mean): 86 (24 Oct 2019 05:33) (71 - 94)  ABP: --  ABP(mean): --  RR: 20 (24 Oct 2019 07:01) (18 - 35)  SpO2: 96% (24 Oct 2019 05:33) (96% - 98%)      General: Awake follow command   HEENT:  jillian              Lymph Nodes: NO cervical LN   Lungs: Bilateral mild crackles   Cardiovascular: Regular   Abdomen: Soft, Positive BS  Extremities: No clubbing   Skin: warm   Neurological: no focal deficit   Musculoskeletal: move all ext     I&O's Detail    23 Oct 2019 07:01  -  24 Oct 2019 07:00  --------------------------------------------------------  IN:    Oral Fluid: 430 mL    Solution: 250 mL    Solution: 50 mL  Total IN: 730 mL    OUT:    Voided: 675 mL  Total OUT: 675 mL    Total NET: 55 mL          LABS:                          8.9    16.26 )-----------( 128      ( 23 Oct 2019 05:33 )             31.1         24 Oct 2019 05:31    141    |  97     |  77     ----------------------------<  160    4.7     |  28     |  1.7      Ca    9.8        24 Oct 2019 05:31  Mg     2.5       24 Oct 2019 05:31    TPro  7.6    /  Alb  3.6    /  TBili  0.6    /  DBili  x      /  AST  18     /  ALT  14     /  AlkPhos  103    24 Oct 2019 05:31  Amylase x     lipase x                                                 PT/INR - ( 23 Oct 2019 11:21 )   PT: >40.00 sec;   INR: 4.48 ratio         PTT - ( 23 Oct 2019 11:21 )  PTT:43.5 sec                                       Urinalysis Basic - ( 22 Oct 2019 10:47 )    Color: Yellow / Appearance: Clear / S.010 / pH: x  Gluc: x / Ketone: Negative  / Bili: Negative / Urobili: 0.2 mg/dL   Blood: x / Protein: 30 mg/dL / Nitrite: Negative   Leuk Esterase: Small / RBC: x / WBC 10-25 /HPF   Sq Epi: x / Non Sq Epi: Occasional /HPF / Bacteria: Few        Lactate, Blood: 2.2 mmol/L (10-22-19 @ 10:47)  Lactate, Blood: 2.7 mmol/L (10-22-19 @ 09:27)    CARDIAC MARKERS ( 24 Oct 2019 05:31 )  x     / 0.14 ng/mL / x     / x     / x      CARDIAC MARKERS ( 23 Oct 2019 11:21 )  x     / 0.14 ng/mL / x     / x     / x      CARDIAC MARKERS ( 23 Oct 2019 05:33 )  x     / 0.19 ng/mL / x     / x     / x      CARDIAC MARKERS ( 22 Oct 2019 21:55 )  x     / 0.20 ng/mL / x     / x     / x      CARDIAC MARKERS ( 22 Oct 2019 15:51 )  x     / 0.20 ng/mL / x     / x     / x      CARDIAC MARKERS ( 22 Oct 2019 09:27 )  x     / 0.03 ng/mL / x     / x     / x                                                            Culture - Acid Fast - Sputum w/Smear (collected 22 Oct 2019 13:58)  Source: .Sputum Sputum    Culture - Urine (collected 22 Oct 2019 10:47)  Source: .Urine Clean Catch (Midstream)  Final Report (23 Oct 2019 19:13):    <10,000 CFU/mL Normal Urogenital Renetta    Culture - Blood (collected 22 Oct 2019 09:29)  Source: .Blood Blood  Preliminary Report (23 Oct 2019 18:01):    No growth to date.    Culture - Blood (collected 22 Oct 2019 09:28)  Source: .Blood Blood  Preliminary Report (23 Oct 2019 18:01):    No growth to date.                                                                                           MEDICATIONS  (STANDING):  acetaminophen  Suppository .. 650 milliGRAM(s) Rectal once  albuterol/ipratropium for Nebulization 3 milliLiter(s) Nebulizer every 6 hours  atorvastatin 10 milliGRAM(s) Oral at bedtime  azithromycin  IVPB 500 milliGRAM(s) IV Intermittent every 24 hours  cefepime   IVPB 1000 milliGRAM(s) IV Intermittent every 24 hours  cholecalciferol Oral Tab/Cap - Peds 1000 Unit(s) Oral daily  levothyroxine 50 MICROGram(s) Oral daily  metoprolol tartrate 12.5 milliGRAM(s) Oral two times a day  pantoprazole    Tablet 40 milliGRAM(s) Oral before breakfast  tiotropium 18 MICROgram(s) Capsule 1 Capsule(s) Inhalation daily    MEDICATIONS  (PRN):          Xrays:  TLC:  OG:  ET tube:                                                                                    worsening b/l effusion    ECHO:  [facility] ICU:

## 2019-10-25 LAB
ALBUMIN SERPL ELPH-MCNC: 3.4 G/DL — LOW (ref 3.5–5.2)
ALP SERPL-CCNC: 101 U/L — SIGNIFICANT CHANGE UP (ref 30–115)
ALT FLD-CCNC: 19 U/L — SIGNIFICANT CHANGE UP (ref 0–41)
ANION GAP SERPL CALC-SCNC: 11 MMOL/L — SIGNIFICANT CHANGE UP (ref 7–14)
AST SERPL-CCNC: 20 U/L — SIGNIFICANT CHANGE UP (ref 0–41)
BILIRUB SERPL-MCNC: 0.6 MG/DL — SIGNIFICANT CHANGE UP (ref 0.2–1.2)
BUN SERPL-MCNC: 85 MG/DL — CRITICAL HIGH (ref 10–20)
CALCIUM SERPL-MCNC: 9.2 MG/DL — SIGNIFICANT CHANGE UP (ref 8.5–10.1)
CHLORIDE SERPL-SCNC: 98 MMOL/L — SIGNIFICANT CHANGE UP (ref 98–110)
CO2 SERPL-SCNC: 30 MMOL/L — SIGNIFICANT CHANGE UP (ref 17–32)
CREAT SERPL-MCNC: 1.6 MG/DL — HIGH (ref 0.7–1.5)
ERYTHROCYTE [SEDIMENTATION RATE] IN BLOOD: 32 MM/HR — HIGH (ref 0–10)
GLUCOSE SERPL-MCNC: 83 MG/DL — SIGNIFICANT CHANGE UP (ref 70–99)
HCT VFR BLD CALC: 31.1 % — LOW (ref 42–52)
HGB BLD-MCNC: 8.8 G/DL — LOW (ref 14–18)
INR BLD: 4.88 RATIO — HIGH (ref 0.65–1.3)
MAGNESIUM SERPL-MCNC: 2.4 MG/DL — SIGNIFICANT CHANGE UP (ref 1.8–2.4)
MCHC RBC-ENTMCNC: 18.6 PG — LOW (ref 27–31)
MCHC RBC-ENTMCNC: 28.3 G/DL — LOW (ref 32–37)
MCV RBC AUTO: 65.8 FL — LOW (ref 80–94)
NRBC # BLD: 0 /100 WBCS — SIGNIFICANT CHANGE UP (ref 0–0)
NT-PROBNP SERPL-SCNC: HIGH PG/ML (ref 0–300)
PLATELET # BLD AUTO: 117 K/UL — LOW (ref 130–400)
POTASSIUM SERPL-MCNC: 5.1 MMOL/L — HIGH (ref 3.5–5)
POTASSIUM SERPL-SCNC: 5.1 MMOL/L — HIGH (ref 3.5–5)
PROT SERPL-MCNC: 6.9 G/DL — SIGNIFICANT CHANGE UP (ref 6–8)
PROTHROM AB SERPL-ACNC: >40 SEC — HIGH (ref 9.95–12.87)
RBC # BLD: 4.73 M/UL — SIGNIFICANT CHANGE UP (ref 4.7–6.1)
RBC # FLD: 19.4 % — HIGH (ref 11.5–14.5)
SODIUM SERPL-SCNC: 139 MMOL/L — SIGNIFICANT CHANGE UP (ref 135–146)
WBC # BLD: 9.71 K/UL — SIGNIFICANT CHANGE UP (ref 4.8–10.8)
WBC # FLD AUTO: 9.71 K/UL — SIGNIFICANT CHANGE UP (ref 4.8–10.8)

## 2019-10-25 PROCEDURE — 71250 CT THORAX DX C-: CPT | Mod: 26

## 2019-10-25 PROCEDURE — 71045 X-RAY EXAM CHEST 1 VIEW: CPT | Mod: 26

## 2019-10-25 PROCEDURE — 71046 X-RAY EXAM CHEST 2 VIEWS: CPT | Mod: 26

## 2019-10-25 PROCEDURE — 99233 SBSQ HOSP IP/OBS HIGH 50: CPT

## 2019-10-25 RX ORDER — FUROSEMIDE 40 MG
20 TABLET ORAL ONCE
Refills: 0 | Status: COMPLETED | OUTPATIENT
Start: 2019-10-25 | End: 2019-10-25

## 2019-10-25 RX ADMIN — Medication 3 MILLILITER(S): at 19:53

## 2019-10-25 RX ADMIN — Medication 20 MILLIGRAM(S): at 20:15

## 2019-10-25 RX ADMIN — PANTOPRAZOLE SODIUM 40 MILLIGRAM(S): 20 TABLET, DELAYED RELEASE ORAL at 06:14

## 2019-10-25 RX ADMIN — TIOTROPIUM BROMIDE 1 CAPSULE(S): 18 CAPSULE ORAL; RESPIRATORY (INHALATION) at 07:50

## 2019-10-25 RX ADMIN — Medication 12.5 MILLIGRAM(S): at 18:45

## 2019-10-25 RX ADMIN — Medication 3 MILLILITER(S): at 01:19

## 2019-10-25 RX ADMIN — Medication 1000 UNIT(S): at 12:26

## 2019-10-25 RX ADMIN — Medication 50 MICROGRAM(S): at 05:20

## 2019-10-25 RX ADMIN — Medication 3 MILLILITER(S): at 08:44

## 2019-10-25 RX ADMIN — ATORVASTATIN CALCIUM 10 MILLIGRAM(S): 80 TABLET, FILM COATED ORAL at 21:54

## 2019-10-25 RX ADMIN — CEFEPIME 100 MILLIGRAM(S): 1 INJECTION, POWDER, FOR SOLUTION INTRAMUSCULAR; INTRAVENOUS at 15:13

## 2019-10-25 RX ADMIN — Medication 3 MILLILITER(S): at 13:53

## 2019-10-25 NOTE — PROGRESS NOTE ADULT - ASSESSMENT
#) Acute on chronic hypoxemic respiratory failure 2/2 sepsis 2/2 suspected GNR bilateral PNA and CHF Exacerbation  -patient is sitting in bed, comfortable  -on home oxygen (2L), no history of COPD, nonsmoker  -on admission, T-101.5, WBC-13.99, HR-105, RR-32, lactate- 2.7, CXR- bibasilar opacities/ effusion  -last 2D (10/22/2019)-normal EF, mod. TR/ MR, sev pulm HTN  -BIPAP 4hrs on/off and at bedtime- patient noncompliant w/ bipap  -titrate O2>92%  -d/c'd IV steroids 10/23  -was on IV abx azithromycin and cefepime- started 10/22, d/c'd azithromycin and started levaquin 10/25  -duonebs PRN  -BCx/ UCx (10/23)- negative, sputum cx pending  -strict I's and O's, fluid restriction 1L/day, DASH diet  -venous duplex- negative  -nephro c/s placed to assess fluid status    #) Elevated INR?  -patient has been off coumadin since admission, INR has been steadily increasing  -wife states he has a lot of vegetables at home that he is now not getting  -will monitor INR for now  -vitamin k pending    #) Chronic atrial fibrillation rate   -w/ an episode of NSVT   -holding coumadin, will start Hep Drip when INR<1.5  -on metoprolol 12.5 q12h  -will hold other BP lowering drugs  -cardio (Dr. Flores)- Possible afib with aberrancy. Chf improving. Medical rx. Echo. Coumadin. Prognosis guarded    #) Anemia of chronic disease    #) CKD stage 3  -Cr baseline around 1.5  - monitor renal function    #) HLD  - statin    #) Hypothyroidism  -synthroid (50mcg)    #) DVT/ GI ppx  -Hep Drip when INR<1.5, protonix    #) Dispo  -from home, at baseline AOx4, ADL's independently  -uses wheelchair/ walker after femur fx surgery on 06/2019  -Physiatry- home w/ OP/ VN services

## 2019-10-25 NOTE — PROGRESS NOTE ADULT - SUBJECTIVE AND OBJECTIVE BOX
SUBJECTIVE:    Patient is a 89y old Male who presents with a chief complaint of Acute on chronic hypoxic respiratory failure (25 Oct 2019 08:27)    Currently admitted to medicine with the primary diagnosis of Pneumonia due to infectious organism, unspecified laterality, unspecified part of lung     Today is hospital day 3d. This morning he is resting comfortably in bed and reports no new issues or overnight events.     PAST MEDICAL & SURGICAL HISTORY  Hyperlipidemia  CHF (congestive heart failure)  Afib  Hypertension  History of hip surgery  S/P AAA (abdominal aortic aneurysm) repair    SOCIAL HISTORY:  Negative for smoking/alcohol/drug use.     ALLERGIES:  No Known Allergies    MEDICATIONS:  STANDING MEDICATIONS  acetaminophen  Suppository .. 650 milliGRAM(s) Rectal once  albuterol/ipratropium for Nebulization 3 milliLiter(s) Nebulizer every 6 hours  atorvastatin 10 milliGRAM(s) Oral at bedtime  azithromycin  IVPB 500 milliGRAM(s) IV Intermittent every 24 hours  cefepime   IVPB 1000 milliGRAM(s) IV Intermittent every 24 hours  cholecalciferol Oral Tab/Cap - Peds 1000 Unit(s) Oral daily  levothyroxine 50 MICROGram(s) Oral daily  metoprolol tartrate 12.5 milliGRAM(s) Oral two times a day  pantoprazole    Tablet 40 milliGRAM(s) Oral before breakfast  tiotropium 18 MICROgram(s) Capsule 1 Capsule(s) Inhalation daily    PRN MEDICATIONS    VITALS:   T(F): 95.3  HR: 62  BP: 107/59  RR: 18  SpO2: 97%    LABS:                        8.8    9.71  )-----------( 117      ( 25 Oct 2019 05:17 )             31.1     10-25    139  |  98  |  85<HH>  ----------------------------<  83  5.1<H>   |  30  |  1.6<H>    Ca    9.2      25 Oct 2019 05:17  Mg     2.4     10-25    TPro  6.9  /  Alb  3.4<L>  /  TBili  0.6  /  DBili  x   /  AST  20  /  ALT  19  /  AlkPhos  101  10-25    PT/INR - ( 25 Oct 2019 05:17 )   PT: >40.00 sec;   INR: 4.88 ratio         PTT - ( 23 Oct 2019 11:21 )  PTT:43.5 sec          Culture - Blood (collected 23 Oct 2019 05:33)  Source: .Blood None  Preliminary Report (24 Oct 2019 17:01):    No growth to date.    Culture - Blood (collected 23 Oct 2019 05:33)  Source: .Blood None  Preliminary Report (24 Oct 2019 17:01):    No growth to date.    Culture - Acid Fast - Sputum w/Smear (collected 22 Oct 2019 13:58)  Source: .Sputum Sputum    Culture - Urine (collected 22 Oct 2019 10:47)  Source: .Urine Clean Catch (Midstream)  Final Report (23 Oct 2019 19:13):    <10,000 CFU/mL Normal Urogenital Renetta      CARDIAC MARKERS ( 24 Oct 2019 05:31 )  x     / 0.14 ng/mL / x     / x     / x      CARDIAC MARKERS ( 23 Oct 2019 11:21 )  x     / 0.14 ng/mL / x     / x     / x          RADIOLOGY:    PHYSICAL EXAM:  GEN: No acute distress  LUNGS: Clear to auscultation bilaterally   HEART: S1/S2 present. RRR.   ABD: Soft, non-tender, non-distended. Bowel sounds present  EXT: NC/NC/NE/2+PP/LANE  NEURO: AAOX3

## 2019-10-25 NOTE — PROGRESS NOTE ADULT - ATTENDING COMMENTS
Patient seen and evaluated independently medical resident note reviewed, I agree with plan and management, except as I have noted. check ct chest results    #Progress Note Handoff  Pending (specify):  Consults_________, Tests____ct chest____, Test Results_______, Other_________  Family discussion:  Disposition: Home___/SNF___/Other________/Unknown at this time__x______

## 2019-10-25 NOTE — PROGRESS NOTE ADULT - SUBJECTIVE AND OBJECTIVE BOX
Patient is a 89y old  Male who presents with a chief complaint of Acute on chronic hypoxic respiratory failure (24 Oct 2019 09:45)      Over Night Events:  Patient seen and examined no fever, on NC       ROS:  See HPI    PHYSICAL EXAM    ICU Vital Signs Last 24 Hrs  T(C): 35.2 (25 Oct 2019 07:10), Max: 37 (24 Oct 2019 15:00)  T(F): 95.3 (25 Oct 2019 07:10), Max: 98.6 (24 Oct 2019 15:00)  HR: 62 (25 Oct 2019 07:11) (62 - 78)  BP: 107/59 (25 Oct 2019 07:11) (98/52 - 145/74)  BP(mean): 80 (25 Oct 2019 07:11) (69 - 101)  ABP: --  ABP(mean): --  RR: 18 (25 Oct 2019 07:11) (18 - 35)  SpO2: 97% (25 Oct 2019 07:11) (90% - 100%)      General: Awake   HEENT:          jillian      Lymph Nodes: NO cervical LN   Lungs: Bilateral crackles   Cardiovascular: Regular   Abdomen: Soft, Positive BS  Extremities: No clubbing   Skin: warm   Neurological: no focal deficit   Musculoskeletal: move all ext     I&O's Detail    24 Oct 2019 07:01  -  25 Oct 2019 07:00  --------------------------------------------------------  IN:    Oral Fluid: 550 mL    Solution: 250 mL    Solution: 50 mL  Total IN: 850 mL    OUT:    Voided: 750 mL  Total OUT: 750 mL    Total NET: 100 mL      25 Oct 2019 07:01  -  25 Oct 2019 08:28  --------------------------------------------------------  IN:  Total IN: 0 mL    OUT:    Voided: 350 mL  Total OUT: 350 mL    Total NET: -350 mL          LABS:                          8.8    9.71  )-----------( 117      ( 25 Oct 2019 05:17 )             31.1         25 Oct 2019 05:17    139    |  98     |  85     ----------------------------<  83     5.1     |  30     |  1.6      Ca    9.2        25 Oct 2019 05:17  Mg     2.4       25 Oct 2019 05:17    TPro  6.9    /  Alb  3.4    /  TBili  0.6    /  DBili  x      /  AST  20     /  ALT  19     /  AlkPhos  101    25 Oct 2019 05:17  Amylase x     lipase x                                                 PT/INR - ( 25 Oct 2019 05:17 )   PT: >40.00 sec;   INR: 4.88 ratio         PTT - ( 23 Oct 2019 11:21 )  PTT:43.5 sec                                           Lactate, Blood: 2.2 mmol/L (10-22-19 @ 10:47)  Lactate, Blood: 2.7 mmol/L (10-22-19 @ 09:27)    CARDIAC MARKERS ( 24 Oct 2019 05:31 )  x     / 0.14 ng/mL / x     / x     / x      CARDIAC MARKERS ( 23 Oct 2019 11:21 )  x     / 0.14 ng/mL / x     / x     / x                                                            Culture - Blood (collected 23 Oct 2019 05:33)  Source: .Blood None  Preliminary Report (24 Oct 2019 17:01):    No growth to date.    Culture - Blood (collected 23 Oct 2019 05:33)  Source: .Blood None  Preliminary Report (24 Oct 2019 17:01):    No growth to date.    Culture - Acid Fast - Sputum w/Smear (collected 22 Oct 2019 13:58)  Source: .Sputum Sputum    Culture - Urine (collected 22 Oct 2019 10:47)  Source: .Urine Clean Catch (Midstream)  Final Report (23 Oct 2019 19:13):    <10,000 CFU/mL Normal Urogenital Renetta    Culture - Blood (collected 22 Oct 2019 09:29)  Source: .Blood Blood  Preliminary Report (23 Oct 2019 18:01):    No growth to date.    Culture - Blood (collected 22 Oct 2019 09:28)  Source: .Blood Blood  Preliminary Report (23 Oct 2019 18:01):    No growth to date.                                                                                           MEDICATIONS  (STANDING):  acetaminophen  Suppository .. 650 milliGRAM(s) Rectal once  albuterol/ipratropium for Nebulization 3 milliLiter(s) Nebulizer every 6 hours  atorvastatin 10 milliGRAM(s) Oral at bedtime  azithromycin  IVPB 500 milliGRAM(s) IV Intermittent every 24 hours  cefepime   IVPB 1000 milliGRAM(s) IV Intermittent every 24 hours  cholecalciferol Oral Tab/Cap - Peds 1000 Unit(s) Oral daily  levothyroxine 50 MICROGram(s) Oral daily  metoprolol tartrate 12.5 milliGRAM(s) Oral two times a day  pantoprazole    Tablet 40 milliGRAM(s) Oral before breakfast  tiotropium 18 MICROgram(s) Capsule 1 Capsule(s) Inhalation daily    MEDICATIONS  (PRN):          Xrays:  TLC:  OG:  ET tube:                                                                                    b/l effusion /opacity    ECHO:  [facility]:

## 2019-10-26 LAB
ALBUMIN SERPL ELPH-MCNC: 3.5 G/DL — SIGNIFICANT CHANGE UP (ref 3.5–5.2)
ALP SERPL-CCNC: 112 U/L — SIGNIFICANT CHANGE UP (ref 30–115)
ALT FLD-CCNC: 20 U/L — SIGNIFICANT CHANGE UP (ref 0–41)
ANION GAP SERPL CALC-SCNC: 14 MMOL/L — SIGNIFICANT CHANGE UP (ref 7–14)
AST SERPL-CCNC: 17 U/L — SIGNIFICANT CHANGE UP (ref 0–41)
BILIRUB SERPL-MCNC: 0.6 MG/DL — SIGNIFICANT CHANGE UP (ref 0.2–1.2)
BUN SERPL-MCNC: 95 MG/DL — CRITICAL HIGH (ref 10–20)
CALCIUM SERPL-MCNC: 9.4 MG/DL — SIGNIFICANT CHANGE UP (ref 8.5–10.1)
CHLORIDE SERPL-SCNC: 99 MMOL/L — SIGNIFICANT CHANGE UP (ref 98–110)
CO2 SERPL-SCNC: 27 MMOL/L — SIGNIFICANT CHANGE UP (ref 17–32)
CREAT SERPL-MCNC: 1.7 MG/DL — HIGH (ref 0.7–1.5)
CRP SERPL-MCNC: 5.45 MG/DL — HIGH (ref 0–0.4)
GLUCOSE SERPL-MCNC: 128 MG/DL — HIGH (ref 70–99)
HCT VFR BLD CALC: 28.8 % — LOW (ref 42–52)
HGB BLD-MCNC: 8.1 G/DL — LOW (ref 14–18)
MAGNESIUM SERPL-MCNC: 2.1 MG/DL — SIGNIFICANT CHANGE UP (ref 1.8–2.4)
MCHC RBC-ENTMCNC: 18.6 PG — LOW (ref 27–31)
MCHC RBC-ENTMCNC: 28.1 G/DL — LOW (ref 32–37)
MCV RBC AUTO: 66.1 FL — LOW (ref 80–94)
NRBC # BLD: 0 /100 WBCS — SIGNIFICANT CHANGE UP (ref 0–0)
PLATELET # BLD AUTO: 109 K/UL — LOW (ref 130–400)
POTASSIUM SERPL-MCNC: 5.4 MMOL/L — HIGH (ref 3.5–5)
POTASSIUM SERPL-SCNC: 5.4 MMOL/L — HIGH (ref 3.5–5)
PROT SERPL-MCNC: 6.8 G/DL — SIGNIFICANT CHANGE UP (ref 6–8)
RBC # BLD: 4.36 M/UL — LOW (ref 4.7–6.1)
RBC # FLD: 19 % — HIGH (ref 11.5–14.5)
SODIUM SERPL-SCNC: 140 MMOL/L — SIGNIFICANT CHANGE UP (ref 135–146)
WBC # BLD: 6.95 K/UL — SIGNIFICANT CHANGE UP (ref 4.8–10.8)
WBC # FLD AUTO: 6.95 K/UL — SIGNIFICANT CHANGE UP (ref 4.8–10.8)

## 2019-10-26 PROCEDURE — 71045 X-RAY EXAM CHEST 1 VIEW: CPT | Mod: 26

## 2019-10-26 PROCEDURE — 99233 SBSQ HOSP IP/OBS HIGH 50: CPT

## 2019-10-26 PROCEDURE — 99232 SBSQ HOSP IP/OBS MODERATE 35: CPT

## 2019-10-26 RX ORDER — SODIUM POLYSTYRENE SULFONATE 4.1 MEQ/G
15 POWDER, FOR SUSPENSION ORAL
Refills: 0 | Status: COMPLETED | OUTPATIENT
Start: 2019-10-26 | End: 2019-10-26

## 2019-10-26 RX ADMIN — SODIUM POLYSTYRENE SULFONATE 15 GRAM(S): 4.1 POWDER, FOR SUSPENSION ORAL at 11:36

## 2019-10-26 RX ADMIN — Medication 12.5 MILLIGRAM(S): at 05:47

## 2019-10-26 RX ADMIN — Medication 50 MICROGRAM(S): at 05:47

## 2019-10-26 RX ADMIN — Medication 12.5 MILLIGRAM(S): at 17:32

## 2019-10-26 RX ADMIN — ATORVASTATIN CALCIUM 10 MILLIGRAM(S): 80 TABLET, FILM COATED ORAL at 21:10

## 2019-10-26 RX ADMIN — Medication 3 MILLILITER(S): at 01:41

## 2019-10-26 RX ADMIN — TIOTROPIUM BROMIDE 1 CAPSULE(S): 18 CAPSULE ORAL; RESPIRATORY (INHALATION) at 08:07

## 2019-10-26 RX ADMIN — PANTOPRAZOLE SODIUM 40 MILLIGRAM(S): 20 TABLET, DELAYED RELEASE ORAL at 06:00

## 2019-10-26 RX ADMIN — Medication 1000 UNIT(S): at 11:36

## 2019-10-26 RX ADMIN — Medication 3 MILLILITER(S): at 20:30

## 2019-10-26 RX ADMIN — SODIUM POLYSTYRENE SULFONATE 15 GRAM(S): 4.1 POWDER, FOR SUSPENSION ORAL at 17:32

## 2019-10-26 RX ADMIN — Medication 3 MILLILITER(S): at 08:07

## 2019-10-26 RX ADMIN — CEFEPIME 100 MILLIGRAM(S): 1 INJECTION, POWDER, FOR SOLUTION INTRAMUSCULAR; INTRAVENOUS at 15:36

## 2019-10-26 RX ADMIN — Medication 3 MILLILITER(S): at 13:52

## 2019-10-26 NOTE — PROGRESS NOTE ADULT - ASSESSMENT
Bp decreased,  Echo mod severe mr. Effusions. But not clear if chf. BP decreased.  Hold lasix. heck labs. Medical rx. Prognosis is poor.

## 2019-10-26 NOTE — PROGRESS NOTE ADULT - SUBJECTIVE AND OBJECTIVE BOX
LENGTH OF HOSPITAL STAY: 4d    CHIEF COMPLAINT:   Patient is a 89y old  Male who presents with a chief complaint of Acute on chronic hypoxic respiratory failure (26 Oct 2019 07:57)      HISTORY OF PRESENTING ILLNESS:    HPI:  No family at bedside so I called his emergency contact listed Tammy at [phone] and discussed care and goals of care with her.   Patient on BIPAP and a poor historian and her daughter Tammy.   Patient is a  89 y.o M with history of advanced CHF and COPD on home O2 ( 2L), base line O2 at home in 80s, CAD, HTN, HLD, afib on coumadin, hypothyroid, AAA repair, anemia, BIBEMS for evaluation of SOB with hypoxia of 70s. He was in his usual state of health until this morning when he became very sob with associated N/V, so daughter called EMS. On the way to ER patient had a syncopal episode and was found to have VTACH which resolved spontaneously without treatment.   He had no chest pain, fevers, or chills.  Pt remains somnolent in chair no distress.    In The ER he received IV antibiotics for PNA and placed on BIPAP (22 Oct 2019 10:39)    PAST MEDICAL & SURGICAL HISTORY  PAST MEDICAL & SURGICAL HISTORY:  Hyperlipidemia  CHF (congestive heart failure)  Afib  Hypertension  History of hip surgery  S/P AAA (abdominal aortic aneurysm) repair    SOCIAL HISTORY:    ALLERGIES:  No Known Allergies    MEDICATIONS:  STANDING MEDICATIONS  acetaminophen  Suppository .. 650 milliGRAM(s) Rectal once  albuterol/ipratropium for Nebulization 3 milliLiter(s) Nebulizer every 6 hours  atorvastatin 10 milliGRAM(s) Oral at bedtime  cefepime   IVPB 1000 milliGRAM(s) IV Intermittent every 24 hours  cholecalciferol Oral Tab/Cap - Peds 1000 Unit(s) Oral daily  levoFLOXacin IVPB      levothyroxine 50 MICROGram(s) Oral daily  metoprolol tartrate 12.5 milliGRAM(s) Oral two times a day  pantoprazole    Tablet 40 milliGRAM(s) Oral before breakfast  sodium polystyrene sulfonate Suspension 15 Gram(s) Oral two times a day  tiotropium 18 MICROgram(s) Capsule 1 Capsule(s) Inhalation daily    PRN MEDICATIONS    VITALS:   T(F): 97.1  HR: 74  BP: 86/48  RR: 30  SpO2: 97%    LABS:                        8.1    6.95  )-----------( 109      ( 26 Oct 2019 06:50 )             28.8     10-26    140  |  99  |  95<HH>  ----------------------------<  128<H>  5.4<H>   |  27  |  1.7<H>    Ca    9.4      26 Oct 2019 06:50  Mg     2.1     10-26    TPro  6.8  /  Alb  3.5  /  TBili  0.6  /  DBili  x   /  AST  17  /  ALT  20  /  AlkPhos  112  10-26    PT/INR - ( 25 Oct 2019 05:17 )   PT: >40.00 sec;   INR: 4.88 ratio                       RADIOLOGY:  < from: Xray Chest 1 View- PORTABLE-Routine (10.26.19 @ 06:18) >  Impression:      Stable left greater than right basilar opacities/effusions. No   pneumothorax.    < end of copied text >      PHYSICAL EXAM:  GEN: No acute distress  HEENT: clear  LUNGS: crackles b/l bases, + rhonchi  HEART: S1/S2 present. RRR.   ABD: Soft, non-tender, non-distended. Bowel sounds present  EXT:-c/c/e  NEURO: AAOX3

## 2019-10-26 NOTE — PROGRESS NOTE ADULT - ASSESSMENT
CHF  pneumonia  acute on chronic hypoxemic resp failure  severe MR  a-fib    As per cardio hold lasix at this time.   Monitor INR for therapeutic AC level.  Continue cefepime and levaquin.

## 2019-10-26 NOTE — PROGRESS NOTE ADULT - ASSESSMENT
#) Acute on chronic hypoxemic respiratory failure 2/2 sepsis 2/2 suspected GNR bilateral PNA and CHF Exacerbation  -patient is sitting in bed, comfortable  -on home oxygen (2L), no history of COPD, nonsmoker  -on admission, T-101.5, WBC-13.99, HR-105, RR-32, lactate- 2.7, CXR- bibasilar opacities/ effusion  -last 2D (10/22/2019)-normal EF, mod. TR/ MR, sev pulm HTN  -BIPAP 4hrs on/off and at bedtime- patient noncompliant w/ bipap  -titrate O2>92%  -d/c'd IV steroids 10/23  -was on IV abx azithromycin and cefepime- started 10/22, d/c'd azithromycin and started levaquin 10/25  -duonebs PRN  -BCx/ UCx (10/23)- negative, sputum cx pending  -strict I's and O's, fluid restriction 1L/day, DASH diet  -venous duplex- negative  -nephro c/s placed to assess fluid status    #) Elevated INR?  -patient has been off coumadin since admission, INR has been steadily increasing  -likley sec to depletion by Iv abx  -will monitor INR for now  -vitamin k level was sent last week    #) Chronic atrial fibrillation rate   -w/ an episode of NSVT   -holding coumadin, will start Hep Drip when INR<1.5  -on metoprolol 12.5 q12h  -will hold other BP lowering drugs  -cardio (Dr. Flores)- Possible afib with aberrancy. Chf improving. Medical rx. Echo. Coumadin. Prognosis guarded    #) Anemia of chronic disease    #) CKD stage 3 with COY  -Cr baseline around 1.5  - monitor renal function    #) HLD  - statin    #) Hypothyroidism  -synthroid (50mcg)    #) DVT/ GI ppx  -Hep Drip when INR<1.5, protonix #) Acute on chronic hypoxemic respiratory failure 2/2 sepsis 2/2 suspected GNR bilateral PNA and CHF Exacerbation  -patient is sitting in bed, comfortable  -on home oxygen (2L), no history of COPD, nonsmoker  -on admission, T-101.5, WBC-13.99, HR-105, RR-32, lactate- 2.7, CXR- bibasilar opacities/ effusion  -last 2D (10/22/2019)-normal EF, mod. TR/ MR, sev pulm HTN  -BIPAP 4hrs on/off and at bedtime- patient noncompliant w/ bipap  -titrate O2>92%  -d/c'd IV steroids 10/23  -was on IV abx azithromycin and cefepime- started 10/22, d/c'd azithromycin and started levaquin 10/25  -duonebs PRN  -BCx/ UCx (10/23)- negative, sputum cx pending  -strict I's and O's, fluid restriction 1L/day, DASH diet  -venous duplex- negative  -nephro c/s placed to assess fluid status    #) lt pleural effusion may need thoracentesis but presently INR is high    #) Elevated INR?  -patient has been off coumadin since admission, INR has been steadily increasing  -romie sec to depletion by Iv abx  -will monitor INR for now  -vitamin k level was sent last week    #) Chronic atrial fibrillation rate   -w/ an episode of NSVT   -holding coumadin, will start Hep Drip when INR<1.5  -on metoprolol 12.5 q12h  -will hold other BP lowering drugs  -cardio (Dr. Flores)- Possible afib with aberrancy. Chf improving. Medical rx. Echo. Coumadin. Prognosis guarded    #) Anemia of chronic disease    #) CKD stage 3 with COY  -Cr baseline around 1.5  - monitor renal function    #) HLD  - statin    #) Hypothyroidism  -synthroid (50mcg)    #) DVT/ GI ppx  -Hep Drip when INR<1.5, protonix  prognosis is guarded #) Acute on chronic hypoxemic respiratory failure 2/2 sepsis 2/2 suspected GNR bilateral PNA and CHF Exacerbation  -patient is sitting in bed, comfortable  -on home oxygen (2L), no history of COPD, nonsmoker  -on admission, T-101.5, WBC-13.99, HR-105, RR-32, lactate- 2.7, CXR- bibasilar opacities/ effusion  -last 2D (10/22/2019)-normal EF, mod. TR/ MR, sev pulm HTN  -BIPAP 4hrs on/off and at bedtime- patient noncompliant w/ bipap  -titrate O2>92%  -d/c'd IV steroids 10/23 and off lasix  -was on IV abx azithromycin and cefepime- started 10/22, d/c'd azithromycin and started levaquin 10/25  --strict I's and O's, fluid restriction 1L/day, DASH diet  -venous duplex- negative  -  #) lt pleural effusion may need thoracentesis but presently INR is high    #) Elevated INR?  -patient has been off coumadin since admission, INR has been steadily increasing  -romie sec to depletion by Iv abx  -will monitor INR for now  -vitamin k level was sent last week    #) Chronic atrial fibrillation rate   -w/ an episode of NSVT   -holding coumadin, will start Hep Drip when INR<1.5  -on metoprolol 12.5 q12h  -will hold other BP lowering drugs  -cardio (Dr. Flores)- Possible afib with aberrancy.    #) Anemia of chronic disease    #) CKD stage 3 with COY  -Cr baseline around 1.5  - monitor renal function    #) HLD  - statin    #) Hypothyroidism  -synthroid (50mcg)    #) DVT/ GI ppx  -Hep Drip when INR<1.5, protonix  prognosis is guarded

## 2019-10-26 NOTE — PROGRESS NOTE ADULT - SUBJECTIVE AND OBJECTIVE BOX
Patient is a 89y old  Male who presents with a chief complaint of Acute on chronic hypoxic respiratory failure (25 Oct 2019 10:17)      T(F): 97.1 (10-26-19 @ 07:00), Max: 97.1 (10-26-19 @ 07:00)  HR: 79 (10-26-19 @ 07:00)  BP: 864/- (10-26-19 @ 07:00)  RR: 26 (10-26-19 @ 07:00)  SpO2: 98% (10-26-19 @ 05:42) (86% - 98%)    PHYSICAL EXAM:  GENERAL: NAD, well-groomed, well-developed  HEAD:  Atraumatic, Normocephalic  EYES: EOMI, PERRLA, conjunctiva and sclera clear  ENMT: No tonsillar erythema, exudates, or enlargement; Moist mucous membranes, Good dentition, No lesions  NECK: Supple, No JVD, Normal thyroid  NERVOUS SYSTEM:  Alert & Oriented X3,  Motor Strength 5/5 B/L upper and lower extremities  CHEST/LUNG: Clear to percussion bilaterally; No rales, rhonchi, wheezing, or rubs decreased bs bases  HEART: Irreg No murmurs, rubs, or gallops  ABDOMEN: Soft, Nontender, Nondistended; Bowel sounds present  EXTREMITIES:   No clubbing, cyanosis, or edema  LYMPH: No lymphadenopathy noted  SKIN: No rashes or lesions    labs  10-25    139  |  98  |  85<HH>  ----------------------------<  83  5.1<H>   |  30  |  1.6<H>    Ca    9.2      25 Oct 2019 05:17  Mg     2.4     10-25    TPro  6.9  /  Alb  3.4<L>  /  TBili  0.6  /  DBili  x   /  AST  20  /  ALT  19  /  AlkPhos BP  101  10-25                          8.1    6.95  )-----------( 109      ( 26 Oct 2019 06:50 )             28.8       PT/INR - ( 25 Oct 2019 05:17 )   PT: >40.00 sec;   INR: 4.88 ratio                 acetaminophen  Suppository .. 650 milliGRAM(s) Rectal once  albuterol/ipratropium for Nebulization 3 milliLiter(s) Nebulizer every 6 hours  atorvastatin 10 milliGRAM(s) Oral at bedtime  cefepime   IVPB 1000 milliGRAM(s) IV Intermittent every 24 hours  cholecalciferol Oral Tab/Cap - Peds 1000 Unit(s) Oral daily  levoFLOXacin IVPB      levothyroxine 50 MICROGram(s) Oral daily  metoprolol tartrate 12.5 milliGRAM(s) Oral two times a day  pantoprazole    Tablet 40 milliGRAM(s) Oral before breakfast  tiotropium 18 MICROgram(s) Capsule 1 Capsule(s) Inhalation daily

## 2019-10-26 NOTE — PROGRESS NOTE ADULT - SUBJECTIVE AND OBJECTIVE BOX
SUBJECTIVE:    Patient is a 89y old Male who presents with a chief complaint of Acute on chronic hypoxic respiratory failure (26 Oct 2019 10:57)    Currently admitted to medicine with the primary diagnosis of Pneumonia due to infectious organism, unspecified laterality, unspecified part of lung     Today is hospital day 4d.     PAST MEDICAL & SURGICAL HISTORY  Hyperlipidemia  CHF (congestive heart failure)  Afib  Hypertension  History of hip surgery  S/P AAA (abdominal aortic aneurysm) repair    ALLERGIES:  No Known Allergies    MEDICATIONS:  STANDING MEDICATIONS  acetaminophen  Suppository .. 650 milliGRAM(s) Rectal once  albuterol/ipratropium for Nebulization 3 milliLiter(s) Nebulizer every 6 hours  atorvastatin 10 milliGRAM(s) Oral at bedtime  cefepime   IVPB 1000 milliGRAM(s) IV Intermittent every 24 hours  cholecalciferol Oral Tab/Cap - Peds 1000 Unit(s) Oral daily  levoFLOXacin IVPB      levothyroxine 50 MICROGram(s) Oral daily  metoprolol tartrate 12.5 milliGRAM(s) Oral two times a day  pantoprazole    Tablet 40 milliGRAM(s) Oral before breakfast  sodium polystyrene sulfonate Suspension 15 Gram(s) Oral two times a day  tiotropium 18 MICROgram(s) Capsule 1 Capsule(s) Inhalation daily    PRN MEDICATIONS    VITALS:   T(F): 97.1  HR: 74  BP: 86/48  RR: 30  SpO2: 97%    LABS:                        8.1    6.95  )-----------( 109      ( 26 Oct 2019 06:50 )             28.8     10-26    140  |  99  |  95<HH>  ----------------------------<  128<H>  5.4<H>   |  27  |  1.7<H>    Ca    9.4      26 Oct 2019 06:50  Mg     2.1     10-26    TPro  6.8  /  Alb  3.5  /  TBili  0.6  /  DBili  x   /  AST  17  /  ALT  20  /  AlkPhos  112  10-26    PT/INR - ( 25 Oct 2019 05:17 )   PT: >40.00 sec;   INR: 4.88 ratio                       RADIOLOGY:    PHYSICAL EXAM:  GEN: No acute distress  LUNGS: lt side reduced breath sounds  HEART: S1/S2 present. RRR.   ABD/ GI: Soft, non-tender, non-distended. Bowel sounds present  EXT: NC/NC/NE/2+PP/LANE  NEURO: AAOX3

## 2019-10-27 LAB
ANION GAP SERPL CALC-SCNC: 12 MMOL/L — SIGNIFICANT CHANGE UP (ref 7–14)
APTT BLD: 43.4 SEC — HIGH (ref 27–39.2)
BASOPHILS # BLD AUTO: 0.01 K/UL — SIGNIFICANT CHANGE UP (ref 0–0.2)
BASOPHILS NFR BLD AUTO: 0.1 % — SIGNIFICANT CHANGE UP (ref 0–1)
BUN SERPL-MCNC: 79 MG/DL — CRITICAL HIGH (ref 10–20)
CALCIUM SERPL-MCNC: 9 MG/DL — SIGNIFICANT CHANGE UP (ref 8.5–10.1)
CHLORIDE SERPL-SCNC: 99 MMOL/L — SIGNIFICANT CHANGE UP (ref 98–110)
CO2 SERPL-SCNC: 31 MMOL/L — SIGNIFICANT CHANGE UP (ref 17–32)
CREAT SERPL-MCNC: 1.7 MG/DL — HIGH (ref 0.7–1.5)
CULTURE RESULTS: SIGNIFICANT CHANGE UP
CULTURE RESULTS: SIGNIFICANT CHANGE UP
EOSINOPHIL # BLD AUTO: 0.11 K/UL — SIGNIFICANT CHANGE UP (ref 0–0.7)
EOSINOPHIL NFR BLD AUTO: 1.6 % — SIGNIFICANT CHANGE UP (ref 0–8)
GLUCOSE SERPL-MCNC: 115 MG/DL — HIGH (ref 70–99)
HCT VFR BLD CALC: 28.7 % — LOW (ref 42–52)
HGB BLD-MCNC: 8.2 G/DL — LOW (ref 14–18)
IMM GRANULOCYTES NFR BLD AUTO: 0.4 % — HIGH (ref 0.1–0.3)
INR BLD: 3.18 RATIO — HIGH (ref 0.65–1.3)
LYMPHOCYTES # BLD AUTO: 0.89 K/UL — LOW (ref 1.2–3.4)
LYMPHOCYTES # BLD AUTO: 13 % — LOW (ref 20.5–51.1)
MAGNESIUM SERPL-MCNC: 2 MG/DL — SIGNIFICANT CHANGE UP (ref 1.8–2.4)
MCHC RBC-ENTMCNC: 18.5 PG — LOW (ref 27–31)
MCHC RBC-ENTMCNC: 28.6 G/DL — LOW (ref 32–37)
MCV RBC AUTO: 64.6 FL — LOW (ref 80–94)
MONOCYTES # BLD AUTO: 0.65 K/UL — HIGH (ref 0.1–0.6)
MONOCYTES NFR BLD AUTO: 9.5 % — HIGH (ref 1.7–9.3)
NEUTROPHILS # BLD AUTO: 5.15 K/UL — SIGNIFICANT CHANGE UP (ref 1.4–6.5)
NEUTROPHILS NFR BLD AUTO: 75.4 % — HIGH (ref 42.2–75.2)
NRBC # BLD: 0 /100 WBCS — SIGNIFICANT CHANGE UP (ref 0–0)
PLATELET # BLD AUTO: 100 K/UL — LOW (ref 130–400)
POTASSIUM SERPL-MCNC: 4.4 MMOL/L — SIGNIFICANT CHANGE UP (ref 3.5–5)
POTASSIUM SERPL-SCNC: 4.4 MMOL/L — SIGNIFICANT CHANGE UP (ref 3.5–5)
PROTHROM AB SERPL-ACNC: 36.2 SEC — HIGH (ref 9.95–12.87)
RBC # BLD: 4.44 M/UL — LOW (ref 4.7–6.1)
RBC # FLD: 19.1 % — HIGH (ref 11.5–14.5)
SODIUM SERPL-SCNC: 142 MMOL/L — SIGNIFICANT CHANGE UP (ref 135–146)
SPECIMEN SOURCE: SIGNIFICANT CHANGE UP
SPECIMEN SOURCE: SIGNIFICANT CHANGE UP
WBC # BLD: 6.84 K/UL — SIGNIFICANT CHANGE UP (ref 4.8–10.8)
WBC # FLD AUTO: 6.84 K/UL — SIGNIFICANT CHANGE UP (ref 4.8–10.8)

## 2019-10-27 PROCEDURE — 71045 X-RAY EXAM CHEST 1 VIEW: CPT | Mod: 26

## 2019-10-27 PROCEDURE — 99233 SBSQ HOSP IP/OBS HIGH 50: CPT

## 2019-10-27 RX ORDER — FUROSEMIDE 40 MG
40 TABLET ORAL DAILY
Refills: 0 | Status: DISCONTINUED | OUTPATIENT
Start: 2019-10-27 | End: 2019-10-30

## 2019-10-27 RX ADMIN — CEFEPIME 100 MILLIGRAM(S): 1 INJECTION, POWDER, FOR SOLUTION INTRAMUSCULAR; INTRAVENOUS at 14:47

## 2019-10-27 RX ADMIN — Medication 3 MILLILITER(S): at 13:41

## 2019-10-27 RX ADMIN — ATORVASTATIN CALCIUM 10 MILLIGRAM(S): 80 TABLET, FILM COATED ORAL at 21:47

## 2019-10-27 RX ADMIN — Medication 12.5 MILLIGRAM(S): at 05:38

## 2019-10-27 RX ADMIN — PANTOPRAZOLE SODIUM 40 MILLIGRAM(S): 20 TABLET, DELAYED RELEASE ORAL at 06:33

## 2019-10-27 RX ADMIN — Medication 3 MILLILITER(S): at 07:01

## 2019-10-27 RX ADMIN — Medication 3 MILLILITER(S): at 01:19

## 2019-10-27 RX ADMIN — Medication 12.5 MILLIGRAM(S): at 17:34

## 2019-10-27 RX ADMIN — Medication 50 MICROGRAM(S): at 05:37

## 2019-10-27 RX ADMIN — Medication 3 MILLILITER(S): at 20:28

## 2019-10-27 RX ADMIN — TIOTROPIUM BROMIDE 1 CAPSULE(S): 18 CAPSULE ORAL; RESPIRATORY (INHALATION) at 07:02

## 2019-10-27 RX ADMIN — Medication 1000 UNIT(S): at 11:51

## 2019-10-27 RX ADMIN — Medication 40 MILLIGRAM(S): at 16:44

## 2019-10-27 NOTE — CONSULT NOTE ADULT - ASSESSMENT
89 y.o M with history of advanced CHF and COPD on home O2 ( 2L), base line O2 at home in 80s, CKD3 baseline Cr ~1.5 eGFR 30's)  CAD, HTN, HLD, afib on coumadin, hypothyroid, AAA repair, anemia, BIBEMS for evaluation of SOB with hypoxia of 70s.   Acute on chronic hypoxic respiratory failure    seems vol up on exam + edema and effusions  albumin is ok, should respond to diuresis  would give 40 IV lasix x 1  monitor UOP  trend BP and Cr to guide further diuresis   check urine prot/ cr ratio    will follow

## 2019-10-27 NOTE — CONSULT NOTE ADULT - CONSULT REASON
"bilateral pleural effusions, cr baseline is 1.5, need guidance in regard to fluid status, whether or not to diurese"
debility  gd
sob
SOb

## 2019-10-27 NOTE — CONSULT NOTE ADULT - REASON FOR ADMISSION
SOB
Acute on chronic hypoxic respiratory failure

## 2019-10-27 NOTE — PROGRESS NOTE ADULT - ASSESSMENT
Echo mod severe mr. Effusions. But not clear if chf. Check labs. Medical rx.  Family aware. Prognosis is poor.

## 2019-10-27 NOTE — PROGRESS NOTE ADULT - ASSESSMENT
#)Acute on chronic hypoxemic respiratory failure 2/2 sepsis 2/2 suspected GNR bilateral PNA and CHF Exacerbation  -patient is sitting in bed, comfortable  -on home oxygen (2L), no history of COPD, nonsmoker  -last 2D (10/22/2019)-normal EF, mod. TR/ MR, sev pulm HTN  -BIPAP 4hrs on/off and at bedtime- patient noncompliant w/ bipap  -titrate O2>92%  -d/c'd IV steroids 10/23 and off lasix  started levaquin 10/25  --strict I's and O's, fluid restriction 1L/day, DASH diet  -venous duplex- negative  -  #) lt pleural effusion may need thoracentesis but presently INR is high    #) Elevated INR? pending collection today  -patient has been off coumadin since admission, INR has been steadily increasing  -likley sec to depletion by Iv abx  -will monitor INR for now  -vitamin k level was sent last week    #) Chronic atrial fibrillation rate   -w/ an episode of NSVT   -holding coumadin, will start Hep Drip when INR<1.5  -on metoprolol 12.5 q12h  -will hold other BP lowering drugs  -cardio (Dr. Flores)- Possible afib with aberrancy.    #) Anemia of chronic disease    #) CKD stage 3 with COY  -Cr baseline around 1.5  - monitor renal function    #) HLD  - statin    #) Hypothyroidism  -synthroid (50mcg)    #) DVT/ GI ppx  holding coumadin inr high/protonix  -prognosis is guarded #)Acute on chronic hypoxemic respiratory failure 2/2 sepsis 2/2 suspected GNR bilateral PNA and CHF Exacerbation  -patient is sitting in bed, comfortable  -on home oxygen (2L), no history of COPD, nonsmoker  -last 2D (10/22/2019)-normal EF, mod. TR/ MR, sev pulm HTN  -BIPAP 4hrs on/off and at bedtime- patient noncompliant w/ bipap  -titrate O2>92%  -d/c'd IV steroids 10/23 and off lasix  started levaquin 10/25  --strict I's and O's, fluid restriction 1L/day, DASH diet  -venous duplex- negative  -  #) lt pleural effusion may need thoracentesis but presently INR is high    #) Elevated INR? pending collection today  -patient has been off coumadin since admission, INR has been steadily increasing  -likley sec to depletion by Iv abx  -will monitor INR for now  -vitamin k level was sent last week    #) Chronic atrial fibrillation rate   -w/ an episode of NSVT   -holding coumadin, will start Hep Drip when INR<1.5  -on metoprolol 12.5 q12h  -will hold other BP lowering drugs  -cardio (Dr. Flores)- Possible afib with aberrancy.    #) Anemia of chronic disease    #) CKD stage 3 with COY  -Cr baseline around 1.5  - monitor renal function    #) HLD  - statin    #) Hypothyroidism  -synthroid (50mcg)    #) DVT/ GI ppx  holding coumadin inr high/protonix  -prognosis is guarded      PT eval -- Dc planning

## 2019-10-27 NOTE — PROGRESS NOTE ADULT - SUBJECTIVE AND OBJECTIVE BOX
Patient is a 89y old  Male who presents with a chief complaint of Acute on chronic hypoxic respiratory failure (26 Oct 2019 11:36)      T(F): 97.6 (10-27-19 @ 07:02), Max: 97.6 (10-27-19 @ 07:02)  HR: 77 (10-27-19 @ 07:12)  BP: 125/62 (10-27-19 @ 07:12)  RR: 22 (10-27-19 @ 07:12)  SpO2: 100% (10-27-19 @ 07:12) (94% - 100%)    PHYSICAL EXAM:  GENERAL: NAD, well-groomed, well-developed  HEAD:  Atraumatic, Normocephalic  EYES: EOMI, PERRLA, conjunctiva and sclera clear  ENMT: No tonsillar erythema, exudates, or enlargement; Moist mucous membranes, Good dentition, No lesions  NECK: Supple, No JVD, Normal thyroid  NERVOUS SYSTEM:  Alert & Oriented X3,  Motor Strength 5/5 B/L upper and lower extremities  CHEST/LUNG: Clear to percussion bilaterally; No rales, rhonchi, wheezing, or rubs  HEART: Regular rate and rhythm; No murmurs, rubs, or gallops  ABDOMEN: Soft, Nontender, Nondistended; Bowel sounds present  EXTREMITIES:   No clubbing, cyanosis, or edema  LYMPH: No lymphadenopathy noted  SKIN: No rashes or lesions    labs  10-26    140  |  99  |  95<HH>  ----------------------------<  128<H>  5.4<H>   |  27  |  1.7<H>    Ca    9.4      26 Oct 2019 06:50  Mg     2.1     10-26    TPro  6.8  /  Alb  3.5  /  TBili  0.6  /  DBili  x   /  AST  17  /  ALT  20  /  AlkPhos  112  10-26                          8.1    6.95  )-----------( 109      ( 26 Oct 2019 06:50 )             28.8               acetaminophen  Suppository .. 650 milliGRAM(s) Rectal once  albuterol/ipratropium for Nebulization 3 milliLiter(s) Nebulizer every 6 hours  atorvastatin 10 milliGRAM(s) Oral at bedtime  cefepime   IVPB 1000 milliGRAM(s) IV Intermittent every 24 hours  cholecalciferol Oral Tab/Cap - Peds 1000 Unit(s) Oral daily  levoFLOXacin IVPB      levoFLOXacin IVPB 750 milliGRAM(s) IV Intermittent every 48 hours  levothyroxine 50 MICROGram(s) Oral daily  metoprolol tartrate 12.5 milliGRAM(s) Oral two times a day  pantoprazole    Tablet 40 milliGRAM(s) Oral before breakfast  tiotropium 18 MICROgram(s) Capsule 1 Capsule(s) Inhalation daily

## 2019-10-27 NOTE — CONSULT NOTE ADULT - SUBJECTIVE AND OBJECTIVE BOX
NEPHROLOGY CONSULTATION NOTE    89 y.o M with history of advanced CHF and COPD on home O2 ( 2L), base line O2 at home in 80s, CKD3 baseline Cr ~1.5 eGFR 30's)  CAD, HTN, HLD, afib on coumadin, hypothyroid, AAA repair, anemia, BIBEMS for evaluation of SOB with hypoxia of 70s.    On the way to ER patient had a syncopal episode and was found to have VTACH which resolved spontaneously without treatment.   He had no chest pain, fevers, or chills.     In [facility] he received IV antibiotics for PNA and placed on BIPAP (22 Oct 2019 10:39)    Cr has been stable ~1.6 -1.7  CXR w/ b/l effusions     PAST MEDICAL & SURGICAL HISTORY:  Hyperlipidemia  CHF (congestive heart failure)  Afib  Hypertension  History of hip surgery  S/P AAA (abdominal aortic aneurysm) repair    Allergies:  No Known Allergies    Home Medications Reviewed  Hospital Medications:   MEDICATIONS  (STANDING):  acetaminophen  Suppository .. 650 milliGRAM(s) Rectal once  albuterol/ipratropium for Nebulization 3 milliLiter(s) Nebulizer every 6 hours  atorvastatin 10 milliGRAM(s) Oral at bedtime  cefepime   IVPB 1000 milliGRAM(s) IV Intermittent every 24 hours  cholecalciferol Oral Tab/Cap - Peds 1000 Unit(s) Oral daily  levoFLOXacin IVPB      levoFLOXacin IVPB 750 milliGRAM(s) IV Intermittent every 48 hours  levothyroxine 50 MICROGram(s) Oral daily  metoprolol tartrate 12.5 milliGRAM(s) Oral two times a day  pantoprazole    Tablet 40 milliGRAM(s) Oral before breakfast  tiotropium 18 MICROgram(s) Capsule 1 Capsule(s) Inhalation daily      SOCIAL HISTORY:  Denies ETOH,Smoking,   FAMILY HISTORY:  FH: congestive heart failure: sister        REVIEW OF SYSTEMS:  CONSTITUTIONAL: +weakness, no ers or chills  EYES/ENT: No visual changes;  No vertigo or throat pain   NECK: No pain or stiffness  RESPIRATORY: No cough, wheezing, hemoptysis; +shortness of breath  CARDIOVASCULAR: No chest pain or palpitations.  GASTROINTESTINAL: No abdominal or epigastric pain. No nausea, vomiting, or hematemesis; No diarrhea or constipation. No melena or hematochezia.  GENITOURINARY: No dysuria, frequency, foamy urine, urinary urgency, incontinence or hematuria  NEUROLOGICAL: No numbness or weakness  SKIN: No itching, burning, rashes, or lesions   VASCULAR: No bilateral lower extremity edema.   All other review of systems is negative unless indicated above.    VITALS:  T(F): 97.6 (10-27-19 @ 07:02), Max: 97.6 (10-27-19 @ 07:02)  HR: 77 (10-27-19 @ 07:12)  BP: 125/62 (10-27-19 @ 07:12)  RR: 22 (10-27-19 @ 07:12)  SpO2: 100% (10-27-19 @ 07:12)    10-26 @ 07:01  -  10-27 @ 07:00  --------------------------------------------------------  IN: 870 mL / OUT: 700 mL / NET: 170 mL            I&O's Detail    26 Oct 2019 07:01  -  27 Oct 2019 07:00  --------------------------------------------------------  IN:    Oral Fluid: 820 mL    Solution: 50 mL  Total IN: 870 mL    OUT:    Voided: 700 mL  Total OUT: 700 mL    Total NET: 170 mL            PHYSICAL EXAM:  Constitutional: NAD  HEENT: anicteric sclera, oropharynx clear, MMM  Neck: No JVD  Respiratory: decreased  bs b/l       Gastrointestinal: BS+, soft, NT/ND  Extremities: No cyanosis or clubbing. +peripheral edema  Neurological: A/O x 3, no focal deficits  Psychiatric: Normal mood, normal affect  : No CVA tenderness. No palomo.   Skin: No rashes  Vascular Access:    LABS:  10-27    142  |  99  |  79<HH>  ----------------------------<  115<H>  4.4   |  31  |  1.7<H>    Ca    9.0      27 Oct 2019 06:44  Mg     2.0     10-27    TPro  6.8  /  Alb  3.5  /  TBili  0.6  /  DBili      /  AST  17  /  ALT  20  /  AlkPhos  112  10-26    Creatinine Trend: 1.7 <--, 1.7 <--, 1.6 <--, 1.7 <--, 1.5 <--, 1.5 <--                        8.2    6.84  )-----------( 100      ( 27 Oct 2019 06:44 )             28.7     Urine Studies:  Urinalysis Basic - ( 22 Oct 2019 10:47 )    Color: Yellow / Appearance: Clear / S.010 / pH:   Gluc:  / Ketone: Negative  / Bili: Negative / Urobili: 0.2 mg/dL   Blood:  / Protein: 30 mg/dL / Nitrite: Negative   Leuk Esterase: Small / RBC:  / WBC 10-25 /HPF   Sq Epi:  / Non Sq Epi: Occasional /HPF / Bacteria: Few                RADIOLOGY & ADDITIONAL STUDIES:       Echo mod severe mr. Effusions. But not clear if chf. Check labs. Medical rx.  Family aware. Prognosis is poor.

## 2019-10-27 NOTE — PROGRESS NOTE ADULT - SUBJECTIVE AND OBJECTIVE BOX
SUBJECTIVE:    Patient is a 89y old Male who presents with a chief complaint of Acute on chronic hypoxic respiratory failure (27 Oct 2019 09:59)    Currently admitted to medicine with the primary diagnosis of Pneumonia due to infectious organism, unspecified laterality, unspecified part of lung     Today is hospital day 5d.     PAST MEDICAL & SURGICAL HISTORY  Hyperlipidemia  CHF (congestive heart failure)  Afib  Hypertension  History of hip surgery  S/P AAA (abdominal aortic aneurysm) repair    ALLERGIES:  No Known Allergies    MEDICATIONS:  STANDING MEDICATIONS  acetaminophen  Suppository .. 650 milliGRAM(s) Rectal once  albuterol/ipratropium for Nebulization 3 milliLiter(s) Nebulizer every 6 hours  atorvastatin 10 milliGRAM(s) Oral at bedtime  cefepime   IVPB 1000 milliGRAM(s) IV Intermittent every 24 hours  cholecalciferol Oral Tab/Cap - Peds 1000 Unit(s) Oral daily  levoFLOXacin IVPB      levoFLOXacin IVPB 750 milliGRAM(s) IV Intermittent every 48 hours  levothyroxine 50 MICROGram(s) Oral daily  metoprolol tartrate 12.5 milliGRAM(s) Oral two times a day  pantoprazole    Tablet 40 milliGRAM(s) Oral before breakfast  tiotropium 18 MICROgram(s) Capsule 1 Capsule(s) Inhalation daily    PRN MEDICATIONS    VITALS:   T(F): 97.1  HR: 72  BP: 107/64  RR: 20  SpO2: 100%    LABS:                        8.2    6.84  )-----------( 100      ( 27 Oct 2019 06:44 )             28.7     10-27    142  |  99  |  79<HH>  ----------------------------<  115<H>  4.4   |  31  |  1.7<H>    Ca    9.0      27 Oct 2019 06:44  Mg     2.0     10-27    TPro  6.8  /  Alb  3.5  /  TBili  0.6  /  DBili  x   /  AST  17  /  ALT  20  /  AlkPhos  112  10-26                  RADIOLOGY:    PHYSICAL EXAM:  GEN: No acute distress  LUNGS: Clear to auscultation bilaterally   HEART: S1/S2 present. RRR.   ABD/ GI: Soft, non-tender, non-distended. Bowel sounds present  EXT:  edema b/l feet  NEURO: AAOX3

## 2019-10-28 ENCOUNTER — OUTPATIENT (OUTPATIENT)
Dept: OUTPATIENT SERVICES | Facility: HOSPITAL | Age: 84
LOS: 1 days | Discharge: HOME | End: 2019-10-28

## 2019-10-28 DIAGNOSIS — I48.91 UNSPECIFIED ATRIAL FIBRILLATION: ICD-10-CM

## 2019-10-28 DIAGNOSIS — Z98.890 OTHER SPECIFIED POSTPROCEDURAL STATES: Chronic | ICD-10-CM

## 2019-10-28 LAB
ANION GAP SERPL CALC-SCNC: 11 MMOL/L — SIGNIFICANT CHANGE UP (ref 7–14)
BUN SERPL-MCNC: 74 MG/DL — CRITICAL HIGH (ref 10–20)
CALCIUM SERPL-MCNC: 9.5 MG/DL — SIGNIFICANT CHANGE UP (ref 8.5–10.1)
CHLORIDE SERPL-SCNC: 97 MMOL/L — LOW (ref 98–110)
CO2 SERPL-SCNC: 33 MMOL/L — HIGH (ref 17–32)
CREAT SERPL-MCNC: 1.6 MG/DL — HIGH (ref 0.7–1.5)
CULTURE RESULTS: SIGNIFICANT CHANGE UP
CULTURE RESULTS: SIGNIFICANT CHANGE UP
GLUCOSE SERPL-MCNC: 95 MG/DL — SIGNIFICANT CHANGE UP (ref 70–99)
HCT VFR BLD CALC: 29.4 % — LOW (ref 42–52)
HGB BLD-MCNC: 8.5 G/DL — LOW (ref 14–18)
INR BLD: 2.56 RATIO — HIGH (ref 0.65–1.3)
MCHC RBC-ENTMCNC: 18.4 PG — LOW (ref 27–31)
MCHC RBC-ENTMCNC: 28.9 G/DL — LOW (ref 32–37)
MCV RBC AUTO: 63.6 FL — LOW (ref 80–94)
NRBC # BLD: 0 /100 WBCS — SIGNIFICANT CHANGE UP (ref 0–0)
PLATELET # BLD AUTO: 115 K/UL — LOW (ref 130–400)
POTASSIUM SERPL-MCNC: 4.7 MMOL/L — SIGNIFICANT CHANGE UP (ref 3.5–5)
POTASSIUM SERPL-SCNC: 4.7 MMOL/L — SIGNIFICANT CHANGE UP (ref 3.5–5)
PROTHROM AB SERPL-ACNC: 29.2 SEC — HIGH (ref 9.95–12.87)
RBC # BLD: 4.62 M/UL — LOW (ref 4.7–6.1)
RBC # FLD: 19.7 % — HIGH (ref 11.5–14.5)
SODIUM SERPL-SCNC: 141 MMOL/L — SIGNIFICANT CHANGE UP (ref 135–146)
SPECIMEN SOURCE: SIGNIFICANT CHANGE UP
SPECIMEN SOURCE: SIGNIFICANT CHANGE UP
WBC # BLD: 10.15 K/UL — SIGNIFICANT CHANGE UP (ref 4.8–10.8)
WBC # FLD AUTO: 10.15 K/UL — SIGNIFICANT CHANGE UP (ref 4.8–10.8)

## 2019-10-28 PROCEDURE — 99233 SBSQ HOSP IP/OBS HIGH 50: CPT

## 2019-10-28 RX ADMIN — Medication 3 MILLILITER(S): at 01:53

## 2019-10-28 RX ADMIN — Medication 40 MILLIGRAM(S): at 05:57

## 2019-10-28 RX ADMIN — PANTOPRAZOLE SODIUM 40 MILLIGRAM(S): 20 TABLET, DELAYED RELEASE ORAL at 06:02

## 2019-10-28 RX ADMIN — Medication 3 MILLILITER(S): at 14:21

## 2019-10-28 RX ADMIN — Medication 3 MILLILITER(S): at 14:22

## 2019-10-28 RX ADMIN — Medication 50 MICROGRAM(S): at 05:57

## 2019-10-28 RX ADMIN — Medication 12.5 MILLIGRAM(S): at 17:22

## 2019-10-28 RX ADMIN — Medication 1000 UNIT(S): at 11:14

## 2019-10-28 RX ADMIN — Medication 3 MILLILITER(S): at 19:30

## 2019-10-28 RX ADMIN — TIOTROPIUM BROMIDE 1 CAPSULE(S): 18 CAPSULE ORAL; RESPIRATORY (INHALATION) at 08:35

## 2019-10-28 RX ADMIN — CEFEPIME 100 MILLIGRAM(S): 1 INJECTION, POWDER, FOR SOLUTION INTRAMUSCULAR; INTRAVENOUS at 15:24

## 2019-10-28 RX ADMIN — ATORVASTATIN CALCIUM 10 MILLIGRAM(S): 80 TABLET, FILM COATED ORAL at 21:29

## 2019-10-28 RX ADMIN — Medication 12.5 MILLIGRAM(S): at 05:57

## 2019-10-28 NOTE — PROGRESS NOTE ADULT - SUBJECTIVE AND OBJECTIVE BOX
Nephrology progress note    Patient is seen and examined, events over the last 24 h noted .  SoB has improved after IV LAsix  Allergies:  No Known Allergies    Hospital Medications:   MEDICATIONS  (STANDING):  acetaminophen  Suppository .. 650 milliGRAM(s) Rectal once  albuterol/ipratropium for Nebulization 3 milliLiter(s) Nebulizer every 6 hours  atorvastatin 10 milliGRAM(s) Oral at bedtime  cefepime   IVPB 1000 milliGRAM(s) IV Intermittent every 24 hours  cholecalciferol Oral Tab/Cap - Peds 1000 Unit(s) Oral daily  furosemide    Tablet 40 milliGRAM(s) Oral daily  levoFLOXacin IVPB      levoFLOXacin IVPB 750 milliGRAM(s) IV Intermittent every 48 hours  levothyroxine 50 MICROGram(s) Oral daily  metoprolol tartrate 12.5 milliGRAM(s) Oral two times a day  pantoprazole    Tablet 40 milliGRAM(s) Oral before breakfast  tiotropium 18 MICROgram(s) Capsule 1 Capsule(s) Inhalation daily        VITALS:  T(F): 95.8 (10-28-19 @ 05:06), Max: 97.1 (10-27-19 @ 14:27)  HR: 71 (10-28-19 @ 05:06)  BP: 104/72 (10-28-19 @ 05:06)  RR: 18 (10-28-19 @ 05:06)  SpO2: 96% (10-28-19 @ 00:06)  Wt(kg): --    10-26 @ 07:  -  10-27 @ 07:00  --------------------------------------------------------  IN: 870 mL / OUT: 700 mL / NET: 170 mL    10-27 @ 07:01  -  10-28 @ 07:00  --------------------------------------------------------  IN: 420 mL / OUT: 1410 mL / NET: -990 mL      Height (cm): 165.1 (10-27 @ 11:50)  Weight (kg): 64.2 (10-27 @ 11:50)  BMI (kg/m2): 23.6 (10-27 @ 11:50)  BSA (m2): 1.71 (10-27 @ 11:50)    PHYSICAL EXAM:  Constitutional: NAD  HEENT: anicteric sclera, oropharynx clear, MMM  Neck: No JVD  Respiratory: Rt basilar crackles  Cardiovascular: S1, S2, RRR  Gastrointestinal: BS+, soft, NT/ND  Extremities: Mild peripheral edema  Neurological: Awake alert   : No CVA tenderness. No palomo.   Skin: No rashes  Vascular Access:    LABS:  10-27    142  |  99  |  79<HH>  ----------------------------<  115<H>  4.4   |  31  |  1.7<H>    Ca    9.0      27 Oct 2019 06:44  Mg     2.0     10-27                            8.2    6.84  )-----------( 100      ( 27 Oct 2019 06:44 )             28.7       Urine Studies:  Urinalysis Basic - ( 22 Oct 2019 10:47 )    Color: Yellow / Appearance: Clear / S.010 / pH:   Gluc:  / Ketone: Negative  / Bili: Negative / Urobili: 0.2 mg/dL   Blood:  / Protein: 30 mg/dL / Nitrite: Negative   Leuk Esterase: Small / RBC:  / WBC 10-25 /HPF   Sq Epi:  / Non Sq Epi: Occasional /HPF / Bacteria: Few        RADIOLOGY & ADDITIONAL STUDIES:  < from: Xray Chest 1 View- PORTABLE-Routine (10.27.19 @ 05:49) >  Impression:      Stable bibasilar opacities and effusions, left greater than right.    < end of copied text >

## 2019-10-28 NOTE — GOALS OF CARE CONVERSATION - ADVANCED CARE PLANNING - CONVERSATION DETAILS
Goals of care discussion with Tammy patient's daughter and emergency contact with regards to his acute on chronic respiratory failure and  comorbid conditions, per Tammy, patient has no living will and based on their conversations with patient, he wants to be full code which includes receiving all medical care including resuscitation and intubation when applicable.
Daughter thought that he improved in the past and he will do so now. she is aware that he has  mitral regurgitation.  do not want DNR and DNI-- or hospice

## 2019-10-28 NOTE — PROGRESS NOTE ADULT - ASSESSMENT
#)Acute on chronic hypoxemic respiratory failure 2/2 sepsis 2/2 suspected GNR bilateral PNA and CHF Exacerbation  -patient is sitting in bed, comfortable  -on home oxygen (2L), no history of COPD, nonsmoker  -last 2D (10/22/2019)-normal EF, mod. TR/ MR, sev pulm HTN  -BIPAP at bedtime-  he was sitting and reading newspaper-titrate O2>92%  -started on  lasix  started levaquin 10/25  --strict I's and O's, fluid restriction 1L/day, DASH diet  -venous duplex- negative  -  #) lt pleural effusion may need thoracentesis but presently INR is high and doubt if pulmonary will proceed with it.    #) Elevated INR?   -patient has been off coumadin since admission, INR  coming down  -likely sec to depletion of Vit K by Iv abx  -will monitor INR for now  -  #) Chronic atrial fibrillation rate   -w/ an episode of NSVT   -holding coumadin, will start Hep Drip when INR<1.5  -on metoprolol 12.5 q12h  -will hold other BP lowering drugs  -cardio (Dr. Flores)- Possible afib with aberrancy.    #) Anemia of chronic disease    #) CKD stage 3 with COY  -Cr baseline around 1.5  - monitor renal function    #) HLD  - statin    #) Hypothyroidism  -synthroid (50mcg)    #) DVT/ GI ppx  holding coumadin inr high/protonix  -prognosis is guarded     Case discussed with family and they think he has been ok so far and even had hip surgery 3 months ago.   PT [place]

## 2019-10-28 NOTE — PROGRESS NOTE ADULT - ASSESSMENT
89 y.o M with history of advanced CHF and COPD on home O2 ( 2L), base line O2 at home in 80s, CKD3 baseline Cr ~1.5 eGFR 30's)  CAD, HTN, HLD, afib on coumadin, hypothyroid, AAA repair, anemia, BIBEMS for evaluation of SOB with hypoxia of 70s.   Acute on chronic hypoxic respiratory failure    Charles - creat stable on increased dose of diuretics - 1.7 (1.5 baseline)  - cont LAsix 40 po qam and 20 mg in the afternoon every other day (pt was on 40 mg LAsix at home)  check urine prot/ cr ratio  - monitor creat as OP, keep creat<2.0    will sign off, renal f/u as OP may be needed if creat cont to rise

## 2019-10-28 NOTE — PROGRESS NOTE ADULT - SUBJECTIVE AND OBJECTIVE BOX
SUBJECTIVE:    Patient is a 89y old Male who presents with a chief complaint of Acute on chronic hypoxic respiratory failure (28 Oct 2019 08:28)    Currently admitted to medicine with the primary diagnosis of Pneumonia due to infectious organism, unspecified laterality, unspecified part of lung     Today is hospital day 6d.     PAST MEDICAL & SURGICAL HISTORY  Hyperlipidemia  CHF (congestive heart failure)  Afib  Hypertension  History of hip surgery  S/P AAA (abdominal aortic aneurysm) repair    ALLERGIES:  No Known Allergies    MEDICATIONS:  STANDING MEDICATIONS  acetaminophen  Suppository .. 650 milliGRAM(s) Rectal once  albuterol/ipratropium for Nebulization 3 milliLiter(s) Nebulizer every 6 hours  atorvastatin 10 milliGRAM(s) Oral at bedtime  cefepime   IVPB 1000 milliGRAM(s) IV Intermittent every 24 hours  cholecalciferol Oral Tab/Cap - Peds 1000 Unit(s) Oral daily  furosemide    Tablet 40 milliGRAM(s) Oral daily  levoFLOXacin IVPB      levoFLOXacin IVPB 750 milliGRAM(s) IV Intermittent every 48 hours  levothyroxine 50 MICROGram(s) Oral daily  metoprolol tartrate 12.5 milliGRAM(s) Oral two times a day  pantoprazole    Tablet 40 milliGRAM(s) Oral before breakfast  tiotropium 18 MICROgram(s) Capsule 1 Capsule(s) Inhalation daily    PRN MEDICATIONS    VITALS:   T(F): 96.8  HR: 73  BP: 143/72  RR: 18  SpO2: 98%    LABS:                        8.5    10.15 )-----------( 115      ( 28 Oct 2019 08:06 )             29.4     10-28    141  |  97<L>  |  74<HH>  ----------------------------<  95  4.7   |  33<H>  |  1.6<H>    Ca    9.5      28 Oct 2019 08:06  Mg     2.0     10-27      PT/INR - ( 28 Oct 2019 08:06 )   PT: 29.20 sec;   INR: 2.56 ratio         PTT - ( 27 Oct 2019 15:14 )  PTT:43.4 sec          Culture - Blood (collected 26 Oct 2019 06:50)  Source: .Blood None  Preliminary Report (27 Oct 2019 19:00):    No growth to date.    Culture - Blood (collected 26 Oct 2019 06:50)  Source: .Blood None  Preliminary Report (27 Oct 2019 19:00):    No growth to date.          RADIOLOGY:    PHYSICAL EXAM:  GEN: No acute distress  LUNGS:  decreased lt base  HEART: S1/S2 present. RRR.   ABD/ GI: Soft, non-tender, non-distended. Bowel sounds present  EXT: edema  NEURO: AAOX3

## 2019-10-29 ENCOUNTER — TRANSCRIPTION ENCOUNTER (OUTPATIENT)
Age: 84
End: 2019-10-29

## 2019-10-29 DIAGNOSIS — Z02.9 ENCOUNTER FOR ADMINISTRATIVE EXAMINATIONS, UNSPECIFIED: ICD-10-CM

## 2019-10-29 LAB
ANION GAP SERPL CALC-SCNC: 15 MMOL/L — HIGH (ref 7–14)
BUN SERPL-MCNC: 72 MG/DL — CRITICAL HIGH (ref 10–20)
CALCIUM SERPL-MCNC: 9.2 MG/DL — SIGNIFICANT CHANGE UP (ref 8.5–10.1)
CHLORIDE SERPL-SCNC: 98 MMOL/L — SIGNIFICANT CHANGE UP (ref 98–110)
CO2 SERPL-SCNC: 30 MMOL/L — SIGNIFICANT CHANGE UP (ref 17–32)
CREAT SERPL-MCNC: 1.6 MG/DL — HIGH (ref 0.7–1.5)
GLUCOSE BLDC GLUCOMTR-MCNC: 129 MG/DL — HIGH (ref 70–99)
GLUCOSE SERPL-MCNC: 100 MG/DL — HIGH (ref 70–99)
HCT VFR BLD CALC: 27.9 % — LOW (ref 42–52)
HGB BLD-MCNC: 8.1 G/DL — LOW (ref 14–18)
INR BLD: 2.11 RATIO — HIGH (ref 0.65–1.3)
MCHC RBC-ENTMCNC: 18.2 PG — LOW (ref 27–31)
MCHC RBC-ENTMCNC: 29 G/DL — LOW (ref 32–37)
MCV RBC AUTO: 62.8 FL — LOW (ref 80–94)
NRBC # BLD: 0 /100 WBCS — SIGNIFICANT CHANGE UP (ref 0–0)
PLATELET # BLD AUTO: 129 K/UL — LOW (ref 130–400)
POTASSIUM SERPL-MCNC: 4.3 MMOL/L — SIGNIFICANT CHANGE UP (ref 3.5–5)
POTASSIUM SERPL-SCNC: 4.3 MMOL/L — SIGNIFICANT CHANGE UP (ref 3.5–5)
PROTHROM AB SERPL-ACNC: 24.1 SEC — HIGH (ref 9.95–12.87)
RBC # BLD: 4.44 M/UL — LOW (ref 4.7–6.1)
RBC # FLD: 19.3 % — HIGH (ref 11.5–14.5)
SODIUM SERPL-SCNC: 143 MMOL/L — SIGNIFICANT CHANGE UP (ref 135–146)
WBC # BLD: 9.41 K/UL — SIGNIFICANT CHANGE UP (ref 4.8–10.8)
WBC # FLD AUTO: 9.41 K/UL — SIGNIFICANT CHANGE UP (ref 4.8–10.8)

## 2019-10-29 PROCEDURE — 99232 SBSQ HOSP IP/OBS MODERATE 35: CPT

## 2019-10-29 RX ORDER — FUROSEMIDE 40 MG
20 TABLET ORAL DAILY
Refills: 0 | Status: DISCONTINUED | OUTPATIENT
Start: 2019-10-29 | End: 2019-10-29

## 2019-10-29 RX ADMIN — Medication 50 MICROGRAM(S): at 06:12

## 2019-10-29 RX ADMIN — Medication 40 MILLIGRAM(S): at 06:12

## 2019-10-29 RX ADMIN — PANTOPRAZOLE SODIUM 40 MILLIGRAM(S): 20 TABLET, DELAYED RELEASE ORAL at 07:55

## 2019-10-29 RX ADMIN — Medication 12.5 MILLIGRAM(S): at 17:20

## 2019-10-29 RX ADMIN — ATORVASTATIN CALCIUM 10 MILLIGRAM(S): 80 TABLET, FILM COATED ORAL at 21:20

## 2019-10-29 RX ADMIN — Medication 1000 UNIT(S): at 11:02

## 2019-10-29 RX ADMIN — Medication 3 MILLILITER(S): at 14:09

## 2019-10-29 RX ADMIN — Medication 12.5 MILLIGRAM(S): at 06:12

## 2019-10-29 RX ADMIN — Medication 3 MILLILITER(S): at 09:05

## 2019-10-29 RX ADMIN — Medication 3 MILLILITER(S): at 04:04

## 2019-10-29 RX ADMIN — TIOTROPIUM BROMIDE 1 CAPSULE(S): 18 CAPSULE ORAL; RESPIRATORY (INHALATION) at 09:05

## 2019-10-29 RX ADMIN — CEFEPIME 100 MILLIGRAM(S): 1 INJECTION, POWDER, FOR SOLUTION INTRAMUSCULAR; INTRAVENOUS at 14:39

## 2019-10-29 NOTE — PROGRESS NOTE ADULT - SUBJECTIVE AND OBJECTIVE BOX
SUBJECTIVE:    Patient is a 89y old Male who presents with a chief complaint of Acute on chronic hypoxic respiratory failure (29 Oct 2019 08:37)    Currently admitted to medicine with the primary diagnosis of Pneumonia due to infectious organism, unspecified laterality, unspecified part of lung     Today is hospital day 7d.     PAST MEDICAL & SURGICAL HISTORY  Hyperlipidemia  CHF (congestive heart failure)  Afib  Hypertension  History of hip surgery  S/P AAA (abdominal aortic aneurysm) repair    ALLERGIES:  No Known Allergies    MEDICATIONS:  STANDING MEDICATIONS  acetaminophen  Suppository .. 650 milliGRAM(s) Rectal once  albuterol/ipratropium for Nebulization 3 milliLiter(s) Nebulizer every 6 hours  atorvastatin 10 milliGRAM(s) Oral at bedtime  cefepime   IVPB 1000 milliGRAM(s) IV Intermittent every 24 hours  cholecalciferol Oral Tab/Cap - Peds 1000 Unit(s) Oral daily  furosemide    Tablet 40 milliGRAM(s) Oral daily  levoFLOXacin IVPB      levoFLOXacin IVPB 750 milliGRAM(s) IV Intermittent every 48 hours  levothyroxine 50 MICROGram(s) Oral daily  metoprolol tartrate 12.5 milliGRAM(s) Oral two times a day  pantoprazole    Tablet 40 milliGRAM(s) Oral before breakfast  tiotropium 18 MICROgram(s) Capsule 1 Capsule(s) Inhalation daily    PRN MEDICATIONS    VITALS:   T(F): 96.8  HR: 82  BP: 122/68  RR: 16  SpO2: 91%    LABS:                        8.1    9.41  )-----------( 129      ( 29 Oct 2019 07:12 )             27.9     10-29    143  |  98  |  72<HH>  ----------------------------<  100<H>  4.3   |  30  |  1.6<H>    Ca    9.2      29 Oct 2019 07:12      PT/INR - ( 29 Oct 2019 07:12 )   PT: 24.10 sec;   INR: 2.11 ratio                       RADIOLOGY:    PHYSICAL EXAM:  GEN: No acute distress  LUNGS: Clear to auscultation bilaterally   HEART: S1/S2 present. RRR.   ABD/ GI: Soft, non-tender, non-distended. Bowel sounds present  EXT: NC/NC/NE/2+PP/LANE  NEURO: AAOX3 SUBJECTIVE:    Patient is a 89y old Male who presents with a chief complaint of Acute on chronic hypoxic respiratory failure (29 Oct 2019 08:37)    Currently admitted to medicine with the primary diagnosis of Pneumonia due to infectious organism, unspecified laterality, unspecified part of lung     Today is hospital day 7d.     PAST MEDICAL & SURGICAL HISTORY  Hyperlipidemia  CHF (congestive heart failure)  Afib  Hypertension  History of hip surgery  S/P AAA (abdominal aortic aneurysm) repair    ALLERGIES:  No Known Allergies    MEDICATIONS:  STANDING MEDICATIONS  acetaminophen  Suppository .. 650 milliGRAM(s) Rectal once  albuterol/ipratropium for Nebulization 3 milliLiter(s) Nebulizer every 6 hours  atorvastatin 10 milliGRAM(s) Oral at bedtime  cefepime   IVPB 1000 milliGRAM(s) IV Intermittent every 24 hours  cholecalciferol Oral Tab/Cap - Peds 1000 Unit(s) Oral daily  furosemide    Tablet 40 milliGRAM(s) Oral daily  levoFLOXacin IVPB      levoFLOXacin IVPB 750 milliGRAM(s) IV Intermittent every 48 hours  levothyroxine 50 MICROGram(s) Oral daily  metoprolol tartrate 12.5 milliGRAM(s) Oral two times a day  pantoprazole    Tablet 40 milliGRAM(s) Oral before breakfast  tiotropium 18 MICROgram(s) Capsule 1 Capsule(s) Inhalation daily    PRN MEDICATIONS    VITALS:   T(F): 96.8  HR: 82  BP: 122/68  RR: 16  SpO2: 91%    LABS:                        8.1    9.41  )-----------( 129      ( 29 Oct 2019 07:12 )             27.9     10-29    143  |  98  |  72<HH>  ----------------------------<  100<H>  4.3   |  30  |  1.6<H>    Ca    9.2      29 Oct 2019 07:12      PT/INR - ( 29 Oct 2019 07:12 )   PT: 24.10 sec;   INR: 2.11 ratio                       RADIOLOGY:    PHYSICAL EXAM:  GEN: No acute distress  LUNGS: decreased BS at bases  HEART: S1/S2 present. RRR. systolic murmur  ABD/ GI: Soft, non-tender, non-distended. Bowel sounds present  EXT: edema present  NEURO: AAOX3

## 2019-10-29 NOTE — PROGRESS NOTE ADULT - ASSESSMENT
#)Acute on chronic hypoxemic respiratory failure 2/2 sepsis 2/2 suspected GNR bilateral PNA and CHF Exacerbation  -patient is sitting in bed, comfortable  -on home oxygen (2L), no history of COPD, nonsmoker  -last 2D (10/22/2019)-normal EF, mod. TR/ MR, sev pulm HTN  -BIPAP at bedtime-  he was sitting and reading newspaper-titrate O2>92%  -started on  lasix 40mg am and 20mg pm  started levaquin 10/25--made po today  --strict I's and O's, fluid restriction 1L/day, DASH diet  -venous duplex- negative  -  #) lt pleural effusion may need thoracentesis, initial INR was high and now it has trended down--  doubt if pulmonary will proceed with it. Recall pulm follow up    #) Elevated INR?   -patient has been off coumadin since admission, INR  coming down  -likely sec to depletion of Vit K by Iv abx  -will monitor INR for now  -  #) Chronic atrial fibrillation rate   -w/ an episode of NSVT   -holding coumadin,  -on metoprolol 12.5 q12h  -will hold other BP lowering drugs  -cardio (Dr. Flores)- Possible afib with aberrancy.    #) Anemia of chronic disease    #) CKD stage 3 with CYO now close to baseline  -Cr baseline around 1.5  - monitor renal function    #) HLD  - statin    #) Hypothyroidism  -synthroid (50mcg)    #) DVT/ GI ppx  holding coumadin inr high/protonix  -prognosis is guarded     Case discussed with family and they think he has been ok so far and even had hip surgery 3 months ago.   DC home with homecare if pulm does not want thoracentesis. #)Acute on chronic hypoxemic respiratory failure 2/2 sepsis 2/2 suspected GNR bilateral PNA and CHF Exacerbation  -patient is sitting in bed, comfortable  -on home oxygen (2L), no history of COPD, nonsmoker  -last 2D (10/22/2019)-normal EF, mod. TR/ MR, sev pulm HTN  -BIPAP at bedtime-  he was sitting and reading newspaper-titrate O2>92%  -started on  lasix 40mg am and 20mg pm  started levaquin 10/25--made po today  --strict I's and O's, fluid restriction 1L/day, DASH diet  -venous duplex- negative  -  #) lt pleural effusion may need thoracentesis, initial INR was high and now it has trended down--  doubt if pulmonary will proceed with it. Recall pulm follow up    #) Elevated INR?   -patient has been off coumadin since admission, INR  coming down  -likely sec to depletion of Vit K by Iv abx  -will monitor INR for now  -  #) Chronic atrial fibrillation rate   -w/ an episode of NSVT   -holding coumadin,  -on metoprolol 12.5 q12h  -will hold other BP lowering drugs  -cardio (Dr. Flores)- Possible afib with aberrancy.    #) Anemia of chronic disease    #) CKD stage 3 with COY now close to baseline  -Cr baseline around 1.5  - monitor renal function    #) moderate to severe MR --patient is on lasix40/20  - statin    #) Hypothyroidism  -synthroid (50mcg)    #) DVT/ GI ppx  holding coumadin inr high/protonix  -prognosis is guarded     Case discussed with family and they think he has been ok so far and even had hip surgery 3 months ago.   DC home with homecare if pulm does not want thoracentesis. #)Acute on chronic hypoxemic respiratory failure 2/2 sepsis 2/2 suspected GNR bilateral PNA and CHF Exacerbation  -patient is sitting in bed, comfortable  -on home oxygen (2L), no history of COPD, nonsmoker  -last 2D (10/22/2019)-normal EF, mod. TR/ MR, sev pulm HTN  -BIPAP at bedtime-  he was sitting and reading newspaper-titrate O2>92%  -started on  lasix 40mg am and 20mg pm  started levaquin 10/25--made po today  --strict I's and O's, fluid restriction 1L/day, DASH diet  -venous duplex- negative  Has home oxygen and  CPAP machine at home  -  #) lt pleural effusion may need thoracentesis, initial INR was high and now it has trended down--  doubt if pulmonary will proceed with it. Recall pulm follow up    #) Elevated INR?   -patient has been off coumadin since admission, INR  coming down  -likely sec to depletion of Vit K by Iv abx  -will monitor INR for now  -  #) Chronic atrial fibrillation rate   -w/ an episode of NSVT   -holding coumadin,  -on metoprolol 12.5 q12h  -will hold other BP lowering drugs  -cardio (Dr. Flores)- Possible afib with aberrancy.    #) Anemia of chronic disease    #) CKD stage 3 with COY now close to baseline  -Cr baseline around 1.5  - monitor renal function    #) moderate to severe MR --patient is on lasix40/20  - statin    #) Hypothyroidism  -synthroid (50mcg)    #) DVT/ GI ppx  holding coumadin inr high/protonix  -prognosis is guarded     Case discussed with family and they think he has been ok so far and even had hip surgery 3 months ago.   DC home with homecare if pulm does not want thoracentesis. #)Acute on chronic hypoxemic respiratory failure 2/2 sepsis 2/2 suspected GNR bilateral PNA and CHF Exacerbation  -patient is sitting in bed, comfortable  -on home oxygen (2L), no history of COPD, nonsmoker  -last 2D (10/22/2019)-normal EF, mod. TR/ MR, sev pulm HTN  -BIPAP at bedtime-  he was sitting and reading newspaper-titrate O2>92%  -started on  lasix 40mg am and 20mg pm  started levaquin 10/25--made po today  --strict I's and O's, fluid restriction 1L/day, DASH diet  -venous duplex- negative  Has home oxygen and  CPAP machine at home  -  #) lt pleural effusion may need thoracentesis, initial INR was high and now it has trended down--  doubt if pulmonary will proceed with it. Recall pulm follow up    #) Elevated INR?   -patient has been off coumadin since admission, INR  coming down  -likely sec to depletion of Vit K by Iv abx  -will monitor INR for now  -  #) Chronic atrial fibrillation rate   -w/ an episode of NSVT   -holding coumadin,  -on metoprolol 12.5 q12h  -will hold other BP lowering drugs  -cardio (Dr. Flores)- Possible afib with aberrancy.    #) Anemia of chronic disease    #) CKD stage 3 with COY now close to baseline  -Cr baseline around 1.5  - monitor renal function    #) moderate to severe MR --patient is on lasix40/20  - statin    #) Hypothyroidism  -synthroid (50mcg)    #) DVT/ GI ppx  holding coumadin inr high/protonix  -prognosis is guarded     Case discussed with family and they think he has been ok so far and even had hip surgery 3 months ago.  His daughter is awae that he has severe MR   DC home with homecare if pulm does not want thoracentesis. #)Acute on chronic hypoxemic respiratory failure 2/2 sepsis 2/2 suspected GNR bilateral PNA and CHF Exacerbation  -patient is sitting in bed, comfortable  -on home oxygen (2L), no history of COPD, nonsmoker  -last 2D (10/22/2019)-normal EF, mod. TR/ MR, sev pulm HTN  -BIPAP at bedtime-  he was sitting and reading newspaper-titrate O2>92%  -started on  lasix 40mg am and 20mg pm  started levaquin 10/25--made po today  --strict I's and O's, fluid restriction 1L/day, DASH diet  -venous duplex- negative  Has home oxygen and  CPAP machine at home  -  #) lt pleural effusion may need thoracentesis, initial INR was high and now it has trended down--  doubt if pulmonary will proceed with it. Recall pulm follow up    #) Elevated INR?   -patient has been off coumadin since admission, INR  coming down  -likely sec to depletion of Vit K by Iv abx  -will monitor INR for now  -  #) Chronic atrial fibrillation rate   -w/ an episode of NSVT   -holding coumadin,  -on metoprolol 12.5 q12h  -will hold other BP lowering drugs  -cardio (Dr. Flores)- Possible afib with aberrancy.    #) Anemia of chronic disease    #) CKD stage 3 with COY now close to baseline  -Cr baseline around 1.5  - monitor renal function    #) moderate to severe MR --patient is on lasix40/20-- not very clear please clarify with Dr naylor  -     #) Hypothyroidism  -synthroid (50mcg)    #) DVT/ GI ppx  holding coumadin inr high/protonix  -prognosis is guarded     Case discussed with family and they think he has been ok so far and even had hip surgery 3 months ago.  His daughter is awae that he has severe MR   DC home with homecare if pulm does not want thoracentesis.

## 2019-10-29 NOTE — DISCHARGE NOTE PROVIDER - NSDCCPCAREPLAN_GEN_ALL_CORE_FT
PRINCIPAL DISCHARGE DIAGNOSIS  Diagnosis: Pneumonia due to infectious organism, unspecified laterality, unspecified part of lung  Assessment and Plan of Treatment:       SECONDARY DISCHARGE DIAGNOSES  Diagnosis: Other hypervolemia  Assessment and Plan of Treatment:     Diagnosis: Nonsustained ventricular tachycardia  Assessment and Plan of Treatment:     Diagnosis: Sepsis with acute hypoxic respiratory failure without septic shock, due to unspecified organism  Assessment and Plan of Treatment: PRINCIPAL DISCHARGE DIAGNOSIS  Diagnosis: Pneumonia due to infectious organism, unspecified laterality, unspecified part of lung  Assessment and Plan of Treatment: complete course of antibiotics  take over the counter probiotics while on antibiotics

## 2019-10-29 NOTE — DISCHARGE NOTE PROVIDER - NSDCMRMEDTOKEN_GEN_ALL_CORE_FT
atorvastatin 10 mg oral tablet: 1 tab(s) orally once a day (at bedtime)  cholecalciferol oral tablet: 2000 unit(s) orally once a day ( vitamin D}  Coumadin 2 mg oral tablet: orally once a day  ipratropium-albuterol 0.5 mg-2.5 mg/3 mLinhalation solution: 3 milliliter(s) inhaled 4 times a day  isosorbide mononitrate 30 mg oral tablet, extended release: 1 tab(s) orally once a day  Lasix 20 mg oral tablet: orally 2 times a day  levothyroxine 50 mcg (0.05 mg) oral tablet: 1 tab(s) orally once a day  metoprolol succinate 50 mg oral tablet, extended release: 1 tab(s) orally once a day atorvastatin 10 mg oral tablet: 1 tab(s) orally once a day (at bedtime)  cholecalciferol oral tablet: 2000 unit(s) orally once a day ( vitamin D}  Coumadin 2 mg oral tablet: orally once a day  ipratropium-albuterol 0.5 mg-2.5 mg/3 mLinhalation solution: 3 milliliter(s) inhaled 4 times a day  Lasix 20 mg oral tablet: orally 2 times a day  Levaquin 500 mg oral tablet: 1 tab(s) orally once a day   levothyroxine 50 mcg (0.05 mg) oral tablet: 1 tab(s) orally once a day  metoprolol succinate 25 mg oral capsule, extended release: 12.5 cap(s) orally 2 times a day

## 2019-10-29 NOTE — PROGRESS NOTE ADULT - ASSESSMENT
89 y.o M with history of advanced CHF and COPD on home O2 ( 2L), base line O2 at home in 80s, CKD3 baseline Cr ~1.5 eGFR 30's)  CAD, HTN, HLD, afib on coumadin, hypothyroid, AAA repair, anemia, BIBEMS for evaluation of SOB with hypoxia of 70s.   Acute on chronic hypoxic respiratory failure    Charles - creat stable on increased dose of diuretics - 1.7 (1.5 baseline)  - cont LAsix 40 po qam and 20 mg in the afternoon every other day (pt was on 40 mg LAsix at home)  check urine prot/ cr ratio  - monitor creat as OP, keep creat<2.0    Anemia - needs iron studies, po Iron  may need FARHAN if iron stores adequate  will sign off, renal f/u as OP

## 2019-10-29 NOTE — DISCHARGE NOTE PROVIDER - PROVIDER TOKENS
PROVIDER:[TOKEN:[[id number]]] PROVIDER:[TOKEN:[[id number]]],PROVIDER:[TOKEN:[[id number]],FOLLOWUP:[1 week]],PROVIDER:[TOKEN:[[id number]],FOLLOWUP:[1 week]]

## 2019-10-29 NOTE — DISCHARGE NOTE PROVIDER - CARE PROVIDER_API CALL
Cassidy Breen)  Internal Medicine  [address] [place] [address]  Phone: [phone]  Fax: [phone]  Follow Up Time: Cassidy Breen)  Internal Medicine  [address] [place] [address]  Phone: [phone]  Fax: [phone]  Follow Up Time:     Wilton Gomez ()  Critical Care Medicine; Pulmonary Disease; Sleep Medicine  [address] [place] [address]  Phone: [phone]  Fax: [phone]  Follow Up Time: 1 week    Jason Davis)  Cardiovascular Disease  [address] [place] [address], [place] [address]  Phone: [phone]  Fax: [phone]  Follow Up Time: 1 week

## 2019-10-29 NOTE — DISCHARGE NOTE PROVIDER - HOSPITAL COURSE
89 y.o M with history of advanced CHF and COPD on home O2 ( 2L), base line O2 at home in 80s, CAD, HTN, HLD, afib on coumadin, hypothyroid, AAA repair, anemia, BIBEMS for evaluation of SOB with hypoxia of 70s. He was in his usual state of health until this morning when he became very sob with associated N/V, so daughter called EMS. On the way to ER patient had a syncopal episode and was found to have VTACH which resolved spontaneously without treatment.      He had a lt pleural effusion that could not be tapped as he had high INR. ECHo showed moderate to severe MR and is on lasix twice a day.        pending pulm follow up and now on po levaquin 89 y.o M with history of advanced CHF and COPD on home O2 ( 2L), base line O2 at home in 80s, CAD, HTN, HLD, afib on coumadin, hypothyroid, AAA repair, anemia, BIBEMS for evaluation of SOB with hypoxia of 70s. He was in his usual state of health until this morning when he became very sob with associated N/V, so daughter called EMS. On the way to ER patient had a syncopal episode and was found to have VTACH which resolved spontaneously without treatment.     Patient was initially admitted to the ICU and then downgraded to the medical floor.        Admitted with Acute on chronic hypoxemic respiratory failure 2/2 sepsis 2/2 suspected GNR bilateral PNA and Acute on Chronic Diastolic CHF Exacerbation        He had a left pleural effusion that could not be tapped as he had high INR upon admission.    Echo showed moderate to severe MR.    patient continued on Lasix daily. Daily weights and I&O's were monitored    He continued on his usual 2L NC while hospitalized.    He ambulated with a walker with PT and is stable for home.    D/C planning took over 50 min    d/c papers done by me

## 2019-10-29 NOTE — PROGRESS NOTE ADULT - SUBJECTIVE AND OBJECTIVE BOX
Nephrology progress note    Patient is seen and examined, events over the last 24 h noted .  Feels better, less SOB  Allergies:  No Known Allergies    Hospital Medications:   MEDICATIONS  (STANDING):  acetaminophen  Suppository .. 650 milliGRAM(s) Rectal once  albuterol/ipratropium for Nebulization 3 milliLiter(s) Nebulizer every 6 hours  atorvastatin 10 milliGRAM(s) Oral at bedtime  cefepime   IVPB 1000 milliGRAM(s) IV Intermittent every 24 hours  cholecalciferol Oral Tab/Cap - Peds 1000 Unit(s) Oral daily  furosemide    Tablet 40 milliGRAM(s) Oral daily  levoFLOXacin IVPB      levoFLOXacin IVPB 750 milliGRAM(s) IV Intermittent every 48 hours  levothyroxine 50 MICROGram(s) Oral daily  metoprolol tartrate 12.5 milliGRAM(s) Oral two times a day  pantoprazole    Tablet 40 milliGRAM(s) Oral before breakfast  tiotropium 18 MICROgram(s) Capsule 1 Capsule(s) Inhalation daily        VITALS:  T(F): 96 (10-29-19 @ 05:26), Max: 97.3 (10-28-19 @ 22:16)  HR: 85 (10-29-19 @ 05:26)  BP: 120/71 (10-29-19 @ 05:26)  RR: 16 (10-29-19 @ 05:26)  SpO2: 91% (10-28-19 @ 19:01)  Wt(kg): --    10-27 @ 07:  -  10-28 @ 07:00  --------------------------------------------------------  IN: 420 mL / OUT: 1410 mL / NET: -990 mL    10-28 @ 07:  -  10-29 @ 07:00  --------------------------------------------------------  IN: 700 mL / OUT: 1525 mL / NET: -825 mL          PHYSICAL EXAM:  Constitutional: NAD  HEENT: anicteric sclera, oropharynx clear, MMM  Neck: No JVD  Respiratory: a few Rt basilar crackles  Cardiovascular: S1, S2, RRR  Gastrointestinal: BS+, soft, NT/ND  Extremities: No cyanosis or clubbing. No peripheral edema  Neurological: A/O x 3  : No CVA tenderness. No palomo.   Skin: No rashes  Vascular Access:    LABS:  10-28    141  |  97<L>  |  74<HH>  ----------------------------<  95  4.7   |  33<H>  |  1.6<H>    Ca    9.5      28 Oct 2019 08:06                            8.5    10.15 )-----------( 115      ( 28 Oct 2019 08:06 )             29.4       Urine Studies:  Urinalysis Basic - ( 22 Oct 2019 10:47 )    Color: Yellow / Appearance: Clear / S.010 / pH:   Gluc:  / Ketone: Negative  / Bili: Negative / Urobili: 0.2 mg/dL   Blood:  / Protein: 30 mg/dL / Nitrite: Negative   Leuk Esterase: Small / RBC:  / WBC 10-25 /HPF   Sq Epi:  / Non Sq Epi: Occasional /HPF / Bacteria: Few        RADIOLOGY & ADDITIONAL STUDIES:

## 2019-10-30 ENCOUNTER — TRANSCRIPTION ENCOUNTER (OUTPATIENT)
Age: 84
End: 2019-10-30

## 2019-10-30 VITALS
SYSTOLIC BLOOD PRESSURE: 109 MMHG | DIASTOLIC BLOOD PRESSURE: 54 MMHG | HEART RATE: 82 BPM | TEMPERATURE: 97 F | RESPIRATION RATE: 16 BRPM

## 2019-10-30 LAB
ANION GAP SERPL CALC-SCNC: 13 MMOL/L — SIGNIFICANT CHANGE UP (ref 7–14)
BUN SERPL-MCNC: 70 MG/DL — CRITICAL HIGH (ref 10–20)
CALCIUM SERPL-MCNC: 9.5 MG/DL — SIGNIFICANT CHANGE UP (ref 8.5–10.1)
CHLORIDE SERPL-SCNC: 97 MMOL/L — LOW (ref 98–110)
CO2 SERPL-SCNC: 31 MMOL/L — SIGNIFICANT CHANGE UP (ref 17–32)
CREAT SERPL-MCNC: 1.6 MG/DL — HIGH (ref 0.7–1.5)
GLUCOSE SERPL-MCNC: 109 MG/DL — HIGH (ref 70–99)
HCT VFR BLD CALC: 28 % — LOW (ref 42–52)
HGB BLD-MCNC: 8.2 G/DL — LOW (ref 14–18)
INR BLD: 2 RATIO — HIGH (ref 0.65–1.3)
MCHC RBC-ENTMCNC: 18.5 PG — LOW (ref 27–31)
MCHC RBC-ENTMCNC: 29.3 G/DL — LOW (ref 32–37)
MCV RBC AUTO: 63.1 FL — LOW (ref 80–94)
NRBC # BLD: 0 /100 WBCS — SIGNIFICANT CHANGE UP (ref 0–0)
PLATELET # BLD AUTO: 127 K/UL — LOW (ref 130–400)
POTASSIUM SERPL-MCNC: 4.5 MMOL/L — SIGNIFICANT CHANGE UP (ref 3.5–5)
POTASSIUM SERPL-SCNC: 4.5 MMOL/L — SIGNIFICANT CHANGE UP (ref 3.5–5)
PROTHROM AB SERPL-ACNC: 22.8 SEC — HIGH (ref 9.95–12.87)
RBC # BLD: 4.44 M/UL — LOW (ref 4.7–6.1)
RBC # FLD: 18.8 % — HIGH (ref 11.5–14.5)
SODIUM SERPL-SCNC: 141 MMOL/L — SIGNIFICANT CHANGE UP (ref 135–146)
WBC # BLD: 7.96 K/UL — SIGNIFICANT CHANGE UP (ref 4.8–10.8)
WBC # FLD AUTO: 7.96 K/UL — SIGNIFICANT CHANGE UP (ref 4.8–10.8)

## 2019-10-30 PROCEDURE — 99239 HOSP IP/OBS DSCHRG MGMT >30: CPT

## 2019-10-30 RX ORDER — METOPROLOL TARTRATE 50 MG
12.5 TABLET ORAL
Qty: 60 | Refills: 0
Start: 2019-10-30

## 2019-10-30 RX ORDER — CIPROFLOXACIN LACTATE 400MG/40ML
1 VIAL (ML) INTRAVENOUS
Qty: 5 | Refills: 0
Start: 2019-10-30

## 2019-10-30 RX ADMIN — Medication 1000 UNIT(S): at 11:08

## 2019-10-30 RX ADMIN — Medication 3 MILLILITER(S): at 13:36

## 2019-10-30 RX ADMIN — Medication 3 MILLILITER(S): at 08:16

## 2019-10-30 RX ADMIN — PANTOPRAZOLE SODIUM 40 MILLIGRAM(S): 20 TABLET, DELAYED RELEASE ORAL at 08:40

## 2019-10-30 RX ADMIN — Medication 3 MILLILITER(S): at 01:28

## 2019-10-30 RX ADMIN — Medication 3 MILLILITER(S): at 00:01

## 2019-10-30 RX ADMIN — Medication 50 MICROGRAM(S): at 06:14

## 2019-10-30 RX ADMIN — Medication 12.5 MILLIGRAM(S): at 06:14

## 2019-10-30 RX ADMIN — TIOTROPIUM BROMIDE 1 CAPSULE(S): 18 CAPSULE ORAL; RESPIRATORY (INHALATION) at 08:17

## 2019-10-30 NOTE — PROGRESS NOTE ADULT - ASSESSMENT
impression:    acute on chronic hypoxemic respiratory failure  Valvular heart failure  Moderate MR  Severe TR  Afib on coumadin  point of care lung ultrasound showed moderate to large left sided effusion, small right sided effusion    Suggest:  Oxygen requirements(stable)  Continue Lasix 40 (BUN trending down, creatinine stable)   Continue heart failure therapy  finish abx course  attempt to reach  the daughter(bari) was not successful, left a message with a call back number. will need to discuss the risk and benefit of therapeutic tap.  HOB elevation  DVt ppx  Gi PPX  OOB to chair

## 2019-10-30 NOTE — PROGRESS NOTE ADULT - SUBJECTIVE AND OBJECTIVE BOX
SUBJECTIVE:    Patient is a 89y old Male who presents with a chief complaint of Acute on chronic hypoxic respiratory failure (29 Oct 2019 08:37)    Currently admitted to medicine with the primary diagnosis of Pneumonia due to infectious organism, unspecified laterality, unspecified part of lung     Today is hospital day 8d.   Discussed at length with pt's daughter, Tabby, she is indecisive about the thoracentesis and would like to speak to her sister before proceeding.      PAST MEDICAL & SURGICAL HISTORY  Hyperlipidemia  CHF (congestive heart failure)  Afib  Hypertension  History of hip surgery  S/P AAA (abdominal aortic aneurysm) repair    ALLERGIES:  No Known Allergies    MEDICATIONS:  MEDICATIONS  (STANDING):  acetaminophen  Suppository .. 650 milliGRAM(s) Rectal once  albuterol/ipratropium for Nebulization 3 milliLiter(s) Nebulizer every 6 hours  atorvastatin 10 milliGRAM(s) Oral at bedtime  cholecalciferol Oral Tab/Cap - Peds 1000 Unit(s) Oral daily  furosemide    Tablet 40 milliGRAM(s) Oral daily  levoFLOXacin IVPB 250 milliGRAM(s) IV Intermittent every 24 hours  levothyroxine 50 MICROGram(s) Oral daily  metoprolol tartrate 12.5 milliGRAM(s) Oral two times a day  pantoprazole    Tablet 40 milliGRAM(s) Oral before breakfast  tiotropium 18 MICROgram(s) Capsule 1 Capsule(s) Inhalation daily    MEDICATIONS  (PRN):      VITALS:   Vital Signs Last 24 Hrs  T(C): 36.1 (30 Oct 2019 05:11), Max: 36.1 (30 Oct 2019 05:11)  T(F): 97 (30 Oct 2019 05:11), Max: 97 (30 Oct 2019 05:11)  HR: 97 (30 Oct 2019 05:11) (80 - 97)  BP: 115/56 (30 Oct 2019 05:11) (110/74 - 122/68)  BP(mean): --  RR: 18 (30 Oct 2019 05:11) (16 - 18)  SpO2: 99% (30 Oct 2019 08:41) (99% - 99%)    LABS:                            8.2    7.96  )-----------( 127      ( 30 Oct 2019 07:17 )             28.0     10-30    141  |  97<L>  |  70<HH>  ----------------------------<  109<H>  4.5   |  31  |  1.6<H>    Ca    9.5      30 Oct 2019 07:17        RADIOLOGY:    PHYSICAL EXAM:  GEN: No acute distress  LUNGS: decreased BS at bases  HEART: S1/S2 present. RRR. systolic murmur  ABD/ GI: Soft, non-tender, non-distended. Bowel sounds present  EXT: edema present  NEURO: AAOX3 SUBJECTIVE:    Patient is a 89y old Male who presents with a chief complaint of Acute on chronic hypoxic respiratory failure (29 Oct 2019 08:37)    Currently admitted to medicine with the primary diagnosis of Pneumonia due to infectious organism, unspecified laterality, unspecified part of lung     Today is hospital day 8d.   Discussed at length with pt's daughter, Tabyb, she is indecisive about the thoracentesis and would like to speak to her sister before proceeding.  Patient is medically stable for d/c home today.     PAST MEDICAL & SURGICAL HISTORY  Hyperlipidemia  CHF (congestive heart failure)  Afib  Hypertension  History of hip surgery  S/P AAA (abdominal aortic aneurysm) repair    ALLERGIES:  No Known Allergies    MEDICATIONS:  MEDICATIONS  (STANDING):  acetaminophen  Suppository .. 650 milliGRAM(s) Rectal once  albuterol/ipratropium for Nebulization 3 milliLiter(s) Nebulizer every 6 hours  atorvastatin 10 milliGRAM(s) Oral at bedtime  cholecalciferol Oral Tab/Cap - Peds 1000 Unit(s) Oral daily  furosemide    Tablet 40 milliGRAM(s) Oral daily  levoFLOXacin IVPB 250 milliGRAM(s) IV Intermittent every 24 hours  levothyroxine 50 MICROGram(s) Oral daily  metoprolol tartrate 12.5 milliGRAM(s) Oral two times a day  pantoprazole    Tablet 40 milliGRAM(s) Oral before breakfast  tiotropium 18 MICROgram(s) Capsule 1 Capsule(s) Inhalation daily    MEDICATIONS  (PRN):      VITALS:   Vital Signs Last 24 Hrs  T(C): 36.1 (30 Oct 2019 05:11), Max: 36.1 (30 Oct 2019 05:11)  T(F): 97 (30 Oct 2019 05:11), Max: 97 (30 Oct 2019 05:11)  HR: 97 (30 Oct 2019 05:11) (80 - 97)  BP: 115/56 (30 Oct 2019 05:11) (110/74 - 122/68)  BP(mean): --  RR: 18 (30 Oct 2019 05:11) (16 - 18)  SpO2: 99% (30 Oct 2019 08:41) (99% - 99%) on 2L    LABS:                            8.2    7.96  )-----------( 127      ( 30 Oct 2019 07:17 )             28.0     10-30    141  |  97<L>  |  70<HH>  ----------------------------<  109<H>  4.5   |  31  |  1.6<H>    Ca    9.5      30 Oct 2019 07:17        RADIOLOGY:  < from: Xray Chest 1 View- PORTABLE-Routine (10.27.19 @ 05:49) >  Impression:      Stable bibasilar opacities and effusions, left greater than right.    < end of copied text >    PHYSICAL EXAM:  GENERAL: NAD, well-groomed, well-developed  HEAD:  Atraumatic, Normocephalic  EYES: EOMI, PERRLA, conjunctiva and sclera clear  NERVOUS SYSTEM:  Alert & Oriented X 4, Good concentration; Motor Strength 5/5 B/L upper and lower extremities; DTRs 2+ intact and symmetric  CHEST/LUNG: decreased breath sounds b/l  HEART: Regular rate and rhythm; + systolic murmur, No rubs, or gallops  ABDOMEN: Soft, Nontender, Nondistended; Bowel sounds present  EXTREMITIES:  2+ Peripheral Pulses, No clubbing, cyanosis. +1 le edema  SKIN: No rashes or lesions 6

## 2019-10-30 NOTE — DIETITIAN INITIAL EVALUATION ADULT. - OTHER INFO
Pt admitted d/t pneumonia, sepsis with acute hypoxic respiratory failure w/o septic shock, non SVT and hypervolemic. Pt has hx of CKD-3. Pt is sitting in chair in hallway with RN also present. Pt reports that he does not have much of an appetite at this moment. Pt reports that if he likes the food on his tray, then he eats it. Per EMR, po intake is varied 0-100%, most frequently recorded at 50%. Pt denies any nausea/abdominal pain. RN reports that pt may do better with mechanical soft diet. Pt likes sweet foods/drinks. Pt has NKFA/intolerances. Unsure if pt takes vitamins/supplements pta. RN reports pt having regular BMs with no diarrhea/constipation present. Last BM was this morning. Unsure of pt UBW/wt changes. Pt does not appear to show any physical signs of muscle wasting/fat loss upon RD observation.

## 2019-10-30 NOTE — DIETITIAN INITIAL EVALUATION ADULT. - ADD RECOMMEND
add 60gm protein restriction to diet order (poor renal fxn), add mechanical soft modifier per RN request, encourage po intake, provide assistance with meals PRN

## 2019-10-30 NOTE — CHART NOTE - NSCHARTNOTEFT_GEN_A_CORE
discussed with pt's daughter, Tammy - family does not want thoracentesis.  Will d/c home today.  Discussed with pulm fellow

## 2019-10-30 NOTE — PROGRESS NOTE ADULT - ASSESSMENT
#)Acute on chronic hypoxemic respiratory failure 2/2 sepsis 2/2 suspected GNR bilateral PNA and CHF Exacerbation  -patient is sitting in bed, comfortable  -on home oxygen (2L), no history of COPD, nonsmoker  -last 2D (10/22/2019)-normal EF, mod. TR/ MR, sev pulm HTN  -BIPAP at bedtime-  he was sitting and reading newspaper-titrate O2>92%  -started on  lasix 40mg am and 20mg pm  started levaquin 10/25--made po today  --strict I's and O's, fluid restriction 1L/day, DASH diet  -venous duplex- negative  Has home oxygen and  CPAP machine at home  -  #) lt pleural effusion may need thoracentesis, initial INR was high and now it has trended down--  doubt if pulmonary will proceed with it. Recall pulm follow up    #) Elevated INR?   -patient has been off coumadin since admission, INR  coming down  -likely sec to depletion of Vit K by Iv abx  -will monitor INR for now  -  #) Chronic atrial fibrillation rate   -w/ an episode of NSVT   -holding coumadin,  -on metoprolol 12.5 q12h  -will hold other BP lowering drugs  -cardio (Dr. Flores)- Possible afib with aberrancy.    #) Anemia of chronic disease    #) CKD stage 3 with COY now close to baseline  -Cr baseline around 1.5  - monitor renal function    #) moderate to severe MR --patient is on lasix40/20-- not very clear please clarify with Dr naylor  -     #) Hypothyroidism  -synthroid (50mcg)    #) DVT/ GI ppx  holding coumadin inr high/protonix  -prognosis is guarded     Case discussed with family and they think he has been ok so far and even had hip surgery 3 months ago.  His daughter is awae that he has severe MR   DC home with homecare if pulm does not want thoracentesis. #)Acute on chronic hypoxemic respiratory failure 2/2 sepsis 2/2 suspected GNR bilateral PNA and CHF Exacerbation  -patient is sitting in the chair and is comfortable  -on home oxygen (2L), no history of COPD, nonsmoker  -last 2D (10/22/2019)-normal EF, mod. TR/ MR, sev pulm HTN  -BIPAP at bedtime  -continues on lasix 40mg daily   -continue PO levaquin   --strict I's and O's, fluid restriction 1L/day, DASH diet  -venous duplex- negative  Has home oxygen and CPAP machine at home  -  #) lt pleural effusion may need thoracentesis  -discussed with pt's daughter, Tabby.  She will discuss with her sister and get back to me    #) Elevated INR - resolved  -patient has been off coumadin since admission  -patient has home draws set up for Monday's    #) Chronic atrial fibrillation rate   -w/ an episode of NSVT   -holding coumadin  -on metoprolol 12.5 q12h  -cardio (Dr. Flores)- Possible afib with aberrancy.    #) Anemia of chronic disease- stable    #) CKD stage 3 with COY -at baseline  -Cr baseline around 1.5  - monitor renal function as outpt    #) moderate to severe MR   -outpt cardio  -continue current tx    #) Hypothyroidism  -synthroid (50mcg)    #) DVT/ GI ppx  -on coumadin - will resume today  -prognosis is guarded      Progress Note Handoff  Pending Consults: pulm follow up  Pending Tests: thoracentesis?  Pending Results:none  Family Discussion: discussed with pt's daughter, Tabby.  Possible d/c today if not thoracentesis.   Disposition: Home__x___/SNF______/Other_____/Unknown at this time_____ #)Acute on chronic hypoxemic respiratory failure 2/2 sepsis 2/2 suspected GNR bilateral PNA and acute on chronic diastolic CHF Exacerbation  -patient is sitting in the chair and is comfortable  -on home oxygen (2L), no history of COPD, nonsmoker  -last 2D (10/22/2019)-normal EF, mod. TR/ MR, sev pulm HTN  -BIPAP at bedtime  -continues on lasix 40mg daily   -continue PO levaquin   --strict I's and O's, fluid restriction 1L/day, DASH diet  -venous duplex- negative  Has home oxygen and CPAP machine at home  -  #) lt pleural effusion may need thoracentesis  -discussed with pt's daughter, Tabby.  She will discuss with her sister and get back to me    #) Elevated INR - resolved  -patient has been off coumadin since admission  -patient has home draws set up for Monday's    #) Chronic atrial fibrillation rate   -w/ an episode of NSVT   -holding coumadin  -on metoprolol 12.5 q12h  -cardio (Dr. Flores)- Possible afib with aberrancy.    #) Anemia of chronic disease- stable    #) CKD stage 3 with COY -at baseline  -Cr baseline around 1.5  - monitor renal function as outpt    #) moderate to severe MR   -outpt cardio  -continue current tx    #) Hypothyroidism  -synthroid (50mcg)    #) DVT/ GI ppx  -on coumadin - will resume today  -prognosis is guarded      Progress Note Handoff  Pending Consults: pulm follow up  Pending Tests: thoracentesis?  Pending Results:none  Family Discussion: discussed with pt's daughter, Tabby.  Possible d/c today if not thoracentesis.   Disposition: Home__x___/SNF______/Other_____/Unknown at this time_____

## 2019-10-30 NOTE — DIETITIAN INITIAL EVALUATION ADULT. - DIET TYPE
DASH/TLC; 800mL fluid restriction 800ml/mechanical soft/DASH/TLC (sodium and cholesterol restricted diet)/60 gm protein

## 2019-10-30 NOTE — DISCHARGE NOTE NURSING/CASE MANAGEMENT/SOCIAL WORK - PATIENT PORTAL LINK FT
You can access the FollowMyHealth Patient Portal offered by [facility] by registering at the following website: [email or web address]. By joining [facility]’s FollowMyHealth portal, you will also be able to view your health information using other applications (apps) compatible with our system.

## 2019-10-30 NOTE — PROGRESS NOTE ADULT - REASON FOR ADMISSION
Acute on chronic hypoxic respiratory failure

## 2019-10-30 NOTE — DIETITIAN INITIAL EVALUATION ADULT. - ENERGY NEEDS
kcal: 5848-2908 (MSJ x 1.2-1.3 AF)  protein: 38-48 g (.6-.75 g/kg) CKD-3 considered- monitor renal fxn  fluid: restriction per LIP

## 2019-10-30 NOTE — PROGRESS NOTE ADULT - PROVIDER SPECIALTY LIST ADULT
Cardiology
Critical Care
Hospitalist
Nephrology
Nephrology
Pulmonology
Critical Care
Hospitalist
Pulmonology
Hospitalist

## 2019-10-30 NOTE — DISCHARGE NOTE NURSING/CASE MANAGEMENT/SOCIAL WORK - NSDCPEPT PROEDHF_GEN_ALL_CORE
Activities as tolerated/Call primary care provider for follow up after discharge/Low salt diet/Report signs and symptoms to primary care provider/Monitor weight daily

## 2019-10-31 LAB
CULTURE RESULTS: SIGNIFICANT CHANGE UP
CULTURE RESULTS: SIGNIFICANT CHANGE UP
SPECIMEN SOURCE: SIGNIFICANT CHANGE UP
SPECIMEN SOURCE: SIGNIFICANT CHANGE UP

## 2019-11-01 DIAGNOSIS — Z79.01 LONG TERM (CURRENT) USE OF ANTICOAGULANTS: ICD-10-CM

## 2019-11-01 DIAGNOSIS — R06.02 SHORTNESS OF BREATH: ICD-10-CM

## 2019-11-01 DIAGNOSIS — N17.9 ACUTE KIDNEY FAILURE, UNSPECIFIED: ICD-10-CM

## 2019-11-01 DIAGNOSIS — E83.42 HYPOMAGNESEMIA: ICD-10-CM

## 2019-11-01 DIAGNOSIS — E78.5 HYPERLIPIDEMIA, UNSPECIFIED: ICD-10-CM

## 2019-11-01 DIAGNOSIS — I13.0 HYPERTENSIVE HEART AND CHRONIC KIDNEY DISEASE WITH HEART FAILURE AND STAGE 1 THROUGH STAGE 4 CHRONIC KIDNEY DISEASE, OR UNSPECIFIED CHRONIC KIDNEY DISEASE: ICD-10-CM

## 2019-11-01 DIAGNOSIS — I25.10 ATHEROSCLEROTIC HEART DISEASE OF NATIVE CORONARY ARTERY WITHOUT ANGINA PECTORIS: ICD-10-CM

## 2019-11-01 DIAGNOSIS — R65.20 SEVERE SEPSIS WITHOUT SEPTIC SHOCK: ICD-10-CM

## 2019-11-01 DIAGNOSIS — J44.1 CHRONIC OBSTRUCTIVE PULMONARY DISEASE WITH (ACUTE) EXACERBATION: ICD-10-CM

## 2019-11-01 DIAGNOSIS — J96.21 ACUTE AND CHRONIC RESPIRATORY FAILURE WITH HYPOXIA: ICD-10-CM

## 2019-11-01 DIAGNOSIS — D64.9 ANEMIA, UNSPECIFIED: ICD-10-CM

## 2019-11-01 DIAGNOSIS — Z87.891 PERSONAL HISTORY OF NICOTINE DEPENDENCE: ICD-10-CM

## 2019-11-01 DIAGNOSIS — A41.9 SEPSIS, UNSPECIFIED ORGANISM: ICD-10-CM

## 2019-11-01 DIAGNOSIS — J15.6 PNEUMONIA DUE TO OTHER GRAM-NEGATIVE BACTERIA: ICD-10-CM

## 2019-11-01 DIAGNOSIS — J44.0 CHRONIC OBSTRUCTIVE PULMONARY DISEASE WITH (ACUTE) LOWER RESPIRATORY INFECTION: ICD-10-CM

## 2019-11-01 DIAGNOSIS — N18.3 CHRONIC KIDNEY DISEASE, STAGE 3 (MODERATE): ICD-10-CM

## 2019-11-01 DIAGNOSIS — E03.9 HYPOTHYROIDISM, UNSPECIFIED: ICD-10-CM

## 2019-11-01 DIAGNOSIS — I47.2 VENTRICULAR TACHYCARDIA: ICD-10-CM

## 2019-11-01 DIAGNOSIS — I34.0 NONRHEUMATIC MITRAL (VALVE) INSUFFICIENCY: ICD-10-CM

## 2019-11-01 DIAGNOSIS — I48.20 CHRONIC ATRIAL FIBRILLATION, UNSPECIFIED: ICD-10-CM

## 2019-11-01 DIAGNOSIS — I50.33 ACUTE ON CHRONIC DIASTOLIC (CONGESTIVE) HEART FAILURE: ICD-10-CM

## 2019-11-01 DIAGNOSIS — I07.1 RHEUMATIC TRICUSPID INSUFFICIENCY: ICD-10-CM

## 2019-11-04 ENCOUNTER — OUTPATIENT (OUTPATIENT)
Dept: OUTPATIENT SERVICES | Facility: HOSPITAL | Age: 84
LOS: 1 days | Discharge: HOME | End: 2019-11-04

## 2019-11-04 DIAGNOSIS — Z98.890 OTHER SPECIFIED POSTPROCEDURAL STATES: Chronic | ICD-10-CM

## 2019-11-04 DIAGNOSIS — Z02.9 ENCOUNTER FOR ADMINISTRATIVE EXAMINATIONS, UNSPECIFIED: ICD-10-CM

## 2019-11-05 LAB — MENADIONE SERPL-MCNC: <.13 NG/ML — LOW (ref 0.13–1.88)

## 2019-11-12 ENCOUNTER — OUTPATIENT (OUTPATIENT)
Dept: OUTPATIENT SERVICES | Facility: HOSPITAL | Age: 84
LOS: 1 days | Discharge: HOME | End: 2019-11-12

## 2019-11-12 DIAGNOSIS — Z98.890 OTHER SPECIFIED POSTPROCEDURAL STATES: Chronic | ICD-10-CM

## 2019-11-12 DIAGNOSIS — R79.89 OTHER SPECIFIED ABNORMAL FINDINGS OF BLOOD CHEMISTRY: ICD-10-CM

## 2019-11-18 ENCOUNTER — OUTPATIENT (OUTPATIENT)
Dept: OUTPATIENT SERVICES | Facility: HOSPITAL | Age: 84
LOS: 1 days | Discharge: HOME | End: 2019-11-18

## 2019-11-18 DIAGNOSIS — I48.0 PAROXYSMAL ATRIAL FIBRILLATION: ICD-10-CM

## 2019-11-18 DIAGNOSIS — Z98.890 OTHER SPECIFIED POSTPROCEDURAL STATES: Chronic | ICD-10-CM

## 2019-11-26 ENCOUNTER — OUTPATIENT (OUTPATIENT)
Dept: OUTPATIENT SERVICES | Facility: HOSPITAL | Age: 84
LOS: 1 days | Discharge: HOME | End: 2019-11-26

## 2019-11-26 DIAGNOSIS — Z98.890 OTHER SPECIFIED POSTPROCEDURAL STATES: Chronic | ICD-10-CM

## 2019-11-26 DIAGNOSIS — I48.91 UNSPECIFIED ATRIAL FIBRILLATION: ICD-10-CM

## 2019-12-02 ENCOUNTER — OUTPATIENT (OUTPATIENT)
Dept: OUTPATIENT SERVICES | Facility: HOSPITAL | Age: 84
LOS: 1 days | Discharge: HOME | End: 2019-12-02

## 2019-12-02 DIAGNOSIS — Z98.890 OTHER SPECIFIED POSTPROCEDURAL STATES: Chronic | ICD-10-CM

## 2019-12-02 DIAGNOSIS — I48.91 UNSPECIFIED ATRIAL FIBRILLATION: ICD-10-CM

## 2019-12-03 ENCOUNTER — INPATIENT (INPATIENT)
Facility: HOSPITAL | Age: 84
LOS: 17 days | End: 2019-12-21
Attending: HOSPITALIST | Admitting: HOSPITALIST
Payer: MEDICARE

## 2019-12-03 VITALS — DIASTOLIC BLOOD PRESSURE: 79 MMHG | HEART RATE: 77 BPM | SYSTOLIC BLOOD PRESSURE: 137 MMHG

## 2019-12-03 DIAGNOSIS — Z98.890 OTHER SPECIFIED POSTPROCEDURAL STATES: Chronic | ICD-10-CM

## 2019-12-03 LAB
ALBUMIN SERPL ELPH-MCNC: 4.3 G/DL — SIGNIFICANT CHANGE UP (ref 3.5–5.2)
ALP SERPL-CCNC: 157 U/L — HIGH (ref 30–115)
ALT FLD-CCNC: 36 U/L — SIGNIFICANT CHANGE UP (ref 0–41)
ANION GAP SERPL CALC-SCNC: 21 MMOL/L — HIGH (ref 7–14)
APPEARANCE UR: ABNORMAL
APTT BLD: 56.1 SEC — HIGH (ref 27–39.2)
AST SERPL-CCNC: 50 U/L — HIGH (ref 0–41)
BASE EXCESS BLDV CALC-SCNC: -0.4 MMOL/L — SIGNIFICANT CHANGE UP (ref -2–2)
BASE EXCESS BLDV CALC-SCNC: -5.8 MMOL/L — LOW (ref -2–2)
BASOPHILS # BLD AUTO: 0.02 K/UL — SIGNIFICANT CHANGE UP (ref 0–0.2)
BASOPHILS NFR BLD AUTO: 0.2 % — SIGNIFICANT CHANGE UP (ref 0–1)
BILIRUB SERPL-MCNC: 1.5 MG/DL — HIGH (ref 0.2–1.2)
BILIRUB UR-MCNC: NEGATIVE — SIGNIFICANT CHANGE UP
BLD GP AB SCN SERPL QL: SIGNIFICANT CHANGE UP
BUN SERPL-MCNC: 83 MG/DL — CRITICAL HIGH (ref 10–20)
CA-I SERPL-SCNC: 1.2 MMOL/L — SIGNIFICANT CHANGE UP (ref 1.12–1.3)
CA-I SERPL-SCNC: 1.23 MMOL/L — SIGNIFICANT CHANGE UP (ref 1.12–1.3)
CALCIUM SERPL-MCNC: 9.7 MG/DL — SIGNIFICANT CHANGE UP (ref 8.5–10.1)
CHLORIDE SERPL-SCNC: 92 MMOL/L — LOW (ref 98–110)
CK MB CFR SERPL CALC: 11.5 NG/ML — HIGH (ref 0.6–6.3)
CK SERPL-CCNC: 114 U/L — SIGNIFICANT CHANGE UP (ref 0–225)
CO2 SERPL-SCNC: 22 MMOL/L — SIGNIFICANT CHANGE UP (ref 17–32)
COLOR SPEC: YELLOW — SIGNIFICANT CHANGE UP
CREAT SERPL-MCNC: 2.2 MG/DL — HIGH (ref 0.7–1.5)
DIFF PNL FLD: NEGATIVE — SIGNIFICANT CHANGE UP
EOSINOPHIL # BLD AUTO: 0 K/UL — SIGNIFICANT CHANGE UP (ref 0–0.7)
EOSINOPHIL NFR BLD AUTO: 0 % — SIGNIFICANT CHANGE UP (ref 0–8)
GAS PNL BLDA: SIGNIFICANT CHANGE UP
GAS PNL BLDV: 137 MMOL/L — SIGNIFICANT CHANGE UP (ref 136–145)
GAS PNL BLDV: 137 MMOL/L — SIGNIFICANT CHANGE UP (ref 136–145)
GAS PNL BLDV: SIGNIFICANT CHANGE UP
GAS PNL BLDV: SIGNIFICANT CHANGE UP
GLUCOSE SERPL-MCNC: 142 MG/DL — HIGH (ref 70–99)
GLUCOSE UR QL: NEGATIVE MG/DL — SIGNIFICANT CHANGE UP
HCO3 BLDV-SCNC: 25 MMOL/L — SIGNIFICANT CHANGE UP (ref 22–29)
HCO3 BLDV-SCNC: 28 MMOL/L — SIGNIFICANT CHANGE UP (ref 22–29)
HCT VFR BLD CALC: 35.1 % — LOW (ref 42–52)
HCT VFR BLDA CALC: 29.5 % — LOW (ref 34–44)
HCT VFR BLDA CALC: 31.5 % — LOW (ref 34–44)
HGB BLD CALC-MCNC: 10.3 G/DL — LOW (ref 14–18)
HGB BLD CALC-MCNC: 9.6 G/DL — LOW (ref 14–18)
HGB BLD-MCNC: 10 G/DL — LOW (ref 14–18)
HOROWITZ INDEX BLDV+IHG-RTO: 21 — SIGNIFICANT CHANGE UP
HOROWITZ INDEX BLDV+IHG-RTO: 40 — SIGNIFICANT CHANGE UP
IMM GRANULOCYTES NFR BLD AUTO: 1.7 % — HIGH (ref 0.1–0.3)
INR BLD: 10.77 RATIO — CRITICAL HIGH (ref 0.65–1.3)
KETONES UR-MCNC: NEGATIVE — SIGNIFICANT CHANGE UP
LACTATE BLDV-MCNC: 4.1 MMOL/L — HIGH (ref 0.5–1.6)
LACTATE BLDV-MCNC: 7.3 MMOL/L — HIGH (ref 0.5–1.6)
LEUKOCYTE ESTERASE UR-ACNC: ABNORMAL
LYMPHOCYTES # BLD AUTO: 1.38 K/UL — SIGNIFICANT CHANGE UP (ref 1.2–3.4)
LYMPHOCYTES # BLD AUTO: 12.4 % — LOW (ref 20.5–51.1)
MCHC RBC-ENTMCNC: 18.7 PG — LOW (ref 27–31)
MCHC RBC-ENTMCNC: 28.5 G/DL — LOW (ref 32–37)
MCV RBC AUTO: 65.6 FL — LOW (ref 80–94)
MONOCYTES # BLD AUTO: 1.35 K/UL — HIGH (ref 0.1–0.6)
MONOCYTES NFR BLD AUTO: 12.1 % — HIGH (ref 1.7–9.3)
NEUTROPHILS # BLD AUTO: 8.21 K/UL — HIGH (ref 1.4–6.5)
NEUTROPHILS NFR BLD AUTO: 73.6 % — SIGNIFICANT CHANGE UP (ref 42.2–75.2)
NITRITE UR-MCNC: NEGATIVE — SIGNIFICANT CHANGE UP
NRBC # BLD: 2 /100 WBCS — HIGH (ref 0–0)
NT-PROBNP SERPL-SCNC: HIGH PG/ML (ref 0–300)
PCO2 BLDV: 64 MMHG — HIGH (ref 41–51)
PCO2 BLDV: 80 MMHG — HIGH (ref 41–51)
PH BLDV: 7.1 — LOW (ref 7.26–7.43)
PH BLDV: 7.25 — LOW (ref 7.26–7.43)
PH UR: 5.5 — SIGNIFICANT CHANGE UP (ref 5–8)
PLATELET # BLD AUTO: 205 K/UL — SIGNIFICANT CHANGE UP (ref 130–400)
PO2 BLDV: 24 MMHG — SIGNIFICANT CHANGE UP (ref 20–40)
PO2 BLDV: 28 MMHG — SIGNIFICANT CHANGE UP (ref 20–40)
POTASSIUM BLDV-SCNC: 4.7 MMOL/L — SIGNIFICANT CHANGE UP (ref 3.3–5.6)
POTASSIUM BLDV-SCNC: 5.2 MMOL/L — SIGNIFICANT CHANGE UP (ref 3.3–5.6)
POTASSIUM SERPL-MCNC: 5.6 MMOL/L — HIGH (ref 3.5–5)
POTASSIUM SERPL-SCNC: 5.6 MMOL/L — HIGH (ref 3.5–5)
PROT SERPL-MCNC: 8 G/DL — SIGNIFICANT CHANGE UP (ref 6–8)
PROT UR-MCNC: >=300 MG/DL
PROTHROM AB SERPL-ACNC: >40 SEC — HIGH (ref 9.95–12.87)
RBC # BLD: 5.35 M/UL — SIGNIFICANT CHANGE UP (ref 4.7–6.1)
RBC # FLD: 22.2 % — HIGH (ref 11.5–14.5)
SAO2 % BLDV: 20 % — SIGNIFICANT CHANGE UP
SAO2 % BLDV: 34 % — SIGNIFICANT CHANGE UP
SODIUM SERPL-SCNC: 135 MMOL/L — SIGNIFICANT CHANGE UP (ref 135–146)
SP GR SPEC: >=1.03 (ref 1.01–1.03)
TROPONIN T SERPL-MCNC: 0.03 NG/ML — CRITICAL HIGH
URATE CRY FLD QL MICRO: ABNORMAL /HPF
UROBILINOGEN FLD QL: 0.2 MG/DL — SIGNIFICANT CHANGE UP (ref 0.2–0.2)
WBC # BLD: 11.15 K/UL — HIGH (ref 4.8–10.8)
WBC # FLD AUTO: 11.15 K/UL — HIGH (ref 4.8–10.8)
WBC UR QL: ABNORMAL /HPF

## 2019-12-03 PROCEDURE — 72125 CT NECK SPINE W/O DYE: CPT | Mod: 26

## 2019-12-03 PROCEDURE — 74176 CT ABD & PELVIS W/O CONTRAST: CPT | Mod: 26

## 2019-12-03 PROCEDURE — 72170 X-RAY EXAM OF PELVIS: CPT | Mod: 26

## 2019-12-03 PROCEDURE — 71046 X-RAY EXAM CHEST 2 VIEWS: CPT | Mod: 26

## 2019-12-03 PROCEDURE — 70450 CT HEAD/BRAIN W/O DYE: CPT | Mod: 26

## 2019-12-03 PROCEDURE — 99223 1ST HOSP IP/OBS HIGH 75: CPT | Mod: AI

## 2019-12-03 PROCEDURE — 71250 CT THORAX DX C-: CPT | Mod: 26

## 2019-12-03 PROCEDURE — 99497 ADVNCD CARE PLAN 30 MIN: CPT | Mod: 25

## 2019-12-03 PROCEDURE — 99291 CRITICAL CARE FIRST HOUR: CPT

## 2019-12-03 PROCEDURE — 71045 X-RAY EXAM CHEST 1 VIEW: CPT | Mod: 26

## 2019-12-03 RX ORDER — IPRATROPIUM/ALBUTEROL SULFATE 18-103MCG
3 AEROSOL WITH ADAPTER (GRAM) INHALATION
Qty: 0 | Refills: 0 | DISCHARGE

## 2019-12-03 RX ORDER — CHLORHEXIDINE GLUCONATE 213 G/1000ML
1 SOLUTION TOPICAL
Refills: 0 | Status: DISCONTINUED | OUTPATIENT
Start: 2019-12-03 | End: 2019-12-18

## 2019-12-03 RX ORDER — CEFEPIME 1 G/1
2000 INJECTION, POWDER, FOR SOLUTION INTRAMUSCULAR; INTRAVENOUS ONCE
Refills: 0 | Status: COMPLETED | OUTPATIENT
Start: 2019-12-03 | End: 2019-12-03

## 2019-12-03 RX ORDER — LEVOTHYROXINE SODIUM 125 MCG
50 TABLET ORAL DAILY
Refills: 0 | Status: DISCONTINUED | OUTPATIENT
Start: 2019-12-03 | End: 2019-12-21

## 2019-12-03 RX ORDER — ATORVASTATIN CALCIUM 80 MG/1
10 TABLET, FILM COATED ORAL AT BEDTIME
Refills: 0 | Status: DISCONTINUED | OUTPATIENT
Start: 2019-12-03 | End: 2019-12-21

## 2019-12-03 RX ORDER — VANCOMYCIN HCL 1 G
1000 VIAL (EA) INTRAVENOUS ONCE
Refills: 0 | Status: COMPLETED | OUTPATIENT
Start: 2019-12-03 | End: 2019-12-03

## 2019-12-03 RX ORDER — FUROSEMIDE 40 MG
0 TABLET ORAL
Qty: 0 | Refills: 0 | DISCHARGE

## 2019-12-03 RX ORDER — SODIUM CHLORIDE 9 MG/ML
250 INJECTION, SOLUTION INTRAVENOUS ONCE
Refills: 0 | Status: DISCONTINUED | OUTPATIENT
Start: 2019-12-03 | End: 2019-12-03

## 2019-12-03 RX ORDER — CHOLECALCIFEROL (VITAMIN D3) 125 MCG
2000 CAPSULE ORAL DAILY
Refills: 0 | Status: DISCONTINUED | OUTPATIENT
Start: 2019-12-03 | End: 2019-12-21

## 2019-12-03 RX ORDER — SODIUM CHLORIDE 9 MG/ML
250 INJECTION INTRAMUSCULAR; INTRAVENOUS; SUBCUTANEOUS ONCE
Refills: 0 | Status: COMPLETED | OUTPATIENT
Start: 2019-12-03 | End: 2019-12-03

## 2019-12-03 RX ORDER — FUROSEMIDE 40 MG
20 TABLET ORAL ONCE
Refills: 0 | Status: COMPLETED | OUTPATIENT
Start: 2019-12-03 | End: 2019-12-03

## 2019-12-03 RX ORDER — OMEPRAZOLE 10 MG/1
0 CAPSULE, DELAYED RELEASE ORAL
Qty: 30 | Refills: 0 | DISCHARGE

## 2019-12-03 RX ORDER — METOPROLOL TARTRATE 50 MG
12.5 TABLET ORAL EVERY 12 HOURS
Refills: 0 | Status: DISCONTINUED | OUTPATIENT
Start: 2019-12-03 | End: 2019-12-04

## 2019-12-03 RX ORDER — FUROSEMIDE 40 MG
20 TABLET ORAL
Refills: 0 | Status: DISCONTINUED | OUTPATIENT
Start: 2019-12-03 | End: 2019-12-06

## 2019-12-03 RX ORDER — WARFARIN SODIUM 2.5 MG/1
0 TABLET ORAL
Qty: 0 | Refills: 0 | DISCHARGE

## 2019-12-03 RX ADMIN — Medication 20 MILLIGRAM(S): at 20:14

## 2019-12-03 RX ADMIN — SODIUM CHLORIDE 250 MILLILITER(S): 9 INJECTION INTRAMUSCULAR; INTRAVENOUS; SUBCUTANEOUS at 14:20

## 2019-12-03 RX ADMIN — CEFEPIME 2000 MILLIGRAM(S): 1 INJECTION, POWDER, FOR SOLUTION INTRAMUSCULAR; INTRAVENOUS at 12:20

## 2019-12-03 RX ADMIN — Medication 1000 MILLIGRAM(S): at 14:00

## 2019-12-03 RX ADMIN — SODIUM CHLORIDE 250 MILLILITER(S): 9 INJECTION INTRAMUSCULAR; INTRAVENOUS; SUBCUTANEOUS at 13:30

## 2019-12-03 RX ADMIN — Medication 250 MILLIGRAM(S): at 11:40

## 2019-12-03 RX ADMIN — CEFEPIME 100 MILLIGRAM(S): 1 INJECTION, POWDER, FOR SOLUTION INTRAMUSCULAR; INTRAVENOUS at 11:40

## 2019-12-03 NOTE — ED ADULT TRIAGE NOTE - CHIEF COMPLAINT QUOTE
BIBA via [employer] as notification for unresponsive adult, care continued by ED notification team, stroke called by MD at 10:20am

## 2019-12-03 NOTE — GOALS OF CARE CONVERSATION - ADVANCED CARE PLANNING - CONVERSATION DETAILS
Goals of care regarding patient's comorbid conditions, CHF, COPD was discussed with both daughter because of patient's change in mental status and he was unable to participate in the discussion. Patient has no living will/ACP, per daughters, their last conversation with patient, he wanted "everything" and treatments done. Patient is Full code which includes resuscitation and Endotracheal intubation when needed.

## 2019-12-03 NOTE — GOALS OF CARE CONVERSATION - ADVANCED CARE PLANNING - AGENT'S NAME
Tammy Narvaez 79 year old male with PMH of CAD s/p CABG s/p stent, Afib on AC, DM-2 (diet controlled), lymphoma s/p chemo/RT reportedly in remission, BPH, CHF, cholecystectomy; presented with urinary retention and now s/p TURP on 9/12/18 with CBI. Had been on ampicillin since 9/12. Patient developed a fever the early morning of 9/14 associated with tachycardia, chills, SOB/hypoxia, and leukocytosis. CXR revealed LLL consolidation consistent with PNA. After fever, patient's abx changed to Zosyn and started on IVF's. Medicine consulted for assistance with management of fever and LLL PNA.

## 2019-12-03 NOTE — ED PROVIDER NOTE - CARE PLAN
Principal Discharge DX:	Altered mental status, unspecified altered mental status type  Secondary Diagnosis:	Acute on chronic respiratory failure with hypercapnia  Secondary Diagnosis:	Pleural effusion  Secondary Diagnosis:	Severe sepsis

## 2019-12-03 NOTE — ED PROVIDER NOTE - PHYSICAL EXAMINATION
Physical Exam    Vital Signs: I have reviewed the initial vital signs.  Constitutional: ill appearing  Eyes: Conjunctiva pink, Sclera clear, pupils react to light  Cardiovascular: S1 and S2, regular rate, regular rhythm, well-perfused extremities, radial pulses equal and 2+, pedal pulses 2+ and equal.   Respiratory: mild labored respiratory effort, right bs decreased, rales b/l  Gastrointestinal: soft, non-tender abdomen, no pulsatile mass, normal bowl sounds  Musculoskeletal: supple neck, no lower extremity edema, no midline tenderness  Integumentary: cold, dry, no rash  Neurologic: GCS 10

## 2019-12-03 NOTE — ED PROVIDER NOTE - CLINICAL SUMMARY MEDICAL DECISION MAKING FREE TEXT BOX
89y male above PMH bibems as notification for AMS, has been having his usual SOB, slid out of wheelchair onto buttocks, no head trauma, put back in bed but confused since, of note told to hold coumadin for elevated INR, on arrival minimally arousable to painful stimuli, no marks of trauma, protecting airway, conj pink neck supple cor irreg lungs with dim bs left hemithorax, abd snt pelvis stable from x 4 neuro nonfocal, stroke code called, no bleed, on return noted to be hypothermic and hypercapneic, placed on bipap, mahendra hugger and covered for sepsis, additional CTs performed given questionable trauma and elevated INR, as above, long d/w family regarding goals of care, is full code, will admit ICU

## 2019-12-03 NOTE — H&P ADULT - NSHPPHYSICALEXAM_GEN_ALL_CORE
ICU Vital Signs Last 24 Hrs  T(C): 34.9 (03 Dec 2019 16:08), Max: 34.9 (03 Dec 2019 16:08)  T(F): 94.8 (03 Dec 2019 16:08), Max: 94.8 (03 Dec 2019 16:08)  HR: 67 (03 Dec 2019 16:08) (54 - 83)  BP: 122/63 (03 Dec 2019 16:08) (109/59 - 180/95)  BP(mean): --  ABP: --  ABP(mean): --  RR: 24 (03 Dec 2019 16:08) (24 - 24)  SpO2: 100% (03 Dec 2019 16:08) (100% - 100%)    General: acutely ill looking male, on BIPAP  Neurology: lethargic  Eyes: PERRLA/   ENT/Neck: Neck supple, trachea midline, No JVD, Gross hearing intact  Respiratory: mild wheez, decreased breath sounds  CV: Irr S1S2, + murmurs, rubs or gallops  Abdominal: Soft, NT, ND +BS,   Extremities: No edema, + peripheral pulses  Skin: No Rashes, Hematoma, Ecchymosis

## 2019-12-03 NOTE — ED PROVIDER NOTE - OBJECTIVE STATEMENT
89 yo male, pmh of afib on coumadin, htn, hld, chf, presents to ed via ems from home for unresponsiveness. as per daughter at bedside at approximately 640am today, pt slid out of bed, normally ambulates with walker. Pt from that time on not answering daughter, talking, or following commands. Daughter denies recent illness, cough, or fever. Pt is supratheraputic inr as epr daughter of yesterday.

## 2019-12-03 NOTE — H&P ADULT - HISTORY OF PRESENT ILLNESS
89 yo male with multiple comorbid condition BIBA via [email or web address] as notification for unresponsive adult and stroke code called by MD at 10:20am which was eventually cancelled because patient was found to be septic.   Patient on BIPAP and with AMS so history was obtained from his 2 daughters at bedside. Patient was in his usual state of health until earlier today, he slipped from his chair and became unresponsive. All other history unable to obtain.     In ER found to be septic and hypothermic. 89 yo male with multiple comorbid condition like diastolic CHF, COPD on home O2, chronic atrial fibrillation on coumadin, CKD stage 3, hypothyroidism, HLD BIBA via [employer] as notification for unresponsive adult and stroke code called by MD at 10:20am which was eventually cancelled because patient was found to be septic.   Patient on BIPAP and with AMS so history was obtained from his 2 daughters at bedside. Patient was in his usual state of health until earlier today, he slipped from his chair and became unresponsive. All other history unable to obtain.     In ER found to be septic and hypothermic.

## 2019-12-03 NOTE — H&P ADULT - NSHPLABSRESULTS_GEN_ALL_CORE
CBC               10.0   11.15 )-----------( 205      ( 03 Dec 2019 10:31 )             35.1     Chem    12-03    135  |  92<L>  |  83<HH>  ----------------------------<  142<H>  5.6<H>   |  22  |  2.2<H>    Ca    9.7      03 Dec 2019 10:31    TPro  8.0  /  Alb  4.3  /  TBili  1.5<H>  /  DBili  x   /  AST  50<H>  /  ALT  36  /  AlkPhos  157<H>  12-03        CTH   MPRESSION:    Motion degraded exam shows no gross acute intracranial hemorrhage or mass   effect. No CT evidence of an acute transcortical infarct, however,   evaluation is degraded by motion.    Chronic ischemic changes in the frontoparietal white matter.      Pelvis XR  impression:  Osteopenia. Status post left hip replacement. No radiographic evidence of   periprosthetic fracture or hardware loosening.   Partially visualized bowel dilatation. Attention on scheduled CT abdomen   pelvis.  Degenerative changes of the right hip.   Partially visualized vascular stent in the lower abdomen.    CT cervical spine    IMPRESSION:    In comparison with the prior noncontrast CT scan of the cervical spine   dated June 28, 2019:    No acute fracture demonstrated.    Multilevel degenerative changes and osteopenia, unchanged.    New left pleural effusion and ground-glass opacities in the visualized   upper lobes.    CT abd/pelvi    IMPRESSION:     1. Since June 27, 2019, no evidence of acute traumatic injury within the   chest, abdomen, or pelvis.    2. Status post interval left total hip placement.    3. Findings suggestive of CHF, including increased size moderate   bilateral pleural effusions, left greater than right, bilateral   interlobular septal thickening, and scattered bilateral ground glass   opacities.      CT chest    1. Since June 27, 2019, no evidence of acute traumatic injury within the   chest, abdomen, or pelvis.    2. Status post interval left total hip placement.    3. Findings suggestive of CHF, including increased size moderate   bilateral pleural effusions, left greater than right, bilateral   interlobular septal thickening, andscattered bilateral groundglass   opacities.

## 2019-12-03 NOTE — ED ADULT NURSE NOTE - NSIMPLEMENTINTERV_GEN_ALL_ED
Implemented All Fall with Harm Risk Interventions:  [place] to call system. Call bell, personal items and telephone within reach. Instruct patient to call for assistance. Room bathroom lighting operational. Non-slip footwear when patient is off stretcher. Physically safe environment: no spills, clutter or unnecessary equipment. Stretcher in lowest position, wheels locked, appropriate side rails in place. Provide visual cue, wrist band, yellow gown, etc. Monitor gait and stability. Monitor for mental status changes and reorient to person, place, and time. Review medications for side effects contributing to fall risk. Reinforce activity limits and safety measures with patient and family. Provide visual clues: red socks.

## 2019-12-03 NOTE — ED PROVIDER NOTE - UNABLE TO OBTAIN
I am unable to obtain a comprehensive history, review of systems, past medical history, and/or physical exam due to constraints imposed by the urgency of the patient's clinical condition and/or mental status. Unresponsive

## 2019-12-03 NOTE — H&P ADULT - ASSESSMENT
89 yo male with chronic diastolic CHF, atrial fib on coumadin, copd on home o2, HTN, HLD, hypothyroidism admitted for     A/P    1. Acute toxic and metabolic encephalopathy suspect due to sepsis and acute on chronic respiratory failure with hypercapnia due to acute on chronic diastolic HF  -Admit to ICU  -Intensivist consulted and accepted admission  -NPO  -ID consult  -Cardiac consult  -telemetry  -IV epimeric antibiotics  -BIPAP  -Check blood and urine cultures  -Monitor labs and blood gas  -Daily weight  -Nolasco cath for strict I and Os  -CHG bath  - Hold all home medications for now     2. Worsening pleural effusion  - monitor and repeat CXR/CT chest  -Currently with supra therapeutic INR so cannot perform thoracentesis  -defer to pulmonologist for recommendation    3. Chronic atrial fibrillation rate controlled  - hold AC due to supra therapeutic INR  - Daily INR monitoring  - No active bleeding    4. HTN- currently with low to normotensive BP suspect due to infectious process  - Hold BP meds and monitor    5. COY on CKD 3  - monitor I and O  -Monitor BUN/creat  -Nephrologist consulted    6. Anemia of CKD  - monitor h/h    7. Hypothyroidism  - may start oral synthroid when more alert, if not consider IV synthroid    8 HLD  -Restart medication when alert and off BIPAP      #Progress Note Handoff  Pending (specify):  Consults Cardiologist, ID and nephrologist   Tests pending- morning labs and cultures  Family discussion: GOC discussed iwth 2 daughters, patient is full code  Disposition:  Unknown at this time 89 yo male with chronic diastolic CHF, atrial fib on coumadin, copd on home o2, HTN, HLD, hypothyroidism admitted for     A/P    1. Acute toxic and metabolic encephalopathy suspect due to sepsis and acute on chronic respiratory failure with hypercapnia due to acute on chronic diastolic HF  -Admit to ICU  -Intensivist consulted and accepted admission  -NPO  -ID consult  -Cardiac consult  -telemetry  -IV epimeric antibiotics  -BIPAP  -Check blood and urine cultures  -Monitor labs and blood gas  -Daily weight  -Nolasco cath for strict I and Os  -CHG bath  - Hold all home medications for now   - fractures ruled out because patient slumped to the floor    2. Worsening pleural effusion  - monitor and repeat CXR/CT chest  -Currently with supra therapeutic INR so cannot perform thoracentesis  -defer to pulmonologist for recommendation    3. Chronic atrial fibrillation rate controlled  - hold AC due to supra therapeutic INR  - Daily INR monitoring  - No active bleeding    4. HTN- currently with low to normotensive BP suspect due to infectious process  - Hold BP meds and monitor    5. COY on CKD 3  - monitor I and O  -Monitor BUN/creat  -Nephrologist consulted    6. Anemia of CKD  - monitor h/h    7. Hypothyroidism  - may start oral synthroid when more alert, if not consider IV synthroid    8 HLD  -Restart medication when alert and off BIPAP      #Progress Note Handoff  Pending (specify):  Consults Cardiologist, ID and nephrologist   Tests pending- morning labs and cultures  Family discussion: GOC discussed with 2 daughters, patient is full code. PATIENT CANNOT TAKE LEVAQUIN OR ANY SLEEPING PILLS PER DAUGHTERS  Disposition:  Unknown at this time

## 2019-12-03 NOTE — ED PROVIDER NOTE - CRITICAL CARE PROVIDED
direct patient care (not related to procedure)/conducted a detailed discussion of DNR status/additional history taking/documentation/consult w/ pt's family directly relating to pts condition/interpretation of diagnostic studies/consultation with other physicians

## 2019-12-03 NOTE — ED PROVIDER NOTE - PROGRESS NOTE DETAILS
pt with AMS with supratherapeutic INR, stroke code activated, not a tpa candidate patient hypothermic, covered for sepsis, hypercapneic, placed on bipap, all parameters improving, d/w daughters, patient is a full code, d/w Dr Coleman, accetped ICU of note, pt was a code sepsis at time of temperature, however has pleural effusions and chf so 30ml/kg bolus not given Vitulli: real time events not reflective of note time stamp- initial evalution GCS 10, s/p bipap and resucitation with abx and 250 bolus, pt more awake, follows commands, able to verbalize, gcs now 15. Gas has improved, pt admited to icu, hospitalist aware.

## 2019-12-04 LAB
ALBUMIN SERPL ELPH-MCNC: 3.6 G/DL — SIGNIFICANT CHANGE UP (ref 3.5–5.2)
ALP SERPL-CCNC: 129 U/L — HIGH (ref 30–115)
ALT FLD-CCNC: 63 U/L — HIGH (ref 0–41)
ANION GAP SERPL CALC-SCNC: 18 MMOL/L — HIGH (ref 7–14)
APTT BLD: 52.1 SEC — HIGH (ref 27–39.2)
AST SERPL-CCNC: 68 U/L — HIGH (ref 0–41)
BILIRUB SERPL-MCNC: 1.2 MG/DL — SIGNIFICANT CHANGE UP (ref 0.2–1.2)
BUN SERPL-MCNC: 91 MG/DL — CRITICAL HIGH (ref 10–20)
CALCIUM SERPL-MCNC: 9.2 MG/DL — SIGNIFICANT CHANGE UP (ref 8.5–10.1)
CHLORIDE SERPL-SCNC: 94 MMOL/L — LOW (ref 98–110)
CO2 SERPL-SCNC: 26 MMOL/L — SIGNIFICANT CHANGE UP (ref 17–32)
CREAT ?TM UR-MCNC: 61 MG/DL — SIGNIFICANT CHANGE UP
CREAT SERPL-MCNC: 2.4 MG/DL — HIGH (ref 0.7–1.5)
CULTURE RESULTS: NO GROWTH — SIGNIFICANT CHANGE UP
GLUCOSE SERPL-MCNC: 72 MG/DL — SIGNIFICANT CHANGE UP (ref 70–99)
HCT VFR BLD CALC: 30.2 % — LOW (ref 42–52)
HGB BLD-MCNC: 8.9 G/DL — LOW (ref 14–18)
INR BLD: 6.78 RATIO — CRITICAL HIGH (ref 0.65–1.3)
MCHC RBC-ENTMCNC: 18.7 PG — LOW (ref 27–31)
MCHC RBC-ENTMCNC: 29.5 G/DL — LOW (ref 32–37)
MCV RBC AUTO: 63.6 FL — LOW (ref 80–94)
NRBC # BLD: 0 /100 WBCS — SIGNIFICANT CHANGE UP (ref 0–0)
PLATELET # BLD AUTO: 121 K/UL — LOW (ref 130–400)
POTASSIUM SERPL-MCNC: 4.9 MMOL/L — SIGNIFICANT CHANGE UP (ref 3.5–5)
POTASSIUM SERPL-SCNC: 4.9 MMOL/L — SIGNIFICANT CHANGE UP (ref 3.5–5)
POTASSIUM UR-SCNC: 22 MMOL/L — SIGNIFICANT CHANGE UP
PROT SERPL-MCNC: 6.9 G/DL — SIGNIFICANT CHANGE UP (ref 6–8)
PROTHROM AB SERPL-ACNC: >40 SEC — HIGH (ref 9.95–12.87)
RBC # BLD: 4.75 M/UL — SIGNIFICANT CHANGE UP (ref 4.7–6.1)
RBC # FLD: 21.1 % — HIGH (ref 11.5–14.5)
SODIUM SERPL-SCNC: 138 MMOL/L — SIGNIFICANT CHANGE UP (ref 135–146)
SODIUM UR-SCNC: 108 MMOL/L — SIGNIFICANT CHANGE UP
SPECIMEN SOURCE: SIGNIFICANT CHANGE UP
TSH SERPL-MCNC: 2.7 UIU/ML — SIGNIFICANT CHANGE UP (ref 0.27–4.2)
UUN UR-MCNC: <6 MG/DL — SIGNIFICANT CHANGE UP
WBC # BLD: 14.83 K/UL — HIGH (ref 4.8–10.8)
WBC # FLD AUTO: 14.83 K/UL — HIGH (ref 4.8–10.8)

## 2019-12-04 PROCEDURE — 71046 X-RAY EXAM CHEST 2 VIEWS: CPT | Mod: 26

## 2019-12-04 PROCEDURE — 99233 SBSQ HOSP IP/OBS HIGH 50: CPT

## 2019-12-04 PROCEDURE — 71045 X-RAY EXAM CHEST 1 VIEW: CPT | Mod: 26

## 2019-12-04 PROCEDURE — 76775 US EXAM ABDO BACK WALL LIM: CPT | Mod: 26

## 2019-12-04 RX ORDER — CEFEPIME 1 G/1
500 INJECTION, POWDER, FOR SOLUTION INTRAMUSCULAR; INTRAVENOUS ONCE
Refills: 0 | Status: COMPLETED | OUTPATIENT
Start: 2019-12-04 | End: 2019-12-04

## 2019-12-04 RX ORDER — FUROSEMIDE 40 MG
10 TABLET ORAL
Qty: 500 | Refills: 0 | Status: DISCONTINUED | OUTPATIENT
Start: 2019-12-04 | End: 2019-12-05

## 2019-12-04 RX ORDER — FUROSEMIDE 40 MG
10 TABLET ORAL
Qty: 500 | Refills: 0 | Status: DISCONTINUED | OUTPATIENT
Start: 2019-12-04 | End: 2019-12-06

## 2019-12-04 RX ORDER — CEFEPIME 1 G/1
500 INJECTION, POWDER, FOR SOLUTION INTRAMUSCULAR; INTRAVENOUS EVERY 24 HOURS
Refills: 0 | Status: DISCONTINUED | OUTPATIENT
Start: 2019-12-05 | End: 2019-12-11

## 2019-12-04 RX ORDER — CEFEPIME 1 G/1
INJECTION, POWDER, FOR SOLUTION INTRAMUSCULAR; INTRAVENOUS
Refills: 0 | Status: DISCONTINUED | OUTPATIENT
Start: 2019-12-04 | End: 2019-12-11

## 2019-12-04 RX ORDER — ATROPINE SULFATE 0.1 MG/ML
0.5 SYRINGE (ML) INJECTION ONCE
Refills: 0 | Status: COMPLETED | OUTPATIENT
Start: 2019-12-04 | End: 2019-12-04

## 2019-12-04 RX ADMIN — Medication 20 MILLIGRAM(S): at 05:41

## 2019-12-04 RX ADMIN — Medication 12.5 MILLIGRAM(S): at 05:41

## 2019-12-04 RX ADMIN — Medication 5 MG/HR: at 17:58

## 2019-12-04 RX ADMIN — Medication 50 MICROGRAM(S): at 05:41

## 2019-12-04 RX ADMIN — Medication 0.5 MILLIGRAM(S): at 08:14

## 2019-12-04 RX ADMIN — CEFEPIME 100 MILLIGRAM(S): 1 INJECTION, POWDER, FOR SOLUTION INTRAMUSCULAR; INTRAVENOUS at 12:00

## 2019-12-04 RX ADMIN — CHLORHEXIDINE GLUCONATE 1 APPLICATION(S): 213 SOLUTION TOPICAL at 12:01

## 2019-12-04 NOTE — CONSULT NOTE ADULT - ASSESSMENT
IMPRESSION:  alter mental status resolve most likely secondary to symptomatic bradycardia   CHF   doubt sepsis no sign  of PNA   COY on CKD     PLAN:    CNS: neuro eval EEG doubt seizure     HEENT: oral care     PULMONARY: keep pox > 92 %     CARDIOVASCULAR:   cardiology consult /EP   gentle diuresis ??   apply CHEETAH   GI: GI prophylaxis.      RENAL: renal consult     INFECTIOUS DISEASE: follow cx continue abx for now   check RVP     HEMATOLOGICAL:  DVT prophylaxis.    ENDOCRINE:  Follow up FS.  Insulin protocol if needed.        CRITICAL CARE TIME SPENT: ***

## 2019-12-04 NOTE — CONSULT NOTE ADULT - SUBJECTIVE AND OBJECTIVE BOX
Patient is a 89y old  Male who presents with a chief complaint of Unresponsiveness (03 Dec 2019 16:13)    89 yo male with multiple comorbid condition like diastolic CHF, COPD on home O2, chronic atrial fibrillation on coumadin, CKD stage 3, hypothyroidism, HLD BIBA via [employer] as notification for unresponsive adult and stroke code called by MD at 10:20am which was eventually cancelled because patient was found to be septic.   Patient on BIPAP and with AMS so history was obtained from his 2 daughters at bedside. Patient was in his usual state of health until earlier yesterday, he slipped from his chair and became unresponsive. All other history unable to obtain.  this morning he was bradycardic HR low 30 also drop BP and unresponsive given atropin hr improve and his mental status improved     Patient History:   Past Medical, Past Surgical, and Family History:  PAST MEDICAL HISTORY:  Afib     CHF (congestive heart failure)     Hyperlipidemia     Hypertension.     PAST SURGICAL HISTORY:  History of hip surgery     S/P AAA (abdominal aortic aneurysm) repair.     FAMILY HISTORY:  FH: congestive heart failure, sister.    ROS:  See HPI    PHYSICAL EXAM    ICU Vital Signs Last 24 Hrs  T(C): 35.3 (04 Dec 2019 11:00), Max: 36.3 (03 Dec 2019 21:10)  T(F): 95.5 (04 Dec 2019 11:00), Max: 97.3 (03 Dec 2019 21:10)  HR: 79 (04 Dec 2019 09:00) (34 - 90)  BP: 121/69 (04 Dec 2019 09:00) (77/41 - 142/67)  BP(mean): 89 (04 Dec 2019 09:00) (53 - 90)  ABP: --  ABP(mean): --  RR: 30 (04 Dec 2019 11:00) (20 - 44)  SpO2: 88% (04 Dec 2019 08:24) (88% - 100%)      General: now AOx3  HEENT:           jillian     Lymph Nodes: NO cervical LN   Lungs: Bilateral cracklles   Cardiovascular: Regular   Abdomen: Soft, Positive BS  Extremities: No clubbing   Skin: warm   Neurological:  no focal deficit   Musculoskeletal: move all ext     I&O's Detail    03 Dec 2019 07:01  -  04 Dec 2019 07:00  --------------------------------------------------------  IN:    IV PiggyBack: 300 mL    Sodium Chloride 0.9% IV Bolus: 250 mL  Total IN: 550 mL    OUT:    Indwelling Catheter - Urethral: 115 mL    Voided: 100 mL  Total OUT: 215 mL    Total NET: 335 mL      04 Dec 2019 07:01  -  04 Dec 2019 11:07  --------------------------------------------------------  IN:  Total IN: 0 mL    OUT:    Indwelling Catheter - Urethral: 90 mL  Total OUT: 90 mL    Total NET: -90 mL          LABS:                          8.9    14.83 )-----------( 121      ( 04 Dec 2019 05:30 )             30.2         04 Dec 2019 05:30    138    |  94     |  91     ----------------------------<  72     4.9     |  26     |  2.4      Ca    9.2        04 Dec 2019 05:30    TPro  6.9    /  Alb  3.6    /  TBili  1.2    /  DBili  x      /  AST  68     /  ALT  63     /  AlkPhos  129    04 Dec 2019 05:30  Amylase x     lipase x                                                 PT/INR - ( 04 Dec 2019 05:30 )   PT: >40.00 sec;   INR: 6.78 ratio         PTT - ( 04 Dec 2019 05:30 )  PTT:52.1 sec                                       Urinalysis Basic - ( 03 Dec 2019 11:24 )    Color: Yellow / Appearance: Slightly Cloudy / SG: >=1.030 / pH: x  Gluc: x / Ketone: Negative  / Bili: Negative / Urobili: 0.2 mg/dL   Blood: x / Protein: >=300 mg/dL / Nitrite: Negative   Leuk Esterase: Small / RBC: x / WBC 6-10 /HPF   Sq Epi: x / Non Sq Epi: x / Bacteria: x          CARDIAC MARKERS ( 03 Dec 2019 10:31 )  x     / 0.03 ng/mL / 114 U/L / x     / 11.5 ng/mL                                                                                                                                         ABG - ( 03 Dec 2019 12:41 )  pH, Arterial: 7.21  pH, Blood: x     /  pCO2: 67    /  pO2: 22    / HCO3: 27    / Base Excess: -2.0  /  SaO2: 21                  MEDICATIONS  (STANDING):  atorvastatin 10 milliGRAM(s) Oral at bedtime  cefepime   IVPB      cefepime   IVPB 500 milliGRAM(s) IV Intermittent once  chlorhexidine 4% Liquid 1 Application(s) Topical <User Schedule>  cholecalciferol 2000 Unit(s) Oral daily  furosemide    Tablet 20 milliGRAM(s) Oral two times a day  levothyroxine 50 MICROGram(s) Oral daily  metoprolol succinate ER 12.5 milliGRAM(s) Oral every 12 hours    MEDICATIONS  (PRN):          Xrays:  TLC:  OG:  ET tube:                                                                                    small b/l effusion left more then right    ECHO:  [facility] ICU:  < from: CT Head No Cont (12.03.19 @ 10:37) >    Motion degraded exam shows no gross acute intracranial hemorrhage or mass   effect. No CT evidence of an acute transcortical infarct, however,   evaluation is degraded by motion.    Chronic ischemic changes in the frontoparietal white matter.    < end of copied text >

## 2019-12-04 NOTE — PROGRESS NOTE ADULT - ASSESSMENT
89 y/m with history of CHF, DLD, Afib, HTN presented after a syncopal episode at home, was hypoxic in ED and was placed in BIPAP:    # Altered mental 89 y/m with history of CHF, DLD, Afib, HTN presented after a syncopal episode at home, was hypoxic in ED and was placed in BIPAP:    # Altered mental status:  could be d/t bradycardia, had an episode this AM, where he passed out with bradycardia, responded to Atropine  CT head : Negative  Neurology evaluation     # Bradycardia:  Received .5 mg of Atropine, Improved  EKG show Afib with PVC  Cardiology consulted  WIll keep atropine at bedside  Hold metoprolol    # Acute on chronic CHF:  CXR show bilateral effusion, more on left  C/w gentle diuresis with lasix  c/w BIPAP PRN    # Leukocytosis:  c/w Empiric antibiotics Cefepime for now  Panculture    # Acute kidney injury:  F/u urine lytes and renal sono  will monitor    #AFib;  Coumadin on hold d/t supratherapeutic INR  Monitor INR    #Diet:  NPO when on BIPAP    # Activity:  Bed rest for now    # DVT ppx:  supra therapeutic iNR    Code:  Discussed with daughter, who wants full code for now.

## 2019-12-04 NOTE — DIETITIAN INITIAL EVALUATION ADULT. - OTHER INFO
89 year old male with hx of CHF, COPD, chronic Afib, CKD , hypothyroidism, AAA repair BIBA 2/2 unresponsiveness, stroke code called and then cancelled. pt found to be septic with hypercapnia 2/2 acute vs chronic CHF. placed on continuous bipap. bradycardic. EEG in progress. no family at bedside.

## 2019-12-04 NOTE — CONSULT NOTE ADULT - SUBJECTIVE AND OBJECTIVE BOX
[facility]  INITIAL CONSULT NOTE  --------------------------------------------------------------------------------  HPI:  89 yo male h/o systolic CHF w/ LVEF 45%, severe pulmonary HTN on Echo in February, CKD w/ creat ~ 1.7 in October, 1.5 March 2018.  Presented w/ altered mental status, found to be bradycardic.  Screat 2.2 on admission, now 2.4.  Pt felt to be in decompensated CHF, but BP dropping after Lasix IV push.  Persistently oliguric.  Pt able to state he is SOB        PAST HISTORY  --------------------------------------------------------------------------------  PAST MEDICAL & SURGICAL HISTORY:  Hyperlipidemia  CHF (congestive heart failure)  Afib  Hypertension  History of hip surgery: Left total hip replacement  S/P AAA (abdominal aortic aneurysm) repair    FAMILY HISTORY:  FH: congestive heart failure: sister    PAST SOCIAL HISTORY:    ALLERGIES & MEDICATIONS  --------------------------------------------------------------------------------  Allergies    levofloxacin (Swelling (Mild))    Intolerances      Standing Inpatient Medications  atorvastatin 10 milliGRAM(s) Oral at bedtime  cefepime   IVPB      chlorhexidine 4% Liquid 1 Application(s) Topical <User Schedule>  cholecalciferol 2000 Unit(s) Oral daily  furosemide    Tablet 20 milliGRAM(s) Oral two times a day  levothyroxine 50 MICROGram(s) Oral daily    PRN Inpatient Medications      REVIEW OF SYSTEMS  --------------------------------------------------------------------------------  Gen: No weight changes, fatigue, fevers/chills, weakness  Skin: No rashes  Head/Eyes/Ears/Mouth: No headache; Normal hearing; Normal vision w/o blurriness; No sinus pain/discomfort, sore throat  Respiratory: No dyspnea, cough, wheezing, hemoptysis  CV: No chest pain, PND, orthopnea  GI: No abdominal pain, diarrhea, constipation, nausea, vomiting, melena, hematochezia  : No increased frequency, dysuria, hematuria, nocturia  MSK: No joint pain/swelling; no back pain; no edema  Neuro: No dizziness/lightheadedness, weakness, seizures, numbness, tingling  Heme: No easy bruising or bleeding  Endo: No heat/cold intolerance  Psych: No significant nervousness, anxiety, stress, depression    All other systems were reviewed and are negative, except as noted.    VITALS/PHYSICAL EXAM  --------------------------------------------------------------------------------  T(C): 35.3 (12-04-19 @ 15:00), Max: 36.3 (12-03-19 @ 21:10)  HR: 79 (12-04-19 @ 15:07) (34 - 90)  BP: 100/58 (12-04-19 @ 15:07) (77/41 - 142/56)  RR: 44 (12-04-19 @ 15:07) (17 - 49)  SpO2: 97% (12-04-19 @ 15:07) (86% - 100%)  Wt(kg): --  Height (cm): 160 (12-03-19 @ 16:08)  Weight (kg): 64.9 (12-03-19 @ 16:08)  BMI (kg/m2): 25.4 (12-03-19 @ 16:08)  BSA (m2): 1.68 (12-03-19 @ 16:08)      12-03-19 @ 07:01  -  12-04-19 @ 07:00  --------------------------------------------------------  IN: 550 mL / OUT: 215 mL / NET: 335 mL    12-04-19 @ 07:01  -  12-04-19 @ 16:26  --------------------------------------------------------  IN: 50 mL / OUT: 120 mL / NET: -70 mL      Physical Exam:  	Gen:  in mild distress, on BiPAP  	HEENT: PERRL, supple neck, clear oropharynx  	Pulm: reduced BS ~ 1/2 up, no rales  	CV: RRR, S1S2; no rub  	Back: No spinal or CVA tenderness; no sacral edema  	Abd: +BS, soft, nontender/nondistended  	: No suprapubic tenderness  	UE: Warm, FROM, no clubbing, no edema  	LE: Warm, FROM, no clubbing, no edema  	Neuro: No focal deficits  	Psych: Normal affect and mood  	Skin: Warm, without rashes  	Vascular access:    LABS/STUDIES  --------------------------------------------------------------------------------              8.9    14.83 >-----------<  121      [12-04-19 @ 05:30]              30.2     138  |  94  |  91  ----------------------------<  72      [12-04-19 @ 05:30]  4.9   |  26  |  2.4        Ca     9.2     [12-04-19 @ 05:30]    TPro  6.9  /  Alb  3.6  /  TBili  1.2  /  DBili  x   /  AST  68  /  ALT  63  /  AlkPhos  129  [12-04-19 @ 05:30]    PT/INR: PT >40.00, INR 6.78       [12-04-19 @ 05:30]  PTT: 52.1       [12-04-19 @ 05:30]    Troponin 0.03      [12-03-19 @ 10:31]        [12-03-19 @ 10:31]    Creatinine Trend:  SCr 2.4 [12-04 @ 05:30]  SCr 2.2 [12-03 @ 10:31]    Urinalysis - [12-03-19 @ 11:24]      Color Yellow / Appearance Slightly Cloudy / SG >=1.030 / pH 5.5      Gluc Negative / Ketone Negative  / Bili Negative / Urobili 0.2       Blood Negative / Protein >=300 / Leuk Est Small / Nitrite Negative      RBC  / WBC 6-10 / Hyaline  / Gran  / Sq Epi  / Non Sq Epi  / Bacteria       Iron 28, TIBC 205, %sat 14      [07-23-19 @ 07:12]  Ferritin 530      [01-31-19 @ 07:25]  PTH -- (Ca 7.3)      [01-31-19 @ 07:25]   261  PTH -- (Ca 5.9)      [01-30-19 @ 06:40]   217  Vitamin D (25OH) 39      [07-06-19 @ 12:22]  TSH 7.41      [01-31-19 @ 07:25]  Lipid: chol 82, TG 78, HDL 26, LDL 45      [01-29-19 @ 19:10]

## 2019-12-04 NOTE — CONSULT NOTE ADULT - ASSESSMENT
1)  Oliguric COY on Stage 3 CKD.  Clinically appears to be in a low flow state, due to reduced LV function but also severe pulmonary HTN.  My concern is he now has progressed to rodney ATN    2)  Underlying Stage 3 CKD    3)  Oliguria w/ bedside evaluation c/w volume overload, likely intravascular.  Drop in BP w/ Lasix IVP likely due to severe pulmonary HTN    4)  Anemia, possibly at least partially due to CKD; r/o iron deficiency, MDS, paraproteinemia    Recommend:    1)  Since pt appears to require diuresis, but cannot tolerate Lasix IVP hemodynamically, and remains oliguric, will start Lasix drip at 10mg/hr.  If no respons overnight into tomorrow AM, can stop drip.    2)  Check Tsat and Ferritin.  If deficient can give IV iron, and if replete can give Procrit.  This is a simple enough way to try and raise Hgb and therefore O2 carrying capacity, at the very least to try and provide some symptomatic relief.

## 2019-12-04 NOTE — PROGRESS NOTE ADULT - SUBJECTIVE AND OBJECTIVE BOX
Patient is a 89y old  Male who presents with a chief complaint of Unresponsiveness.    As per the daughter, he was at his home when he passed out and slumped to floor, passed out for few minutes and regained consciousness.     Interval events:  This he AM, he was in his chair when he passed out, was bradycardiac, atropine iv 0.5 mg given, heart rate improved and his mental status improved as well.    PAST MEDICAL & SURGICAL HISTORY:  Hyperlipidemia  CHF (congestive heart failure)  Afib  Hypertension  History of hip surgery: Left total hip replacement  S/P AAA (abdominal aortic aneurysm) repair      MEDICATIONS  (STANDING):  atorvastatin 10 milliGRAM(s) Oral at bedtime  cefepime   IVPB      chlorhexidine 4% Liquid 1 Application(s) Topical <User Schedule>  cholecalciferol 2000 Unit(s) Oral daily  furosemide    Tablet 20 milliGRAM(s) Oral two times a day  levothyroxine 50 MICROGram(s) Oral daily    MEDICATIONS  (PRN):          Vital Signs Last 24 Hrs  T(C): 35.3 (04 Dec 2019 11:00), Max: 36.3 (03 Dec 2019 21:10)  T(F): 95.5 (04 Dec 2019 11:00), Max: 97.3 (03 Dec 2019 21:10)  HR: 86 (04 Dec 2019 11:22) (34 - 90)  BP: 105/59 (04 Dec 2019 11:15) (77/41 - 142/56)  BP(mean): 75 (04 Dec 2019 11:15) (53 - 90)  RR: 41 (04 Dec 2019 11:15) (20 - 49)  SpO2: 100% (04 Dec 2019 11:22) (86% - 100%)  CAPILLARY BLOOD GLUCOSE        I&O's Summary    03 Dec 2019 07:01  -  04 Dec 2019 07:00  --------------------------------------------------------  IN: 550 mL / OUT: 215 mL / NET: 335 mL    04 Dec 2019 07:01  -  04 Dec 2019 13:02  --------------------------------------------------------  IN: 0 mL / OUT: 110 mL / NET: -110 mL        Physical Exam:    -     General : Lying in bed no acute distress    -      Cardiac: Regular rate    -      Pulm: b/l crackles    -      GI: soft non tender    -      Musculoskeletal: no edema    -      Neuro: AO x 1, non focal        Labs:                        8.9    14.83 )-----------( 121      ( 04 Dec 2019 05:30 )             30.2             12-04    138  |  94<L>  |  91<HH>  ----------------------------<  72  4.9   |  26  |  2.4<H>    Ca    9.2      04 Dec 2019 05:30    TPro  6.9  /  Alb  3.6  /  TBili  1.2  /  DBili  x   /  AST  68<H>  /  ALT  63<H>  /  AlkPhos  129<H>  12-04    LIVER FUNCTIONS - ( 04 Dec 2019 05:30 )  Alb: 3.6 g/dL / Pro: 6.9 g/dL / ALK PHOS: 129 U/L / ALT: 63 U/L / AST: 68 U/L / GGT: x                 PT/INR - ( 04 Dec 2019 05:30 )   PT: >40.00 sec;   INR: 6.78 ratio         PTT - ( 04 Dec 2019 05:30 )  PTT:52.1 sec  CARDIAC MARKERS ( 03 Dec 2019 10:31 )  x     / 0.03 ng/mL / 114 U/L / x     / 11.5 ng/mL      ABG - ( 03 Dec 2019 12:41 )  pH, Arterial: 7.21  pH, Blood: x     /  pCO2: 67    /  pO2: 22    / HCO3: 27    / Base Excess: -2.0  /  SaO2: 21          Urinalysis Basic - ( 03 Dec 2019 11:24 )    Color: Yellow / Appearance: Slightly Cloudy / SG: >=1.030 / pH: x  Gluc: x / Ketone: Negative  / Bili: Negative / Urobili: 0.2 mg/dL   Blood: x / Protein: >=300 mg/dL / Nitrite: Negative   Leuk Esterase: Small / RBC: x / WBC 6-10 /HPF   Sq Epi: x / Non Sq Epi: x / Bacteria: x        Imaging:    ECG:

## 2019-12-04 NOTE — CONSULT NOTE ADULT - ASSESSMENT
# Sepsis ( Hypothermia + tachypnea + Leukocytosis )  # Pneumonia  # B/L Pleural effusion    would recommend:    1. Follow up cultures and obtain RVP and sputum culture  2. Monitor WBC count  3. Diagnostic and therapeutic thoracentesis    will follow the patient with you and make further recommendation based on the clinical course and Lab results  Thank you for the opportunity to participate in Mr. TONG's care Patient is a 89y old  Male with multiple comorbid condition including diastolic CHF, COPD on home O2, chronic atrial fibrillation on coumadin, CKD stage 3, hypothyroidism, HLD BIBA via [employer] as notification for unresponsive adult and stroke code called by MD at 10:20am which was eventually cancelled because patient was found to be septic.   Patient on BIPAP and with AMS so history was obtained from his 2 daughters at bedside. Patient was in his usual state of health until earlier of stevenson day od admission, he slipped from his chair and became unresponsive. In ER found to be septic and hypothermic.     # Sepsis ( Hypothermia + tachypnea + Leukocytosis )  # Pneumonia  # B/L Pleural effusion    would recommend:    1. Follow up cultures and obtain RVP, MRSA PCR and sputum culture  2. Monitor WBC count  3. Diagnostic and therapeutic thoracentesis  4. Management of BIPAP as per ICU team   5. Aspiration precaution    d/w ICU team    will follow the patient with you and make further recommendation based on the clinical course and Lab results  Thank you for the opportunity to participate in Mr. TONG's care      - > 45 minutes spent in this total encounter

## 2019-12-04 NOTE — CONSULT NOTE ADULT - SUBJECTIVE AND OBJECTIVE BOX
Patient is a 89y old  Male who presents with a chief complaint of Unresponsiveness (04 Dec 2019 16:08)        REVIEW OF SYSTEMS: Total of twelve systems have been reviewed with patient and found to be negative unless mentioned in HPI      PAST MEDICAL & SURGICAL HISTORY:  Hyperlipidemia  CHF (congestive heart failure)  Afib  Hypertension  History of hip surgery: Left total hip replacement  S/P AAA (abdominal aortic aneurysm) repair          SOCIAL HISTORY  Alcohol: Does not drink  Tobacco: Does not smoke  Illicit substance use: None      FAMILY HISTORY: Non contributory to the present illness        ALLERGIES: levofloxacin (Swelling (Mild))        ICU Vital Signs Last 24 Hrs  T(C): 35.8 (04 Dec 2019 19:00), Max: 36 (03 Dec 2019 23:07)  T(F): 96.5 (04 Dec 2019 19:00), Max: 96.8 (03 Dec 2019 23:07)  HR: 64 (04 Dec 2019 20:24) (34 - 90)  BP: 103/59 (04 Dec 2019 19:00) (77/41 - 142/56)  BP(mean): 79 (04 Dec 2019 19:00) (53 - 90)  ABP: --  ABP(mean): --  RR: 22 (04 Dec 2019 19:00) (17 - 49)  SpO2: 100% (04 Dec 2019 20:24) (86% - 100%)          PHYSICAL EXAM:  GENERAL: Not in distress   CHEST/LUNG:  Aire ntry bilaterally  HEART: s1 and s2 present  ABDOMEN:  Nontender and  Nondistended  EXTREMITIES: No pedal  edema  CNS: Awake and Alert      LABS:                        8.9    14.83 )-----------( 121      ( 04 Dec 2019 05:30 )             30.2     12-04    138  |  94<L>  |  91<HH>  ----------------------------<  72  4.9   |  26  |  2.4<H>    Ca    9.2      04 Dec 2019 05:30    TPro  6.9  /  Alb  3.6  /  TBili  1.2  /  DBili  x   /  AST  68<H>  /  ALT  63<H>  /  AlkPhos  129<H>  12-04    PT/INR - ( 04 Dec 2019 05:30 )   PT: >40.00 sec;   INR: 6.78 ratio         PTT - ( 04 Dec 2019 05:30 )  PTT:52.1 sec          ABG - ( 03 Dec 2019 12:41 )  pH, Arterial: 7.21  pH, Blood: x     /  pCO2: 67    /  pO2: 22    / HCO3: 27    / Base Excess: -2.0  /  SaO2: 21                  Urinalysis Basic - ( 03 Dec 2019 11:24 )  Color: Yellow / Appearance: Slightly Cloudy / SG: >=1.030 / pH: x  Gluc: x / Ketone: Negative  / Bili: Negative / Urobili: 0.2 mg/dL   Blood: x / Protein: >=300 mg/dL / Nitrite: Negative   Leuk Esterase: Small / RBC: x / WBC 6-10 /HPF   Sq Epi: x / Non Sq Epi: x / Bacteria: x        MEDICATIONS  (STANDING):  atorvastatin 10 milliGRAM(s) Oral at bedtime  cefepime   IVPB      chlorhexidine 4% Liquid 1 Application(s) Topical <User Schedule>  cholecalciferol 2000 Unit(s) Oral daily  furosemide    Tablet 20 milliGRAM(s) Oral two times a day  furosemide Infusion 10 mG/Hr (5 mL/Hr) IV Continuous <Continuous>  furosemide Infusion 10 mG/Hr (5 mL/Hr) IV Continuous <Continuous>  levothyroxine 50 MICROGram(s) Oral daily    MEDICATIONS  (PRN):          RADIOLOGY & ADDITIONAL TESTS: Patient is a 89y old  Male with multiple comorbid condition including diastolic CHF, COPD on home O2, chronic atrial fibrillation on coumadin, CKD stage 3, hypothyroidism, HLD BIBA via [employer] as notification for unresponsive adult and stroke code called by MD at 10:20am which was eventually cancelled because patient was found to be septic.   Patient on BIPAP and with AMS so history was obtained from his 2 daughters at bedside. Patient was in his usual state of health until earlier of stevenson day od admission, he slipped from his chair and became unresponsive. In ER found to be septic and hypothermic.         REVIEW OF SYSTEMS: Unable to obtain due to mental status unless mentioned in HPI        PAST MEDICAL & SURGICAL HISTORY:  Hyperlipidemia  CHF (congestive heart failure)  Afib  Hypertension  History of hip surgery: Left total hip replacement  S/P AAA (abdominal aortic aneurysm) repair          SOCIAL HISTORY  Alcohol: Does not drink  Tobacco: Does not smoke  Illicit substance use: None      FAMILY HISTORY: Non contributory to the present illness        ALLERGIES: levofloxacin (Swelling (Mild))        ICU Vital Signs Last 24 Hrs  T(C): 35.8 (04 Dec 2019 19:00), Max: 36 (03 Dec 2019 23:07)  T(F): 96.5 (04 Dec 2019 19:00), Max: 96.8 (03 Dec 2019 23:07)  HR: 64 (04 Dec 2019 20:24) (34 - 90)  BP: 103/59 (04 Dec 2019 19:00) (77/41 - 142/56)  BP(mean): 79 (04 Dec 2019 19:00) (53 - 90)  ABP: --  ABP(mean): --  RR: 22 (04 Dec 2019 19:00) (17 - 49)  SpO2: 100% (04 Dec 2019 20:24) (86% - 100%)          PHYSICAL EXAM:  GENERAL: Not in distress , on BIAPA  CHEST/LUNG:  Air entry bilaterally  HEART: s1 and s2 present  ABDOMEN:  Nontender and  Nondistended  EXTREMITIES: No pedal  edema, B/L Hand restrain in placed  CNS: Not Alert          LABS:                        8.9    14.83 )-----------( 121      ( 04 Dec 2019 05:30 )             30.2         12-04    138  |  94<L>  |  91<HH>  ----------------------------<  72  4.9   |  26  |  2.4<H>    Ca    9.2      04 Dec 2019 05:30    TPro  6.9  /  Alb  3.6  /  TBili  1.2  /  DBili  x   /  AST  68<H>  /  ALT  63<H>  /  AlkPhos  129<H>  12-04    PT/INR - ( 04 Dec 2019 05:30 )   PT: >40.00 sec;   INR: 6.78 ratio         PTT - ( 04 Dec 2019 05:30 )  PTT:52.1 sec          ABG - ( 03 Dec 2019 12:41 )  pH, Arterial: 7.21  pH, Blood: x     /  pCO2: 67    /  pO2: 22    / HCO3: 27    / Base Excess: -2.0  /  SaO2: 21                  Urinalysis Basic - ( 03 Dec 2019 11:24 )  Color: Yellow / Appearance: Slightly Cloudy / SG: >=1.030 / pH: x  Gluc: x / Ketone: Negative  / Bili: Negative / Urobili: 0.2 mg/dL   Blood: x / Protein: >=300 mg/dL / Nitrite: Negative   Leuk Esterase: Small / RBC: x / WBC 6-10 /HPF   Sq Epi: x / Non Sq Epi: x / Bacteria: x        MEDICATIONS  (STANDING):  atorvastatin 10 milliGRAM(s) Oral at bedtime  cefepime   IVPB      chlorhexidine 4% Liquid 1 Application(s) Topical <User Schedule>  cholecalciferol 2000 Unit(s) Oral daily  furosemide    Tablet 20 milliGRAM(s) Oral two times a day  furosemide Infusion 10 mG/Hr (5 mL/Hr) IV Continuous <Continuous>  furosemide Infusion 10 mG/Hr (5 mL/Hr) IV Continuous <Continuous>  levothyroxine 50 MICROGram(s) Oral daily    MEDICATIONS  (PRN):          RADIOLOGY & ADDITIONAL TESTS:

## 2019-12-05 DIAGNOSIS — J96.22 ACUTE AND CHRONIC RESPIRATORY FAILURE WITH HYPERCAPNIA: ICD-10-CM

## 2019-12-05 LAB
ANION GAP SERPL CALC-SCNC: 20 MMOL/L — HIGH (ref 7–14)
BUN SERPL-MCNC: 105 MG/DL — CRITICAL HIGH (ref 10–20)
CALCIUM SERPL-MCNC: 9.1 MG/DL — SIGNIFICANT CHANGE UP (ref 8.5–10.1)
CHLORIDE SERPL-SCNC: 97 MMOL/L — LOW (ref 98–110)
CO2 SERPL-SCNC: 22 MMOL/L — SIGNIFICANT CHANGE UP (ref 17–32)
CREAT SERPL-MCNC: 2.9 MG/DL — HIGH (ref 0.7–1.5)
FERRITIN SERPL-MCNC: 479 NG/ML — HIGH (ref 30–400)
FLU A RESULT: NEGATIVE — SIGNIFICANT CHANGE UP
FLU A RESULT: NEGATIVE — SIGNIFICANT CHANGE UP
FLUAV AG NPH QL: NEGATIVE — SIGNIFICANT CHANGE UP
FLUBV AG NPH QL: NEGATIVE — SIGNIFICANT CHANGE UP
GLUCOSE SERPL-MCNC: 53 MG/DL — LOW (ref 70–99)
HCT VFR BLD CALC: 28.5 % — LOW (ref 42–52)
HGB BLD-MCNC: 8.5 G/DL — LOW (ref 14–18)
INR BLD: 10.04 RATIO — CRITICAL HIGH (ref 0.65–1.3)
IRON SATN MFR SERPL: 16 % — SIGNIFICANT CHANGE UP (ref 15–50)
IRON SATN MFR SERPL: 31 UG/DL — LOW (ref 35–150)
MAGNESIUM SERPL-MCNC: 2.6 MG/DL — HIGH (ref 1.8–2.4)
MCHC RBC-ENTMCNC: 18.5 PG — LOW (ref 27–31)
MCHC RBC-ENTMCNC: 29.8 G/DL — LOW (ref 32–37)
MCV RBC AUTO: 62.1 FL — LOW (ref 80–94)
MRSA PCR RESULT.: POSITIVE
NRBC # BLD: 0 /100 WBCS — SIGNIFICANT CHANGE UP (ref 0–0)
PLATELET # BLD AUTO: 133 K/UL — SIGNIFICANT CHANGE UP (ref 130–400)
POTASSIUM SERPL-MCNC: 5.3 MMOL/L — HIGH (ref 3.5–5)
POTASSIUM SERPL-SCNC: 5.3 MMOL/L — HIGH (ref 3.5–5)
PROTHROM AB SERPL-ACNC: >40 SEC — HIGH (ref 9.95–12.87)
RAPID RVP RESULT: SIGNIFICANT CHANGE UP
RBC # BLD: 4.59 M/UL — LOW (ref 4.7–6.1)
RBC # FLD: 21.8 % — HIGH (ref 11.5–14.5)
RSV RESULT: NEGATIVE — SIGNIFICANT CHANGE UP
RSV RNA RESP QL NAA+PROBE: NEGATIVE — SIGNIFICANT CHANGE UP
SODIUM SERPL-SCNC: 139 MMOL/L — SIGNIFICANT CHANGE UP (ref 135–146)
TIBC SERPL-MCNC: 195 UG/DL — LOW (ref 220–430)
UIBC SERPL-MCNC: 164 UG/DL — SIGNIFICANT CHANGE UP (ref 110–370)
WBC # BLD: 12.95 K/UL — HIGH (ref 4.8–10.8)
WBC # FLD AUTO: 12.95 K/UL — HIGH (ref 4.8–10.8)

## 2019-12-05 PROCEDURE — 99233 SBSQ HOSP IP/OBS HIGH 50: CPT

## 2019-12-05 PROCEDURE — 71045 X-RAY EXAM CHEST 1 VIEW: CPT | Mod: 26

## 2019-12-05 PROCEDURE — 99222 1ST HOSP IP/OBS MODERATE 55: CPT

## 2019-12-05 RX ORDER — MUPIROCIN 20 MG/G
1 OINTMENT TOPICAL
Refills: 0 | Status: COMPLETED | OUTPATIENT
Start: 2019-12-05 | End: 2019-12-10

## 2019-12-05 RX ORDER — PHYTONADIONE (VIT K1) 5 MG
10 TABLET ORAL ONCE
Refills: 0 | Status: COMPLETED | OUTPATIENT
Start: 2019-12-05 | End: 2019-12-05

## 2019-12-05 RX ADMIN — Medication 102 MILLIGRAM(S): at 13:36

## 2019-12-05 RX ADMIN — CEFEPIME 100 MILLIGRAM(S): 1 INJECTION, POWDER, FOR SOLUTION INTRAMUSCULAR; INTRAVENOUS at 11:30

## 2019-12-05 RX ADMIN — MUPIROCIN 1 APPLICATION(S): 20 OINTMENT TOPICAL at 17:43

## 2019-12-05 RX ADMIN — CHLORHEXIDINE GLUCONATE 1 APPLICATION(S): 213 SOLUTION TOPICAL at 05:12

## 2019-12-05 RX ADMIN — Medication 5 MG/HR: at 08:00

## 2019-12-05 NOTE — PROGRESS NOTE ADULT - SUBJECTIVE AND OBJECTIVE BOX
Nephrology progress note    Patient is seen and examined, events over the last 24 h noted .  On BiPAP and LAsix drip 5 mg/hr  Allergies:  levofloxacin (Swelling (Mild))    Hospital Medications:   MEDICATIONS  (STANDING):  atorvastatin 10 milliGRAM(s) Oral at bedtime  cefepime   IVPB      cefepime   IVPB 500 milliGRAM(s) IV Intermittent every 24 hours  chlorhexidine 4% Liquid 1 Application(s) Topical <User Schedule>  cholecalciferol 2000 Unit(s) Oral daily  furosemide    Tablet 20 milliGRAM(s) Oral two times a day  furosemide Infusion 10 mG/Hr (5 mL/Hr) IV Continuous <Continuous>  furosemide Infusion 10 mG/Hr (5 mL/Hr) IV Continuous <Continuous>  levothyroxine 50 MICROGram(s) Oral daily        VITALS:  T(F): 95.8 (12-05-19 @ 07:01), Max: 97.9 (12-04-19 @ 23:00)  HR: 49 (12-05-19 @ 07:39)  BP: 119/66 (12-05-19 @ 07:39)  RR: 26 (12-05-19 @ 09:00)  SpO2: 82% (12-05-19 @ 07:39)  Wt(kg): --    12-03 @ 07:01  -  12-04 @ 07:00  --------------------------------------------------------  IN: 550 mL / OUT: 215 mL / NET: 335 mL    12-04 @ 07:01  -  12-05 @ 07:00  --------------------------------------------------------  IN: 115 mL / OUT: 275 mL / NET: -160 mL    12-05 @ 07:01  -  12-05 @ 10:53  --------------------------------------------------------  IN: 10 mL / OUT: 70 mL / NET: -60 mL          PHYSICAL EXAM:  Constitutional: NAD, on   Respiratory: decreased BS  Cardiovascular: S1, S2, RRR  Gastrointestinal: BS+, soft, NT/ND  Extremities:  No peripheral edema  Neurological: unresponsive  : has palomo.   Skin: No rashes  Vascular Access:    LABS:  12-05    139  |  97<L>  |  105<HH>  ----------------------------<  53<L>  5.3<H>   |  22  |  2.9<H>  Creatinine Trend: 2.9<--, 2.4<--, 2.2<--      Ca    9.1      05 Dec 2019 05:23  Mg     2.6     12-05    TPro  6.9  /  Alb  3.6  /  TBili  1.2  /  DBili      /  AST  68<H>  /  ALT  63<H>  /  AlkPhos  129<H>  12-04                          8.5    12.95 )-----------( 133      ( 05 Dec 2019 05:23 )             28.5       Urine Studies:  Urinalysis Basic - ( 03 Dec 2019 11:24 )    Color: Yellow / Appearance: Slightly Cloudy / SG: >=1.030 / pH:   Gluc:  / Ketone: Negative  / Bili: Negative / Urobili: 0.2 mg/dL   Blood:  / Protein: >=300 mg/dL / Nitrite: Negative   Leuk Esterase: Small / RBC:  / WBC 6-10 /HPF   Sq Epi:  / Non Sq Epi:  / Bacteria:       Sodium, Random Urine: 108.0 mmoL/L (12-04 @ 17:49)  Potassium, Random Urine: 22 mmol/L (12-04 @ 17:49)  Creatinine, Random Urine: 61 mg/dL (12-04 @ 17:49)    RADIOLOGY & ADDITIONAL STUDIES:  < from: US Renal (12.04.19 @ 15:29) >  RIGHT KIDNEY: Adnexal:  Study of the second... The right kidney measures 9.5 x 4.5 x 5.5 and   contains numerous simple cysts. There is no hydronephrosis.    LEFT KIDNEY: The left kidney was not visualized as the patient could not   cooperate fully.    IMPRESSION:  Large cysts within the right kidney. Nonvisualization of the left kidney.    Please see the CT scan from 25 hours ago for further delineation of the   anatomy of the kidneys..    < end of copied text >  < from: US Renal (12.04.19 @ 15:29) >  RIGHT KIDNEY: Adnexal:  Study of the second... The right kidney measures 9.5 x 4.5 x 5.5 and   contains numerous simple cysts. There is no hydronephrosis.    LEFT KIDNEY: The left kidney was not visualized as the patient could not   cooperate fully.    IMPRESSION:  Large cysts within the right kidney. Nonvisualization of the left kidney.    Please see the CT scan from 25 hours ago for further delineation of the   anatomy of the kidneys..    < end of copied text >  < from: US Renal (12.04.19 @ 15:29) >  RIGHT KIDNEY: Adnexal:  Study of the second... The right kidney measures 9.5 x 4.5 x 5.5 and   contains numerous simple cysts. There is no hydronephrosis.    LEFT KIDNEY: The left kidney was not visualized as the patient could not   cooperate fully.    < from: Xray Chest 1 View-PORTABLE IMMEDIATE (12.04.19 @ 09:01) >    Lung pathology, resolving. Stable cardiomegaly.    < end of copied text >

## 2019-12-05 NOTE — PROGRESS NOTE ADULT - SUBJECTIVE AND OBJECTIVE BOX
Patient again had an episode this am were his HR dropped to the 30's and he became unresponsive    now on Lasix drip      T(F): 95.8 (12-05-19 @ 07:01), Max: 97.9 (12-04-19 @ 23:00)  HR: 49 (12-05-19 @ 07:39)  BP: 119/66 (12-05-19 @ 07:39)  RR: 24  SpO2: 82% (12-05-19 @ 07:39) (82% - 100%)    PHYSICAL EXAM:  GENERAL: NAD  HEAD:  Atraumatic, Normocephalic  NERVOUS SYSTEM:  no focal deficit  CHEST/LUNG:  bilateral rhonchi  HEART: Regular rate and rhythm; No murmurs, rubs, or gallops  ABDOMEN: Soft, Nontender, Nondistended; Bowel sounds present  EXTREMITIES:  2+ Peripheral Pulses, No clubbing, cyanosis, or edema  LYMPH: No lymphadenopathy noted  SKIN: No rashes or lesions    LABS  12-05    139  |  97<L>  |  105<HH>  ----------------------------<  53<L>  5.3<H>   |  22  |  2.9<H>    Ca    9.1      05 Dec 2019 05:23  Mg     2.6     12-05    TPro  6.9  /  Alb  3.6  /  TBili  1.2  /  DBili  x   /  AST  68<H>  /  ALT  63<H>  /  AlkPhos  129<H>  12-04                          8.9    14.83 )-----------( 121      ( 04 Dec 2019 05:30 )             30.2     PT/INR - ( 04 Dec 2019 05:30 )   PT: >40.00 sec;   INR: 6.78 ratio         PTT - ( 04 Dec 2019 05:30 )  PTT:52.1 sec    CARDIAC ENZYMES  Creatine Kinase, Serum: 114 (12-03 @ 10:31)    CKMB Units: 11.5 (12-03 @ 10:31)    Troponin T, Serum: 0.03 ng/mL (12-03-19 @ 10:31)      Culture Results:   No growth to date. (12-03-19)  Culture Results:   No growth to date. (12-03-19)  Culture Results:   No growth (12-03-19)    RADIOLOGY  < from: US Renal (12.04.19 @ 15:29) >    RIGHT KIDNEY: Adnexal:  Study of the second... The right kidney measures 9.5 x 4.5 x 5.5 and   contains numerous simple cysts. There is no hydronephrosis.    LEFT KIDNEY: The left kidney was not visualized as the patient could not   cooperate fully.      < end of copied text >  < from: CT Chest No Cont (12.03.19 @ 13:41) >    IMPRESSION:     1. Since June 27, 2019, no evidence of acute traumatic injury within the   chest, abdomen, or pelvis.    2. Status post interval left total hip placement.    3. Findings suggestive of CHF, including increased size moderate   bilateral pleural effusions, left greater than right, bilateral   interlobular septal thickening, andscattered bilateral groundglass   opacities.    < end of copied text >    MEDICATIONS  (STANDING):  atorvastatin 10 milliGRAM(s) Oral at bedtime   cefepime   IVPB 500 milliGRAM(s) IV Intermittent every 24 hours  chlorhexidine 4% Liquid 1 Application(s) Topical <User Schedule>  cholecalciferol 2000 Unit(s) Oral daily  furosemide Infusion 10 mG/Hr (5 mL/Hr) IV Continuous <Continuous>  levothyroxine 50 MICROGram(s) Oral daily    MEDICATIONS  (PRN):

## 2019-12-05 NOTE — CONSULT NOTE ADULT - SUBJECTIVE AND OBJECTIVE BOX
CARDIOLOGY CONSULT NOTE     CHIEF COMPLAINT/REASON FOR CONSULT:    HPI:  89 yo male with multiple comorbid condition like diastolic CHF, COPD on home O2, chronic atrial fibrillation on coumadin, CKD stage 3, hypothyroidism, HLD BIBA via [employer] as notification for unresponsive adult and stroke code called by MD at 10:20am which was eventually cancelled because patient was found to be septic.   Patient on BIPAP and with AMS .      In ER found to be septic and hypothermic. (03 Dec 2019 16:13)      PAST MEDICAL & SURGICAL HISTORY:  Hyperlipidemia  CHF (congestive heart failure)  Afib  Hypertension  History of hip surgery: Left total hip replacement  S/P AAA (abdominal aortic aneurysm) repair      Cardiac Risks:   [x ]HTN, [ ] DM, [ ] Smoking, [ ] FH,  [ ] Lipids        MEDICATIONS:  MEDICATIONS  (STANDING):  atorvastatin 10 milliGRAM(s) Oral at bedtime  cefepime   IVPB      cefepime   IVPB 500 milliGRAM(s) IV Intermittent every 24 hours  chlorhexidine 4% Liquid 1 Application(s) Topical <User Schedule>  cholecalciferol 2000 Unit(s) Oral daily  furosemide    Tablet 20 milliGRAM(s) Oral two times a day  furosemide Infusion 10 mG/Hr (5 mL/Hr) IV Continuous <Continuous>  furosemide Infusion 10 mG/Hr (5 mL/Hr) IV Continuous <Continuous>  levothyroxine 50 MICROGram(s) Oral daily      FAMILY HISTORY:  FH: congestive heart failure: sister      SOCIAL HISTORY:      [ ] Marital status   Allergies    levofloxacin (Swelling (Mild))        	    REVIEW OF SYSTEMS:  CONSTITUTIONAL: No fever, weight loss, or fatigue  EYES: No eye pain, visual disturbances, or discharge  ENMT:  No difficulty hearing, tinnitus, vertigo; No sinus or throat pain  NECK: No pain or stiffness  RESPIRATORY: No cough, wheezing, chills or hemoptysis; No Shortness of Breath  CARDIOVASCULAR: See above  GASTROINTESTINAL: No abdominal or epigastric pain. No nausea, vomiting, or hematemesis; No diarrhea or constipation. No melena or hematochezia.  GENITOURINARY: No dysuria, frequency, hematuria, or incontinence  NEUROLOGICAL: No headaches, memory loss, loss of strength, numbness, or tremors  SKIN: No itching, burning, rashes, or lesions   	    PHYSICAL EXAM:  T(C): 35.4 (12-05-19 @ 07:01), Max: 36.6 (12-04-19 @ 23:00)  HR: 49 (12-05-19 @ 07:39) (34 - 90)  BP: 119/66 (12-05-19 @ 07:39) (75/53 - 121/69)  RR: 33 (12-05-19 @ 07:39) (17 - 49)  SpO2: 82% (12-05-19 @ 07:39) (82% - 100%)  Wt(kg): --  I&O's Summary    04 Dec 2019 07:01  -  05 Dec 2019 07:00  --------------------------------------------------------  IN: 115 mL / OUT: 275 mL / NET: -160 mL    05 Dec 2019 07:01  -  05 Dec 2019 08:09  --------------------------------------------------------  IN: 10 mL / OUT: 25 mL / NET: -15 mL        Appearance: Normal	  Psychiatry: A & O x 3, Mood & affect appropriate  HEENT:   Normal oral mucosa, PERRL, EOMI	  Lymphatic: No lymphadenopathy  Cardiovascular: Normal S1 S2,irreg No JVD, No murmurs  Respiratory: Lungs clear to auscultation	  Gastrointestinal:  Soft, Non-tender, + BS	  Skin: No rashes, No ecchymoses, No cyanosis	  Neurologic: Non-focal  Extremities: Normal range of motion, No clubbing, cyanosis or edema  Vascular: Peripheral pulses palpable 2+ bilaterally      ECG:  	< from: 12 Lead ECG (12.04.19 @ 08:30) >  Diagnosis Line Atrial fibrillation with premature ventricular or aberrantly conducted  complexes  Low voltage QRS  ST & T wave abnormality, consider lateral ischemia      Confirmed by VAUGHN CHAVEZ MD ([id number]) on 12/4/2019 11:50:53 AM    < end of copied text >      	  LABS:	 	    CARDIAC MARKERS:                                    8.9    14.83 )-----------( 121      ( 04 Dec 2019 05:30 )             30.2     12-04    138  |  94<L>  |  91<HH>  ----------------------------<  72  4.9   |  26  |  2.4<H>    Ca    9.2      04 Dec 2019 05:30    TPro  6.9  /  Alb  3.6  /  TBili  1.2  /  DBili  x   /  AST  68<H>  /  ALT  63<H>  /  AlkPhos  129<H>  12-04    PT/INR - ( 04 Dec 2019 05:30 )   PT: >40.00 sec;   INR: 6.78 ratio         PTT - ( 04 Dec 2019 05:30 )  PTT:52.1 sec

## 2019-12-05 NOTE — PROGRESS NOTE ADULT - ASSESSMENT
1)  Oliguric COY on Stage 3 CKD.  Clinically appears to be in a low flow state, due to reduced LV function but also severe pulmonary HTN.  Likely ATN at this point    2)  Underlying Stage 3 CKD    3)  Oliguria w/ bedside evaluation c/w volume overload, likely intravascular.  Drop in BP w/ Lasix IVP likely due to severe pulmonary HTN    4)  Anemia, possibly at least partially due to CKD; r/o iron deficiency, MDS, paraproteinemia    5) Hyperkalemia - not on feeds, Insulin and Glucose, repeat it later    6) Bradycardia - check TSH      Recommend:    1) Cont LAsix drip 5-7.5 mg/ml UO 25 cc/hr    2)  Check Tsat and Ferritin.  If deficient can give IV iron, and if replete can give Procrit.  This is a simple enough way to try and raise Hgb and therefore O2 carrying capacity, at the very least to try and provide some symptomatic relief.  3) Advanced directives to be discussed with family - pt is not a candidate for RRT (severe hypotension and bradycardia limiting the use of HD)  D/W ICU Team

## 2019-12-05 NOTE — PROGRESS NOTE ADULT - ASSESSMENT
89 y/m with history of CHF, DLD, Afib, HTN presented after a syncopal episode at home, was hypoxic in ED and was placed in BIPAP:    # Altered mental status:  could be d/t bradycardia, had an episode yesterday, where he passed out with bradycardia, responded to Atropine  CT head : Negative  Neurology evaluation: EEG negative for seizures, discussed with Neuro, likely non neuro cause    # Bradycardia:  Received .5 mg of Atropine, Improved  EKG show Afib with PVC  Cardiology following  WIll keep atropine at bedside  Hold metoprolol    # Acute on chronic CHF:  CXR show bilateral effusion, more on left  on furosemide drip, keep I<O  c/w BIPAP PRN    # Leukocytosis:  c/w Empiric antibiotics Cefepime for now  Panculture: UA -ve, blood culture, urine cx negative    # Acute kidney injury:  Renal sono: hydronephrosis  will monitor  Nephrology following    #AFib;  Coumadin on hold d/t supratherapeutic INR  Monitor INR, will give coumadin 10 mg today    #Diet:  NPO when on BIPAP    # Activity:  Bed rest for now    # DVT ppx:  supra therapeutic iNR    Code:  Discussed with daughter, who wants full code for now.

## 2019-12-05 NOTE — PROGRESS NOTE ADULT - SUBJECTIVE AND OBJECTIVE BOX
RANKUSH  89y, Male  Allergy: levofloxacin (Swelling (Mild))      CHIEF COMPLAINT: Unresponsiveness (04 Dec 2019 21:23)      INTERVAL EVENTS/HPI  - No acute events overnight  - T(F): , Max: 97.9 (12-04-19 @ 23:00)        ROS  UTO     SOCIAL HISTORY - UTO     Substance Use (  ) never used  (  ) IVDU (  ) Other:  Tobacco Usage:  (   ) never smoked   (   ) former smoker   (   ) current smoker   Alcohol Usage: (   ) social  (   ) daily use (   ) denies  Sexual History:       FH noncontributory     VITALS:  T(F): 97.9, Max: 97.9 (12-04-19 @ 23:00)  HR: 72  BP: 103/68  RR: 30Vital Signs Last 24 Hrs  T(C): 36.6 (05 Dec 2019 03:00), Max: 36.6 (04 Dec 2019 23:00)  T(F): 97.9 (05 Dec 2019 03:00), Max: 97.9 (04 Dec 2019 23:00)  HR: 72 (05 Dec 2019 03:00) (34 - 90)  BP: 103/68 (05 Dec 2019 03:00) (77/41 - 142/56)  BP(mean): 81 (05 Dec 2019 03:00) (53 - 89)  RR: 30 (05 Dec 2019 05:00) (17 - 49)  SpO2: 89% (05 Dec 2019 05:00) (86% - 100%)    PHYSICAL EXAM:  Gen: NAD, resting in bed. Bi PAP  HEENT: Normocephalic, atraumatic  Neck: supple, no lymphadenopathy  CV: s1 s 2 +  Lungs: decreased BS   Abdomen: Soft, BS present  Ext: Warm, well perfused  Neuro: non focal, awake  Skin: no rash, no erythema      TESTS & MEASUREMENTS:                        8.9    14.83 )-----------( 121      ( 04 Dec 2019 05:30 )             30.2     12-04    138  |  94<L>  |  91<HH>  ----------------------------<  72  4.9   |  26  |  2.4<H>    Ca    9.2      04 Dec 2019 05:30    TPro  6.9  /  Alb  3.6  /  TBili  1.2  /  DBili  x   /  AST  68<H>  /  ALT  63<H>  /  AlkPhos  129<H>  12-04      LIVER FUNCTIONS - ( 04 Dec 2019 05:30 )  Alb: 3.6 g/dL / Pro: 6.9 g/dL / ALK PHOS: 129 U/L / ALT: 63 U/L / AST: 68 U/L / GGT: x           Urinalysis Basic - ( 03 Dec 2019 11:24 )    Color: Yellow / Appearance: Slightly Cloudy / SG: >=1.030 / pH: x  Gluc: x / Ketone: Negative  / Bili: Negative / Urobili: 0.2 mg/dL   Blood: x / Protein: >=300 mg/dL / Nitrite: Negative   Leuk Esterase: Small / RBC: x / WBC 6-10 /HPF   Sq Epi: x / Non Sq Epi: x / Bacteria: x        Culture - Blood (collected 12-03-19 @ 12:24)  Source: .Blood Blood-Peripheral  Preliminary Report (12-05-19 @ 01:01):    No growth to date.    Culture - Blood (collected 12-03-19 @ 11:26)  Source: .Blood Blood-Peripheral  Preliminary Report (12-05-19 @ 01:01):    No growth to date.    Culture - Urine (collected 12-03-19 @ 11:24)  Source: .Urine Catheterized  Final Report (12-04-19 @ 23:11):    No growth        Blood Gas Venous - Lactate: 4.1 mmoL/L (12-03-19 @ 14:33)  Blood Gas Venous - Lactate: 7.3 mmoL/L (12-03-19 @ 10:32)      INFECTIOUS DISEASES TESTING      RADIOLOGY & ADDITIONAL TESTS:  I have personally reviewed the last Chest xray  CXR      CT  CT Abdomen and Pelvis No Cont:   EXAM:  CT CHEST        EXAM:  CT ABDOMEN AND PELVIS            PROCEDURE DATE:  12/03/2019            INTERPRETATION:  CLINICAL STATEMENT: Status post fall.      TECHNIQUE: Contiguous axial CT images were obtained from the thoracic   inlet to the pubic symphysis without intravenous contrast.  Oral contrast   was not administered.  Reformatted images in the coronal and sagittal   planes were acquired.    COMPARISON CT: June 27, 2019.      FINDINGS:    CHEST:    LUNGS/PLEURA/AIRWAYS: Increased size moderate bilateral pleural   effusions, with bibasilar subsegmental atelectasis, left greater than   right. Bilateral interlobular septal thickening and groundglass   opacities, suggestive of underlying CHF. No pneumothorax.    MEDIASTINUM/THORACICNODES: Multiple subcentimeter mediastinal lymph   nodes, likely reactive.    HEART/GREAT VESSELS: Stable cardiomegaly. Atherosclerotic calcification   of the thoracic aorta and coronary arteries. No pericardial effusion.      ABDOMEN/PELVIS:    HEPATOBILIARY: Unremarkable.    SPLEEN: Unremarkable.    PANCREAS: Unremarkable.    ADRENAL GLANDS: Unremarkable.    KIDNEYS: Multiple overall unchanged bilateral renal cysts. No   hydronephrosis.    ABDOMINOPELVIC NODES: Unremarkable.    PELVIC ORGANS: Nolasco catheter within a decompressed urinary bladder.    PERITONEUM/MESENTERY/BOWEL: Sigmoid diverticulosis    BONES/SOFT TISSUES: Chronic right-sided anterior and posterior rib   fractures. Chronic T9 vertebral body compression deformity, status post   vertebroplasty. Status post left total hip replacement.    OTHER: Atherosclerotic vascular calcification. Stable infrarenal aortic   endograft placement.      IMPRESSION:     1. Since June 27, 2019, no evidence of acute traumatic injury within the   chest, abdomen, or pelvis.    2. Status post interval left total hip placement.    3. Findings suggestive of CHF, including increased size moderate   bilateral pleural effusions, left greater than right, bilateral   interlobular septal thickening, andscattered bilateral groundglass   opacities.                  JAYDEN JOYNER M.D., ATTENDING RADIOLOGIST  This document has been electronically signed. Dec  3 2019  2:41PM             (12-03-19 @ 13:41)  CT Chest No Cont:   EXAM:  CT CHEST        EXAM:  CT ABDOMEN AND PELVIS            PROCEDURE DATE:  12/03/2019            INTERPRETATION:  CLINICAL STATEMENT: Status post fall.      TECHNIQUE: Contiguous axial CT images were obtained from the thoracic   inlet to the pubic symphysis without intravenous contrast.  Oral contrast   was not administered.  Reformatted images in the coronal and sagittal   planes were acquired.    COMPARISON CT: June 27, 2019.      FINDINGS:    CHEST:    LUNGS/PLEURA/AIRWAYS: Increased size moderate bilateral pleural   effusions, with bibasilar subsegmental atelectasis, left greater than   right. Bilateral interlobular septal thickening and groundglass   opacities, suggestive of underlying CHF. No pneumothorax.    MEDIASTINUM/THORACICNODES: Multiple subcentimeter mediastinal lymph   nodes, likely reactive.    HEART/GREAT VESSELS: Stable cardiomegaly. Atherosclerotic calcification   of the thoracic aorta and coronary arteries. No pericardial effusion.      ABDOMEN/PELVIS:    HEPATOBILIARY: Unremarkable.    SPLEEN: Unremarkable.    PANCREAS: Unremarkable.    ADRENAL GLANDS: Unremarkable.    KIDNEYS: Multiple overall unchanged bilateral renal cysts. No   hydronephrosis.    ABDOMINOPELVIC NODES: Unremarkable.    PELVIC ORGANS: Nolasco catheter within a decompressed urinary bladder.    PERITONEUM/MESENTERY/BOWEL: Sigmoid diverticulosis    BONES/SOFT TISSUES: Chronic right-sided anterior and posterior rib   fractures. Chronic T9 vertebral body compression deformity, status post   vertebroplasty. Status post left total hip replacement.    OTHER: Atherosclerotic vascular calcification. Stable infrarenal aortic   endograft placement.      IMPRESSION:     1. Since June 27, 2019, no evidence of acute traumatic injury within the   chest, abdomen, or pelvis.    2. Status post interval left total hip placement.    3. Findings suggestive of CHF, including increased size moderate   bilateral pleural effusions, left greater than right, bilateral   interlobular septal thickening, andscattered bilateral groundglass   opacities.                  JAYDEN JOYNER M.D., ATTENDING RADIOLOGIST  This document has been electronically signed. Dec  3 2019  2:41PM             (12-03-19 @ 13:41)      CARDIOLOGY TESTING  12 Lead ECG:   Ventricular Rate 86 BPM    Atrial Rate 74 BPM    QRS Duration 104 ms    Q-T Interval 406 ms    QTC Calculation(Bezet) 485 ms    R Axis 14 degrees    T Axis 188 degrees    Diagnosis Line Atrial fibrillation with premature ventricular or aberrantly conducted  complexes  Low voltage QRS  ST & T wave abnormality, consider lateral ischemia      Confirmed by VAUGHN CHAVEZ MD ([id number]) on 12/4/2019 11:50:53 AM (12-04-19 @ 08:30)  12 Lead ECG:   Ventricular Rate 58 BPM    Atrial Rate 312 BPM    QRS Duration 106 ms    Q-T Interval 474 ms    QTC Calculation(Bezet) 465 ms    R Axis 33 degrees    T Axis 184 degrees    Diagnosis Line Atrial fibrillation with slow ventricular response  Low voltage QRS  Nonspecific ST-T changes  Confirmed by VAUGHN CHAVEZ MD ([id number]) on 12/4/2019 11:50:41 AM (12-03-19 @ 13:03)      MEDICATIONS  atorvastatin 10  cefepime   IVPB   cefepime   IVPB 500  chlorhexidine 4% Liquid 1  cholecalciferol 2000  furosemide    Tablet 20  furosemide Infusion 10  furosemide Infusion 10  levothyroxine 50      ANTIBIOTICS:  cefepime   IVPB      cefepime   IVPB 500 milliGRAM(s) IV Intermittent every 24 hours

## 2019-12-05 NOTE — PROGRESS NOTE ADULT - PROBLEM SELECTOR PLAN 1
acute illness  NO evidence of ongoing sepsis  CT chest - more suggestive of CHF   No fevers and mild increase in wbc could be sec to CHF / vol overload    Check RVP / MRSA PCR - nasal  DC abx   Diuresis per CC team   Follow wbc and pan cx if increase wbc or spiking     Avoid sedation / aspiration precautions

## 2019-12-05 NOTE — CONSULT NOTE ADULT - SUBJECTIVE AND OBJECTIVE BOX
Neurology Consultation note    Name  KUSH TONG    HPI:  89 yo male with multiple comorbid condition like diastolic CHF, COPD on home O2, chronic atrial fibrillation on coumadin, CKD stage 3, hypothyroidism, HLD BIBA via [facility] as notification for unresponsive adult and stroke code called by MD at 10:20am which was eventually cancelled because patient was found to be septic.   Patient on BIPAP and with AMS so history was obtained from his 2 daughters at bedside. Patient was in his usual state of health until earlier today, he slipped from his chair and became unresponsive. All other history unable to obtain.     In ER found to be septic and hypothermic. (03 Dec 2019 16:13)    NEURO  89 YO MAN WITH HX AS ABOVE ADM WITH UNRESPONSIVENESS. STROKE CODE CALLED INITIALLY BUT PATIENT SOON FOUND TO HAVE HYPERCAPNIA AND CVA DX WAS NO LONGER BEING ENTERTAINED AND CODE WAS CANCELLED  NEURO CALLED BACK FOR AMS  REEG SHOWS GENERALIZED MILD SLOWING AND SMALL NUMBER OF SHARP TRANSIENTS  NO SEIZURE  PATIENT IS AGITATED AND LETHARGIC AND UNABLE TO PROVIDE A HX  PATIENT WITH EPISODE OF SYMPTOMATIC BRADYCARDIA TODAY.     CTH UNREMARKABLE      Vital Signs Last 24 Hrs  T(C): 35.4 (05 Dec 2019 07:01), Max: 36.6 (04 Dec 2019 23:00)  T(F): 95.8 (05 Dec 2019 07:01), Max: 97.9 (04 Dec 2019 23:00)  HR: 49 (05 Dec 2019 07:39) (49 - 86)  BP: 119/66 (05 Dec 2019 07:39) (75/53 - 119/66)  BP(mean): 85 (05 Dec 2019 07:39) (58 - 85)  RR: 26 (05 Dec 2019 09:00) (17 - 49)  SpO2: 82% (05 Dec 2019 07:39) (82% - 100%)    Neurological Exam:     AROUSABLE TO TACTILE STIM  NOT VERBAL OR FOLLOWING COMMANDS AT PRESENT  AGITATED EASILY CLENCHING EYES SHUT   NO FACIAL ASYMMETRY  MOVING ALL 4 EQUALLY       Medications  atorvastatin 10 milliGRAM(s) Oral at bedtime  cefepime   IVPB      cefepime   IVPB 500 milliGRAM(s) IV Intermittent every 24 hours  chlorhexidine 4% Liquid 1 Application(s) Topical <User Schedule>  cholecalciferol 2000 Unit(s) Oral daily  furosemide    Tablet 20 milliGRAM(s) Oral two times a day  furosemide Infusion 10 mG/Hr IV Continuous <Continuous>  furosemide Infusion 10 mG/Hr IV Continuous <Continuous>  levothyroxine 50 MICROGram(s) Oral daily      Lab  12-05    139  |  97<L>  |  105<HH>  ----------------------------<  53<L>  5.3<H>   |  22  |  2.9<H>    Ca    9.1      05 Dec 2019 05:23  Mg     2.6     12-05    TPro  6.9  /  Alb  3.6  /  TBili  1.2  /  DBili  x   /  AST  68<H>  /  ALT  63<H>  /  AlkPhos  129<H>  12-04                          8.9    14.83 )-----------( 121      ( 04 Dec 2019 05:30 )             30.2     LIVER FUNCTIONS - ( 04 Dec 2019 05:30 )  Alb: 3.6 g/dL / Pro: 6.9 g/dL / ALK PHOS: 129 U/L / ALT: 63 U/L / AST: 68 U/L / GGT: x             2.6        Radiology      Assessment: 89 YO MAN ADM IN RESP DISTRESS/CHF EXACERBATION  NOW WITH EPISODES OF SYMPTOMATIC BRADYCARDIA  CTH NEG.  NO SZ ON EEG  DO NOT SUSPECT NEURO ETIOLOGY OF AMS  NO FURTHER RECS FROM NEURO AT THIS TIME  PLEASE CALL WITH ANY QUESTIONS    Plan:

## 2019-12-05 NOTE — PROGRESS NOTE ADULT - ASSESSMENT
89 y/m with history of CHF, DLD, Afib, HTN presented after a syncopal episode at home, was hypoxic in ED and was placed in BIPAP:    # Altered mental status / Afib / acute on chronic diastolic CHF exacerbation /   could be d/t bradycardia, had a few  episodes in unit, where he passed out with bradycardia, responded to Atropine  cardiology following  CT head : Negative  EEG as above with positive epileptiform activity  Neurology evaluation, ? start AED's      # Bradycardia:  Received .5 mg of Atropine, Improved  EKG show Afib with PVC  Cardiology consulted  WIll keep atropine at bedside  Hold metoprolol      # Leukocytosis:  c/w Empiric antibiotics Cefepime for now  Panculture    # Acute kidney injury:  F/u urine lytes and renal sono  will monitor    #AFib;  Coumadin on hold d/t supratherapeutic INR  Monitor INR    #Diet:  NPO when on BIPAP    # Activity:  Bed rest for now    # DVT ppx:  supra therapeutic iNR    Code:  Discussed with daughter, who wants full code for now. 89 y/m with history of CHF, DLD, Afib, HTN presented after a syncopal episode at home, was hypoxic in ED and was placed in BIPAP:    # Altered mental status / Afib / acute on chronic diastolic CHF exacerbation /   could be d/t bradycardia, had a few  episodes in unit, where he passed out with bradycardia, responded to Atropine  cardiology following  metoprolol on hold  CT head : Negative  EEG as above with positive epileptiform activity  Neurology evaluation, ? start AED's    IV Lasix drip and negative fluid balance  may dc antibiotics as per ID      #AFib;  Coumadin on hold d/t supra-therapeutic INR  Monitor INR    #Diet:  NPO when on BIPAP     full code for now.

## 2019-12-05 NOTE — PROGRESS NOTE ADULT - SUBJECTIVE AND OBJECTIVE BOX
Patient is a 89y old  Male who presents with a chief complaint of Unresponsiveness (05 Dec 2019 05:43)      Over Night Events:  Patient seen and examined on lasix drip urine output improved this morning still on bipap on and off       ROS:  See HPI    PHYSICAL EXAM    ICU Vital Signs Last 24 Hrs  T(C): 35.4 (05 Dec 2019 07:01), Max: 36.6 (04 Dec 2019 23:00)  T(F): 95.8 (05 Dec 2019 07:01), Max: 97.9 (04 Dec 2019 23:00)  HR: 69 (05 Dec 2019 07:00) (34 - 90)  BP: 104/56 (05 Dec 2019 07:00) (75/53 - 121/69)  BP(mean): 74 (05 Dec 2019 07:00) (53 - 89)  ABP: --  ABP(mean): --  RR: 26 (05 Dec 2019 07:01) (17 - 49)  SpO2: 98% (05 Dec 2019 07:00) (86% - 100%)      General:  Aox3  HEENT:    jillian            Lymph Nodes: NO cervical LN   Lungs: Bilateral crackles   Cardiovascular: Regular   Abdomen: Soft, Positive BS  Extremities: No clubbing   Skin: warm   Neurological: no focal deficit   Musculoskeletal: move all ext     I&O's Detail    04 Dec 2019 07:01  -  05 Dec 2019 07:00  --------------------------------------------------------  IN:    furosemide Infusion: 65 mL    IV PiggyBack: 50 mL  Total IN: 115 mL    OUT:    Indwelling Catheter - Urethral: 275 mL  Total OUT: 275 mL    Total NET: -160 mL      05 Dec 2019 07:01  -  05 Dec 2019 07:44  --------------------------------------------------------  IN:  Total IN: 0 mL    OUT:    Indwelling Catheter - Urethral: 25 mL  Total OUT: 25 mL    Total NET: -25 mL          LABS:                          8.9    14.83 )-----------( 121      ( 04 Dec 2019 05:30 )             30.2         04 Dec 2019 05:30    138    |  94     |  91     ----------------------------<  72     4.9     |  26     |  2.4      Ca    9.2        04 Dec 2019 05:30                                               PT/INR - ( 04 Dec 2019 05:30 )   PT: >40.00 sec;   INR: 6.78 ratio         PTT - ( 04 Dec 2019 05:30 )  PTT:52.1 sec                                       Urinalysis Basic - ( 03 Dec 2019 11:24 )    Color: Yellow / Appearance: Slightly Cloudy / SG: >=1.030 / pH: x  Gluc: x / Ketone: Negative  / Bili: Negative / Urobili: 0.2 mg/dL   Blood: x / Protein: >=300 mg/dL / Nitrite: Negative   Leuk Esterase: Small / RBC: x / WBC 6-10 /HPF   Sq Epi: x / Non Sq Epi: x / Bacteria: x          CARDIAC MARKERS ( 03 Dec 2019 10:31 )  x     / 0.03 ng/mL / 114 U/L / x     / 11.5 ng/mL                                                        Culture - Blood (collected 03 Dec 2019 12:24)  Source: .Blood Blood-Peripheral  Preliminary Report (05 Dec 2019 01:01):    No growth to date.    Culture - Blood (collected 03 Dec 2019 11:26)  Source: .Blood Blood-Peripheral  Preliminary Report (05 Dec 2019 01:01):    No growth to date.    Culture - Urine (collected 03 Dec 2019 11:24)  Source: .Urine Catheterized  Final Report (04 Dec 2019 23:11):    No growth                                                                                       ABG - ( 03 Dec 2019 12:41 )  pH, Arterial: 7.21  pH, Blood: x     /  pCO2: 67    /  pO2: 22    / HCO3: 27    / Base Excess: -2.0  /  SaO2: 21                  MEDICATIONS  (STANDING):  atorvastatin 10 milliGRAM(s) Oral at bedtime  cefepime   IVPB      cefepime   IVPB 500 milliGRAM(s) IV Intermittent every 24 hours  chlorhexidine 4% Liquid 1 Application(s) Topical <User Schedule>  cholecalciferol 2000 Unit(s) Oral daily  furosemide    Tablet 20 milliGRAM(s) Oral two times a day  furosemide Infusion 10 mG/Hr (5 mL/Hr) IV Continuous <Continuous>  furosemide Infusion 10 mG/Hr (5 mL/Hr) IV Continuous <Continuous>  levothyroxine 50 MICROGram(s) Oral daily    MEDICATIONS  (PRN):          Xrays:  TLC:  OG:  ET tube:                                                                                    left effusion  b/l opacity    ECHO:  [facility] ICU:

## 2019-12-05 NOTE — PROGRESS NOTE ADULT - SUBJECTIVE AND OBJECTIVE BOX
Patient is a 89y old  Male who presents with a chief complaint of Unresponsiveness (05 Dec 2019 10:52)      Interval events:  Was bradycardiac transiently this AM, heart rate improved with out medicaition    PAST MEDICAL & SURGICAL HISTORY:  Hyperlipidemia  CHF (congestive heart failure)  Afib  Hypertension  History of hip surgery: Left total hip replacement  S/P AAA (abdominal aortic aneurysm) repair      MEDICATIONS  (STANDING):  atorvastatin 10 milliGRAM(s) Oral at bedtime  cefepime   IVPB      cefepime   IVPB 500 milliGRAM(s) IV Intermittent every 24 hours  chlorhexidine 4% Liquid 1 Application(s) Topical <User Schedule>  cholecalciferol 2000 Unit(s) Oral daily  furosemide    Tablet 20 milliGRAM(s) Oral two times a day  furosemide Infusion 10 mG/Hr (5 mL/Hr) IV Continuous <Continuous>  furosemide Infusion 10 mG/Hr (5 mL/Hr) IV Continuous <Continuous>  levothyroxine 50 MICROGram(s) Oral daily  phytonadione  IVPB 10 milliGRAM(s) IV Intermittent once    MEDICATIONS  (PRN):          Vital Signs Last 24 Hrs  T(C): 35.4 (05 Dec 2019 11:00), Max: 36.6 (04 Dec 2019 23:00)  T(F): 95.8 (05 Dec 2019 11:00), Max: 97.9 (04 Dec 2019 23:00)  HR: 49 (05 Dec 2019 07:39) (49 - 83)  BP: 119/66 (05 Dec 2019 07:39) (75/53 - 119/66)  BP(mean): 85 (05 Dec 2019 07:39) (58 - 85)  RR: 26 (05 Dec 2019 11:00) (17 - 44)  SpO2: 82% (05 Dec 2019 07:39) (82% - 100%)  CAPILLARY BLOOD GLUCOSE        I&O's Summary    04 Dec 2019 07:01  -  05 Dec 2019 07:00  --------------------------------------------------------  IN: 115 mL / OUT: 275 mL / NET: -160 mL    05 Dec 2019 07:01  -  05 Dec 2019 12:08  --------------------------------------------------------  IN: 60 mL / OUT: 130 mL / NET: -70 mL        Physical Exam:    -     General : lying in bed on BIPAP    -      Cardiac: Regualr rate    -      Pulm: b/l clear    -      GI: soft non tender    -      Musculoskeletal: no edema    -      Neuro: On BIPAP, AO x 0, non focal        Labs:                        8.5    12.95 )-----------( 133      ( 05 Dec 2019 05:23 )             28.5             12-05    139  |  97<L>  |  105<HH>  ----------------------------<  53<L>  5.3<H>   |  22  |  2.9<H>    Ca    9.1      05 Dec 2019 05:23  Mg     2.6     12-05    TPro  6.9  /  Alb  3.6  /  TBili  1.2  /  DBili  x   /  AST  68<H>  /  ALT  63<H>  /  AlkPhos  129<H>  12-04    LIVER FUNCTIONS - ( 04 Dec 2019 05:30 )  Alb: 3.6 g/dL / Pro: 6.9 g/dL / ALK PHOS: 129 U/L / ALT: 63 U/L / AST: 68 U/L / GGT: x                 PT/INR - ( 05 Dec 2019 05:23 )   PT: >40.00 sec;   INR: 10.04 ratio         PTT - ( 04 Dec 2019 05:30 )  PTT:52.1 sec      ABG - ( 03 Dec 2019 12:41 )  pH, Arterial: 7.21  pH, Blood: x     /  pCO2: 67    /  pO2: 22    / HCO3: 27    / Base Excess: -2.0  /  SaO2: 21                    Culture - Blood (collected 03 Dec 2019 12:24)  Source: .Blood Blood-Peripheral  Preliminary Report (05 Dec 2019 01:01):    No growth to date.    Culture - Blood (collected 03 Dec 2019 11:26)  Source: .Blood Blood-Peripheral  Preliminary Report (05 Dec 2019 01:01):    No growth to date.    Culture - Urine (collected 03 Dec 2019 11:24)  Source: .Urine Catheterized  Final Report (04 Dec 2019 23:11):    No growth        Imaging:    ECG:

## 2019-12-06 LAB
ANION GAP SERPL CALC-SCNC: 20 MMOL/L — HIGH (ref 7–14)
BUN SERPL-MCNC: 121 MG/DL — CRITICAL HIGH (ref 10–20)
CALCIUM SERPL-MCNC: 8.7 MG/DL — SIGNIFICANT CHANGE UP (ref 8.5–10.1)
CHLORIDE SERPL-SCNC: 99 MMOL/L — SIGNIFICANT CHANGE UP (ref 98–110)
CO2 SERPL-SCNC: 21 MMOL/L — SIGNIFICANT CHANGE UP (ref 17–32)
CREAT SERPL-MCNC: 3.2 MG/DL — HIGH (ref 0.7–1.5)
GLUCOSE SERPL-MCNC: 69 MG/DL — LOW (ref 70–99)
HCT VFR BLD CALC: 31.5 % — LOW (ref 42–52)
HGB BLD-MCNC: 9.4 G/DL — LOW (ref 14–18)
INR BLD: 3.72 RATIO — HIGH (ref 0.65–1.3)
MANUAL SMEAR VERIFICATION: SIGNIFICANT CHANGE UP
MCHC RBC-ENTMCNC: 19 PG — LOW (ref 27–31)
MCHC RBC-ENTMCNC: 29.8 G/DL — LOW (ref 32–37)
MCV RBC AUTO: 63.8 FL — LOW (ref 80–94)
NRBC # BLD: 1 /100 WBCS — HIGH (ref 0–0)
PLAT MORPH BLD: NORMAL — SIGNIFICANT CHANGE UP
PLATELET # BLD AUTO: 121 K/UL — LOW (ref 130–400)
PLATELET CLUMP BLD QL SMEAR: SIGNIFICANT CHANGE UP
PLATELET COUNT - ESTIMATE: ABNORMAL
POTASSIUM SERPL-MCNC: 4.7 MMOL/L — SIGNIFICANT CHANGE UP (ref 3.5–5)
POTASSIUM SERPL-SCNC: 4.7 MMOL/L — SIGNIFICANT CHANGE UP (ref 3.5–5)
PROTHROM AB SERPL-ACNC: >40 SEC — HIGH (ref 9.95–12.87)
RBC # BLD: 4.94 M/UL — SIGNIFICANT CHANGE UP (ref 4.7–6.1)
RBC # FLD: 22.2 % — HIGH (ref 11.5–14.5)
RBC BLD AUTO: ABNORMAL
SODIUM SERPL-SCNC: 140 MMOL/L — SIGNIFICANT CHANGE UP (ref 135–146)
TRANSFERRIN SERPL-MCNC: 149 MG/DL — LOW (ref 200–360)
WBC # BLD: 13.07 K/UL — HIGH (ref 4.8–10.8)
WBC # FLD AUTO: 13.07 K/UL — HIGH (ref 4.8–10.8)

## 2019-12-06 PROCEDURE — 71045 X-RAY EXAM CHEST 1 VIEW: CPT | Mod: 26,59,76,77

## 2019-12-06 PROCEDURE — 99233 SBSQ HOSP IP/OBS HIGH 50: CPT

## 2019-12-06 PROCEDURE — 71048 X-RAY EXAM CHEST 4+ VIEWS: CPT | Mod: 26,77

## 2019-12-06 PROCEDURE — 71045 X-RAY EXAM CHEST 1 VIEW: CPT | Mod: 26,59,77

## 2019-12-06 RX ORDER — MIDODRINE HYDROCHLORIDE 2.5 MG/1
5 TABLET ORAL EVERY 8 HOURS
Refills: 0 | Status: DISCONTINUED | OUTPATIENT
Start: 2019-12-06 | End: 2019-12-11

## 2019-12-06 RX ADMIN — MUPIROCIN 1 APPLICATION(S): 20 OINTMENT TOPICAL at 18:11

## 2019-12-06 RX ADMIN — MIDODRINE HYDROCHLORIDE 5 MILLIGRAM(S): 2.5 TABLET ORAL at 21:03

## 2019-12-06 RX ADMIN — MUPIROCIN 1 APPLICATION(S): 20 OINTMENT TOPICAL at 05:29

## 2019-12-06 RX ADMIN — CHLORHEXIDINE GLUCONATE 1 APPLICATION(S): 213 SOLUTION TOPICAL at 05:25

## 2019-12-06 RX ADMIN — ATORVASTATIN CALCIUM 10 MILLIGRAM(S): 80 TABLET, FILM COATED ORAL at 21:03

## 2019-12-06 RX ADMIN — CEFEPIME 100 MILLIGRAM(S): 1 INJECTION, POWDER, FOR SOLUTION INTRAMUSCULAR; INTRAVENOUS at 10:03

## 2019-12-06 NOTE — PROGRESS NOTE ADULT - ASSESSMENT
1)  Oliguric COY on Stage 3 CKD.  Clinically appears to be in a low flow state, due to reduced LV function but also severe pulmonary HTN.  Likely ATN at this point    UO improved with Lasix drip to 800 cc daily, but pt is hypotensive, CXR mild fluid overload  Agree to stop LAsix, creat is up tot 3.2 today  Midodrine 10 mg q8h  Pressors if needed    Prognosis is very guarded  Family wants everything done as per ICU staff

## 2019-12-06 NOTE — PROGRESS NOTE ADULT - SUBJECTIVE AND OBJECTIVE BOX
Patient is a 89y old  Male who presents with a chief complaint of Unresponsiveness (06 Dec 2019 08:03)      Over Night Events:  Patient seen and examined.       ROS:  See HPI    PHYSICAL EXAM    ICU Vital Signs Last 24 Hrs  T(C): 35.6 (06 Dec 2019 03:00), Max: 36.7 (05 Dec 2019 16:45)  T(F): 96.1 (06 Dec 2019 03:00), Max: 98 (05 Dec 2019 16:45)  HR: 74 (06 Dec 2019 07:48) (64 - 75)  BP: 128/60 (06 Dec 2019 07:48) (91/46 - 128/60)  BP(mean): 86 (06 Dec 2019 07:48) (63 - 86)  ABP: --  ABP(mean): --  RR: 30 (06 Dec 2019 07:48) (22 - 36)  SpO2: 100% (06 Dec 2019 07:48) (96% - 100%)      General: Aox3   HEENT:   pale              Lymph Nodes: NO cervical LN   Lungs: Bilateral BS  Cardiovascular: Regular   Abdomen: Soft, Positive BS  Extremities: No clubbing   Skin: warm   Neurological: no focal deficit   Musculoskeletal: move all ext     I&O's Detail    05 Dec 2019 07:01  -  06 Dec 2019 07:00  --------------------------------------------------------  IN:    furosemide Infusion: 115 mL    IV PiggyBack: 50 mL  Total IN: 165 mL    OUT:    Indwelling Catheter - Urethral: 835 mL  Total OUT: 835 mL    Total NET: -670 mL      06 Dec 2019 07:01  -  06 Dec 2019 08:38  --------------------------------------------------------  IN:    furosemide Infusion: 10 mL  Total IN: 10 mL    OUT:  Total OUT: 0 mL    Total NET: 10 mL          LABS:                          9.4    13.07 )-----------( 121      ( 06 Dec 2019 06:10 )             31.5         05 Dec 2019 05:23    139    |  97     |  105    ----------------------------<  53     5.3     |  22     |  2.9      Ca    9.1        05 Dec 2019 05:23  Mg     2.6       05 Dec 2019 05:23                                               PT/INR - ( 05 Dec 2019 05:23 )   PT: >40.00 sec;   INR: 10.04 ratio                                                                                                            Culture - Blood (collected 03 Dec 2019 12:24)  Source: .Blood Blood-Peripheral  Preliminary Report (05 Dec 2019 01:01):    No growth to date.    Culture - Blood (collected 03 Dec 2019 11:26)  Source: .Blood Blood-Peripheral  Preliminary Report (05 Dec 2019 01:01):    No growth to date.    Culture - Urine (collected 03 Dec 2019 11:24)  Source: .Urine Catheterized  Final Report (04 Dec 2019 23:11):    No growth                                                                                           MEDICATIONS  (STANDING):  atorvastatin 10 milliGRAM(s) Oral at bedtime  cefepime   IVPB      cefepime   IVPB 500 milliGRAM(s) IV Intermittent every 24 hours  chlorhexidine 4% Liquid 1 Application(s) Topical <User Schedule>  cholecalciferol 2000 Unit(s) Oral daily  furosemide    Tablet 20 milliGRAM(s) Oral two times a day  furosemide Infusion 10 mG/Hr (5 mL/Hr) IV Continuous <Continuous>  levothyroxine 50 MICROGram(s) Oral daily  mupirocin 2% Ointment 1 Application(s) Both Nostrils two times a day    MEDICATIONS  (PRN):          Xrays:  TLC:  OG:  ET tube:                                                                                    b/l effusion L > R atelectasis    ECHO:  CAM ICU:

## 2019-12-06 NOTE — PROGRESS NOTE ADULT - SUBJECTIVE AND OBJECTIVE BOX
RANKUSH GABRIEL  89y, Male  Allergy: levofloxacin (Swelling (Mild))      CHIEF COMPLAINT: Unresponsiveness (05 Dec 2019 12:08)      INTERVAL EVENTS/HPI  - No acute events overnight  - T(F): , Max: 98 (12-05-19 @ 16:45)    ROS  10 system review - SOB    SOCIAL HISTORY - not relevant     Substance Use (  ) never used  (  ) IVDU (  ) Other:  Tobacco Usage:  (   ) never smoked   (   ) former smoker   (   ) current smoker   Alcohol Usage: (   ) social  (   ) daily use (   ) denies  Sexual History:       FH noncontributory     VITALS:  T(F): 96.1, Max: 98 (12-05-19 @ 16:45)  HR: 72  BP: 94/56  RR: 30Vital Signs Last 24 Hrs  T(C): 35.6 (06 Dec 2019 03:00), Max: 36.7 (05 Dec 2019 16:45)  T(F): 96.1 (06 Dec 2019 03:00), Max: 98 (05 Dec 2019 16:45)  HR: 72 (06 Dec 2019 05:00) (49 - 83)  BP: 94/56 (06 Dec 2019 04:50) (87/70 - 119/66)  BP(mean): 69 (06 Dec 2019 04:50) (63 - 85)  RR: 30 (06 Dec 2019 05:00) (22 - 36)  SpO2: 100% (06 Dec 2019 05:00) (82% - 100%)    PHYSICAL EXAM:  Gen: NAD, resting in bed  HEENT: Normocephalic, atraumatic  Neck: supple, no lymphadenopathy  CV: s1 s 2 +   Lungs: decreased BS   Abdomen: Soft, BS present  Ext: Warm, well perfused  Neuro: non focal, arousable   Skin: no rash, no erythema      TESTS & MEASUREMENTS:                        8.5    12.95 )-----------( 133      ( 05 Dec 2019 05:23 )             28.5     12-05    139  |  97<L>  |  105<HH>  ----------------------------<  53<L>  5.3<H>   |  22  |  2.9<H>    Ca    9.1      05 Dec 2019 05:23  Mg     2.6     12-05              Culture - Blood (collected 12-03-19 @ 12:24)  Source: .Blood Blood-Peripheral  Preliminary Report (12-05-19 @ 01:01):    No growth to date.    Culture - Blood (collected 12-03-19 @ 11:26)  Source: .Blood Blood-Peripheral  Preliminary Report (12-05-19 @ 01:01):    No growth to date.    Culture - Urine (collected 12-03-19 @ 11:24)  Source: .Urine Catheterized  Final Report (12-04-19 @ 23:11):    No growth        Blood Gas Venous - Lactate: 4.1 mmoL/L (12-03-19 @ 14:33)  Blood Gas Venous - Lactate: 7.3 mmoL/L (12-03-19 @ 10:32)      INFECTIOUS DISEASES TESTING  MRSA PCR Result.: Positive (12-05-19 @ 06:30)      RADIOLOGY & ADDITIONAL TESTS:  I have personally reviewed the last Chest xray  CXR      CT  CT Abdomen and Pelvis No Cont:   EXAM:  CT CHEST        EXAM:  CT ABDOMEN AND PELVIS            PROCEDURE DATE:  12/03/2019            INTERPRETATION:  CLINICAL STATEMENT: Status post fall.      TECHNIQUE: Contiguous axial CT images were obtained from the thoracic   inlet to the pubic symphysis without intravenous contrast.  Oral contrast   was not administered.  Reformatted images in the coronal and sagittal   planes were acquired.    COMPARISON CT: June 27, 2019.      FINDINGS:    CHEST:    LUNGS/PLEURA/AIRWAYS: Increased size moderate bilateral pleural   effusions, with bibasilar subsegmental atelectasis, left greater than   right. Bilateral interlobular septal thickening and groundglass   opacities, suggestive of underlying CHF. No pneumothorax.    MEDIASTINUM/THORACICNODES: Multiple subcentimeter mediastinal lymph   nodes, likely reactive.    HEART/GREAT VESSELS: Stable cardiomegaly. Atherosclerotic calcification   of the thoracic aorta and coronary arteries. No pericardial effusion.      ABDOMEN/PELVIS:    HEPATOBILIARY: Unremarkable.    SPLEEN: Unremarkable.    PANCREAS: Unremarkable.    ADRENAL GLANDS: Unremarkable.    KIDNEYS: Multiple overall unchanged bilateral renal cysts. No   hydronephrosis.    ABDOMINOPELVIC NODES: Unremarkable.    PELVIC ORGANS: Nolasco catheter within a decompressed urinary bladder.    PERITONEUM/MESENTERY/BOWEL: Sigmoid diverticulosis    BONES/SOFT TISSUES: Chronic right-sided anterior and posterior rib   fractures. Chronic T9 vertebral body compression deformity, status post   vertebroplasty. Status post left total hip replacement.    OTHER: Atherosclerotic vascular calcification. Stable infrarenal aortic   endograft placement.      IMPRESSION:     1. Since June 27, 2019, no evidence of acute traumatic injury within the   chest, abdomen, or pelvis.    2. Status post interval left total hip placement.    3. Findings suggestive of CHF, including increased size moderate   bilateral pleural effusions, left greater than right, bilateral   interlobular septal thickening, andscattered bilateral groundglass   opacities.                  JAYDEN JOYNER M.D., ATTENDING RADIOLOGIST  This document has been electronically signed. Dec  3 2019  2:41PM             (12-03-19 @ 13:41)  CT Chest No Cont:   EXAM:  CT CHEST        EXAM:  CT ABDOMEN AND PELVIS            PROCEDURE DATE:  12/03/2019            INTERPRETATION:  CLINICAL STATEMENT: Status post fall.      TECHNIQUE: Contiguous axial CT images were obtained from the thoracic   inlet to the pubic symphysis without intravenous contrast.  Oral contrast   was not administered.  Reformatted images in the coronal and sagittal   planes were acquired.    COMPARISON CT: June 27, 2019.      FINDINGS:    CHEST:    LUNGS/PLEURA/AIRWAYS: Increased size moderate bilateral pleural   effusions, with bibasilar subsegmental atelectasis, left greater than   right. Bilateral interlobular septal thickening and groundglass   opacities, suggestive of underlying CHF. No pneumothorax.    MEDIASTINUM/THORACICNODES: Multiple subcentimeter mediastinal lymph   nodes, likely reactive.    HEART/GREAT VESSELS: Stable cardiomegaly. Atherosclerotic calcification   of the thoracic aorta and coronary arteries. No pericardial effusion.      ABDOMEN/PELVIS:    HEPATOBILIARY: Unremarkable.    SPLEEN: Unremarkable.    PANCREAS: Unremarkable.    ADRENAL GLANDS: Unremarkable.    KIDNEYS: Multiple overall unchanged bilateral renal cysts. No   hydronephrosis.    ABDOMINOPELVIC NODES: Unremarkable.    PELVIC ORGANS: Nolasco catheter within a decompressed urinary bladder.    PERITONEUM/MESENTERY/BOWEL: Sigmoid diverticulosis    BONES/SOFT TISSUES: Chronic right-sided anterior and posterior rib   fractures. Chronic T9 vertebral body compression deformity, status post   vertebroplasty. Status post left total hip replacement.    OTHER: Atherosclerotic vascular calcification. Stable infrarenal aortic   endograft placement.      IMPRESSION:     1. Since June 27, 2019, no evidence of acute traumatic injury within the   chest, abdomen, or pelvis.    2. Status post interval left total hip placement.    3. Findings suggestive of CHF, including increased size moderate   bilateral pleural effusions, left greater than right, bilateral   interlobular septal thickening, andscattered bilateral groundglass   opacities.                  JAYDEN JOYNER M.D., ATTENDING RADIOLOGIST  This document has been electronically signed. Dec  3 2019  2:41PM             (12-03-19 @ 13:41)      CARDIOLOGY TESTING  12 Lead ECG:   Ventricular Rate 86 BPM    Atrial Rate 74 BPM    QRS Duration 104 ms    Q-T Interval 406 ms    QTC Calculation(Bezet) 485 ms    R Axis 14 degrees    T Axis 188 degrees    Diagnosis Line Atrial fibrillation with premature ventricular or aberrantly conducted  complexes  Low voltage QRS  ST & T wave abnormality, consider lateral ischemia      Confirmed by VAUGHN CHAVEZ MD ([id number]) on 12/4/2019 11:50:53 AM (12-04-19 @ 08:30)  12 Lead ECG:   Ventricular Rate 58 BPM    Atrial Rate 312 BPM    QRS Duration 106 ms    Q-T Interval 474 ms    QTC Calculation(Bezet) 465 ms    R Axis 33 degrees    T Axis 184 degrees    Diagnosis Line Atrial fibrillation with slow ventricular response  Low voltage QRS  Nonspecific ST-T changes  Confirmed by VAUGHN CHAVEZ MD ([id number]) on 12/4/2019 11:50:41 AM (12-03-19 @ 13:03)      MEDICATIONS  atorvastatin 10  cefepime   IVPB   cefepime   IVPB 500  chlorhexidine 4% Liquid 1  cholecalciferol 2000  furosemide    Tablet 20  furosemide Infusion 10  levothyroxine 50  mupirocin 2% Ointment 1      ANTIBIOTICS:  cefepime   IVPB      cefepime   IVPB 500 milliGRAM(s) IV Intermittent every 24 hours

## 2019-12-06 NOTE — PROGRESS NOTE ADULT - SUBJECTIVE AND OBJECTIVE BOX
Nephrology progress note    Patient is seen and examined, events over the last 24 h noted .    Allergies:  levofloxacin (Swelling (Mild))    Hospital Medications:   MEDICATIONS  (STANDING):  atorvastatin 10 milliGRAM(s) Oral at bedtime  cefepime   IVPB      cefepime   IVPB 500 milliGRAM(s) IV Intermittent every 24 hours  chlorhexidine 4% Liquid 1 Application(s) Topical <User Schedule>  cholecalciferol 2000 Unit(s) Oral daily  furosemide    Tablet 20 milliGRAM(s) Oral two times a day  furosemide Infusion 10 mG/Hr (5 mL/Hr) IV Continuous <Continuous>  levothyroxine 50 MICROGram(s) Oral daily  mupirocin 2% Ointment 1 Application(s) Both Nostrils two times a day        VITALS:  T(F): 97.5 (12-06-19 @ 11:34), Max: 98 (12-05-19 @ 16:45)  HR: 78 (12-06-19 @ 12:48)  BP: 87/52 (12-06-19 @ 12:48)  RR: 37 (12-06-19 @ 12:48)  SpO2: 100% (12-06-19 @ 12:48)  Wt(kg): --    12-04 @ 07:01  -  12-05 @ 07:00  --------------------------------------------------------  IN: 115 mL / OUT: 275 mL / NET: -160 mL    12-05 @ 07:01  -  12-06 @ 07:00  --------------------------------------------------------  IN: 165 mL / OUT: 835 mL / NET: -670 mL    12-06 @ 07:01  -  12-06 @ 14:38  --------------------------------------------------------  IN: 60 mL / OUT: 295 mL / NET: -235 mL          PHYSICAL EXAM:  Constitutional: On FM  Respiratory: BS b/l+  Cardiovascular: S1, S2, RRR  Gastrointestinal: BS+, soft, NT/ND  Extremities: No peripheral edema  Neurological: calm  : No CVA tenderness. has palomo.   Skin: No rashes  Vascular Access:    LABS:  12-06    140  |  99  |  121<HH>  ----------------------------<  69<L>  4.7   |  21  |  3.2<H>  Creatinine Trend: 3.2<--, 2.9<--, 2.4<--, 2.2<--    Ca    8.7      06 Dec 2019 06:10  Mg     2.6     12-05                            9.4    13.07 )-----------( 121      ( 06 Dec 2019 06:10 )             31.5       Urine Studies:  Urinalysis Basic - ( 03 Dec 2019 11:24 )    Color: Yellow / Appearance: Slightly Cloudy / SG: >=1.030 / pH:   Gluc:  / Ketone: Negative  / Bili: Negative / Urobili: 0.2 mg/dL   Blood:  / Protein: >=300 mg/dL / Nitrite: Negative   Leuk Esterase: Small / RBC:  / WBC 6-10 /HPF   Sq Epi:  / Non Sq Epi:  / Bacteria:       Sodium, Random Urine: 108.0 mmoL/L (12-04 @ 17:49)  Potassium, Random Urine: 22 mmol/L (12-04 @ 17:49)  Creatinine, Random Urine: 61 mg/dL (12-04 @ 17:49)    RADIOLOGY & ADDITIONAL STUDIES:  < from: Xray Chest 1 View- PORTABLE-Routine (12.05.19 @ 05:22) >  Stable bilateral opacities and left effusion.    < end of copied text >

## 2019-12-06 NOTE — PROGRESS NOTE ADULT - ASSESSMENT
89 y/m with history of CHF, DLD, Afib, HTN presented after a syncopal episode at home, was hypoxic in ED and was placed in BIPAP:    # Altered mental status:  could be d/t bradycardia, had an episode on second day of admission, where he passed out with bradycardia, responded to Atropine, had few more episodes after that which resolved on its own  CT head : Negative  Neurology evaluation: EEG negative for seizures, discussed with Neuro, likely non neuro cause    # Bradycardia:  Received .5 mg of Atropine, Improved  EKG show Afib with PVC  Cardiology following  WIll keep atropine at bedside  Hold metoprolol    # Acute on chronic CHF:  CXR show bilateral effusion, more on left  on furosemide drip, keep I<O  c/w BIPAP PRN    # Leukocytosis:  c/w Empiric antibiotics Cefepime for now  Panculture: UA -ve, blood culture, urine cx negative    # Acute kidney injury:  Renal sono: hydronephrosis  will monitor  Nephrology following    #AFib;  Coumadin on hold d/t supratherapeutic INR  Monitor INR, given vitamin K 10 mg yesterday, f/u INR today    #Diet:  Speech and swallow eval once off BIPAP    # Activity:  Bed rest for now    # DVT ppx:  supra therapeutic iNR    Code:  Discussed with daughter, who wants full code for now. 89 y/m with history of CHF, DLD, Afib, HTN presented after a syncopal episode at home, was hypoxic in ED and was placed in BIPAP:    # Altered mental status:  could be d/t bradycardia, had an episode on second day of admission, where he passed out with bradycardia, responded to Atropine, had few more episodes after that which resolved on its own  CT head : Negative  Neurology evaluation: EEG negative for seizures, discussed with Neuro, likely non neuro cause    # Bradycardia:  Received .5 mg of Atropine, Improved  EKG show Afib with PVC  Cardiology following  WIll keep atropine at bedside  Hold metoprolol    # Acute on chronic diastolic CHF:  CXR show bilateral effusion, more on left  on furosemide drip, keep I<O  c/w BIPAP PRN    # Leukocytosis:  c/w Empiric antibiotics Cefepime for now  Panculture: UA -ve, blood culture, urine cx negative    # Acute kidney injury:  Renal sono: hydronephrosis  will monitor  Nephrology following    #AFib;  Coumadin on hold d/t supratherapeutic INR  Monitor INR, given vitamin K 10 mg yesterday, f/u INR today    #Diet:  Speech and swallow eval once off BIPAP    # Activity:  Bed rest for now    # DVT ppx:  supra therapeutic iNR    Code:  Discussed with daughter, who wants full code for now.

## 2019-12-06 NOTE — PROGRESS NOTE ADULT - ASSESSMENT
IMPRESSION:  alter mental status resolve most likely secondary to symptomatic bradycardia   CHF   doubt sepsis no sign  of PNA   COY on CKD     PLAN:    CNS: ne no sedation     HEENT: oral care     PULMONARY: keep pox > 92 %   now on NC bipap at night and as needed   low threshold for intubation been on bipap for 48 hrs if not able to taper might need intubation     CARDIOVASCULAR:   cardiology consult /EP   on lasix drip follow Is and OS     GI: GI prophylaxis.  speech and swallow eval if remain of bipap     RENAL: renal consult     INFECTIOUS DISEASE: follow cx continue abx for now   check RVP     HEMATOLOGICAL:  DVT prophylaxis. check INR today off coumadin        ENDOCRINE:  Follow up FS.  Insulin protocol if needed.        CRITICAL CARE TIME SPENT: ***

## 2019-12-06 NOTE — PROGRESS NOTE ADULT - PROBLEM SELECTOR PLAN 1
CHF   Careful diuresis  neg cx  wbc declining   hypothermia    Increased risk for HCAP - need to ambulate aggressively   Would not give a prolonged course of empiric abx - ? 7 days  otherwise increased risk of MDR GNR colonization   recall if needed

## 2019-12-06 NOTE — PROGRESS NOTE ADULT - SUBJECTIVE AND OBJECTIVE BOX
Patient is a 89y old  Male who presents with a chief complaint of Unresponsiveness (06 Dec 2019 08:38)      Interval events:  Switched to ventimask thiS AM    PAST MEDICAL & SURGICAL HISTORY:  Hyperlipidemia  CHF (congestive heart failure)  Afib  Hypertension  History of hip surgery: Left total hip replacement  S/P AAA (abdominal aortic aneurysm) repair      MEDICATIONS  (STANDING):  atorvastatin 10 milliGRAM(s) Oral at bedtime  cefepime   IVPB      cefepime   IVPB 500 milliGRAM(s) IV Intermittent every 24 hours  chlorhexidine 4% Liquid 1 Application(s) Topical <User Schedule>  cholecalciferol 2000 Unit(s) Oral daily  furosemide    Tablet 20 milliGRAM(s) Oral two times a day  furosemide Infusion 10 mG/Hr (5 mL/Hr) IV Continuous <Continuous>  levothyroxine 50 MICROGram(s) Oral daily  mupirocin 2% Ointment 1 Application(s) Both Nostrils two times a day    MEDICATIONS  (PRN):          Vital Signs Last 24 Hrs  T(C): 35.2 (06 Dec 2019 08:48), Max: 36.7 (05 Dec 2019 16:45)  T(F): 95.4 (06 Dec 2019 08:48), Max: 98 (05 Dec 2019 16:45)  HR: 67 (06 Dec 2019 08:48) (67 - 75)  BP: 111/55 (06 Dec 2019 08:48) (91/46 - 128/60)  BP(mean): 79 (06 Dec 2019 08:48) (63 - 86)  RR: 30 (06 Dec 2019 08:48) (22 - 36)  SpO2: 100% (06 Dec 2019 08:48) (96% - 100%)  CAPILLARY BLOOD GLUCOSE        I&O's Summary    05 Dec 2019 07:01  -  06 Dec 2019 07:00  --------------------------------------------------------  IN: 165 mL / OUT: 835 mL / NET: -670 mL    06 Dec 2019 07:01  -  06 Dec 2019 10:24  --------------------------------------------------------  IN: 60 mL / OUT: 150 mL / NET: -90 mL        Physical Exam:    -     General :     -      Cardiac:    -      Pulm:    -      GI:    -      Musculoskeletal:    -      Neuro:        Labs:                        9.4    13.07 )-----------( 121      ( 06 Dec 2019 06:10 )             31.5             12-06    140  |  99  |  121<HH>  ----------------------------<  69<L>  4.7   |  21  |  3.2<H>    Ca    8.7      06 Dec 2019 06:10  Mg     2.6     12-05              PT/INR - ( 05 Dec 2019 05:23 )   PT: >40.00 sec;   INR: 10.04 ratio                     Culture - Blood (collected 03 Dec 2019 12:24)  Source: .Blood Blood-Peripheral  Preliminary Report (05 Dec 2019 01:01):    No growth to date.    Culture - Blood (collected 03 Dec 2019 11:26)  Source: .Blood Blood-Peripheral  Preliminary Report (05 Dec 2019 01:01):    No growth to date.    Culture - Urine (collected 03 Dec 2019 11:24)  Source: .Urine Catheterized  Final Report (04 Dec 2019 23:11):    No growth        Imaging:    ECG: Patient is a 89y old  Male who presents with a chief complaint of Unresponsiveness (06 Dec 2019 08:38)      Interval events:  Switched to ventimask thiS AM    PAST MEDICAL & SURGICAL HISTORY:  Hyperlipidemia  CHF (congestive heart failure)  Afib  Hypertension  History of hip surgery: Left total hip replacement  S/P AAA (abdominal aortic aneurysm) repair      MEDICATIONS  (STANDING):  atorvastatin 10 milliGRAM(s) Oral at bedtime  cefepime   IVPB      cefepime   IVPB 500 milliGRAM(s) IV Intermittent every 24 hours  chlorhexidine 4% Liquid 1 Application(s) Topical <User Schedule>  cholecalciferol 2000 Unit(s) Oral daily  furosemide    Tablet 20 milliGRAM(s) Oral two times a day  furosemide Infusion 10 mG/Hr (5 mL/Hr) IV Continuous <Continuous>  levothyroxine 50 MICROGram(s) Oral daily  mupirocin 2% Ointment 1 Application(s) Both Nostrils two times a day    MEDICATIONS  (PRN):          Vital Signs Last 24 Hrs  T(C): 35.2 (06 Dec 2019 08:48), Max: 36.7 (05 Dec 2019 16:45)  T(F): 95.4 (06 Dec 2019 08:48), Max: 98 (05 Dec 2019 16:45)  HR: 67 (06 Dec 2019 08:48) (67 - 75)  BP: 111/55 (06 Dec 2019 08:48) (91/46 - 128/60)  BP(mean): 79 (06 Dec 2019 08:48) (63 - 86)  RR: 30 (06 Dec 2019 08:48) (22 - 36)  SpO2: 100% (06 Dec 2019 08:48) (96% - 100%)  CAPILLARY BLOOD GLUCOSE        I&O's Summary    05 Dec 2019 07:01  -  06 Dec 2019 07:00  --------------------------------------------------------  IN: 165 mL / OUT: 835 mL / NET: -670 mL    06 Dec 2019 07:01  -  06 Dec 2019 10:24  --------------------------------------------------------  IN: 60 mL / OUT: 150 mL / NET: -90 mL        Physical Exam:    -     General : On Ventimask, no acute distress    -      Cardiac: Regular rate    -      Pulm: b/l clear    -      GI: soft non tender    -      Musculoskeletal: no edema    -      Neuro: AO x 0, non focla        Labs:                        9.4    13.07 )-----------( 121      ( 06 Dec 2019 06:10 )             31.5             12-06    140  |  99  |  121<HH>  ----------------------------<  69<L>  4.7   |  21  |  3.2<H>    Ca    8.7      06 Dec 2019 06:10  Mg     2.6     12-05              PT/INR - ( 05 Dec 2019 05:23 )   PT: >40.00 sec;   INR: 10.04 ratio                     Culture - Blood (collected 03 Dec 2019 12:24)  Source: .Blood Blood-Peripheral  Preliminary Report (05 Dec 2019 01:01):    No growth to date.    Culture - Blood (collected 03 Dec 2019 11:26)  Source: .Blood Blood-Peripheral  Preliminary Report (05 Dec 2019 01:01):    No growth to date.    Culture - Urine (collected 03 Dec 2019 11:24)  Source: .Urine Catheterized  Final Report (04 Dec 2019 23:11):    No growth        Imaging:    ECG:

## 2019-12-06 NOTE — PROGRESS NOTE ADULT - SUBJECTIVE AND OBJECTIVE BOX
Patient is a 89y old  Male who presents with a chief complaint of Unresponsiveness (06 Dec 2019 05:46)      T(F): 96.1 (12-06-19 @ 03:00), Max: 98 (12-05-19 @ 16:45)  HR: 72 (12-06-19 @ 05:48)  BP: 95/55 (12-06-19 @ 05:48)  RR: 35 (12-06-19 @ 05:48)  SpO2: 100% (12-06-19 @ 05:48) (96% - 100%)    PHYSICAL EXAM:  GENERAL: NAD, well-groomed, well-developed  HEAD:  Atraumatic, Normocephalic  EYES: EOMI, PERRLA, conjunctiva and sclera clear  ENMT: No tonsillar erythema, exudates, or enlargement; Moist mucous membranes, Good dentition, No lesions  NECK: Supple, No JVD, Normal thyroid  NERVOUS SYSTEM:  Alert & Oriented X3,  Motor Strength 5/5 B/L upper and lower extremities  CHEST/LUNG: Clear to percussion bilaterally; No rales, rhonchi, wheezing, or rubs  HEART: Regular rate and rhythm; No murmurs, rubs, or gallops  ABDOMEN: Soft, Nontender, Nondistended; Bowel sounds present  EXTREMITIES:   No clubbing, cyanosis, or edema  LYMPH: No lymphadenopathy noted  SKIN: No rashes or lesions    labs  12-05    139  |  97<L>  |  105<HH>  ----------------------------<  53<L>  5.3<H>   |  22  |  2.9<H>    Ca    9.1      05 Dec 2019 05:23  Mg     2.6     12-05                            8.5    12.95 )-----------( 133      ( 05 Dec 2019 05:23 )             28.5       Culture - Blood (collected 03 Dec 2019 12:24)  Source: .Blood Blood-Peripheral  Preliminary Report (05 Dec 2019 01:01):    No growth to date.    Culture - Blood (collected 03 Dec 2019 11:26)  Source: .Blood Blood-Peripheral  Preliminary Report (05 Dec 2019 01:01):    No growth to date.    Culture - Urine (collected 03 Dec 2019 11:24)  Source: .Urine Catheterized  Final Report (04 Dec 2019 23:11):    No growth      PT/INR - ( 05 Dec 2019 05:23 )   PT: >40.00 sec;   INR: 10.04 ratio                 atorvastatin 10 milliGRAM(s) Oral at bedtime  cefepime   IVPB      cefepime   IVPB 500 milliGRAM(s) IV Intermittent every 24 hours  chlorhexidine 4% Liquid 1 Application(s) Topical <User Schedule>  cholecalciferol 2000 Unit(s) Oral daily  furosemide    Tablet 20 milliGRAM(s) Oral two times a day  furosemide Infusion 10 mG/Hr IV Continuous <Continuous>  levothyroxine 50 MICROGram(s) Oral daily  mupirocin 2% Ointment 1 Application(s) Both Nostrils two times a day

## 2019-12-07 LAB
ALBUMIN SERPL ELPH-MCNC: 2.9 G/DL — LOW (ref 3.5–5.2)
ALBUMIN SERPL ELPH-MCNC: 3 G/DL — LOW (ref 3.5–5.2)
ALP SERPL-CCNC: 101 U/L — SIGNIFICANT CHANGE UP (ref 30–115)
ALP SERPL-CCNC: 116 U/L — HIGH (ref 30–115)
ALT FLD-CCNC: 42 U/L — HIGH (ref 0–41)
ALT FLD-CCNC: 47 U/L — HIGH (ref 0–41)
ANION GAP SERPL CALC-SCNC: 16 MMOL/L — HIGH (ref 7–14)
ANION GAP SERPL CALC-SCNC: 21 MMOL/L — HIGH (ref 7–14)
AST SERPL-CCNC: 23 U/L — SIGNIFICANT CHANGE UP (ref 0–41)
AST SERPL-CCNC: 30 U/L — SIGNIFICANT CHANGE UP (ref 0–41)
BILIRUB SERPL-MCNC: 1 MG/DL — SIGNIFICANT CHANGE UP (ref 0.2–1.2)
BILIRUB SERPL-MCNC: 1.1 MG/DL — SIGNIFICANT CHANGE UP (ref 0.2–1.2)
BUN SERPL-MCNC: 130 MG/DL — CRITICAL HIGH (ref 10–20)
BUN SERPL-MCNC: 131 MG/DL — CRITICAL HIGH (ref 10–20)
CALCIUM SERPL-MCNC: 9 MG/DL — SIGNIFICANT CHANGE UP (ref 8.5–10.1)
CALCIUM SERPL-MCNC: 9 MG/DL — SIGNIFICANT CHANGE UP (ref 8.5–10.1)
CHLORIDE SERPL-SCNC: 100 MMOL/L — SIGNIFICANT CHANGE UP (ref 98–110)
CHLORIDE SERPL-SCNC: 101 MMOL/L — SIGNIFICANT CHANGE UP (ref 98–110)
CO2 SERPL-SCNC: 25 MMOL/L — SIGNIFICANT CHANGE UP (ref 17–32)
CO2 SERPL-SCNC: 29 MMOL/L — SIGNIFICANT CHANGE UP (ref 17–32)
CREAT SERPL-MCNC: 3.1 MG/DL — HIGH (ref 0.7–1.5)
CREAT SERPL-MCNC: 3.3 MG/DL — HIGH (ref 0.7–1.5)
FERRITIN SERPL-MCNC: 445 NG/ML — HIGH (ref 30–400)
GLUCOSE SERPL-MCNC: 116 MG/DL — HIGH (ref 70–99)
GLUCOSE SERPL-MCNC: 195 MG/DL — HIGH (ref 70–99)
HCT VFR BLD CALC: 31.5 % — LOW (ref 42–52)
HGB BLD-MCNC: 9.2 G/DL — LOW (ref 14–18)
INR BLD: 2.11 RATIO — HIGH (ref 0.65–1.3)
MCHC RBC-ENTMCNC: 18.8 PG — LOW (ref 27–31)
MCHC RBC-ENTMCNC: 29.2 G/DL — LOW (ref 32–37)
MCV RBC AUTO: 64.3 FL — LOW (ref 80–94)
PLATELET # BLD AUTO: 108 K/UL — LOW (ref 130–400)
POTASSIUM SERPL-MCNC: 4.1 MMOL/L — SIGNIFICANT CHANGE UP (ref 3.5–5)
POTASSIUM SERPL-MCNC: 4.3 MMOL/L — SIGNIFICANT CHANGE UP (ref 3.5–5)
POTASSIUM SERPL-SCNC: 4.1 MMOL/L — SIGNIFICANT CHANGE UP (ref 3.5–5)
POTASSIUM SERPL-SCNC: 4.3 MMOL/L — SIGNIFICANT CHANGE UP (ref 3.5–5)
PROT SERPL-MCNC: 5.9 G/DL — LOW (ref 6–8)
PROT SERPL-MCNC: 5.9 G/DL — LOW (ref 6–8)
PROTHROM AB SERPL-ACNC: 24.1 SEC — HIGH (ref 9.95–12.87)
RBC # BLD: 4.9 M/UL — SIGNIFICANT CHANGE UP (ref 4.7–6.1)
RBC # FLD: 22.1 % — HIGH (ref 11.5–14.5)
SODIUM SERPL-SCNC: 145 MMOL/L — SIGNIFICANT CHANGE UP (ref 135–146)
SODIUM SERPL-SCNC: 147 MMOL/L — HIGH (ref 135–146)
TSH SERPL-MCNC: 1.2 UIU/ML — SIGNIFICANT CHANGE UP (ref 0.27–4.2)
WBC # BLD: 11.28 K/UL — HIGH (ref 4.8–10.8)
WBC # FLD AUTO: 11.28 K/UL — HIGH (ref 4.8–10.8)

## 2019-12-07 PROCEDURE — 99233 SBSQ HOSP IP/OBS HIGH 50: CPT

## 2019-12-07 PROCEDURE — 71045 X-RAY EXAM CHEST 1 VIEW: CPT | Mod: 26,76,77

## 2019-12-07 PROCEDURE — 71045 X-RAY EXAM CHEST 1 VIEW: CPT | Mod: 26,77

## 2019-12-07 RX ORDER — PANTOPRAZOLE SODIUM 20 MG/1
40 TABLET, DELAYED RELEASE ORAL
Refills: 0 | Status: DISCONTINUED | OUTPATIENT
Start: 2019-12-07 | End: 2019-12-21

## 2019-12-07 RX ORDER — POTASSIUM CHLORIDE 20 MEQ
40 PACKET (EA) ORAL ONCE
Refills: 0 | Status: DISCONTINUED | OUTPATIENT
Start: 2019-12-07 | End: 2019-12-07

## 2019-12-07 RX ORDER — CHLORHEXIDINE GLUCONATE 213 G/1000ML
1 SOLUTION TOPICAL
Refills: 0 | Status: DISCONTINUED | OUTPATIENT
Start: 2019-12-07 | End: 2019-12-21

## 2019-12-07 RX ADMIN — MUPIROCIN 1 APPLICATION(S): 20 OINTMENT TOPICAL at 18:37

## 2019-12-07 RX ADMIN — CEFEPIME 100 MILLIGRAM(S): 1 INJECTION, POWDER, FOR SOLUTION INTRAMUSCULAR; INTRAVENOUS at 10:06

## 2019-12-07 RX ADMIN — Medication 2000 UNIT(S): at 12:11

## 2019-12-07 RX ADMIN — CHLORHEXIDINE GLUCONATE 1 APPLICATION(S): 213 SOLUTION TOPICAL at 05:13

## 2019-12-07 RX ADMIN — Medication 50 MICROGRAM(S): at 05:13

## 2019-12-07 RX ADMIN — CHLORHEXIDINE GLUCONATE 1 APPLICATION(S): 213 SOLUTION TOPICAL at 15:44

## 2019-12-07 RX ADMIN — PANTOPRAZOLE SODIUM 40 MILLIGRAM(S): 20 TABLET, DELAYED RELEASE ORAL at 15:43

## 2019-12-07 RX ADMIN — MIDODRINE HYDROCHLORIDE 5 MILLIGRAM(S): 2.5 TABLET ORAL at 05:13

## 2019-12-07 RX ADMIN — MIDODRINE HYDROCHLORIDE 5 MILLIGRAM(S): 2.5 TABLET ORAL at 21:53

## 2019-12-07 RX ADMIN — MIDODRINE HYDROCHLORIDE 5 MILLIGRAM(S): 2.5 TABLET ORAL at 15:43

## 2019-12-07 RX ADMIN — ATORVASTATIN CALCIUM 10 MILLIGRAM(S): 80 TABLET, FILM COATED ORAL at 21:53

## 2019-12-07 RX ADMIN — MUPIROCIN 1 APPLICATION(S): 20 OINTMENT TOPICAL at 05:13

## 2019-12-07 NOTE — PROGRESS NOTE ADULT - ASSESSMENT
IMPRESSION:  alterED mental statu  CHF   PNA   COY on CKD     PLAN:    CNS: no sedation     HEENT: oral care     PULMONARY: keep pox > 92 % , FM, aspiration precaution, might need intubated  CARDIOVASCULAR:   cardiology consult /EP   on lasix drip follow Is and OS     GI: GI prophylaxis.  NGT    RENAL: renal consult     INFECTIOUS DISEASE: follow cx continue abx for now     HEMATOLOGICAL:  DVT prophylaxis. check INR today off coumadin        ENDOCRINE:  Follow up FS.  Insulin protocol if needed.  VERY POOR PROGNOSIS  ADVANCE DIRECTIVES

## 2019-12-07 NOTE — PROGRESS NOTE ADULT - SUBJECTIVE AND OBJECTIVE BOX
NEPHROLOGY PROGRESS NOTE    Patient seen and examined around 2.30 pm in MICU, events of the last 24 hrs noted and management D/W CC staff         PAST MEDICAL & SURGICAL HISTORY:  Hyperlipidemia  CHF (congestive heart failure)  Afib  Hypertension  History of hip surgery: Left total hip replacement  S/P AAA (abdominal aortic aneurysm) repair    Allergies:  levofloxacin (Swelling (Mild))    Home Medications Reviewed  Hospital Medications:   MEDICATIONS  (STANDING):  atorvastatin 10 milliGRAM(s) Oral at bedtime  cefepime   IVPB      cefepime   IVPB 500 milliGRAM(s) IV Intermittent every 24 hours  chlorhexidine 4% Liquid 1 Application(s) Topical <User Schedule>  chlorhexidine 4% Liquid 1 Application(s) Topical <User Schedule>  cholecalciferol 2000 Unit(s) Oral daily  levothyroxine 50 MICROGram(s) Oral daily  midodrine 5 milliGRAM(s) Oral every 8 hours  mupirocin 2% Ointment 1 Application(s) Both Nostrils two times a day  pantoprazole    Tablet 40 milliGRAM(s) Oral before breakfast      SOCIAL HISTORY:  Denies ETOH, Smoking   FAMILY HISTORY:  FH: congestive heart failure: sister        VITALS:  T(F): 96.1 (12-07-19 @ 19:00), Max: 96.6 (12-07-19 @ 05:35)  HR: 70 (12-07-19 @ 18:45)  BP: 114/58 (12-07-19 @ 18:45)  RR: 19 (12-07-19 @ 19:00)  SpO2: 100% (12-07-19 @ 18:45)    12-06 @ 07:01  -  12-07 @ 07:00  --------------------------------------------------------  IN: 750 mL / OUT: 1015 mL / NET: -265 mL    12-07 @ 07:01  -  12-07 @ 21:25  --------------------------------------------------------  IN: 780 mL / OUT: 905 mL / NET: -125 mL          12-06-19 @ 07:01  -  12-07-19 @ 07:00  --------------------------------------------------------  IN: 0 mL / OUT: 1015 mL / NET: -1015 mL    12-07-19 @ 07:01  -  12-07-19 @ 21:25  --------------------------------------------------------  IN: 0 mL / OUT: 905 mL / NET: -905 mL      I&O's Detail    06 Dec 2019 07:01  -  07 Dec 2019 07:00  --------------------------------------------------------  IN:    Enteral Tube Flush: 200 mL    furosemide Infusion: 20 mL    IV PiggyBack: 50 mL    Nepro: 480 mL  Total IN: 750 mL    OUT:    Indwelling Catheter - Urethral: 1015 mL  Total OUT: 1015 mL    Total NET: -265 mL      07 Dec 2019 07:01  -  07 Dec 2019 21:25  --------------------------------------------------------  IN:    Enteral Tube Flush: 250 mL    IV PiggyBack: 50 mL    Nepro: 240 mL    Osmolite 1.2: 240 mL  Total IN: 780 mL    OUT:    Indwelling Catheter - Urethral: 905 mL  Total OUT: 905 mL    Total NET: -125 mL            PHYSICAL EXAM:  Lethargic  Respiratory: BS b/l+  Cardiovascular: S1, S2, RRR  Gastrointestinal: BS+, soft, NT/ND  Extremities: No peripheral edema  Neurological: calm  : No CVA tenderness. has palomo.   Skin: No rashes      LABS:  12-07    145  |  100  |  131<HH>  ----------------------------<  195<H>  4.1   |  29  |  3.1<H>    Ca    9.0      07 Dec 2019 16:21    TPro  5.9<L>  /  Alb  2.9<L>  /  TBili  1.0  /  DBili      /  AST  23  /  ALT  42<H>  /  AlkPhos  116<H>  12-07    Creatinine Trend: 3.1 <--, 3.3 <--, 3.2 <--, 2.9 <--, 2.4 <--, 2.2 <--                        9.2    11.28 )-----------( 108      ( 07 Dec 2019 06:16 )             31.5     Urine Studies:  Urinalysis Basic - ( 03 Dec 2019 11:24 )    Color: Yellow / Appearance: Slightly Cloudy / SG: >=1.030 / pH:   Gluc:  / Ketone: Negative  / Bili: Negative / Urobili: 0.2 mg/dL   Blood:  / Protein: >=300 mg/dL / Nitrite: Negative   Leuk Esterase: Small / RBC:  / WBC 6-10 /HPF   Sq Epi:  / Non Sq Epi:  / Bacteria:       Sodium, Random Urine: 108.0 mmoL/L (12-04 @ 17:49)  Potassium, Random Urine: 22 mmol/L (12-04 @ 17:49)  Creatinine, Random Urine: 61 mg/dL (12-04 @ 17:49)

## 2019-12-07 NOTE — PROGRESS NOTE ADULT - ASSESSMENT
Patient unchanged. No further bradycardia. Continue antibiotics. When stable oob chair. Prognosis poor

## 2019-12-07 NOTE — PROGRESS NOTE ADULT - ASSESSMENT
89 ym with history of CHF, DLD, Afib, HTN presented after a syncopal episode at home:      Assessment & Plan:    1. Metabolic Encephalopathy:  No significant improvement.  CT head : Negative  Neurology evaluation: EEG negative for seizures. No further recommendations.        2. Bradycardia: Resolved  Off Metoprolol. s/p Atropine.  Cardiology following.      3. Acute on chronic diastolic CHF:  CXR show bilateral effusion, more on left  Off Lasix drip, keep I<O  c/w BIPAP PRN      4. Leukocytosis:  c/w Empiric antibiotics Cefepime for now  Panculture: UA -ve, blood culture, urine cx negative      5. COY on CKD 3:  Creatinine trending up. Renal sono: hydronephrosis  Monitor BMP.   Nephrology following.        6. AFib:  Rate controlled. INR now 2.11.  Monitor INR.      7. Hypokalemia:  Replaced. Follow up BMP.        8. Mild Hyponatremia:  Monitor Na with free water flushes and tube feeds.      Prophylaxis: None  Code status: Full code    Progress Note Handoff:  Pending consults: None  Pending Tests: None  Family/Patient discussion: None  Disposition: Unknown at this time.      Attending: Dr. Patti Bowden. [employer] [id number]. 89 ym with history of CHF, DLD, Afib, HTN presented after a syncopal episode at home:      Assessment & Plan:    1. Metabolic Encephalopathy:  No significant improvement.  CT head : Negative  Neurology evaluation: EEG negative for seizures. No further recommendations.        2. Bradycardia: Resolved  Off Metoprolol. s/p Atropine.  Cardiology following.      3. Acute on chronic diastolic CHF:  CXR show bilateral effusion, more on left  Off Lasix drip, keep I<O  c/w BIPAP PRN      4. Leukocytosis:  c/w Empiric antibiotics Cefepime for now  Panculture: UA -ve, blood culture, urine cx negative      5. COY on CKD 3:  Creatinine stable. Renal sono: hydronephrosis  Monitor BMP.   Nephrology following.        6. AFib:  Rate controlled. INR now 2.11.  Monitor INR.      7. Mild Hyponatremia:  Monitor Na with free water flushes and tube feeds.      Prophylaxis: None  Code status: Full code    Progress Note Handoff:  Pending consults: None  Pending Tests: None  Family/Patient discussion: None  Disposition: Unknown at this time.      Attending: Dr. Patti Bowden. [employer] [id number].

## 2019-12-07 NOTE — PROGRESS NOTE ADULT - ASSESSMENT
Renal impression:  COY    Non-oliguric now      CKD stage 3    Cardio-renal       vs Pre-renal           R/O ATN  Systolic HF with decreased LV function   Severe pulmonary HTN  Urine out put has improved (1080 cc) but with worsening renal function and now severe renal dysfunction, so recommend to D/C Lasix drip/Lasix  Midodrine 10 mg q8h  Pressors if/as needed  Avoid hypotension  Could use albumin to increase filling pressure post diuresis (25% 50 cc TID a couple of days ot 5% albumin 50cc/hr for 5-10 hrs and re-evaluate based on response  Monitor BUN/Cr  Monitor and correct lytes as needed  Monitor urine out put and Is and Os  - Continue general care and management of the rest of comorbidities as per medicine/CC  Prognosis is very guarded  Management D/W ICU staff

## 2019-12-07 NOTE — PROGRESS NOTE ADULT - SUBJECTIVE AND OBJECTIVE BOX
KUSH TONG  89y  Male    Patient is a 89y old  Male who presents with a chief complaint of Unresponsiveness (07 Dec 2019 09:40)      INTERVAL HPI/OVERNIGHT EVENTS:  No interval events.  Patient still confused.      REVIEW OF SYSTEMS: UTO due to confusion      VITALS:  T(F): 96.1 (12-07-19 @ 15:45), Max: 96.6 (12-07-19 @ 05:35)  HR: 73 (12-07-19 @ 15:45) (69 - 84)  BP: 102/59 (12-07-19 @ 15:45) (89/52 - 121/58)  RR: 32 (12-07-19 @ 15:45) (20 - 39)  SpO2: 100% (12-07-19 @ 15:45) (88% - 100%)      PHYSICAL EXAM:  GENERAL: chronically ill looking, cachetic  HEAD:  Atraumatic, Normocephalic  EYES: conjunctiva and sclera clear  ENMT: Moist mucous membranes  NECK: Supple, Normal thyroid  NERVOUS SYSTEM:  Awake but confused. Oriented X 0. No focal deficits.  CHEST/LUNG: Decreased AE bilaterally; No rales, rhonchi, wheezing, or rubs  HEART: Regular rate and rhythm; No murmurs, rubs, or gallops  ABDOMEN: Soft, Nontender, Nondistended; Bowel sounds present  EXTREMITIES:  2+ Peripheral Pulses, No clubbing, cyanosis, or edema  LYMPH: No lymphadenopathy noted  SKIN: please see nursing assessment    Consultant(s) Notes Reviewed:  [x ] YES  [ ] NO  Care Discussed with Consultants/Other Providers [ x] YES  [ ] NO    LABS:                        9.2    11.28 )-----------( 108      ( 07 Dec 2019 06:16 )             31.5       12-07    147<H>  |  101  |  130<HH>  ----------------------------<  116<H>  4.3   |  25  |  3.3<H>    Ca    9.0      07 Dec 2019 06:16    TPro  5.9<L>  /  Alb  3.0<L>  /  TBili  1.1  /  DBili  x   /  AST  30  /  ALT  47<H>  /  AlkPhos  101  12-07      PT/INR - ( 07 Dec 2019 06:16 )   PT: 24.10 sec;   INR: 2.11 ratio         MICROBIOLOGY:  Culture - Blood (12.03.19 @ 12:24)    Specimen Source: .Blood Blood-Peripheral    Culture Results: No growth to date.      Culture - Blood (12.03.19 @ 11:26)    Specimen Source: .Blood Blood-Peripheral    Culture Results: No growth to date.      Culture - Urine (12.03.19 @ 11:24)    Specimen Source: .Urine Catheterized    Culture Results: No growth      RADIOLOGY & ADDITIONAL TESTS:  CT Chest, Abdomen & Pelvis No Cont (12.03.19 @ 13:41)   1. Since June 27, 2019, no evidence of acute traumatic injury within the   chest, abdomen, or pelvis.    2. Status post interval left total hip placement.    3. Findings suggestive of CHF, including increased size moderate   bilateral pleural effusions, left greater than right, bilateral   interlobular septal thickening, and scattered bilateral ground glass   opacities.      CT Cervical Spine No Cont (12.03.19 @ 13:41)   In comparison with the prior noncontrast CT scan of the cervical spine   dated June 28, 2019:    No acute fracture demonstrated.    Multilevel degenerative changes and osteopenia, unchanged.    New left pleural effusion and ground glass opacities in the visualized   upper lobes.      CT Head No Cont (12.03.19 @ 10:37)   Motion degraded exam shows no gross acute intracranial hemorrhage or mass   effect. No CT evidence of an acute transcortical infarct, however,   evaluation is degraded by motion.    Chronic ischemic changes in the frontoparietal white matter.        X-ray Chest 1 View- PORTABLE-Urgent (12.06.19 @ 19:46)   Support devices: NG tube at GE junction..    Cardiac/mediastinum/hilum: Cardiomegaly, thoracic aortic calcification..    Lung parenchyma/Pleura: Bilateral opacities/pleural effusions.    Skeleton/soft tissues: Stable.    Impression:      NG tube at the GE junction and should be advanced. Stable lung pathology   and cardiomegaly.      Imaging Personally Reviewed:  [ ] YES  [ ] NO    MEDICATIONS  (STANDING):  atorvastatin 10 milliGRAM(s) Oral at bedtime  cefepime   IVPB 500 milliGRAM(s) IV Intermittent every 24 hours  chlorhexidine 4% Liquid 1 Application(s) Topical <User Schedule>  chlorhexidine 4% Liquid 1 Application(s) Topical <User Schedule>  cholecalciferol 2000 Unit(s) Oral daily  levothyroxine 50 MICROGram(s) Oral daily  midodrine 5 milliGRAM(s) Oral every 8 hours  mupirocin 2% Ointment 1 Application(s) Both Nostrils two times a day  pantoprazole    Tablet 40 milliGRAM(s) Oral before breakfast      MEDICATIONS  (PRN): None        HOME MEDICATIONS:  cholecalciferol oral tablet: 2000 unit(s) orally once a day ( vitamin D      HEALTH ISSUES - PROBLEM Dx:  Acute on chronic respiratory failure with hypercapnia  Hyperlipidemia  CHF (congestive heart failure)  Afib  Hypertension  History of hip surgery: Left total hip replacement  S/P AAA (abdominal aortic aneurysm) repair

## 2019-12-07 NOTE — PROGRESS NOTE ADULT - SUBJECTIVE AND OBJECTIVE BOX
OVERNIGHT EVENTS: events noted, FM 30%, lasix drip    Vital Signs Last 24 Hrs  T(C): 35.9 (07 Dec 2019 05:35), Max: 36.4 (06 Dec 2019 11:34)  T(F): 96.6 (07 Dec 2019 05:35), Max: 97.5 (06 Dec 2019 11:34)  HR: 72 (07 Dec 2019 07:54) (69 - 84)  BP: 101/67 (07 Dec 2019 07:51) (81/44 - 121/58)  BP(mean): 77 (07 Dec 2019 07:51) (57 - 83)  RR: 24 (07 Dec 2019 09:00) (18 - 59)  SpO2: 96% (07 Dec 2019 07:54) (95% - 100%)    PHYSICAL EXAMINATION:    GENERAL: cachectic, wasted, confused    HEENT: Head is normocephalic and atraumatic. Extraocular muscles are intact. Mucous membranes are moist.    NECK: Supple.    LUNGS: dec bs both bases    HEART: TED 3/6    ABDOMEN: Soft, nontender, and nondistended.      EXTREMITIES: Without any cyanosis, clubbing, rash, lesions or edema.          LABS:                        9.2    11.28 )-----------( 108      ( 07 Dec 2019 06:16 )             31.5     12-07    147<H>  |  101  |  130<HH>  ----------------------------<  116<H>  4.3   |  25  |  3.3<H>    Ca    9.0      07 Dec 2019 06:16    TPro  5.9<L>  /  Alb  3.0<L>  /  TBili  1.1  /  DBili  x   /  AST  30  /  ALT  47<H>  /  AlkPhos  101  12-07    PT/INR - ( 07 Dec 2019 06:16 )   PT: 24.10 sec;   INR: 2.11 ratio             ABG - ( 06 Dec 2019 11:24 )  pH, Arterial: 7.34  pH, Blood: x     /  pCO2: 42    /  pO2: 97    / HCO3: 22    / Base Excess: -3.2  /  SaO2: 98                                12-06-19 @ 07:01  -  12-07-19 @ 07:00  --------------------------------------------------------  IN: 750 mL / OUT: 1015 mL / NET: -265 mL    12-07-19 @ 07:01  -  12-07-19 @ 09:35  --------------------------------------------------------  IN: 0 mL / OUT: 135 mL / NET: -135 mL        MICROBIOLOGY:  Culture Results:   No growth to date. (12-03 @ 12:24)  Culture Results:   No growth to date. (12-03 @ 11:26)  Culture Results:   No growth (12-03 @ 11:24)      MEDICATIONS  (STANDING):  atorvastatin 10 milliGRAM(s) Oral at bedtime  cefepime   IVPB      cefepime   IVPB 500 milliGRAM(s) IV Intermittent every 24 hours  chlorhexidine 4% Liquid 1 Application(s) Topical <User Schedule>  cholecalciferol 2000 Unit(s) Oral daily  levothyroxine 50 MICROGram(s) Oral daily  midodrine 5 milliGRAM(s) Oral every 8 hours  mupirocin 2% Ointment 1 Application(s) Both Nostrils two times a day    MEDICATIONS  (PRN):      RADIOLOGY & ADDITIONAL STUDIES:

## 2019-12-07 NOTE — CHART NOTE - NSCHARTNOTEFT_GEN_A_CORE
Registered Dietitian Follow-Up     Patient Profile Reviewed                           Yes [x]   No []     Nutrition History Previously Obtained        Yes []  No [x]  - RD unable to obtain history, patient noted as lethargic/confused, no family at bedside      Pertinent Subjective Information: N/A to patient, tolerating TF per RN      Pertinent Medical Interventions: AMS possibly 2/2 bradycardia, acute on chronic diastolic CHF, leukocytosis, COY- nephrology following, A-fib, SLP once off bipap per MD note; patient initiated on NG feeds 12/6      Diet order: NPO with NG feeds: Nepro bolus of 240 mL q 6 hrs for TV of 960 mL/day, documented 50 mL pre/post feed (1728 kcal, 76 g protein, 1098 mL free water)      Anthropometrics:  - Ht: 63 inches   - WtL 69 kg on 12/7, 64.9 kg on 12/3, question accuracy   - %wt change: Question accuracy of fluctuating wts, noted with +1 edema to b/l hands   - BMI: Using admission wt of 64.9 kg on 12/3- 25   - IBWL 124#      Pertinent Lab Data: 12/6: H/H 9.2/28.1, , Cr 3.2, glucose 68, GFR 16      Pertinent Meds: antibiotics, midodrine, atorvastatin, cholecalciferol, levothyroxine      Physical Findings:  - Appearance: Patient noted as lethargic/confused, appeared as sleeping upon f/u, with bipap   - GI function: last BM noted as 12/2- RN made aware, consider bowel regimen   - Tubes: NG   - Oral/Mouth cavity: NPO with NG feeds   - Skin: skin tear/skin lesions/ecchymosis      Nutrition Requirements  Weight Used: 64.9 kg- admission wt      Estimated Energy Needs    Continue []  Adjust [x]  Adjusted Energy Recommendations: MSJ x 1.2-1.3= 3404-3841 kcal/day      Estimated Protein Needs    Continue [x]  Adjust []  Adjusted Protein Recommendations: 1.0-1.2 g/kg- 65-78 g/day (monitor renal function)         Estimated Fluid Needs        Continue []  Adjust [x]  Adjusted Fluid Recommendations: per ICU team      Nutrient Intake: Patient exceeding calorie needs with current TF regimen.     [x] Previous Nutrition Diagnosis: Inadequate energy intake no longer appropriate. See new PES below.      Nutrition Diagnostic #1  Problem: Excessive enteral nutrition infusion   Etiology:  Statement:     Nutrition Intervention      Goal/Expected Outcome:     Indicator/Monitoring: Registered Dietitian Follow-Up     Patient Profile Reviewed                           Yes [x]   No []     Nutrition History Previously Obtained        Yes []  No [x]  - RD unable to obtain history, patient noted as lethargic/confused, no family at bedside      Pertinent Subjective Information: N/A to patient, tolerating TF per RN      Pertinent Medical Interventions: AMS possibly 2/2 bradycardia, acute on chronic diastolic CHF, leukocytosis, COY- nephrology following, A-fib, SLP once off bipap per MD note; patient initiated on NG feeds 12/6      Diet order: NPO with NG feeds: Nepro bolus of 240 mL q 6 hrs for TV of 960 mL/day, documented 50 mL pre/post feed (1728 kcal, 76 g protein, 1098 mL free water)      Anthropometrics:  - Ht: 63 inches   - WtL 69 kg on 12/7, 64.9 kg on 12/3, question accuracy   - %wt change: Question accuracy of fluctuating wts, noted with +1 edema to b/l hands   - BMI: Using admission wt of 64.9 kg on 12/3- 25   - IBWL 124#      Pertinent Lab Data: 12/6: H/H 9.2/28.1, , Cr 3.2, glucose 68, GFR 16      Pertinent Meds: antibiotics, midodrine, atorvastatin, cholecalciferol, levothyroxine      Physical Findings:  - Appearance: Patient noted as lethargic/confused, appeared as sleeping upon f/u, with bipap   - GI function: last BM noted as 12/2- RN made aware, consider bowel regimen   - Tubes: NG   - Oral/Mouth cavity: NPO with NG feeds   - Skin: skin tear/skin lesions/ecchymosis      Nutrition Requirements  Weight Used: 64.9 kg- admission wt      Estimated Energy Needs    Continue []  Adjust [x]  Adjusted Energy Recommendations: MSJ x 1.2-1.3= 1877-9636 kcal/day      Estimated Protein Needs    Continue [x]  Adjust []  Adjusted Protein Recommendations: 1.0-1.2 g/kg- 65-78 g/day (monitor renal function)         Estimated Fluid Needs        Continue []  Adjust [x]  Adjusted Fluid Recommendations: per ICU team      Nutrient Intake: Patient exceeding calorie needs with current TF regimen.     [x] Previous Nutrition Diagnosis: Inadequate energy intake no longer appropriate. See new PES below.      Nutrition Diagnostic #1  Problem: Excessive enteral nutrition infusion   Etiology: related to rate/formula   Statement: as evidenced by patient receiving 121% of estimated calorie needs via current TF regimen.      Nutrition Intervention: Recommend changing TF regimen to      Goal/Expected Outcome:     Indicator/Monitoring: Registered Dietitian Follow-Up     Patient Profile Reviewed                           Yes [x]   No []     Nutrition History Previously Obtained        Yes []  No [x]  - RD unable to obtain history, patient noted as lethargic/confused, no family at bedside      Pertinent Subjective Information: N/A to patient, tolerating TF per RN      Pertinent Medical Interventions: AMS possibly 2/2 bradycardia, acute on chronic diastolic CHF, leukocytosis, COY- nephrology following, A-fib, SLP once off bipap per MD note; patient initiated on NG feeds 12/6      Diet order: NPO with NG feeds: Nepro bolus of 240 mL q 6 hrs for TV of 960 mL/day, documented 50 mL pre/post feed (1728 kcal, 76 g protein, 1098 mL free water)      Anthropometrics:  - Ht: 63 inches   - WtL 69 kg on 12/7, 64.9 kg on 12/3, question accuracy   - %wt change: Question accuracy of fluctuating wts, noted with +1 edema to b/l hands   - BMI: Using admission wt of 64.9 kg on 12/3- 25   - IBWL 124#      Pertinent Lab Data: 12/6: H/H 9.2/28.1, , Cr 3.2, glucose 68, GFR 16      Pertinent Meds: antibiotics, midodrine, atorvastatin, cholecalciferol, levothyroxine      Physical Findings:  - Appearance: Patient noted as lethargic/confused, appeared as sleeping upon f/u, with bipap   - GI function: last BM noted as 12/2- RN made aware, consider bowel regimen   - Tubes: NG   - Oral/Mouth cavity: NPO with NG feeds   - Skin: skin tear/skin lesions/ecchymosis      Nutrition Requirements  Weight Used: 64.9 kg- admission wt      Estimated Energy Needs    Continue []  Adjust [x]  Adjusted Energy Recommendations: MSJ x 1.2-1.3= 2727-8867 kcal/day      Estimated Protein Needs    Continue [x]  Adjust []  Adjusted Protein Recommendations: 1.0-1.2 g/kg- 65-78 g/day (monitor renal function)         Estimated Fluid Needs        Continue []  Adjust [x]  Adjusted Fluid Recommendations: per ICU team      Nutrient Intake: Patient exceeding calorie needs with current TF regimen.     [x] Previous Nutrition Diagnosis: Inadequate energy intake no longer appropriate. See new PES below.      Nutrition Diagnostic #1  Problem: Excessive enteral nutrition infusion   Etiology: related to rate/formula   Statement: as evidenced by patient receiving 121% of estimated calorie needs via current TF regimen.      Nutrition Intervention: Recommend changing TF regimen to osmolite 1.5 4 cans/day (240 mL q 6 hrs) along with prosource TF 45 mL (1 packet) q 24 hrs to provide 1465 kcal, 71 g protein, 730 mL free water. Additional flushes per ICU team. Consider bowel regimen.      Goal/Expected Outcome: Patient to receive/tolerate >85% and <110% of estimated needs via TF regimen upon f/u in 3 days, 1 BM/day.      Indicator/Monitoring: RD to monitor diet order, nutrition focused physical findings (TF tolerance, skin, BM), body composition (wt trends), plan of care.

## 2019-12-08 LAB
ALBUMIN SERPL ELPH-MCNC: 3 G/DL — LOW (ref 3.5–5.2)
ALP SERPL-CCNC: 101 U/L — SIGNIFICANT CHANGE UP (ref 30–115)
ALT FLD-CCNC: 36 U/L — SIGNIFICANT CHANGE UP (ref 0–41)
ANION GAP SERPL CALC-SCNC: 14 MMOL/L — SIGNIFICANT CHANGE UP (ref 7–14)
APTT BLD: 39 SEC — SIGNIFICANT CHANGE UP (ref 27–39.2)
AST SERPL-CCNC: 21 U/L — SIGNIFICANT CHANGE UP (ref 0–41)
BASOPHILS # BLD AUTO: 0.01 K/UL — SIGNIFICANT CHANGE UP (ref 0–0.2)
BASOPHILS NFR BLD AUTO: 0.1 % — SIGNIFICANT CHANGE UP (ref 0–1)
BILIRUB SERPL-MCNC: 0.9 MG/DL — SIGNIFICANT CHANGE UP (ref 0.2–1.2)
BUN SERPL-MCNC: 127 MG/DL — CRITICAL HIGH (ref 10–20)
CALCIUM SERPL-MCNC: 9.3 MG/DL — SIGNIFICANT CHANGE UP (ref 8.5–10.1)
CHLORIDE SERPL-SCNC: 105 MMOL/L — SIGNIFICANT CHANGE UP (ref 98–110)
CO2 SERPL-SCNC: 29 MMOL/L — SIGNIFICANT CHANGE UP (ref 17–32)
CORTICOSTEROID BINDING GLOBULIN RESULT: 1.7 MG/DL — SIGNIFICANT CHANGE UP
CORTIS F/TOTAL MFR SERPL: 56 % — SIGNIFICANT CHANGE UP
CORTIS SERPL-MCNC: 26 UG/DL — HIGH
CORTISOL, FREE RESULT: 15 UG/DL — HIGH
CREAT SERPL-MCNC: 3.2 MG/DL — HIGH (ref 0.7–1.5)
EOSINOPHIL # BLD AUTO: 0.08 K/UL — SIGNIFICANT CHANGE UP (ref 0–0.7)
EOSINOPHIL NFR BLD AUTO: 0.9 % — SIGNIFICANT CHANGE UP (ref 0–8)
GLUCOSE SERPL-MCNC: 166 MG/DL — HIGH (ref 70–99)
HCT VFR BLD CALC: 30.8 % — LOW (ref 42–52)
HGB BLD-MCNC: 9.1 G/DL — LOW (ref 14–18)
IMM GRANULOCYTES NFR BLD AUTO: 0.6 % — HIGH (ref 0.1–0.3)
INR BLD: 1.92 RATIO — HIGH (ref 0.65–1.3)
LYMPHOCYTES # BLD AUTO: 1.16 K/UL — LOW (ref 1.2–3.4)
LYMPHOCYTES # BLD AUTO: 13.1 % — LOW (ref 20.5–51.1)
MAGNESIUM SERPL-MCNC: 2.6 MG/DL — HIGH (ref 1.8–2.4)
MCHC RBC-ENTMCNC: 18.6 PG — LOW (ref 27–31)
MCHC RBC-ENTMCNC: 29.5 G/DL — LOW (ref 32–37)
MCV RBC AUTO: 63 FL — LOW (ref 80–94)
MONOCYTES # BLD AUTO: 1.11 K/UL — HIGH (ref 0.1–0.6)
MONOCYTES NFR BLD AUTO: 12.5 % — HIGH (ref 1.7–9.3)
NEUTROPHILS # BLD AUTO: 6.44 K/UL — SIGNIFICANT CHANGE UP (ref 1.4–6.5)
NEUTROPHILS NFR BLD AUTO: 72.8 % — SIGNIFICANT CHANGE UP (ref 42.2–75.2)
NRBC # BLD: 1 /100 WBCS — HIGH (ref 0–0)
PLATELET # BLD AUTO: 96 K/UL — LOW (ref 130–400)
POTASSIUM SERPL-MCNC: 4 MMOL/L — SIGNIFICANT CHANGE UP (ref 3.5–5)
POTASSIUM SERPL-SCNC: 4 MMOL/L — SIGNIFICANT CHANGE UP (ref 3.5–5)
PROT SERPL-MCNC: 5.9 G/DL — LOW (ref 6–8)
PROTHROM AB SERPL-ACNC: 21.9 SEC — HIGH (ref 9.95–12.87)
RBC # BLD: 4.89 M/UL — SIGNIFICANT CHANGE UP (ref 4.7–6.1)
RBC # FLD: 22.6 % — HIGH (ref 11.5–14.5)
SODIUM SERPL-SCNC: 148 MMOL/L — HIGH (ref 135–146)
WBC # BLD: 8.85 K/UL — SIGNIFICANT CHANGE UP (ref 4.8–10.8)
WBC # FLD AUTO: 8.85 K/UL — SIGNIFICANT CHANGE UP (ref 4.8–10.8)

## 2019-12-08 PROCEDURE — 99233 SBSQ HOSP IP/OBS HIGH 50: CPT

## 2019-12-08 PROCEDURE — 71045 X-RAY EXAM CHEST 1 VIEW: CPT | Mod: 26

## 2019-12-08 RX ADMIN — Medication 2000 UNIT(S): at 11:40

## 2019-12-08 RX ADMIN — MIDODRINE HYDROCHLORIDE 5 MILLIGRAM(S): 2.5 TABLET ORAL at 05:30

## 2019-12-08 RX ADMIN — MIDODRINE HYDROCHLORIDE 5 MILLIGRAM(S): 2.5 TABLET ORAL at 14:14

## 2019-12-08 RX ADMIN — MUPIROCIN 1 APPLICATION(S): 20 OINTMENT TOPICAL at 05:30

## 2019-12-08 RX ADMIN — PANTOPRAZOLE SODIUM 40 MILLIGRAM(S): 20 TABLET, DELAYED RELEASE ORAL at 06:03

## 2019-12-08 RX ADMIN — MIDODRINE HYDROCHLORIDE 5 MILLIGRAM(S): 2.5 TABLET ORAL at 21:23

## 2019-12-08 RX ADMIN — ATORVASTATIN CALCIUM 10 MILLIGRAM(S): 80 TABLET, FILM COATED ORAL at 21:23

## 2019-12-08 RX ADMIN — Medication 50 MICROGRAM(S): at 05:30

## 2019-12-08 RX ADMIN — CEFEPIME 100 MILLIGRAM(S): 1 INJECTION, POWDER, FOR SOLUTION INTRAMUSCULAR; INTRAVENOUS at 10:04

## 2019-12-08 RX ADMIN — MUPIROCIN 1 APPLICATION(S): 20 OINTMENT TOPICAL at 17:01

## 2019-12-08 RX ADMIN — CHLORHEXIDINE GLUCONATE 1 APPLICATION(S): 213 SOLUTION TOPICAL at 05:30

## 2019-12-08 NOTE — PROGRESS NOTE ADULT - ASSESSMENT
89 ym with history of CHF, DLD, Afib, HTN presented after a syncopal episode at home:      Assessment & Plan:    1. Metabolic Encephalopathy:  No significant improvement.  CT head : Negative  Neurology evaluation: EEG negative for seizures. No further recommendations.        2. Bradycardia: Resolved  Off Metoprolol. s/p Atropine.  Cardiology following.      3. Acute on chronic diastolic CHF:  CXR show bilateral effusion, more on left  Off Lasix drip, keep I<O  c/w BIPAP PRN      4. Leukocytosis:  c/w Empiric antibiotics Cefepime for now  Panculture: UA -ve, blood culture, urine cx negative      5. COY on CKD 3:  Creatinine stable. CT - NO hydronephrosis  Monitor BMP.   Nephrology following.        6. AFib:  rate controlled   Monitor INR.      7.  Hypernatremia:   increase  free water via ngt      Code status: Full code

## 2019-12-08 NOTE — PROGRESS NOTE ADULT - SUBJECTIVE AND OBJECTIVE BOX
Patient is a 89y old  Male who presents with a chief complaint of Unresponsiveness (07 Dec 2019 21:24)      T(F): 96 (12-08-19 @ 11:00), Max: 97.6 (12-08-19 @ 03:00)  HR: 78 (12-08-19 @ 10:45)  BP: 96/53 (12-08-19 @ 10:45)  RR: 20  SpO2: 95% (12-08-19 @ 10:45) (88% - 100%)    PHYSICAL EXAM:  GENERAL: NAD  HEAD:  Atraumatic, Normocephalic  EYES: EOMI, PERRLA, conjunctiva and sclera clear  NERVOUS SYSTEM:  no focal deficits   CHEST/LUNG: Clear to percussion bilaterally; No rales, rhonchi, wheezing, or rubs  HEART: Regular rate and rhythm; No murmurs, rubs, or gallops  ABDOMEN: Soft, Nontender, Nondistended; Bowel sounds present  EXTREMITIES:  2+ Peripheral Pulses, No clubbing, cyanosis, or edema  LYMPH: No lymphadenopathy noted  SKIN: No rashes or lesions    LABS  12-08    148<H>  |  105  |  127<HH>  ----------------------------<  166<H>  4.0   |  29  |  3.2<H>    Ca    9.3      08 Dec 2019 06:19  Mg     2.6     12-08    TPro  5.9<L>  /  Alb  3.0<L>  /  TBili  0.9  /  DBili  x   /  AST  21  /  ALT  36  /  AlkPhos  101  12-08                          9.1    8.85  )-----------( 96       ( 08 Dec 2019 06:19 )             30.8     PT/INR - ( 08 Dec 2019 06:19 )   PT: 21.90 sec;   INR: 1.92 ratio         PTT - ( 08 Dec 2019 06:19 )  PTT:39.0 sec      Culture Results:   No growth to date. (12-03-19)  Culture Results:   No growth to date. (12-03-19)  Culture Results:   No growth (12-03-19)    RADIOLOGY  < from: Xray Chest 1 View-PORTABLE IMMEDIATE (12.07.19 @ 17:45) >  Impression:      Stable bilateral effusions and interstitial edema, left greater than   right.    < from: CT Abdomen and Pelvis No Cont (12.03.19 @ 13:41) >   No   hydronephrosis.    < end of copied text >      MEDICATIONS  (STANDING):  atorvastatin 10 milliGRAM(s) Oral at bedtime  cefepime   IVPB 500 milliGRAM(s) IV Intermittent every 24 hours  chlorhexidine 4% Liquid 1 Application(s) Topical <User Schedule  cholecalciferol 2000 Unit(s) Oral daily  levothyroxine 50 MICROGram(s) Oral daily  midodrine 5 milliGRAM(s) Oral every 8 hours  mupirocin 2% Ointment 1 Application(s) Both Nostrils two times a day  pantoprazole    Tablet 40 milliGRAM(s) Oral before breakfast    MEDICATIONS  (PRN):

## 2019-12-09 LAB
ANION GAP SERPL CALC-SCNC: 13 MMOL/L — SIGNIFICANT CHANGE UP (ref 7–14)
BASOPHILS # BLD AUTO: 0.02 K/UL — SIGNIFICANT CHANGE UP (ref 0–0.2)
BASOPHILS NFR BLD AUTO: 0.2 % — SIGNIFICANT CHANGE UP (ref 0–1)
BUN SERPL-MCNC: 121 MG/DL — CRITICAL HIGH (ref 10–20)
CALCIUM SERPL-MCNC: 9.2 MG/DL — SIGNIFICANT CHANGE UP (ref 8.5–10.1)
CHLORIDE SERPL-SCNC: 104 MMOL/L — SIGNIFICANT CHANGE UP (ref 98–110)
CO2 SERPL-SCNC: 30 MMOL/L — SIGNIFICANT CHANGE UP (ref 17–32)
CREAT SERPL-MCNC: 2.7 MG/DL — HIGH (ref 0.7–1.5)
CULTURE RESULTS: SIGNIFICANT CHANGE UP
CULTURE RESULTS: SIGNIFICANT CHANGE UP
EOSINOPHIL # BLD AUTO: 0.2 K/UL — SIGNIFICANT CHANGE UP (ref 0–0.7)
EOSINOPHIL NFR BLD AUTO: 2.2 % — SIGNIFICANT CHANGE UP (ref 0–8)
GLUCOSE BLDC GLUCOMTR-MCNC: 126 MG/DL — HIGH (ref 70–99)
GLUCOSE SERPL-MCNC: 114 MG/DL — HIGH (ref 70–99)
HCT VFR BLD CALC: 31 % — LOW (ref 42–52)
HGB BLD-MCNC: 9.2 G/DL — LOW (ref 14–18)
IMM GRANULOCYTES NFR BLD AUTO: 0.6 % — HIGH (ref 0.1–0.3)
LYMPHOCYTES # BLD AUTO: 1.28 K/UL — SIGNIFICANT CHANGE UP (ref 1.2–3.4)
LYMPHOCYTES # BLD AUTO: 14.3 % — LOW (ref 20.5–51.1)
MAGNESIUM SERPL-MCNC: 2.5 MG/DL — HIGH (ref 1.8–2.4)
MCHC RBC-ENTMCNC: 18.7 PG — LOW (ref 27–31)
MCHC RBC-ENTMCNC: 29.7 G/DL — LOW (ref 32–37)
MCV RBC AUTO: 63 FL — LOW (ref 80–94)
MONOCYTES # BLD AUTO: 1.19 K/UL — HIGH (ref 0.1–0.6)
MONOCYTES NFR BLD AUTO: 13.3 % — HIGH (ref 1.7–9.3)
NEUTROPHILS # BLD AUTO: 6.19 K/UL — SIGNIFICANT CHANGE UP (ref 1.4–6.5)
NEUTROPHILS NFR BLD AUTO: 69.4 % — SIGNIFICANT CHANGE UP (ref 42.2–75.2)
NRBC # BLD: 0 /100 WBCS — SIGNIFICANT CHANGE UP (ref 0–0)
PHOSPHATE SERPL-MCNC: 3.5 MG/DL — SIGNIFICANT CHANGE UP (ref 2.1–4.9)
PLATELET # BLD AUTO: 92 K/UL — LOW (ref 130–400)
POTASSIUM SERPL-MCNC: 4 MMOL/L — SIGNIFICANT CHANGE UP (ref 3.5–5)
POTASSIUM SERPL-SCNC: 4 MMOL/L — SIGNIFICANT CHANGE UP (ref 3.5–5)
RBC # BLD: 4.92 M/UL — SIGNIFICANT CHANGE UP (ref 4.7–6.1)
RBC # FLD: 22.5 % — HIGH (ref 11.5–14.5)
SODIUM SERPL-SCNC: 147 MMOL/L — HIGH (ref 135–146)
SPECIMEN SOURCE: SIGNIFICANT CHANGE UP
SPECIMEN SOURCE: SIGNIFICANT CHANGE UP
WBC # BLD: 8.93 K/UL — SIGNIFICANT CHANGE UP (ref 4.8–10.8)
WBC # FLD AUTO: 8.93 K/UL — SIGNIFICANT CHANGE UP (ref 4.8–10.8)

## 2019-12-09 PROCEDURE — 71045 X-RAY EXAM CHEST 1 VIEW: CPT | Mod: 26

## 2019-12-09 PROCEDURE — 99233 SBSQ HOSP IP/OBS HIGH 50: CPT

## 2019-12-09 RX ORDER — WARFARIN SODIUM 2.5 MG/1
2 TABLET ORAL ONCE
Refills: 0 | Status: COMPLETED | OUTPATIENT
Start: 2019-12-09 | End: 2019-12-09

## 2019-12-09 RX ADMIN — MIDODRINE HYDROCHLORIDE 5 MILLIGRAM(S): 2.5 TABLET ORAL at 05:53

## 2019-12-09 RX ADMIN — CEFEPIME 100 MILLIGRAM(S): 1 INJECTION, POWDER, FOR SOLUTION INTRAMUSCULAR; INTRAVENOUS at 11:32

## 2019-12-09 RX ADMIN — Medication 50 MICROGRAM(S): at 05:53

## 2019-12-09 RX ADMIN — MUPIROCIN 1 APPLICATION(S): 20 OINTMENT TOPICAL at 18:08

## 2019-12-09 RX ADMIN — MUPIROCIN 1 APPLICATION(S): 20 OINTMENT TOPICAL at 05:53

## 2019-12-09 RX ADMIN — WARFARIN SODIUM 2 MILLIGRAM(S): 2.5 TABLET ORAL at 21:32

## 2019-12-09 RX ADMIN — MIDODRINE HYDROCHLORIDE 5 MILLIGRAM(S): 2.5 TABLET ORAL at 21:32

## 2019-12-09 RX ADMIN — CHLORHEXIDINE GLUCONATE 1 APPLICATION(S): 213 SOLUTION TOPICAL at 05:52

## 2019-12-09 RX ADMIN — ATORVASTATIN CALCIUM 10 MILLIGRAM(S): 80 TABLET, FILM COATED ORAL at 21:32

## 2019-12-09 RX ADMIN — MIDODRINE HYDROCHLORIDE 5 MILLIGRAM(S): 2.5 TABLET ORAL at 13:29

## 2019-12-09 RX ADMIN — Medication 2000 UNIT(S): at 11:33

## 2019-12-09 NOTE — PROGRESS NOTE ADULT - ASSESSMENT
Renal impression:  COY    Non-oliguric now      CKD stage 3    Cardio-renal       vs Pre-renal           R/O ATN  Systolic HF with decreased LV function   Severe pulmonary HTN  Midodrine 10 mg q8h  Pressors if/as needed  Avoid hypotension  Could use albumin to increase filling pressure post diuresis (25% 50 cc TID a couple of days ot 5% albumin 50cc/hr for 5-10 hrs and re-evaluate based on response  Monitor BUN/Cr  Monitor and correct lytes as needed  Monitor urine out put and Is and Os  - Continue general care and management of the rest of comorbidities as per medicine/CC  Prognosis is very guarded  Management D/W ICU staff Renal impression:  #COY on CKD 3 rule out cardiorenal etiology rule out ATN     Non-oliguric now  Systolic HF with decreased LV function   Severe pulmonary HTN    PLAN:  CONTINUE MIDODRINE  kEEP map>65   Avoid hypotension  would hold on lasix unless oliguric   Monitor BUN/Cr/ no need for RRT   Monitor and correct lytes as needed  Monitor urine out put and Is and Os  - Continue general care and management of the rest of comorbidities as per medicine/CC  Prognosis is very guarded    will follow

## 2019-12-09 NOTE — PROGRESS NOTE ADULT - SUBJECTIVE AND OBJECTIVE BOX
Patient is a 89y old  Male who presents with a chief complaint of Unresponsiveness (09 Dec 2019 09:49)      T(F): 96.5 (12-09-19 @ 07:03), Max: 97.9 (12-08-19 @ 21:36)  HR: 73 (12-09-19 @ 12:05)  BP: 110/53 (12-09-19 @ 09:03)  RR: 20 (12-09-19 @ 12:05)  SpO2: 99% (12-09-19 @ 12:05) (93% - 100%)    PHYSICAL EXAM:  GENERAL: NAD  HEAD:  Atraumatic, Normocephalic  NERVOUS SYSTEM:  no focal deficits   CHEST/LUNG:  bilateral rhonchi  HEART: Regular rate and rhythm; No murmurs, rubs, or gallops  ABDOMEN: Soft, Nontender, Nondistended; Bowel sounds present  EXTREMITIES:  2+ Peripheral Pulses, No clubbing, cyanosis, edema    LABS  12-09    147<H>  |  104  |  121<HH>  ----------------------------<  114<H>  4.0   |  30  |  2.7<H>    Ca    9.2      09 Dec 2019 05:16  Phos  3.5     12-09  Mg     2.5     12-09    TPro  5.9<L>  /  Alb  3.0<L>  /  TBili  0.9  /  DBili  x   /  AST  21  /  ALT  36  /  AlkPhos  101  12-08                          9.2    8.93  )-----------( 92       ( 09 Dec 2019 05:16 )             31.0     PT/INR - ( 08 Dec 2019 06:19 )   PT: 21.90 sec;   INR: 1.92 ratio         PTT - ( 08 Dec 2019 06:19 )  PTT:39.0 sec      Culture Results:   No growth at 5 days. (12-03-19)  Culture Results:   No growth at 5 days. (12-03-19)  Culture Results:   No growth (12-03-19)    RADIOLOGY  < from: Xray Chest 1 View- PORTABLE-Routine (12.09.19 @ 06:08) >    Impression: Mandible obscures lung apices.    Stable left basilar opacity/effusion. No pneumothorax        < end of copied text >    MEDICATIONS  (STANDING):  atorvastatin 10 milliGRAM(s) Oral at bedtime   cefepime   IVPB 500 milliGRAM(s) IV Intermittent every 24 hours  chlorhexidine 4% Liquid 1 Application(s) Topical <User Schedule>  cholecalciferol 2000 Unit(s) Oral daily  levothyroxine 50 MICROGram(s) Oral daily  midodrine 5 milliGRAM(s) Oral every 8 hours  mupirocin 2% Ointment 1 Application(s) Both Nostrils two times a day  pantoprazole    Tablet 40 milliGRAM(s) Oral before breakfast    MEDICATIONS  (PRN):

## 2019-12-09 NOTE — PHYSICAL THERAPY INITIAL EVALUATION ADULT - GENERAL OBSERVATIONS, REHAB EVAL
11:41-12:05 Chart reviewed. Pt encountered  reclined in chair, may be seen by Physical Therapist as confirmed with Nurse. Patient appeared in no pain or discomfort; +tele; +o2 via NC; +urine bag; +IV; +NGT

## 2019-12-09 NOTE — PROGRESS NOTE ADULT - SUBJECTIVE AND OBJECTIVE BOX
Nephrology progress note    THIS IS AN INCOMPLETE NOTE . FULL NOTE TO FOLLOW SHORTLY    Patient is seen and examined, events over the last 24 h noted .    Allergies:  levofloxacin (Swelling (Mild))    Hospital Medications:   MEDICATIONS  (STANDING):  atorvastatin 10 milliGRAM(s) Oral at bedtime  cefepime   IVPB      cefepime   IVPB 500 milliGRAM(s) IV Intermittent every 24 hours  chlorhexidine 4% Liquid 1 Application(s) Topical <User Schedule>  chlorhexidine 4% Liquid 1 Application(s) Topical <User Schedule>  cholecalciferol 2000 Unit(s) Oral daily  levothyroxine 50 MICROGram(s) Oral daily  midodrine 5 milliGRAM(s) Oral every 8 hours  mupirocin 2% Ointment 1 Application(s) Both Nostrils two times a day  pantoprazole    Tablet 40 milliGRAM(s) Oral before breakfast        VITALS:  T(F): 96.5 (12-09-19 @ 07:03), Max: 97.9 (12-08-19 @ 21:36)  HR: 82 (12-09-19 @ 07:05)  BP: 110/61 (12-09-19 @ 07:05)  RR: 24 (12-09-19 @ 07:03)  SpO2: 98% (12-09-19 @ 07:05)  Wt(kg): --    12-07 @ 07:01  -  12-08 @ 07:00  --------------------------------------------------------  IN: 1560 mL / OUT: 1550 mL / NET: 10 mL    12-08 @ 07:01  -  12-09 @ 07:00  --------------------------------------------------------  IN: 1410 mL / OUT: 1285 mL / NET: 125 mL    12-09 @ 07:01  -  12-09 @ 08:19  --------------------------------------------------------  IN: 40 mL / OUT: 50 mL / NET: -10 mL          PHYSICAL EXAM:  Constitutional: NAD  HEENT: anicteric sclera, oropharynx clear, MMM  Neck: No JVD  Respiratory: CTAB, no wheezes, rales or rhonchi  Cardiovascular: S1, S2, RRR  Gastrointestinal: BS+, soft, NT/ND  Extremities: No cyanosis or clubbing. No peripheral edema  :  No palomo.   Skin: No rashes    LABS:  12-09    147<H>  |  104  |  121<HH>  ----------------------------<  114<H>  4.0   |  30  |  2.7<H>    Ca    9.2      09 Dec 2019 05:16  Phos  3.5     12-09  Mg     2.5     12-09    TPro  5.9<L>  /  Alb  3.0<L>  /  TBili  0.9  /  DBili      /  AST  21  /  ALT  36  /  AlkPhos  101  12-08                          9.1    8.85  )-----------( 96       ( 08 Dec 2019 06:19 )             30.8       Urine Studies:  Urinalysis Basic - ( 03 Dec 2019 11:24 )    Color: Yellow / Appearance: Slightly Cloudy / SG: >=1.030 / pH:   Gluc:  / Ketone: Negative  / Bili: Negative / Urobili: 0.2 mg/dL   Blood:  / Protein: >=300 mg/dL / Nitrite: Negative   Leuk Esterase: Small / RBC:  / WBC 6-10 /HPF   Sq Epi:  / Non Sq Epi:  / Bacteria:       Sodium, Random Urine: 108.0 mmoL/L (12-04 @ 17:49)  Potassium, Random Urine: 22 mmol/L (12-04 @ 17:49)  Creatinine, Random Urine: 61 mg/dL (12-04 @ 17:49)    RADIOLOGY & ADDITIONAL STUDIES: Nephrology progress note  Patient is seen and examined, events over the last 24 h noted .  lethargic   responding to stimuli     Allergies:  levofloxacin (Swelling (Mild))    Hospital Medications:   MEDICATIONS  (STANDING):  atorvastatin 10 milliGRAM(s) Oral at bedtime     cefepime   IVPB 500 milliGRAM(s) IV Intermittent every 24 hours  cholecalciferol 2000 Unit(s) Oral daily  levothyroxine 50 MICROGram(s) Oral daily  midodrine 5 milliGRAM(s) Oral every 8 hours  mupirocin 2% Ointment 1 Application(s) Both Nostrils two times a day  pantoprazole    Tablet 40 milliGRAM(s) Oral before breakfast        VITALS:  T(F): 96.5 (12-09-19 @ 07:03), Max: 97.9 (12-08-19 @ 21:36)  HR: 82 (12-09-19 @ 07:05)  BP: 110/61 (12-09-19 @ 07:05)  RR: 24 (12-09-19 @ 07:03)  SpO2: 98% (12-09-19 @ 07:05)      12-07 @ 07:01  -  12-08 @ 07:00  --------------------------------------------------------  IN: 1560 mL / OUT: 1550 mL / NET: 10 mL    12-08 @ 07:01  -  12-09 @ 07:00  --------------------------------------------------------  IN: 1410 mL / OUT: 1285 mL / NET: 125 mL    12-09 @ 07:01  -  12-09 @ 08:19  --------------------------------------------------------  IN: 40 mL / OUT: 50 mL / NET: -10 mL          PHYSICAL EXAM:  Constitutional: lethargic   HEENT: anicteric sclera, oropharynx clear, MMM  Neck: No JVD  Respiratory: CTAB, no wheezes, rales or rhonchi  Cardiovascular: S1, S2, RRR  Gastrointestinal: BS+, soft, NT/ND  Extremities: No cyanosis or clubbing. No peripheral edema  :  positive palomo   Skin: No rashes    LABS:  12-09    147<H>  |  104  |  121<HH>  ----------------------------<  114<H>  4.0   |  30  |  2.7<H>  Creatinine Trend: 2.7<--, 3.2<--, 3.1<--, 3.3<--, 3.2<--, 2.9<--  Ca    9.2      09 Dec 2019 05:16  Phos  3.5     12-09  Mg     2.5     12-09    TPro  5.9<L>  /  Alb  3.0<L>  /  TBili  0.9  /  DBili      /  AST  21  /  ALT  36  /  AlkPhos  101  12-08                          9.1    8.85  )-----------( 96       ( 08 Dec 2019 06:19 )             30.8       Urine Studies:  Urinalysis Basic - ( 03 Dec 2019 11:24 )    Color: Yellow / Appearance: Slightly Cloudy / SG: >=1.030 / pH:   Gluc:  / Ketone: Negative  / Bili: Negative / Urobili: 0.2 mg/dL   Blood:  / Protein: >=300 mg/dL / Nitrite: Negative   Leuk Esterase: Small / RBC:  / WBC 6-10 /HPF   Sq Epi:  / Non Sq Epi:  / Bacteria:       Sodium, Random Urine: 108.0 mmoL/L (12-04 @ 17:49)  Potassium, Random Urine: 22 mmol/L (12-04 @ 17:49)  Creatinine, Random Urine: 61 mg/dL (12-04 @ 17:49)    RADIOLOGY & ADDITIONAL STUDIES:

## 2019-12-09 NOTE — PROGRESS NOTE ADULT - SUBJECTIVE AND OBJECTIVE BOX
SUBJECTIVE:    Today is hospital day 6d.   OVERNIGHT EVENTS: none  Today, patient is lethargic. No ROS obtained.       MEDICATIONS:  STANDING MEDICATIONS  atorvastatin 10 milliGRAM(s) Oral at bedtime  cefepime   IVPB      cefepime   IVPB 500 milliGRAM(s) IV Intermittent every 24 hours  chlorhexidine 4% Liquid 1 Application(s) Topical <User Schedule>  chlorhexidine 4% Liquid 1 Application(s) Topical <User Schedule>  cholecalciferol 2000 Unit(s) Oral daily  levothyroxine 50 MICROGram(s) Oral daily  midodrine 5 milliGRAM(s) Oral every 8 hours  mupirocin 2% Ointment 1 Application(s) Both Nostrils two times a day  pantoprazole    Tablet 40 milliGRAM(s) Oral before breakfast    PRN MEDICATIONS    VITALS:   T(F): 96.5  HR: 73  BP: 110/53  RR: 20  SpO2: 99%          LABS:                        9.2    8.93  )-----------( 92       ( 09 Dec 2019 05:16 )             31.0     12-09    147<H>  |  104  |  121<HH>  ----------------------------<  114<H>  4.0   |  30  |  2.7<H>    Ca    9.2      09 Dec 2019 05:16  Phos  3.5     12-09  Mg     2.5     12-09    TPro  5.9<L>  /  Alb  3.0<L>  /  TBili  0.9  /  DBili  x   /  AST  21  /  ALT  36  /  AlkPhos  101  12-08    PT/INR - ( 08 Dec 2019 06:19 )   PT: 21.90 sec;   INR: 1.92 ratio         PTT - ( 08 Dec 2019 06:19 )  PTT:39.0 sec              RADIOLOGY:    PHYSICAL EXAM:  GEN: Lethargic. Does not follow commands  HEAD:  Atraumatic, normocephalic, +  NG tube  Eyes:  EOMI, Sclera white  LUNGS: CTAB  HEART: RRR, S1, S2  ABD: soft non tender  EXT: no edema  NEURO: moves all extremities. Does not follow commands

## 2019-12-09 NOTE — PROGRESS NOTE ADULT - ASSESSMENT
89 ym with history of CHF, DLD, Afib, HTN presented after a syncopal episode at home:      Assessment & Plan:    1. Metabolic Encephalopathy:  No significant improvement.  CT head : Negative  Neurology evaluation: EEG negative for seizures. No further recommendations.        2. Bradycardia: Resolved  Off Metoprolol. s/p Atropine.  Cardiology following.      3. Acute on chronic diastolic CHF:  CXR show bilateral opacities  now Off Lasix   c/w BIPAP PRN      4. Leukocytosis:  c/w Empiric antibiotics, complete Cefepime course  Panculture: UA -ve, blood culture, urine cx negative      5. COY on CKD 3:  Creatinine stable. CT - NO hydronephrosis  Monitor BMP.   Nephrology following.        6. AFib:  rate controlled   Monitor INR.      7.  Hypernatremia:   continue   free water via ngt      Code status: Full code

## 2019-12-09 NOTE — CONSULT NOTE ADULT - SUBJECTIVE AND OBJECTIVE BOX
HPI: 89 yo male with multiple comorbid condition like diastolic CHF, COPD on home O2, chronic atrial fibrillation on coumadin, CKD stage 3, hypothyroidism, HLD BIBA via [facility] as notification for unresponsive adult and stroke code called by MD at 10:20am which was eventually cancelled because patient was found to be septic.   Patient on BIPAP and with AMS so history was obtained from his 2 daughters at bedside. Patient was in his usual state of health until earlier today, he slipped from his chair and became unresponsive. All other history unable to obtain.     In ER found to be septic and hypothermic.  ptn  dtr  at  bed side  give  hx  ptn and dtr known  to  me      PTN  REFERRED TO ACUTE  REHAB  FOR  EVAL AND  TX   PAST MEDICAL & SURGICAL HISTORY:  Hyperlipidemia  CHF (congestive heart failure)  Afib  Hypertension  History of hip surgery: Left total hip replacement  S/P AAA (abdominal aortic aneurysm) repair      Hospital Course:    TODAY'S SUBJECTIVE & REVIEW OF SYMPTOMS:     Constitutional WNL   Cardio WNL   Resp WNL   GI WNL  Heme WNL  Endo WNL  Skin WNL  MSK WNL  Neuro WNL  Cognitive WNL  Psych WNL      MEDICATIONS  (STANDING):  atorvastatin 10 milliGRAM(s) Oral at bedtime  cefepime   IVPB      cefepime   IVPB 500 milliGRAM(s) IV Intermittent every 24 hours  chlorhexidine 4% Liquid 1 Application(s) Topical <User Schedule>  chlorhexidine 4% Liquid 1 Application(s) Topical <User Schedule>  cholecalciferol 2000 Unit(s) Oral daily  levothyroxine 50 MICROGram(s) Oral daily  midodrine 5 milliGRAM(s) Oral every 8 hours  mupirocin 2% Ointment 1 Application(s) Both Nostrils two times a day  pantoprazole    Tablet 40 milliGRAM(s) Oral before breakfast  warfarin 2 milliGRAM(s) Oral once    MEDICATIONS  (PRN):      FAMILY HISTORY:  FH: congestive heart failure: sister      Allergies    levofloxacin (Swelling (Mild))    Intolerances        SOCIAL HISTORY:    [  ] Etoh  [  ] Smoking  [  ] Substance abuse     Home Environment:  [  ] Home Alone  [  x] Lives with Family  [  ] Home Health Aid    Dwelling:  [  ] Apartment  [ x ] Private House  [  ] Adult Home  [  ] Skilled Nursing Facility      [  ] Short Term  [  ] Long Term  [  x] Stairs       Elevator [  ]    FUNCTIONAL STATUS PTA: (Check all that apply)  Ambulation: [  x ]Independent    [  ] Dependent     [  ] Non-Ambulatory  Assistive Device: [  ] SA Cane  [  ]  Q Cane  [ x ] Walker  [  ]  Wheelchair  ADL : [x  ] Independent  [x  ]  Dependent       Vital Signs Last 24 Hrs  T(C): 35.6 (09 Dec 2019 15:00), Max: 36.6 (08 Dec 2019 21:36)  T(F): 96 (09 Dec 2019 15:00), Max: 97.9 (08 Dec 2019 21:36)  HR: 77 (09 Dec 2019 15:10) (71 - 82)  BP: 122/58 (09 Dec 2019 15:10) (103/55 - 123/61)  BP(mean): 82 (09 Dec 2019 15:10) (72 - 83)  RR: 0 (09 Dec 2019 15:10) (0 - 43)  SpO2: 95% (09 Dec 2019 15:10) (94% - 100%)      PHYSICAL EXAM: Alert & Oriented X 2  GENERAL: NAD, well-groomed, well-developed  HEAD:  Atraumatic, Normocephalic  EYES: EOMI, PERRLA, conjunctiva and sclera clear  NECK: Supple, No JVD, Normal thyroid  CHEST/LUNG: Clear to percussion bilaterally; No rales, rhonchi, wheezing, or rubs  HEART: Regular rate and rhythm; No murmurs, rubs, or gallops  ABDOMEN: Soft, Nontender, Nondistended; Bowel sounds present  EXTREMITIES:  2+ Peripheral Pulses, No clubbing, cyanosis, or edema    NERVOUS SYSTEM:  Cranial Nerves 2-12 intact [x  ] Abnormal  [  ]  ROM: WFL all extremities [  ]  Abnormal [ x ]  Motor Strength: WFL all extremities  [  ]  Abnormal [ x ]  Sensation: intact to light touch [  ] Abnormal [ x ]  Reflexes: Symmetric [  ]  Abnormal [ x ]    FUNCTIONAL STATUS:  Bed Mobility: Independent [  ]  Supervision [  ]  Needs Assistance [ x ]  N/A [  ]  Transfers: Independent [  ]  Supervision [  ]  Needs Assistance [x  ]  N/A [  ]   Ambulation: Independent [  ]  Supervision [  ]  Needs Assistance [x  ]  N/A [  ]  ADL: Independent [  ] Requires Assistance [  ] N/A [x  ]  SEE PT/OT IE NOTES    LABS:                        9.2    8.93  )-----------( 92       ( 09 Dec 2019 05:16 )             31.0     12-09    147<H>  |  104  |  121<HH>  ----------------------------<  114<H>  4.0   |  30  |  2.7<H>    Ca    9.2      09 Dec 2019 05:16  Phos  3.5     12-09  Mg     2.5     12-09    TPro  5.9<L>  /  Alb  3.0<L>  /  TBili  0.9  /  DBili  x   /  AST  21  /  ALT  36  /  AlkPhos  101  12-08    PT/INR - ( 08 Dec 2019 06:19 )   PT: 21.90 sec;   INR: 1.92 ratio         PTT - ( 08 Dec 2019 06:19 )  PTT:39.0 sec      RADIOLOGY & ADDITIONAL STUDIES:    Assesment:

## 2019-12-09 NOTE — PROGRESS NOTE ADULT - ASSESSMENT
IMPRESSION:  altered mental status likely secondary acute hypercapnic respiratory failure  CHF diastolic  History of COPD on home O2  PNA  COY on CKD  MRSA + of nares      PLAN:    CNS: no sedation     HEENT: oral care     PULMONARY: keep pox > 92 % , FM, aspiration precaution BIPAP 16/8 RR24    CARDIOVASCULAR: cardiology consult/EP    GI: GI prophylaxis.  NGT. Failed S/S    RENAL: renal f/u    INFECTIOUS DISEASE: follow cx continue abx for now     HEMATOLOGICAL:  DVT prophylaxis. check INR today off coumadin     ENDOCRINE:  Follow up FS.  Insulin protocol if needed.  Clarify functional status with family  VERY POOR PROGNOSIS  ADVANCE DIRECTIVES  PT/rehab IMPRESSION:  altered mental status likely secondary acute hypercapnic respiratory failure  CHF diastolic  History of COPD on home O2  PNA  COY on CKD  MRSA + of nares      PLAN:    CNS: no sedation     HEENT: oral care     PULMONARY: keep pox > 92 % , FM, aspiration precaution BIPAP 16/8 RR24    CARDIOVASCULAR: cardiology consult/EP    GI: GI prophylaxis.  NGT. Failed S/S    RENAL: renal f/u    INFECTIOUS DISEASE: follow cx continue abx for now     HEMATOLOGICAL:  DVT prophylaxis. check INR today off coumadin     ENDOCRINE:  Follow up FS.  Insulin protocol if needed.  Clarify functional status with family  VERY POOR PROGNOSIS  ADVANCE DIRECTIVES  PT/rehab  Downgrade to tele

## 2019-12-09 NOTE — PHYSICAL THERAPY INITIAL EVALUATION ADULT - LEVEL OF INDEPENDENCE: GAIT, REHAB EVAL
secondary to patient unable to assume, maintain, nor balance in supported standing with rolling walker/unable to perform

## 2019-12-09 NOTE — PROGRESS NOTE ADULT - SUBJECTIVE AND OBJECTIVE BOX
Patient is a 89y old  Male who presents with a chief complaint of Unresponsiveness (09 Dec 2019 08:18)    Over Night Events: No events    ROS:  See HPI    PHYSICAL EXAM    ICU Vital Signs Last 24 Hrs  T(C): 35.8 (09 Dec 2019 07:03), Max: 36.6 (08 Dec 2019 21:36)  T(F): 96.5 (09 Dec 2019 07:03), Max: 97.9 (08 Dec 2019 21:36)  HR: 82 (09 Dec 2019 07:05) (71 - 82)  BP: 110/61 (09 Dec 2019 07:05) (77/51 - 123/61)  BP(mean): 81 (09 Dec 2019 07:05) (58 - 83)  RR: 23 (09 Dec 2019 09:00) (20 - 43)  SpO2: 98% (09 Dec 2019 07:05) (92% - 100%)      GENERAL: cachectic, wasted, confused  HEENT: Head is normocephalic and atraumatic. Extraocular muscles are intact. Mucous membranes are moist.  NECK: Supple.  LUNGS: dec bs both bases  HEART: TED 3/6  ABDOMEN: Soft, nontender, and nondistended.    EXTREMITIES: Without any cyanosis, clubbing, rash, lesions or edema  NEURO: Does not follow commands      12-08-19 @ 07:01  -  12-09-19 @ 07:00  --------------------------------------------------------  IN:    Enteral Tube Flush: 400 mL    IV PiggyBack: 50 mL    Osmolite 1.2: 960 mL  Total IN: 1410 mL    OUT:    Indwelling Catheter - Urethral: 1285 mL  Total OUT: 1285 mL    Total NET: 125 mL      12-09-19 @ 07:01  -  12-09-19 @ 09:50  --------------------------------------------------------  IN:    Enteral Tube Flush: 40 mL  Total IN: 40 mL    OUT:    Indwelling Catheter - Urethral: 150 mL  Total OUT: 150 mL    Total NET: -110 mL    LABS:                        9.2    8.93  )-----------( 92       ( 09 Dec 2019 05:16 )             31.0                                               12-09    147<H>  |  104  |  121<HH>  ----------------------------<  114<H>  4.0   |  30  |  2.7<H>    Ca    9.2      09 Dec 2019 05:16  Phos  3.5     12-09  Mg     2.5     12-09    TPro  5.9<L>  /  Alb  3.0<L>  /  TBili  0.9  /  DBili  x   /  AST  21  /  ALT  36  /  AlkPhos  101  12-08      PT/INR - ( 08 Dec 2019 06:19 )   PT: 21.90 sec;   INR: 1.92 ratio         PTT - ( 08 Dec 2019 06:19 )  PTT:39.0 sec                                                                                     LIVER FUNCTIONS - ( 08 Dec 2019 06:19 )  Alb: 3.0 g/dL / Pro: 5.9 g/dL / ALK PHOS: 101 U/L / ALT: 36 U/L / AST: 21 U/L / GGT: x                                              MEDICATIONS  (STANDING):  atorvastatin 10 milliGRAM(s) Oral at bedtime  cefepime   IVPB      cefepime   IVPB 500 milliGRAM(s) IV Intermittent every 24 hours  chlorhexidine 4% Liquid 1 Application(s) Topical <User Schedule>  chlorhexidine 4% Liquid 1 Application(s) Topical <User Schedule>  cholecalciferol 2000 Unit(s) Oral daily  levothyroxine 50 MICROGram(s) Oral daily  midodrine 5 milliGRAM(s) Oral every 8 hours  mupirocin 2% Ointment 1 Application(s) Both Nostrils two times a day  pantoprazole    Tablet 40 milliGRAM(s) Oral before breakfast    < from: CT Chest No Cont (12.03.19 @ 13:41) >  IMPRESSION:     1. Since June 27, 2019, no evidence of acute traumatic injury within the   chest, abdomen, or pelvis.    2. Status post interval left total hip placement.    3. Findings suggestive of CHF, including increased size moderate   bilateral pleural effusions, left greater than right, bilateral   interlobular septal thickening, andscattered bilateral groundglass   opacities.    < end of copied text >

## 2019-12-09 NOTE — PROGRESS NOTE ADULT - ASSESSMENT
89 year old male with history of CHF, DLD, Afibb, HTN presented after an unresponsive episode at home.     1. Metabolic Encephalopathy:  No significant improvement since admission  CT head: chronic ischemic changes. No New changes.    Neurology evaluation: EEG negative for seizures. No further recommendations.    - will obtain a speech and swallow evaluation.     2. Bradycardia: Resolved  Off Metoprolol. s/p Atropine.  Cardiology following.    3. Acute on chronic diastolic CHF:  CXR show bilateral opacities  Off lasix since 12/6  c/w BIPAP PRN at nights at 16/8    4. Leukocytosis:  c/w Empiric antibiotics, complete Cefepime course  Panculture: UA -ve, blood culture, urine cx negative    5. COY on CKD 3:  Creatinine stable. CT - NO hydronephrosis  Monitor BMP.   Nephrology following.    6. AFib:  rate controlled   Monitor INR.    7.  Hypernatremia:   continue   free water via ngt    Code status: Full code  Disposition: Downgraded to Telemetry. Discussed with daughter regarding prognosis and no improvement in mental status since admission.  Will continue to monitor and discuss care of plan.

## 2019-12-09 NOTE — PHYSICAL THERAPY INITIAL EVALUATION ADULT - TRANSFER SAFETY CONCERNS NOTED: SIT/STAND, REHAB EVAL
losing balance/decreased weight-shifting ability/trunk, hips, and knees kept flexed/decreased safety awareness

## 2019-12-10 LAB
ALBUMIN SERPL ELPH-MCNC: 3.1 G/DL — LOW (ref 3.5–5.2)
ALP SERPL-CCNC: 117 U/L — HIGH (ref 30–115)
ALT FLD-CCNC: 27 U/L — SIGNIFICANT CHANGE UP (ref 0–41)
ANION GAP SERPL CALC-SCNC: 11 MMOL/L — SIGNIFICANT CHANGE UP (ref 7–14)
ANION GAP SERPL CALC-SCNC: 13 MMOL/L — SIGNIFICANT CHANGE UP (ref 7–14)
APTT BLD: 38.9 SEC — SIGNIFICANT CHANGE UP (ref 27–39.2)
AST SERPL-CCNC: 24 U/L — SIGNIFICANT CHANGE UP (ref 0–41)
BASOPHILS # BLD AUTO: 0.01 K/UL — SIGNIFICANT CHANGE UP (ref 0–0.2)
BASOPHILS NFR BLD AUTO: 0.1 % — SIGNIFICANT CHANGE UP (ref 0–1)
BILIRUB SERPL-MCNC: 1 MG/DL — SIGNIFICANT CHANGE UP (ref 0.2–1.2)
BLD GP AB SCN SERPL QL: SIGNIFICANT CHANGE UP
BUN SERPL-MCNC: 100 MG/DL — CRITICAL HIGH (ref 10–20)
BUN SERPL-MCNC: 109 MG/DL — CRITICAL HIGH (ref 10–20)
CALCIUM SERPL-MCNC: 9.3 MG/DL — SIGNIFICANT CHANGE UP (ref 8.5–10.1)
CALCIUM SERPL-MCNC: 9.4 MG/DL — SIGNIFICANT CHANGE UP (ref 8.5–10.1)
CHLORIDE SERPL-SCNC: 106 MMOL/L — SIGNIFICANT CHANGE UP (ref 98–110)
CHLORIDE SERPL-SCNC: 107 MMOL/L — SIGNIFICANT CHANGE UP (ref 98–110)
CO2 SERPL-SCNC: 33 MMOL/L — HIGH (ref 17–32)
CO2 SERPL-SCNC: 33 MMOL/L — HIGH (ref 17–32)
CREAT SERPL-MCNC: 2.1 MG/DL — HIGH (ref 0.7–1.5)
CREAT SERPL-MCNC: 2.3 MG/DL — HIGH (ref 0.7–1.5)
EOSINOPHIL # BLD AUTO: 0.2 K/UL — SIGNIFICANT CHANGE UP (ref 0–0.7)
EOSINOPHIL NFR BLD AUTO: 2.4 % — SIGNIFICANT CHANGE UP (ref 0–8)
GLUCOSE SERPL-MCNC: 120 MG/DL — HIGH (ref 70–99)
GLUCOSE SERPL-MCNC: 133 MG/DL — HIGH (ref 70–99)
HCT VFR BLD CALC: 33.1 % — LOW (ref 42–52)
HGB BLD-MCNC: 9.5 G/DL — LOW (ref 14–18)
IMM GRANULOCYTES NFR BLD AUTO: 0.5 % — HIGH (ref 0.1–0.3)
INR BLD: 2.08 RATIO — HIGH (ref 0.65–1.3)
LYMPHOCYTES # BLD AUTO: 1.25 K/UL — SIGNIFICANT CHANGE UP (ref 1.2–3.4)
LYMPHOCYTES # BLD AUTO: 15.3 % — LOW (ref 20.5–51.1)
MAGNESIUM SERPL-MCNC: 2.5 MG/DL — HIGH (ref 1.8–2.4)
MCHC RBC-ENTMCNC: 18.9 PG — LOW (ref 27–31)
MCHC RBC-ENTMCNC: 28.7 G/DL — LOW (ref 32–37)
MCV RBC AUTO: 65.8 FL — LOW (ref 80–94)
MONOCYTES # BLD AUTO: 1.25 K/UL — HIGH (ref 0.1–0.6)
MONOCYTES NFR BLD AUTO: 15.3 % — HIGH (ref 1.7–9.3)
NEUTROPHILS # BLD AUTO: 5.44 K/UL — SIGNIFICANT CHANGE UP (ref 1.4–6.5)
NEUTROPHILS NFR BLD AUTO: 66.4 % — SIGNIFICANT CHANGE UP (ref 42.2–75.2)
NRBC # BLD: 0 /100 WBCS — SIGNIFICANT CHANGE UP (ref 0–0)
PLATELET # BLD AUTO: 69 K/UL — LOW (ref 130–400)
POTASSIUM SERPL-MCNC: 3.8 MMOL/L — SIGNIFICANT CHANGE UP (ref 3.5–5)
POTASSIUM SERPL-MCNC: 4.1 MMOL/L — SIGNIFICANT CHANGE UP (ref 3.5–5)
POTASSIUM SERPL-SCNC: 3.8 MMOL/L — SIGNIFICANT CHANGE UP (ref 3.5–5)
POTASSIUM SERPL-SCNC: 4.1 MMOL/L — SIGNIFICANT CHANGE UP (ref 3.5–5)
PROT SERPL-MCNC: 6.2 G/DL — SIGNIFICANT CHANGE UP (ref 6–8)
PROTHROM AB SERPL-ACNC: 23.7 SEC — HIGH (ref 9.95–12.87)
RBC # BLD: 5.03 M/UL — SIGNIFICANT CHANGE UP (ref 4.7–6.1)
RBC # FLD: 23 % — HIGH (ref 11.5–14.5)
SODIUM SERPL-SCNC: 150 MMOL/L — HIGH (ref 135–146)
SODIUM SERPL-SCNC: 153 MMOL/L — HIGH (ref 135–146)
WBC # BLD: 8.19 K/UL — SIGNIFICANT CHANGE UP (ref 4.8–10.8)
WBC # FLD AUTO: 8.19 K/UL — SIGNIFICANT CHANGE UP (ref 4.8–10.8)

## 2019-12-10 PROCEDURE — 71045 X-RAY EXAM CHEST 1 VIEW: CPT | Mod: 26

## 2019-12-10 PROCEDURE — 99232 SBSQ HOSP IP/OBS MODERATE 35: CPT

## 2019-12-10 RX ORDER — WARFARIN SODIUM 2.5 MG/1
2 TABLET ORAL ONCE
Refills: 0 | Status: COMPLETED | OUTPATIENT
Start: 2019-12-10 | End: 2019-12-10

## 2019-12-10 RX ORDER — SODIUM CHLORIDE 9 MG/ML
1000 INJECTION, SOLUTION INTRAVENOUS
Refills: 0 | Status: DISCONTINUED | OUTPATIENT
Start: 2019-12-10 | End: 2019-12-11

## 2019-12-10 RX ADMIN — Medication 50 MICROGRAM(S): at 05:14

## 2019-12-10 RX ADMIN — CEFEPIME 100 MILLIGRAM(S): 1 INJECTION, POWDER, FOR SOLUTION INTRAMUSCULAR; INTRAVENOUS at 10:08

## 2019-12-10 RX ADMIN — SODIUM CHLORIDE 60 MILLILITER(S): 9 INJECTION, SOLUTION INTRAVENOUS at 16:11

## 2019-12-10 RX ADMIN — MUPIROCIN 1 APPLICATION(S): 20 OINTMENT TOPICAL at 05:15

## 2019-12-10 RX ADMIN — CHLORHEXIDINE GLUCONATE 1 APPLICATION(S): 213 SOLUTION TOPICAL at 05:15

## 2019-12-10 RX ADMIN — MIDODRINE HYDROCHLORIDE 5 MILLIGRAM(S): 2.5 TABLET ORAL at 05:14

## 2019-12-10 NOTE — PROGRESS NOTE ADULT - ASSESSMENT
Renal impression:  #COY on CKD 3 rule out cardiorenal etiology rule out ATN     Non-oliguric now  Systolic HF with decreased LV function   Severe pulmonary HTN    PLAN:  CONTINUE MIDODRINE  kEEP map>65   increase free water   Avoid hypotension  no need for lasix   Monitor BUN/Cr/ no need for RRT   Monitor and correct lytes as needed  Monitor urine out put and Is and Os  - Continue general care and management of the rest of comorbidities as per medicine/CC  Prognosis is very guarded    will follow

## 2019-12-10 NOTE — PROGRESS NOTE ADULT - ASSESSMENT
89 year old male with history of CHF, DLD, Afibb, HTN presented after an unresponsive episode at home.     1. Metabolic Encephalopathy:  No significant improvement since admission  CT head: chronic ischemic changes. No New changes.    Neurology evaluation: EEG negative for seizures. No further recommendations.    Seen by speech and swallow today. Too lethargic and unable to tolerate PO. To continue with NG tube feeds. Discussed with Dr. Pham who recommended we remove the salem pump and place a NG feeding tube.    2. Bradycardia: Resolved  Off Metoprolol. s/p Atropine.  Cardiology following.    3. Acute on chronic diastolic CHF:  CXR show bilateral opacities  Off lasix since 12/6  c/w BIPAP PRN at nights at 16/8    4. Leukocytosis:  c/w Empiric antibiotics, complete Cefepime course  Panculture: UA -ve, blood culture, urine cx negative    5. COY on CKD 3:  Creatinine stable. CT - NO hydronephrosis  Monitor BMP.   Nephrology following.    6. AFib:  rate controlled   Monitor INR.    7.  Hypernatremia:   continue   free water via ngt    Code status: Full code  Disposition: Downgraded to Telemetry. Discussed with daughter regarding prognosis and no improvement in mental status since admission.  Will continue to monitor and discuss care of plan. 89 year old male with history of CHF, DLD, Afibb, HTN presented after an unresponsive episode at home.     1. Metabolic Encephalopathy:  No significant improvement since admission  CT head: chronic ischemic changes. No New changes.    Neurology evaluation: EEG negative for seizures. No further recommendations.    Seen by speech and swallow today. Too lethargic and unable to tolerate PO. To continue with NG tube feeds. Discussed with Dr. Pham who recommended we remove the salem pump and place a NG feeding tube.    2. Bradycardia: Resolved  Off Metoprolol. s/p Atropine.  Cardiology following.    3. Acute on chronic diastolic CHF:  CXR show bilateral opacities  Off lasix since 12/6  c/w BIPAP PRN at nights at 16/8    4. Leukocytosis:  c/w Empiric antibiotics, complete Cefepime course  Panculture: UA -ve, blood culture, urine cx negative    5. COY on CKD 3:  Creatinine stable. CT - NO hydronephrosis  Monitor BMP.   Nephrology following.    6. AFib:  rate controlled   Monitor INR.    7.  Hypernatremia:   continue   free water via ngt    Code status: Full code  Disposition: Downgraded to Medical floor.  Will continue to monitor and discuss care of plan.

## 2019-12-10 NOTE — CHART NOTE - NSCHARTNOTEFT_GEN_A_CORE
attempted placing NG tube for nutrition. Patient had a salem NG tube that was removed today.    Unable to place NG tube at this time after multiple attempts. however, patient drank two cups of water without aspiration or coughing.      Please recall speech and swallow in AM as mental status is improving.

## 2019-12-10 NOTE — PROGRESS NOTE ADULT - ASSESSMENT
IMPRESSION:  altered mental status likely secondary acute hypercapnic respiratory failure  CHF diastolic  History of COPD on home O2  PNA  COY on CKD - improving  MRSA + of nares      PLAN:    CNS: no sedation     HEENT: oral care     PULMONARY: keep pox > 92 %. Noncompliant with NIV    CARDIOVASCULAR: cardiology consult/EP    GI: GI prophylaxis.  NGT. S/S f/u    RENAL: renal f/u. Increase Free water intake     INFECTIOUS DISEASE: follow cx continue abx for now     HEMATOLOGICAL:  DVT prophylaxis. check INR today off coumadin     ENDOCRINE:  Follow up FS.  Insulin protocol if needed.  Clarify functional status with family  VERY POOR PROGNOSIS  ADVANCE DIRECTIVES  PT/rehab  Downgrade to tele

## 2019-12-10 NOTE — PROGRESS NOTE ADULT - SUBJECTIVE AND OBJECTIVE BOX
Patient is a 89y old  Male who presents with a chief complaint of Unresponsiveness (09 Dec 2019 19:31)      INTERVAL HISTORY:   No overnight events    MEDICATIONS  (STANDING):  atorvastatin 10 milliGRAM(s) Oral at bedtime  cefepime   IVPB      cefepime   IVPB 500 milliGRAM(s) IV Intermittent every 24 hours  chlorhexidine 4% Liquid 1 Application(s) Topical <User Schedule>  chlorhexidine 4% Liquid 1 Application(s) Topical <User Schedule>  cholecalciferol 2000 Unit(s) Oral daily  levothyroxine 50 MICROGram(s) Oral daily  midodrine 5 milliGRAM(s) Oral every 8 hours  pantoprazole    Tablet 40 milliGRAM(s) Oral before breakfast    Allergies    levofloxacin (Swelling (Mild))    ICU Vital Signs Last 24 Hrs  T(C): 35.8 (10 Dec 2019 07:10), Max: 36.2 (09 Dec 2019 23:07)  T(F): 96.5 (10 Dec 2019 07:10), Max: 97.2 (09 Dec 2019 23:07)  HR: 84 (10 Dec 2019 07:08) (73 - 84)  BP: 126/59 (10 Dec 2019 07:08) (119/76 - 126/59)  BP(mean): 85 (10 Dec 2019 07:08) (82 - 93)  RR: 24 (10 Dec 2019 07:10) (0 - 53)  SpO2: 94% 3L NC (10 Dec 2019 07:08) (94% - 100%)    GENERAL: cachectic, wasted, confused  HEENT: Head is normocephalic and atraumatic. Extraocular muscles are intact. Mucous membranes are moist.  NECK: Supple.  LUNGS: dec bs both bases  HEART: TED 3/6  ABDOMEN: Soft, nontender, and nondistended.    EXTREMITIES: Without any cyanosis, clubbing, rash, lesions or edema  NEURO: Does not follow commands      Daily     Daily     I&O's Summary    09 Dec 2019 07:01  -  10 Dec 2019 07:00  --------------------------------------------------------  IN: 1520 mL / OUT: 655 mL / NET: 865 mL    Lab Results:               9.5    8.19  )-----------( 69       ( 10 Dec 2019 05:15 )             33.1     12-10    153<H>  |  107  |  109<HH>  ----------------------------<  120<H>  3.8   |  33<H>  |  2.3<H>    Ca    9.3      10 Dec 2019 05:15  Phos  3.5     12-09  Mg     2.5     12-10    TPro  6.2  /  Alb  3.1<L>  /  TBili  1.0  /  DBili  x   /  AST  24  /  ALT  27  /  AlkPhos  117<H>  12-10    PT/INR - ( 10 Dec 2019 05:15 )   PT: 23.70 sec;   INR: 2.08 ratio       PTT - ( 10 Dec 2019 05:15 )  PTT:38.9 sec    LIVER FUNCTIONS - ( 10 Dec 2019 05:15 )  Alb: 3.1 g/dL / Pro: 6.2 g/dL / ALK PHOS: 117 U/L / ALT: 27 U/L / AST: 24 U/L / GGT: x

## 2019-12-10 NOTE — CHART NOTE - NSCHARTNOTEFT_GEN_A_CORE
Registered Dietitian Follow-Up     Patient Profile Reviewed                           Yes [v]   No []     Nutrition History Previously Obtained        Yes []  No []       Pertinent Subjective Information: Pt did not receive feeds today as NGT was removed earlier today for SLP evaluation. As per RN no BMs today     Pertinent Medical Interventions: COY on CKD 3 rule out cardiorenal etiology,  rule out ATN - Nephrology following, no need for RRT. Metabolic Encephalopathy - no significant improvement since admission per MD. Acute on chronic diastolic CHF- off lasix. Downgraded to Telemetry. SLP saw pt today, recommended: continue NPO w/ alternate means if nutrition/ hydration.      Diet order:      Anthropometrics:  - Ht.  - Wt.  - %wt change  - BMI  - IBW     Pertinent Lab Data:     Pertinent Meds:     Physical Findings:  - Appearance:  - GI function:  - Tubes:  - Oral/Mouth cavity:  - Skin:     Nutrition Requirements  Weight Used:     Estimated Energy Needs    Continue []  Adjust []  Adjusted Energy Recommendations:   kcal/day        Estimated Protein Needs    Continue []  Adjust []  Adjusted Protein Recommendations:   gm/day        Estimated Fluid Needs        Continue []  Adjust []  Adjusted Fluid Recommendations:   mL/day     Nutrient Intake:        [] Previous Nutrition Diagnosis:            [] Ongoing          [] Resolved    [] No active nutrition diagnosis identified at this time     Nutrition Diagnostic #1  Problem:  Etiology:  Statement:     Nutrition Diagnostic #2  Problem:  Etiology:  Statement:     Nutrition Intervention      Goal/Expected Outcome:     Indicator/Monitoring: Registered Dietitian Follow-Up     Patient Profile Reviewed                           Yes [v]   No []     Nutrition History Previously Obtained        Yes []  No []       Pertinent Subjective Information: Pt did not receive feeds today as NGT was removed this AM for SLP evaluation. As per RN no BMs today     Pertinent Medical Interventions: COY on CKD 3 rule out cardiorenal etiology,  rule out ATN - Nephrology following, no need for RRT. Metabolic Encephalopathy - no significant improvement since admission per MD. Acute on chronic diastolic CHF- off lasix. Downgraded to Telemetry. SLP saw pt today, recommended: continue NPO w/ alternate means if nutrition/ hydration.      Diet order:  Osmolite 1.5 240 mL q 6 hrs + Prosource TF 1 packet q 24 hrs (provides 1466 kcal, 71 g protein, 720 mL water).      Anthropometrics:  - Ht: 63 inches   - Wt: 62.9kg (12/9) vs 69.1kg (12/8) compared to admission wt 64.9 kg (12/3), question accuracy, some weight fluctuations likely due to fluid shifts.     - %wt change:   - BMI: 25 (admission wt of 64.9 kg)   - IBW 124lbs      Pertinent Lab Data: 12/10: H/H 9.5/33.1, , Cr 2.3, Glu 120, Na 153, Mg 2.5     Pertinent Meds: antibiotics, midodrine, atorvastatin, cholecalciferol, levothyroxine      Physical Findings:  - Appearance: Patient lethargic/confused, OOB to chair, combative at times. On supplemental 02 via NC.    - GI function: last BM documented 12/2- RN aware.    - Tubes: NGT is out as of this AM, as per RN to be replaced today.   - Oral/Mouth:  - Skin: skin tears/ecchymosis      Nutrition Requirements  Weight Used: 64.9 kg- admission wt      Estimated Energy Needs    Continue [v]  Adjust []  MSJ x 1.2-1.3= 0815-1165 kcal/day      Estimated Protein Needs    Continue [v]  Adjust [] 1.0-1.2 gm/kg- = 65-78 g/day (monitor renal function)       Estimated Fluid Needs        Continue [v]  Adjust [] per ICU team      Nutrient Intake: not meeting needs as pt has not been fed since last night due to lack of EN access (active TF order provides % est kcal needs and % estimated protein needs)    [v] Previous Nutrition Diagnosis:  1. Inadequate energy intake -restated  2. Excessive enteral nutrition infusion - resolved      Goal/Expected Outcome: 1. Patient to receive/tolerate >85% and <110% of estimated needs via TF regimen upon f/u in 3 days. 2. Regular BMs, at least  1 in 3 days.       Indicator/Monitoring: RD will monitor diet order, energy intake, labs, nutrition focused physical findings, body composition.    Recommended: Please replace NGT and after placement is confirmed restart EN regimen of  Osmolite 1.5 240 mL q 6 hrs + Prosource TF 1 packet q 24 hrs to provide 1466 kcal, 71 gm protein, 720 mL water. Additional flushes per ICU team. Add bowel regimen, please document BMs.     Pt remains at risk, will f/u in 3 days.

## 2019-12-10 NOTE — PROGRESS NOTE ADULT - SUBJECTIVE AND OBJECTIVE BOX
SUBJECTIVE:    Today is hospital day 7d.   OVERNIGHT EVENTS  Today, patient is      MEDICATIONS:  STANDING MEDICATIONS  atorvastatin 10 milliGRAM(s) Oral at bedtime  cefepime   IVPB      cefepime   IVPB 500 milliGRAM(s) IV Intermittent every 24 hours  chlorhexidine 4% Liquid 1 Application(s) Topical <User Schedule>  chlorhexidine 4% Liquid 1 Application(s) Topical <User Schedule>  cholecalciferol 2000 Unit(s) Oral daily  levothyroxine 50 MICROGram(s) Oral daily  midodrine 5 milliGRAM(s) Oral every 8 hours  pantoprazole    Tablet 40 milliGRAM(s) Oral before breakfast    PRN MEDICATIONS    VITALS:   T(F): 96.5  HR: 84  BP: 126/59  RR: 24  SpO2: 94%          LABS:                        9.5    8.19  )-----------( 69       ( 10 Dec 2019 05:15 )             33.1     12-10    153<H>  |  107  |  109<HH>  ----------------------------<  120<H>  3.8   |  33<H>  |  2.3<H>    Ca    9.3      10 Dec 2019 05:15  Phos  3.5     12-09  Mg     2.5     12-10    TPro  6.2  /  Alb  3.1<L>  /  TBili  1.0  /  DBili  x   /  AST  24  /  ALT  27  /  AlkPhos  117<H>  12-10    PT/INR - ( 10 Dec 2019 05:15 )   PT: 23.70 sec;   INR: 2.08 ratio         PTT - ( 10 Dec 2019 05:15 )  PTT:38.9 sec              RADIOLOGY:    PHYSICAL EXAM:  GEN: awake, alert, but oriented x 0  HEAD:  Atraumatic, normocephaic  Eyes:  EOMI, Sclera white  LUNGS: ctab  HEART: RRR,   ABD: soft non tender  EXT: minimal edema  NEURO: responds, does not follow commands SUBJECTIVE:    Today is hospital day 7d.   OVERNIGHT EVENTS: none  Today, patient is lethargic, awakens to verbal response.       MEDICATIONS:  STANDING MEDICATIONS  atorvastatin 10 milliGRAM(s) Oral at bedtime  cefepime   IVPB      cefepime   IVPB 500 milliGRAM(s) IV Intermittent every 24 hours  chlorhexidine 4% Liquid 1 Application(s) Topical <User Schedule>  chlorhexidine 4% Liquid 1 Application(s) Topical <User Schedule>  cholecalciferol 2000 Unit(s) Oral daily  levothyroxine 50 MICROGram(s) Oral daily  midodrine 5 milliGRAM(s) Oral every 8 hours  pantoprazole    Tablet 40 milliGRAM(s) Oral before breakfast    PRN MEDICATIONS    VITALS:   T(F): 96.5  HR: 84  BP: 126/59  RR: 24  SpO2: 94%          LABS:                        9.5    8.19  )-----------( 69       ( 10 Dec 2019 05:15 )             33.1     12-10    153<H>  |  107  |  109<HH>  ----------------------------<  120<H>  3.8   |  33<H>  |  2.3<H>    Ca    9.3      10 Dec 2019 05:15  Phos  3.5     12-09  Mg     2.5     12-10    TPro  6.2  /  Alb  3.1<L>  /  TBili  1.0  /  DBili  x   /  AST  24  /  ALT  27  /  AlkPhos  117<H>  12-10    PT/INR - ( 10 Dec 2019 05:15 )   PT: 23.70 sec;   INR: 2.08 ratio         PTT - ( 10 Dec 2019 05:15 )  PTT:38.9 sec              RADIOLOGY:    PHYSICAL EXAM:  GEN: awake, alert, but oriented x 0  HEAD:  Atraumatic, normocephaic  Eyes:  EOMI, Sclera white  LUNGS: ctab  HEART: RRR,   ABD: soft non tender  EXT: minimal edema  NEURO: responds, does not follow commands

## 2019-12-10 NOTE — PROGRESS NOTE ADULT - SUBJECTIVE AND OBJECTIVE BOX
Nephrology progress note    Patient was seen and examined, events over the last 24 h noted .  cr improved   Allergies:  levofloxacin (Swelling (Mild))    Hospital Medications:   MEDICATIONS  (STANDING):  atorvastatin 10 milliGRAM(s) Oral at bedtime  cefepime   IVPB      cefepime   IVPB 500 milliGRAM(s) IV Intermittent every 24 hours  chlorhexidine 4% Liquid 1 Application(s) Topical <User Schedule>  chlorhexidine 4% Liquid 1 Application(s) Topical <User Schedule>  cholecalciferol 2000 Unit(s) Oral daily  levothyroxine 50 MICROGram(s) Oral daily  midodrine 5 milliGRAM(s) Oral every 8 hours  pantoprazole    Tablet 40 milliGRAM(s) Oral before breakfast        VITALS:  T(F): 96.5 (12-10-19 @ 07:10), Max: 97.2 (12-09-19 @ 23:07)  HR: 84 (12-10-19 @ 07:08)  BP: 126/59 (12-10-19 @ 07:08)  RR: 24 (12-10-19 @ 07:10)  SpO2: 94% (12-10-19 @ 07:08)  Wt(kg): --    12-08 @ 07:01  -  12-09 @ 07:00  --------------------------------------------------------  IN: 1700 mL / OUT: 1285 mL / NET: 415 mL    12-09 @ 07:01  -  12-10 @ 07:00  --------------------------------------------------------  IN: 1520 mL / OUT: 655 mL / NET: 865 mL          PHYSICAL EXAM:  Constitutional: lethargic   HEENT: anicteric sclera, oropharynx clear, MMM  Neck: No JVD  Respiratory: CTAB, no wheezes, rales or rhonchi  Cardiovascular: S1, S2, RRR  Gastrointestinal: BS+, soft, NT/ND  Extremities: No cyanosis or clubbing. No peripheral edema  :  positive palomo   Skin: No rashes    LABS:  12-10    153<H>  |  107  |  109<HH>  ----------------------------<  120<H>  3.8   |  33<H>  |  2.3<H>    Ca    9.3      10 Dec 2019 05:15  Phos  3.5     12-09  Mg     2.5     12-10    TPro  6.2  /  Alb  3.1<L>  /  TBili  1.0  /  DBili      /  AST  24  /  ALT  27  /  AlkPhos  117<H>  12-10                          9.5    8.19  )-----------( 69       ( 10 Dec 2019 05:15 )             33.1       Urine Studies:    Sodium, Random Urine: 108.0 mmoL/L (12-04 @ 17:49)  Potassium, Random Urine: 22 mmol/L (12-04 @ 17:49)  Creatinine, Random Urine: 61 mg/dL (12-04 @ 17:49)    RADIOLOGY & ADDITIONAL STUDIES:

## 2019-12-11 LAB
ALBUMIN SERPL ELPH-MCNC: 3.3 G/DL — LOW (ref 3.5–5.2)
ALP SERPL-CCNC: 104 U/L — SIGNIFICANT CHANGE UP (ref 30–115)
ALT FLD-CCNC: 24 U/L — SIGNIFICANT CHANGE UP (ref 0–41)
ANION GAP SERPL CALC-SCNC: 14 MMOL/L — SIGNIFICANT CHANGE UP (ref 7–14)
AST SERPL-CCNC: 23 U/L — SIGNIFICANT CHANGE UP (ref 0–41)
BASOPHILS # BLD AUTO: 0.02 K/UL — SIGNIFICANT CHANGE UP (ref 0–0.2)
BASOPHILS NFR BLD AUTO: 0.3 % — SIGNIFICANT CHANGE UP (ref 0–1)
BILIRUB SERPL-MCNC: 1.2 MG/DL — SIGNIFICANT CHANGE UP (ref 0.2–1.2)
BUN SERPL-MCNC: 99 MG/DL — CRITICAL HIGH (ref 10–20)
CALCIUM SERPL-MCNC: 9.4 MG/DL — SIGNIFICANT CHANGE UP (ref 8.5–10.1)
CHLORIDE SERPL-SCNC: 106 MMOL/L — SIGNIFICANT CHANGE UP (ref 98–110)
CO2 SERPL-SCNC: 31 MMOL/L — SIGNIFICANT CHANGE UP (ref 17–32)
CREAT SERPL-MCNC: 2.1 MG/DL — HIGH (ref 0.7–1.5)
CULTURE RESULTS: SIGNIFICANT CHANGE UP
EOSINOPHIL # BLD AUTO: 0.27 K/UL — SIGNIFICANT CHANGE UP (ref 0–0.7)
EOSINOPHIL NFR BLD AUTO: 4.2 % — SIGNIFICANT CHANGE UP (ref 0–8)
GLUCOSE SERPL-MCNC: 106 MG/DL — HIGH (ref 70–99)
HCT VFR BLD CALC: 32.1 % — LOW (ref 42–52)
HGB BLD-MCNC: 9.2 G/DL — LOW (ref 14–18)
IMM GRANULOCYTES NFR BLD AUTO: 0.5 % — HIGH (ref 0.1–0.3)
LYMPHOCYTES # BLD AUTO: 1.03 K/UL — LOW (ref 1.2–3.4)
LYMPHOCYTES # BLD AUTO: 16.1 % — LOW (ref 20.5–51.1)
MAGNESIUM SERPL-MCNC: 2.5 MG/DL — HIGH (ref 1.8–2.4)
MCHC RBC-ENTMCNC: 18.7 PG — LOW (ref 27–31)
MCHC RBC-ENTMCNC: 28.7 G/DL — LOW (ref 32–37)
MCV RBC AUTO: 65.1 FL — LOW (ref 80–94)
MONOCYTES # BLD AUTO: 0.81 K/UL — HIGH (ref 0.1–0.6)
MONOCYTES NFR BLD AUTO: 12.7 % — HIGH (ref 1.7–9.3)
NEUTROPHILS # BLD AUTO: 4.23 K/UL — SIGNIFICANT CHANGE UP (ref 1.4–6.5)
NEUTROPHILS NFR BLD AUTO: 66.2 % — SIGNIFICANT CHANGE UP (ref 42.2–75.2)
NRBC # BLD: 0 /100 WBCS — SIGNIFICANT CHANGE UP (ref 0–0)
PLATELET # BLD AUTO: 70 K/UL — LOW (ref 130–400)
POTASSIUM SERPL-MCNC: 4.2 MMOL/L — SIGNIFICANT CHANGE UP (ref 3.5–5)
POTASSIUM SERPL-SCNC: 4.2 MMOL/L — SIGNIFICANT CHANGE UP (ref 3.5–5)
PROT SERPL-MCNC: 6.7 G/DL — SIGNIFICANT CHANGE UP (ref 6–8)
RBC # BLD: 4.93 M/UL — SIGNIFICANT CHANGE UP (ref 4.7–6.1)
RBC # FLD: 23.4 % — HIGH (ref 11.5–14.5)
SODIUM SERPL-SCNC: 151 MMOL/L — HIGH (ref 135–146)
SPECIMEN SOURCE: SIGNIFICANT CHANGE UP
WBC # BLD: 6.39 K/UL — SIGNIFICANT CHANGE UP (ref 4.8–10.8)
WBC # FLD AUTO: 6.39 K/UL — SIGNIFICANT CHANGE UP (ref 4.8–10.8)

## 2019-12-11 PROCEDURE — 99233 SBSQ HOSP IP/OBS HIGH 50: CPT

## 2019-12-11 PROCEDURE — 71045 X-RAY EXAM CHEST 1 VIEW: CPT | Mod: 26

## 2019-12-11 RX ORDER — MIDODRINE HYDROCHLORIDE 2.5 MG/1
5 TABLET ORAL THREE TIMES A DAY
Refills: 0 | Status: DISCONTINUED | OUTPATIENT
Start: 2019-12-11 | End: 2019-12-11

## 2019-12-11 RX ORDER — HALOPERIDOL DECANOATE 100 MG/ML
2.5 INJECTION INTRAMUSCULAR ONCE
Refills: 0 | Status: COMPLETED | OUTPATIENT
Start: 2019-12-11 | End: 2019-12-11

## 2019-12-11 RX ORDER — SODIUM CHLORIDE 9 MG/ML
1000 INJECTION, SOLUTION INTRAVENOUS
Refills: 0 | Status: DISCONTINUED | OUTPATIENT
Start: 2019-12-11 | End: 2019-12-13

## 2019-12-11 RX ADMIN — HALOPERIDOL DECANOATE 2.5 MILLIGRAM(S): 100 INJECTION INTRAMUSCULAR at 23:19

## 2019-12-11 RX ADMIN — CEFEPIME 100 MILLIGRAM(S): 1 INJECTION, POWDER, FOR SOLUTION INTRAMUSCULAR; INTRAVENOUS at 15:33

## 2019-12-11 RX ADMIN — CHLORHEXIDINE GLUCONATE 1 APPLICATION(S): 213 SOLUTION TOPICAL at 05:57

## 2019-12-11 RX ADMIN — CHLORHEXIDINE GLUCONATE 1 APPLICATION(S): 213 SOLUTION TOPICAL at 05:58

## 2019-12-11 NOTE — PROGRESS NOTE ADULT - ASSESSMENT
89M PMHx HFpEF, COPD on home o2, AFib on coumadin, CKD III, hypothyroidism here with unresponsiveness. Dysphagia.    #Unresponsiveness, now resolved  mental status nearing baseline per daughter at bedside  c/b dyphagia as below  unclear etiology, no focal deficits  appreciate neuro input  cth unremarkable  #Oropharyngeal dysphagia  npo per s+s  place ngt if possible  d5w 75 cc/hr  discussed with daughter at bedside; ongoing goc discussion  #HFpEF  monitor off lasix  #COPD  controlled off medications  #AFib  coumadin  #CKD III  stable  trend scr  #Hypothyroidism  synthroid 50  #DVT ppx  coumadin    #Progress Note Handoff:  Pending (specify):  Consults_________, Tests________, Test Results_______, Other____ongoing goc_____  Family discussion:d/w daughter at bedside re: treatment plan, primary dx  Disposition: Home___/SNF___/Other________/Unknown at this time___x_____ 89M PMHx HFpEF, COPD on home o2, AFib on coumadin, CKD III, hypothyroidism here with unresponsiveness. Dysphagia.    #Unresponsiveness, now resolved  mental status nearing baseline per daughter at bedside  c/b dyphagia as below  unclear etiology, no focal deficits  appreciate neuro input  cth unremarkable  #Oropharyngeal dysphagia  npo per s+s  place ngt if possible  d5w 75 cc/hr  discussed with daughter at bedside; ongoing goc discussion  #HFpEF  monitor off lasix  #COPD  controlled off medications  #AFib  coumadin  #CKD III  stable  trend scr  #Hypothyroidism  synthroid 50  #DVT ppx  coumadin  #Vit D def, suspected  replete    #Progress Note Handoff:  Pending (specify):  Consults_________, Tests________, Test Results_______, Other____ongoing goc_____  Family discussion:d/w daughter at bedside re: treatment plan, primary dx  Disposition: Home___/SNF___/Other________/Unknown at this time___x_____

## 2019-12-11 NOTE — PROGRESS NOTE ADULT - SUBJECTIVE AND OBJECTIVE BOX
INTERVAL HPI/OVERNIGHT EVENTS:    SUBJECTIVE: Patient seen and examined at bedside.     unable to obtain ros  OBJECTIVE:    VITAL SIGNS:  ICU Vital Signs Last 24 Hrs  T(C): 35.9 (11 Dec 2019 13:00), Max: 36.1 (11 Dec 2019 07:00)  T(F): 96.7 (11 Dec 2019 13:00), Max: 96.9 (11 Dec 2019 07:00)  HR: 95 (11 Dec 2019 13:00) (75 - 95)  BP: 128/58 (11 Dec 2019 13:00) (128/58 - 145/67)  BP(mean): --  ABP: --  ABP(mean): --  RR: 16 (11 Dec 2019 13:00) (16 - 20)  SpO2: --        12-10 @ 07:01  -  12-11 @ 07:00  --------------------------------------------------------  IN: 0 mL / OUT: 500 mL / NET: -500 mL      CAPILLARY BLOOD GLUCOSE          PHYSICAL EXAM:    General: NAD  HEENT: NC/AT; PERRL, clear conjunctiva  Neck: supple  Respiratory: base crackles  Cardiovascular: +S1/S2; RRR  Abdomen: soft, NT/ND; +BS x4  Extremities: WWP, 2+ peripheral pulses b/l; no LE edema  Skin: normal color and turgor; no rash  Neurological:    MEDICATIONS:  MEDICATIONS  (STANDING):  atorvastatin 10 milliGRAM(s) Oral at bedtime  chlorhexidine 4% Liquid 1 Application(s) Topical <User Schedule>  chlorhexidine 4% Liquid 1 Application(s) Topical <User Schedule>  cholecalciferol 2000 Unit(s) Oral daily  dextrose 5%. 1000 milliLiter(s) (75 mL/Hr) IV Continuous <Continuous>  levothyroxine 50 MICROGram(s) Oral daily  midodrine. 5 milliGRAM(s) Oral three times a day  pantoprazole    Tablet 40 milliGRAM(s) Oral before breakfast    MEDICATIONS  (PRN):      ALLERGIES:  Allergies    levofloxacin (Swelling (Mild))    Intolerances        LABS:                        9.2    6.39  )-----------( 70       ( 11 Dec 2019 06:18 )             32.1     Hemoglobin: 9.2 g/dL (12-11 @ 06:18)  Hemoglobin: 9.5 g/dL (12-10 @ 05:15)  Hemoglobin: 9.2 g/dL (12-09 @ 05:16)  Hemoglobin: 9.1 g/dL (12-08 @ 06:19)  Hemoglobin: 9.2 g/dL (12-07 @ 06:16)    CBC Full  -  ( 11 Dec 2019 06:18 )  WBC Count : 6.39 K/uL  RBC Count : 4.93 M/uL  Hemoglobin : 9.2 g/dL  Hematocrit : 32.1 %  Platelet Count - Automated : 70 K/uL  Mean Cell Volume : 65.1 fL  Mean Cell Hemoglobin : 18.7 pg  Mean Cell Hemoglobin Concentration : 28.7 g/dL  Auto Neutrophil # : 4.23 K/uL  Auto Lymphocyte # : 1.03 K/uL  Auto Monocyte # : 0.81 K/uL  Auto Eosinophil # : 0.27 K/uL  Auto Basophil # : 0.02 K/uL  Auto Neutrophil % : 66.2 %  Auto Lymphocyte % : 16.1 %  Auto Monocyte % : 12.7 %  Auto Eosinophil % : 4.2 %  Auto Basophil % : 0.3 %    12-11    151<H>  |  106  |  99<HH>  ----------------------------<  106<H>  4.2   |  31  |  2.1<H>    Ca    9.4      11 Dec 2019 06:18  Mg     2.5     12-11    TPro  6.7  /  Alb  3.3<L>  /  TBili  1.2  /  DBili  x   /  AST  23  /  ALT  24  /  AlkPhos  104  12-11    Creatinine Trend: 2.1<--, 2.1<--, 2.3<--, 2.7<--, 3.2<--, 3.1<--  LIVER FUNCTIONS - ( 11 Dec 2019 06:18 )  Alb: 3.3 g/dL / Pro: 6.7 g/dL / ALK PHOS: 104 U/L / ALT: 24 U/L / AST: 23 U/L / GGT: x           PT/INR - ( 10 Dec 2019 05:15 )   PT: 23.70 sec;   INR: 2.08 ratio         PTT - ( 10 Dec 2019 05:15 )  PTT:38.9 sec    hs Troponin:              CSF:                      EKG:   MICROBIOLOGY:    IMAGING:      Labs, imaging, EKG personally reviewed    RADIOLOGY & ADDITIONAL TESTS: Reviewed.

## 2019-12-11 NOTE — PROGRESS NOTE ADULT - SUBJECTIVE AND OBJECTIVE BOX
Nephrology progress note    Patient is seen and examined, events over the last 24 h noted .  Restless, on BiPAP cont. No NGT tube, Na high, supposed to be on D5W - pulled out the IV/  Allergies:  levofloxacin (Swelling (Mild))    Hospital Medications:   MEDICATIONS  (STANDING):  atorvastatin 10 milliGRAM(s) Oral at bedtime  cefepime   IVPB      cefepime   IVPB 500 milliGRAM(s) IV Intermittent every 24 hours  chlorhexidine 4% Liquid 1 Application(s) Topical <User Schedule>  chlorhexidine 4% Liquid 1 Application(s) Topical <User Schedule>  cholecalciferol 2000 Unit(s) Oral daily  dextrose 5%. 1000 milliLiter(s) (60 mL/Hr) IV Continuous <Continuous>  levothyroxine 50 MICROGram(s) Oral daily  midodrine 5 milliGRAM(s) Oral every 8 hours  pantoprazole    Tablet 40 milliGRAM(s) Oral before breakfast        VITALS:  T(F): 96.9 (12-11-19 @ 07:00), Max: 96.9 (12-11-19 @ 07:00)  HR: 87 (12-11-19 @ 07:00)  BP: 145/67 (12-11-19 @ 07:00)  RR: 18 (12-11-19 @ 07:00)  SpO2: 96% (12-10-19 @ 15:07)  Wt(kg): --    12-09 @ 07:01  -  12-10 @ 07:00  --------------------------------------------------------  IN: 1520 mL / OUT: 655 mL / NET: 865 mL    12-10 @ 07:01  -  12-11 @ 07:00  --------------------------------------------------------  IN: 0 mL / OUT: 500 mL / NET: -500 mL          PHYSICAL EXAM:  Constitutional: In resp distress, on BiPAP  Neck: No JVD  Respiratory: rhonchi+  Cardiovascular: S1, S2, RRR  Gastrointestinal: BS+, soft, NT/ND  Extremities:  No peripheral edema  Neurological: Awake  : has palomo.   Skin: No rashes  Vascular Access:    LABS:  12-10    150<H>  |  106  |  100<HH>  ----------------------------<  133<H>  4.1   |  33<H>  |  2.1<H>  Creatinine Trend: 2.1<--, 2.3<--, 2.7<--, 3.2<--, 3.1<--, 3.3<--    Ca    9.4      10 Dec 2019 18:59  Mg     2.5     12-10    TPro  6.2  /  Alb  3.1<L>  /  TBili  1.0  /  DBili      /  AST  24  /  ALT  27  /  AlkPhos  117<H>  12-10                          9.5    8.19  )-----------( 69       ( 10 Dec 2019 05:15 )             33.1       Urine Studies:    Sodium, Random Urine: 108.0 mmoL/L (12-04 @ 17:49)  Potassium, Random Urine: 22 mmol/L (12-04 @ 17:49)  Creatinine, Random Urine: 61 mg/dL (12-04 @ 17:49)    RADIOLOGY & ADDITIONAL STUDIES:  < from: Xray Chest 1 View- PORTABLE-Routine (12.11.19 @ 07:01) >  diastinum    Lung parenchyma/Pleura: Unchanged left pleural effusion. Bilateral lower   lobe opacities, appears to be worse compared to the previous examination,   which may be secondary to technique and low lung volume. No evidence of   pneumothorax.    Skeleton/soft tissues: Stable    < end of copied text >

## 2019-12-11 NOTE — CHART NOTE - NSCHARTNOTEFT_GEN_A_CORE
Dr Cowan requested placement for NGT for feeds. Multiple attempts made by myself and another PA without success. Pt tolerated well. However, refused further attempts. NGT placement to be attempted again in am. D/W Pt's daughter.

## 2019-12-11 NOTE — SWALLOW BEDSIDE ASSESSMENT ADULT - PHARYNGEAL PHASE
Delayed throat clear post oral intake/Delayed cough post oral intake/Complaints of pharyngeal stasis/Multiple swallows Cough post oral intake/Throat clear post oral intake

## 2019-12-11 NOTE — SWALLOW BEDSIDE ASSESSMENT ADULT - SLP PERTINENT HISTORY OF CURRENT PROBLEM
Adm 2' unresponsive at home. Dx metabolic encephalopathy 2' sepsis & acute on chronic respiratory failure.  Currently on O2 via nasal cannula, BiPAP at night.  Hx COPD (on home O2), CKD III. CXR: CHF & b/l pleural effusions.  Seen by SLP yesterday, expectorated trials.
Adm 2' unresponsive at home. Dx metabolic encephalopathy 2' sepsis & acute on chronic respiratory failure.  Currently on O2 via nasal cannula, BiPAP at night.  Hx COPD (on home O2), CKD III. CXR: CHF & b/l pleural effusions.

## 2019-12-11 NOTE — SWALLOW BEDSIDE ASSESSMENT ADULT - SWALLOW EVAL: FUNCTIONAL LEVEL AT TIME OF EVAL
easily arousable, b/l mitts, O2 via nasal cannula, confused but with increased participation compared to yesterday's assessment
awake, confused, perseverative, uncooperative, b/l wrist restraints, O2 via nasal cannula.  RN removed [facility] NGT prior to assessment.

## 2019-12-12 LAB
ALBUMIN SERPL ELPH-MCNC: 3 G/DL — LOW (ref 3.5–5.2)
ALP SERPL-CCNC: 90 U/L — SIGNIFICANT CHANGE UP (ref 30–115)
ALT FLD-CCNC: 18 U/L — SIGNIFICANT CHANGE UP (ref 0–41)
ANION GAP SERPL CALC-SCNC: 10 MMOL/L — SIGNIFICANT CHANGE UP (ref 7–14)
AST SERPL-CCNC: 20 U/L — SIGNIFICANT CHANGE UP (ref 0–41)
BASOPHILS # BLD AUTO: 0.02 K/UL — SIGNIFICANT CHANGE UP (ref 0–0.2)
BASOPHILS NFR BLD AUTO: 0.3 % — SIGNIFICANT CHANGE UP (ref 0–1)
BILIRUB SERPL-MCNC: 1 MG/DL — SIGNIFICANT CHANGE UP (ref 0.2–1.2)
BUN SERPL-MCNC: 88 MG/DL — CRITICAL HIGH (ref 10–20)
CALCIUM SERPL-MCNC: 9.1 MG/DL — SIGNIFICANT CHANGE UP (ref 8.5–10.1)
CHLORIDE SERPL-SCNC: 107 MMOL/L — SIGNIFICANT CHANGE UP (ref 98–110)
CO2 SERPL-SCNC: 33 MMOL/L — HIGH (ref 17–32)
CREAT SERPL-MCNC: 2 MG/DL — HIGH (ref 0.7–1.5)
EOSINOPHIL # BLD AUTO: 0.19 K/UL — SIGNIFICANT CHANGE UP (ref 0–0.7)
EOSINOPHIL NFR BLD AUTO: 2.7 % — SIGNIFICANT CHANGE UP (ref 0–8)
GLUCOSE SERPL-MCNC: 131 MG/DL — HIGH (ref 70–99)
HCT VFR BLD CALC: 30.8 % — LOW (ref 42–52)
HGB BLD-MCNC: 8.7 G/DL — LOW (ref 14–18)
IMM GRANULOCYTES NFR BLD AUTO: 0.3 % — SIGNIFICANT CHANGE UP (ref 0.1–0.3)
LYMPHOCYTES # BLD AUTO: 1.34 K/UL — SIGNIFICANT CHANGE UP (ref 1.2–3.4)
LYMPHOCYTES # BLD AUTO: 18.8 % — LOW (ref 20.5–51.1)
MAGNESIUM SERPL-MCNC: 2.5 MG/DL — HIGH (ref 1.8–2.4)
MCHC RBC-ENTMCNC: 18.5 PG — LOW (ref 27–31)
MCHC RBC-ENTMCNC: 28.2 G/DL — LOW (ref 32–37)
MCV RBC AUTO: 65.5 FL — LOW (ref 80–94)
MONOCYTES # BLD AUTO: 0.96 K/UL — HIGH (ref 0.1–0.6)
MONOCYTES NFR BLD AUTO: 13.4 % — HIGH (ref 1.7–9.3)
NEUTROPHILS # BLD AUTO: 4.61 K/UL — SIGNIFICANT CHANGE UP (ref 1.4–6.5)
NEUTROPHILS NFR BLD AUTO: 64.5 % — SIGNIFICANT CHANGE UP (ref 42.2–75.2)
NRBC # BLD: 0 /100 WBCS — SIGNIFICANT CHANGE UP (ref 0–0)
PHOSPHATE SERPL-MCNC: 3.5 MG/DL — SIGNIFICANT CHANGE UP (ref 2.1–4.9)
PLATELET # BLD AUTO: 69 K/UL — LOW (ref 130–400)
POTASSIUM SERPL-MCNC: 4.4 MMOL/L — SIGNIFICANT CHANGE UP (ref 3.5–5)
POTASSIUM SERPL-SCNC: 4.4 MMOL/L — SIGNIFICANT CHANGE UP (ref 3.5–5)
PROT SERPL-MCNC: 6.1 G/DL — SIGNIFICANT CHANGE UP (ref 6–8)
RBC # BLD: 4.7 M/UL — SIGNIFICANT CHANGE UP (ref 4.7–6.1)
RBC # FLD: 22.7 % — HIGH (ref 11.5–14.5)
SODIUM SERPL-SCNC: 150 MMOL/L — HIGH (ref 135–146)
WBC # BLD: 7.14 K/UL — SIGNIFICANT CHANGE UP (ref 4.8–10.8)
WBC # FLD AUTO: 7.14 K/UL — SIGNIFICANT CHANGE UP (ref 4.8–10.8)

## 2019-12-12 PROCEDURE — 99233 SBSQ HOSP IP/OBS HIGH 50: CPT

## 2019-12-12 RX ADMIN — SODIUM CHLORIDE 75 MILLILITER(S): 9 INJECTION, SOLUTION INTRAVENOUS at 11:58

## 2019-12-12 RX ADMIN — CHLORHEXIDINE GLUCONATE 1 APPLICATION(S): 213 SOLUTION TOPICAL at 05:43

## 2019-12-12 NOTE — PROGRESS NOTE ADULT - ASSESSMENT
COY on Stage 3 CKD.  Severe PAH. CHF. CRS. Hypotension    - pt was on Lasix drip initially - UO improve somewhat, creat stable  - now intravascularly volume depleted. needs free water - give D5W at 50 cc/h   - no need for LASix  REspiratory failure - on BiPAP  HYpotension has improved  On empiric ABx  Prognosis is poor  -

## 2019-12-12 NOTE — PROGRESS NOTE ADULT - SUBJECTIVE AND OBJECTIVE BOX
INTERVAL HPI/OVERNIGHT EVENTS:    SUBJECTIVE: Patient seen and examined at bedside.     unable to obtain ros  OBJECTIVE:    VITAL SIGNS:  ICU Vital Signs Last 24 Hrs  T(C): 36.1 (11 Dec 2019 22:00), Max: 36.1 (11 Dec 2019 22:00)  T(F): 97 (11 Dec 2019 22:00), Max: 97 (11 Dec 2019 22:00)  HR: 98 (12 Dec 2019 06:00) (89 - 98)  BP: 125/75 (12 Dec 2019 06:00) (109/62 - 128/58)  BP(mean): --  ABP: --  ABP(mean): --  RR: 16 (12 Dec 2019 06:00) (16 - 16)  SpO2: 99% (12 Dec 2019 09:02) (99% - 99%)        12-11 @ 07:01  -  12-12 @ 07:00  --------------------------------------------------------  IN: 0 mL / OUT: 500 mL / NET: -500 mL      CAPILLARY BLOOD GLUCOSE          PHYSICAL EXAM:    General: NAD  HEENT: NC/AT; PERRL, clear conjunctiva  Neck: supple  Respiratory: base crackles  Cardiovascular: +S1/S2; RRR  Abdomen: soft, NT/ND; +BS x4  Extremities: WWP, 2+ peripheral pulses b/l; no LE edema  Skin: normal color and turgor; no rash  Neurological:    MEDICATIONS:  MEDICATIONS  (STANDING):  atorvastatin 10 milliGRAM(s) Oral at bedtime  chlorhexidine 4% Liquid 1 Application(s) Topical <User Schedule>  chlorhexidine 4% Liquid 1 Application(s) Topical <User Schedule>  cholecalciferol 2000 Unit(s) Oral daily  dextrose 5%. 1000 milliLiter(s) (75 mL/Hr) IV Continuous <Continuous>  levothyroxine 50 MICROGram(s) Oral daily  pantoprazole    Tablet 40 milliGRAM(s) Oral before breakfast    MEDICATIONS  (PRN):      ALLERGIES:  Allergies    levofloxacin (Swelling (Mild))    Intolerances        LABS:                        9.2    6.39  )-----------( 70       ( 11 Dec 2019 06:18 )             32.1     Hemoglobin: 9.2 g/dL (12-11 @ 06:18)  Hemoglobin: 9.5 g/dL (12-10 @ 05:15)  Hemoglobin: 9.2 g/dL (12-09 @ 05:16)  Hemoglobin: 9.1 g/dL (12-08 @ 06:19)    CBC Full  -  ( 11 Dec 2019 06:18 )  WBC Count : 6.39 K/uL  RBC Count : 4.93 M/uL  Hemoglobin : 9.2 g/dL  Hematocrit : 32.1 %  Platelet Count - Automated : 70 K/uL  Mean Cell Volume : 65.1 fL  Mean Cell Hemoglobin : 18.7 pg  Mean Cell Hemoglobin Concentration : 28.7 g/dL  Auto Neutrophil # : 4.23 K/uL  Auto Lymphocyte # : 1.03 K/uL  Auto Monocyte # : 0.81 K/uL  Auto Eosinophil # : 0.27 K/uL  Auto Basophil # : 0.02 K/uL  Auto Neutrophil % : 66.2 %  Auto Lymphocyte % : 16.1 %  Auto Monocyte % : 12.7 %  Auto Eosinophil % : 4.2 %  Auto Basophil % : 0.3 %    12-11    151<H>  |  106  |  99<HH>  ----------------------------<  106<H>  4.2   |  31  |  2.1<H>    Ca    9.4      11 Dec 2019 06:18  Mg     2.5     12-11    TPro  6.7  /  Alb  3.3<L>  /  TBili  1.2  /  DBili  x   /  AST  23  /  ALT  24  /  AlkPhos  104  12-11    Creatinine Trend: 2.1<--, 2.1<--, 2.3<--, 2.7<--, 3.2<--, 3.1<--  LIVER FUNCTIONS - ( 11 Dec 2019 06:18 )  Alb: 3.3 g/dL / Pro: 6.7 g/dL / ALK PHOS: 104 U/L / ALT: 24 U/L / AST: 23 U/L / GGT: x               hs Troponin:              CSF:                      EKG:   MICROBIOLOGY:    IMAGING:      Labs, imaging, EKG personally reviewed    RADIOLOGY & ADDITIONAL TESTS: Reviewed.

## 2019-12-12 NOTE — PROGRESS NOTE ADULT - ASSESSMENT
89M PMHx HFpEF, COPD on home o2, AFib on coumadin, CKD III, hypothyroidism here with unresponsiveness. Dysphagia.    #Unresponsiveness, now resolved  mental status nearing baseline per daughter at bedside  c/b dyphagia as below  unclear etiology, no focal deficits  appreciate neuro input  cth unremarkable  #Oropharyngeal dysphagia  npo per s+s  attempt to place ngt  d5w 75 cc/hr  discussed with daughter at bedside; ongoing goc discussion  repeat s+s eval at later date  #HFpEF  monitor off lasix  #COPD  controlled off medications  #AFib  coumadin  #CKD III  stable  trend scr  #Hypothyroidism  synthroid 50  #DVT ppx  coumadin  #Vit D def, suspected  replete    #Progress Note Handoff:  Pending (specify):  Consults_________, Tests________, Test Results_______, Other____ongoing goc_____  Family discussion:d/w daughter at bedside re: treatment plan, primary dx  Disposition: Home___/SNF___/Other________/Unknown at this time___x_____

## 2019-12-12 NOTE — PROGRESS NOTE ADULT - SUBJECTIVE AND OBJECTIVE BOX
Nephrology progress note    Patient was seen and examined, events over the last 24 h noted .  cr yesterday stable    Allergies:  levofloxacin (Swelling (Mild))    Hospital Medications:   MEDICATIONS  (STANDING):  atorvastatin 10 milliGRAM(s) Oral at bedtime  chlorhexidine 4% Liquid 1 Application(s) Topical <User Schedule>  chlorhexidine 4% Liquid 1 Application(s) Topical <User Schedule>  cholecalciferol 2000 Unit(s) Oral daily  dextrose 5%. 1000 milliLiter(s) (75 mL/Hr) IV Continuous <Continuous>  levothyroxine 50 MICROGram(s) Oral daily  pantoprazole    Tablet 40 milliGRAM(s) Oral before breakfast        VITALS:  T(F): 97 (12-11-19 @ 22:00), Max: 97 (12-11-19 @ 22:00)  HR: 98 (12-12-19 @ 06:00)  BP: 125/75 (12-12-19 @ 06:00)  RR: 16 (12-12-19 @ 06:00)  SpO2: 99% (12-12-19 @ 09:02)  Wt(kg): --    12-10 @ 07:01  -  12-11 @ 07:00  --------------------------------------------------------  IN: 0 mL / OUT: 500 mL / NET: -500 mL    12-11 @ 07:01  -  12-12 @ 07:00  --------------------------------------------------------  IN: 0 mL / OUT: 500 mL / NET: -500 mL          PHYSICAL EXAM:  Constitutional: NAD  HEENT: anicteric sclera, oropharynx clear, MMM  Neck: No JVD  Respiratory: CTAB, no wheezes, rales or rhonchi  Cardiovascular: S1, S2, RRR  Gastrointestinal: BS+, soft, NT/ND  Extremities: No cyanosis or clubbing. No peripheral edema  :  No palomo.   Skin: No rashes    LABS:  12-11    151<H>  |  106  |  99<HH>  ----------------------------<  106<H>  4.2   |  31  |  2.1<H>    Ca    9.4      11 Dec 2019 06:18  Mg     2.5     12-11    TPro  6.7  /  Alb  3.3<L>  /  TBili  1.2  /  DBili      /  AST  23  /  ALT  24  /  AlkPhos  104  12-11                          9.2    6.39  )-----------( 70       ( 11 Dec 2019 06:18 )             32.1       Urine Studies:      RADIOLOGY & ADDITIONAL STUDIES:

## 2019-12-13 LAB
ANION GAP SERPL CALC-SCNC: 9 MMOL/L — SIGNIFICANT CHANGE UP (ref 7–14)
BUN SERPL-MCNC: 75 MG/DL — CRITICAL HIGH (ref 10–20)
CALCIUM SERPL-MCNC: 8.9 MG/DL — SIGNIFICANT CHANGE UP (ref 8.5–10.1)
CHLORIDE SERPL-SCNC: 102 MMOL/L — SIGNIFICANT CHANGE UP (ref 98–110)
CO2 SERPL-SCNC: 34 MMOL/L — HIGH (ref 17–32)
CREAT SERPL-MCNC: 1.7 MG/DL — HIGH (ref 0.7–1.5)
GLUCOSE SERPL-MCNC: 108 MG/DL — HIGH (ref 70–99)
INR BLD: 2.06 RATIO — HIGH (ref 0.65–1.3)
POTASSIUM SERPL-MCNC: 4.1 MMOL/L — SIGNIFICANT CHANGE UP (ref 3.5–5)
POTASSIUM SERPL-SCNC: 4.1 MMOL/L — SIGNIFICANT CHANGE UP (ref 3.5–5)
PROTHROM AB SERPL-ACNC: 23.5 SEC — HIGH (ref 9.95–12.87)
SODIUM SERPL-SCNC: 145 MMOL/L — SIGNIFICANT CHANGE UP (ref 135–146)

## 2019-12-13 PROCEDURE — 93970 EXTREMITY STUDY: CPT | Mod: 26

## 2019-12-13 PROCEDURE — 71045 X-RAY EXAM CHEST 1 VIEW: CPT | Mod: 26

## 2019-12-13 PROCEDURE — 99233 SBSQ HOSP IP/OBS HIGH 50: CPT

## 2019-12-13 RX ORDER — SODIUM CHLORIDE 9 MG/ML
1000 INJECTION, SOLUTION INTRAVENOUS
Refills: 0 | Status: DISCONTINUED | OUTPATIENT
Start: 2019-12-13 | End: 2019-12-14

## 2019-12-13 RX ORDER — CHLORHEXIDINE GLUCONATE 213 G/1000ML
15 SOLUTION TOPICAL
Refills: 0 | Status: DISCONTINUED | OUTPATIENT
Start: 2019-12-13 | End: 2019-12-21

## 2019-12-13 RX ADMIN — CHLORHEXIDINE GLUCONATE 15 MILLILITER(S): 213 SOLUTION TOPICAL at 21:57

## 2019-12-13 RX ADMIN — ATORVASTATIN CALCIUM 10 MILLIGRAM(S): 80 TABLET, FILM COATED ORAL at 21:56

## 2019-12-13 RX ADMIN — SODIUM CHLORIDE 75 MILLILITER(S): 9 INJECTION, SOLUTION INTRAVENOUS at 15:55

## 2019-12-13 RX ADMIN — CHLORHEXIDINE GLUCONATE 1 APPLICATION(S): 213 SOLUTION TOPICAL at 05:03

## 2019-12-13 NOTE — CHART NOTE - NSCHARTNOTEFT_GEN_A_CORE
Registered Dietitian Follow-Up     Patient Profile Reviewed                           Yes [v]   No []     Nutrition History Previously Obtained        Yes []  No [v]  pt remains confused, family not available     Pertinent Subjective Information: As per RN pt has not been fed since 12/10. As per RN pt has been on BIPAP, unable to wean to NC as per RN.      Pertinent Medical Interventions: Unresponsiveness, now resolved as per MD. COY on Stage 3 CKD.  Nephrology following. Hypernatremia - due to intravascularly volume depleted, c/w IVF. Respiratory failure - on BiPAP/NC. HFpEF - off lasix. Oropharyngeal dysphagia, SLP following, rec'd NPO (last f/u 12/11) w/ alternate means of nutrition/hydration. NGT has been out since 12/10, as per chart multiple attempts to replace NGT were unsuccessfully, pt refused another attempt. As per RN family refused PEG before. Ongoing GOC discussion per chart.       Diet order:  Pt has not been fed since 12/10 due to lack of EN access.   Active TF order: Osmolite 1.5 240 mL q 6 hrs + Prosource TF 1 packet q 24 hrs (provides 1466 kcal, 71 g protein, 720 mL water) + 250ml free h20 q 6hrs    Anthropometrics:  - Ht: 63 inches   - Wt: no new weights. Last documented (12/9) -  62.9kg  vs 69.1kg (12/8) compared to admission wt 64.9 kg (12/3), question accuracy, some weight fluctuations likely due to fluid shifts.     - %wt change:   - BMI: 25 (admission wt of 64.9 kg)   - IBW 124lbs +/-10%     Pertinent Lab Data: 12/12: H/H 8.7/30.8, BUN 75, Cr 1.7 (BUN/Cr trending down), Glu 108, Mg 2.5     Pertinent Meds: D5@75ml/hr (provides ~306kcal/day from dextrose), pantoprazole, atorvastatin, cholecalciferol, levothyroxine      Physical Findings:  - Appearance: Patient lethargic, on BIPAP    - GI function: ?? last BM documented 12/2. As per RN no BMs today.     - Tubes: NGT is out since 12/10.    - Oral/Mouth:   - Skin: nose wound      Nutrition Requirements  Weight Used: 64.9 kg- admission wt      Estimated Energy Needs    Continue [v]  Adjust []  MSJ x 1.2-1.3= 0065-5776 kcal/day      Estimated Protein Needs    Continue [v]  Adjust [] 1.0-1.2 gm/kg- = 65-78 g/day (monitor renal function)       Estimated Fluid Needs        Continue [v]  Adjust [] per ICU team      Nutrient Intake: not meeting needs as pt has not been fed since 12/10 due to lack of EN access   (active EN at goal rate will provide % est kcal needs and % estimated protein needs)    [v] Previous Nutrition Diagnosis:  Inadequate energy intake - ongoing      Nutrition Intervention: Enteral nutrition vs. ?Meal and snacks     Goal/Expected Outcome: ??? GOC,  pt has not been fed is 3 days - not appropriate for PO diet as per SLP, unable to replace EN access/NGT per MD.     Indicator/Monitoring: RD will monitor diet order, energy intake, labs, nutrition focused physical findings, body composition.    Recommended: If able to replace NGT (and pt is off BIPAP)  please restart EN regimen of  Osmolite 1.5 240 mL q 6 hrs + Prosource TF 1 packet q 24 hrs to provide 1466 kcal, 71 gm protein, 720 mL water. Additional flushes per LIP. Add bowel regimen, please document BMs.   Please consider NST evaluation If unable to feed pt w/in 24-48hrs (? aggressive intervention)     Pt remains at risk, will f/u in 1-4 days. Registered Dietitian Follow-Up     Patient Profile Reviewed                           Yes [v]   No []     Nutrition History Previously Obtained        Yes []  No [v]  pt remains confused, family not available     Pertinent Subjective Information: As per RN pt has not been fed since 12/10. Now on BIPAP, unable to wean to NC as per RN.      Pertinent Medical Interventions: Unresponsiveness, now resolved as per MD. COY on Stage 3 CKD.  Nephrology following. Hypernatremia - due to intravascularly volume depleted, c/w IVF. Respiratory failure - on BiPAP/NC. HFpEF - off lasix. Oropharyngeal dysphagia, SLP following, rec'd NPO (last f/u 12/11) w/ alternate means of nutrition/hydration. NGT has been out since 12/10, as per chart multiple attempts to replace NGT were unsuccessfully, pt refused another attempt. As per RN family refused PEG before. Ongoing GOC discussion per chart.       Diet order:  Pt has not been fed since 12/10 due to lack of EN access.   Active TF order: Osmolite 1.5 240 mL q 6 hrs + Prosource TF 1 packet q 24 hrs (provides 1466 kcal, 71 g protein, 720 mL water) + 250ml free h20 q 6hrs    Anthropometrics:  - Ht: 63 inches   - Wt: no new weights. Last documented (12/9) -  62.9kg  vs 69.1kg (12/8) compared to admission wt 64.9 kg (12/3), question accuracy, some weight fluctuations likely due to fluid shifts.     - %wt change:   - BMI: 25 (admission wt of 64.9 kg)   - IBW 124lbs +/-10%     Pertinent Lab Data: 12/12: H/H 8.7/30.8, BUN 75, Cr 1.7 (BUN/Cr trending down), Glu 108, Mg 2.5     Pertinent Meds: D5@75ml/hr (provides ~306kcal/day from dextrose), pantoprazole, atorvastatin, cholecalciferol, levothyroxine      Physical Findings:  - Appearance: Patient lethargic, on BIPAP    - GI function: ?? last BM documented 12/2. As per RN no BMs today.     - Tubes: NGT is out since 12/10.    - Oral/Mouth:   - Skin: nose wound      Nutrition Requirements  Weight Used: 64.9 kg- admission wt      Estimated Energy Needs    Continue [v]  Adjust []  MSJ x 1.2-1.3= 6945-0504 kcal/day      Estimated Protein Needs    Continue [v]  Adjust [] 1.0-1.2 gm/kg- = 65-78 g/day (monitor renal function)       Estimated Fluid Needs        Continue [v]  Adjust [] per ICU team      Nutrient Intake: not meeting needs as pt has not been fed since 12/10 due to lack of EN access   (active EN at goal rate will provide % est kcal needs and % estimated protein needs)    [v] Previous Nutrition Diagnosis:  Inadequate energy intake - ongoing      Nutrition Intervention: Enteral nutrition vs. ?Meal and snacks     Goal/Expected Outcome: ??? GOC,  pt has not been fed is 3 days - not appropriate for PO diet as per SLP, unable to replace EN access/NGT per MD.     Indicator/Monitoring: RD will monitor diet order, energy intake, labs, nutrition focused physical findings, body composition.    Recommended: If able to replace NGT (and pt is off BIPAP)  please restart EN regimen of  Osmolite 1.5 240 mL q 6 hrs + Prosource TF 1 packet q 24 hrs to provide 1466 kcal, 71 gm protein, 720 mL water. Additional flushes per LIP. Add bowel regimen, please document BMs.   Please consider NST evaluation If unable to feed pt w/in 24-48hrs (? aggressive intervention)     Pt remains at risk, will f/u in 1-4 days.

## 2019-12-13 NOTE — SWALLOW BEDSIDE ASSESSMENT ADULT - NS SPL SWALLOW CLINIC TRIAL FT
Pt expectorated trial of puree. Pt also did not accept straw or further attempts to offer trials, turning head away.
oral dysphagia, suspected pharyngeal dysphagia for puree
suspected pharyngeal impairment

## 2019-12-13 NOTE — PROGRESS NOTE ADULT - SUBJECTIVE AND OBJECTIVE BOX
Nephrology progress note    Patient is seen and examined, events over the last 24 h noted .  On BiPAP, on D5W 75 cc/hr  Allergies:  levofloxacin (Swelling (Mild))    Hospital Medications:   MEDICATIONS  (STANDING):  atorvastatin 10 milliGRAM(s) Oral at bedtime  chlorhexidine 4% Liquid 1 Application(s) Topical <User Schedule>  chlorhexidine 4% Liquid 1 Application(s) Topical <User Schedule>  cholecalciferol 2000 Unit(s) Oral daily  dextrose 5%. 1000 milliLiter(s) (75 mL/Hr) IV Continuous <Continuous>  levothyroxine 50 MICROGram(s) Oral daily  pantoprazole    Tablet 40 milliGRAM(s) Oral before breakfast        VITALS:  T(F): 96 (12-13-19 @ 05:15), Max: 98 (12-12-19 @ 14:00)  HR: 86 (12-13-19 @ 05:15)  BP: 128/85 (12-13-19 @ 05:15)  RR: 16 (12-13-19 @ 05:15)  SpO2: 94% (12-12-19 @ 22:02)  Wt(kg): --    12-11 @ 07:01  -  12-12 @ 07:00  --------------------------------------------------------  IN: 0 mL / OUT: 500 mL / NET: -500 mL          PHYSICAL EXAM:  Constitutional: On BiPAP  Respiratory: Coarse BS b/l  Cardiovascular: S1, S2, RRR  Gastrointestinal: BS+, soft, NT/ND  Extremities:  No peripheral edema  Neurological: Awake  : No palomo.   Skin: No rashes  Vascular Access:    LABS:  12-12    150<H>  |  107  |  88<HH>  ----------------------------<  131<H>  4.4   |  33<H>  |  2.0<H>    Ca    9.1      12 Dec 2019 11:21  Phos  3.5     12-12  Mg     2.5     12-12    TPro  6.1  /  Alb  3.0<L>  /  TBili  1.0  /  DBili      /  AST  20  /  ALT  18  /  AlkPhos  90  12-12                          8.7    7.14  )-----------( 69       ( 12 Dec 2019 11:21 )             30.8       Urine Studies:      RADIOLOGY & ADDITIONAL STUDIES:

## 2019-12-13 NOTE — PROGRESS NOTE ADULT - SUBJECTIVE AND OBJECTIVE BOX
KUSH TONG  89y  Male    Patient is a 89y old  Male who presents with a chief complaint of Unresponsiveness (07 Dec 2019 09:40)      INTERVAL HPI/OVERNIGHT EVENTS:  No interval events.  Patient seen earlier in the AM on BiPAP.  Patient still confused.      REVIEW OF SYSTEMS: UTO due to confusion      VITALS:  T(F): 96.4 (12-13-19 @ 13:58), Max: 96.4 (12-12-19 @ 22:07)  HR: 74 (12-13-19 @ 13:58) (73 - 88)  BP: 118/64 (12-13-19 @ 13:58) (118/64 - 135/66)  RR: 16 (12-13-19 @ 13:58) (16 - 16)  SpO2: 94% (12-12-19 @ 22:02) (94% - 94%)      PHYSICAL EXAM:  GENERAL: chronically ill looking, cachetic  HEAD:  Atraumatic, Normocephalic  EYES: conjunctiva and sclera clear  ENMT: Moist mucous membranes  NECK: Supple, Normal thyroid  NERVOUS SYSTEM:  Awake but confused. Oriented X 0. No focal deficits.  CHEST/LUNG: Decreased AE bilaterally; + rales, No rhonchi, wheezing, or rubs  HEART: Regular rate and rhythm; No murmurs, rubs, or gallops  ABDOMEN: Soft, Nontender, Nondistended; Bowel sounds present  EXTREMITIES:  2+ Peripheral Pulses, No clubbing, cyanosis, or edema  LYMPH: No lymphadenopathy noted  SKIN: please see nursing assessment    Consultant(s) Notes Reviewed:  [x ] YES  [ ] NO  Care Discussed with Consultants/Other Providers [ x] YES  [ ] NO    LABS:                        8.7    7.14  )-----------( 69       ( 12 Dec 2019 11:21 )             30.8       12-13    145  |  102  |  75<HH>  ----------------------------<  108<H>  4.1   |  34<H>  |  1.7<H>    Ca    8.9      13 Dec 2019 06:13  Phos  3.5     12-12  Mg     2.5     12-12    TPro  6.1  /  Alb  3.0<L>  /  TBili  1.0  /  DBili  x   /  AST  20  /  ALT  18  /  AlkPhos  90  12-12      Prothrombin Time and INR, Plasma in AM (12.10.19 @ 05:15)    Prothrombin Time, Plasma: 23.70 sec,   INR: 2.08:       MICROBIOLOGY:  Culture - Blood (12.03.19 @ 12:24)    Specimen Source: .Blood Blood-Peripheral    Culture Results:   No growth at 5 days.      Culture - Urine (12.03.19 @ 11:24)    Specimen Source: .Urine Catheterized    Culture Results:   No growth        RADIOLOGY & ADDITIONAL TESTS:  X-ray Chest 1 View- PORTABLE-Urgent (12.13.19 @ 11:25)   Cardiac/mediastinum/hilum: Cardiomegaly, thoracic aortic calcification..    Lung parenchyma/Pleura: Bilateral opacities/left pleural effusion.    Skeleton/soft tissues: Stable.    Impression:      Stable lung pathology and cardiomegaly.      CT Chest No Cont (12.03.19 @ 13:41)   1. Since June 27, 2019, no evidence of acute traumatic injury within the   chest, abdomen, or pelvis.    2. Status post interval left total hip placement.    3. Findings suggestive of CHF, including increased size moderate   bilateral pleural effusions, left greater than right, bilateral   interlobular septal thickening, and scattered bilateral ground glass   opacities.        US Renal (12.04.19 @ 15:29)   Large cysts within the right kidney. Nonvisualization of the left kidney.    Please see the CT scan from 25 hours ago for further delineation of the   anatomy of the kidneys..      CT Cervical Spine No Cont (12.03.19 @ 13:41)   In comparison with the prior noncontrast CT scan of the cervical spine   dated June 28, 2019:    No acute fracture demonstrated.    Multilevel degenerative changes and osteopenia, unchanged.    New left pleural effusion and ground glass opacities in the visualized   upper lobes.      CT Head No Cont (12.03.19 @ 10:37)   Motion degraded exam shows no gross acute intracranial hemorrhage or mass   effect. No CT evidence of an acute transcortical infarct, however,   evaluation is degraded by motion.    Chronic ischemic changes in the frontoparietal white matter.  Imaging Personally Reviewed:  [x] YES  [ ] NO      MEDICATIONS  (STANDING):  atorvastatin 10 milliGRAM(s) Oral at bedtime  chlorhexidine 0.12% Liquid 15 milliLiter(s) Swish and Spit two times a day  chlorhexidine 4% Liquid 1 Application(s) Topical <User Schedule>  chlorhexidine 4% Liquid 1 Application(s) Topical <User Schedule>  cholecalciferol 2000 Unit(s) Oral daily  dextrose 5%. 1000 milliLiter(s) (75 mL/Hr) IV Continuous <Continuous>  levothyroxine 50 MICROGram(s) Oral daily  pantoprazole    Tablet 40 milliGRAM(s) Oral before breakfast    MEDICATIONS  (PRN): None        Home Medications:  cholecalciferol oral tablet: 2000 unit(s) orally once a day ( vitamin D        HEALTH ISSUES - PROBLEM Dx:  Acute on chronic respiratory failure with hypercapnia  Hyperlipidemia  CHF (congestive heart failure)  Afib  Hypertension  History of hip surgery: Left total hip replacement  S/P AAA (abdominal aortic aneurysm) repair

## 2019-12-13 NOTE — SWALLOW BEDSIDE ASSESSMENT ADULT - PHARYNGEAL PHASE
Delayed throat clear post oral intake/Cough post oral intake/Wet vocal quality post oral intake + toleration observed without overt symptoms of penetration/aspiration

## 2019-12-13 NOTE — SWALLOW BEDSIDE ASSESSMENT ADULT - ASR SWALLOW RECOMMEND DIAG
assessment for candidacy patient currently does not seem to be able to be positioned properly discussed with PT

## 2019-12-13 NOTE — PROGRESS NOTE ADULT - ASSESSMENT
COY on Stage 3 CKD.  Severe PAH. CHF. CRS. Hypotension    HyperNAtremia - due to intravascularly volume depleted.   needs free water - cont  D5W at 75cc/h     REspiratory failure - on BiPAP  HYpotension has improved  On empiric ABx  Prognosis is poor  -

## 2019-12-13 NOTE — SWALLOW BEDSIDE ASSESSMENT ADULT - SWALLOW EVAL: RECOMMENDED FEEDING/EATING TECHNIQUES
check mouth frequently for oral residue/pocketing/oral hygiene/maintain upright posture during/after eating for 30 mins/allow for swallow between intakes/position upright (90 degrees)/small sips/bites

## 2019-12-13 NOTE — PROGRESS NOTE ADULT - ASSESSMENT
89 ym with history of CHF, DLD, Afib, HTN presented after a syncopal episode at home:      Assessment & Plan:    1. Metabolic Encephalopathy:  Resolving per prior notes.  CT head : Negative  Neurology consulted: EEG negative for seizures. No further recommendations.        2.       3. Acute on chronic diastolic CHF:  CXR show bilateral effusion, more on left  Off Lasix drip, keep I<O  c/w BIPAP PRN      4.      5. COY on CKD 3:  Creatinine stable. Renal sono: hydronephrosis  Monitor BMP.   Nephrology following.        6. AFib:  Rate controlled.   Monitor INR. Off Coumadin for 3 days.      7. Hypernatremia:  Resolved with D5W.  Continue and Monitor Na level.      89M PMHx HFpEF, COPD on home o2, AFib on coumadin, CKD III, hypothyroidism here with unresponsiveness. Dysphagia.    #Unresponsiveness, now resolved  mental status nearing baseline per daughter at bedside  c/b dyphagia as below  unclear etiology, no focal deficits  appreciate neuro input  cth unremarkable  #Oropharyngeal dysphagia  npo per s+s  attempt to place ngt  d5w 75 cc/hr  discussed with daughter at bedside; ongoing goc discussion  repeat s+s eval at later date  #HFpEF  monitor off lasix  #COPD  controlled off medications  #AFib  coumadin  #CKD III  stable  trend scr  #Hypothyroidism  synthroid 50  #DVT ppx  coumadin  #Vit D def, suspected  replete  Prophylaxis: None  Code status: Full code    Progress Note Handoff:  Pending consults: None  Pending Tests: None  Family/Patient discussion: None  Disposition: Unknown at this time.      Attending: Dr. Patti Bowden. Spectra [id number]. 89 year old Male with history of CHF, DLD, Afib, HTN presented after a syncopal episode at home:      Assessment & Plan:    1. Metabolic Encephalopathy:  Resolving per prior notes.  CT head : Negative  Neurology consulted: EEG negative for seizures. No further recommendations.        2. Acute on Chronic diastolic CHF:  CXR show bilateral effusion, more on left  Off Lasix since 12/6.  c/w BIPAP PRN and qhs.      4. Dysphagia:  SLP following: MBS on Monday.  Started on Dysphagia 2 with Nectars.      5. h/o COPD:  No acute exacerbation. Stable.      6. Hypothyroidism:  TSH wnl. On Synthroid.        5. COY on CKD 3:  Creatinine trended down. Renal sono: hydronephrosis  Monitor BMP.   Nephrology following.        6. AFib:  Rate controlled.   Follow up INR. Has been off Coumadin for 3 days.      7. Hypernatremia:  Resolved with D5W. Rate decreased.  Monitor Na level.      8. Vitamin D Deficiency:  Continue supplements.        Prophylaxis: None  Code status: Full code    Progress Note Handoff:  Pending consults: None  Pending Tests: None  Family/Patient discussion: None  Disposition: Unknown at this time.      Attending: Dr. Patti Bowden. Spectra [phone]. 89 year old Male with history of CHF, DLD, Afib, HTN presented after a syncopal episode at home:      Assessment & Plan:    1. Metabolic Encephalopathy:  Resolving per prior notes.  CT head : Negative  Neurology consulted: EEG negative for seizures. No further recommendations.        2. Acute on Chronic diastolic CHF:  CXR show bilateral effusion, more on left  Off Lasix since 12/6.  c/w BIPAP PRN and qhs.      4. Dysphagia:  SLP following: MBS on Monday.  Started on Dysphagia 2 with Nectars.      5. Chronic Anemia & Thromcocytopenia:  Hgb stable at baseline. Goal >7 g/dl.  No prior use of Heparin.  Follow up work up and heme consult.        6. Hypothyroidism:  TSH wnl. On Synthroid.        5. COY on CKD 3:  Creatinine trended down. Renal sono: hydronephrosis  Monitor BMP.   Nephrology following.        6. AFib:  Rate controlled.   Follow up INR. Has been off Coumadin for 3 days.      7. Hypernatremia:  Resolved with D5W. Rate decreased.  Monitor Na level.      8. Vitamin D Deficiency:  Continue supplements.        Prophylaxis: None  Code status: Full code    Progress Note Handoff:  Pending consults: None  Pending Tests: None  Family/Patient discussion: None  Disposition: Unknown at this time.      Attending: Dr. Patti Bowden. Spectra [phone].

## 2019-12-13 NOTE — SWALLOW BEDSIDE ASSESSMENT ADULT - COMMENTS
agitated intermittently throughout assessment, Patient redirectable. Difficult to re-position mild-moderate oral impairment no signs of pharyngeal impairment, may benefit from instrumental assessment if can be positioned

## 2019-12-14 LAB
ALBUMIN SERPL ELPH-MCNC: 2.8 G/DL — LOW (ref 3.5–5.2)
ALP SERPL-CCNC: 106 U/L — SIGNIFICANT CHANGE UP (ref 30–115)
ALT FLD-CCNC: 16 U/L — SIGNIFICANT CHANGE UP (ref 0–41)
ANION GAP SERPL CALC-SCNC: 9 MMOL/L — SIGNIFICANT CHANGE UP (ref 7–14)
ANISOCYTOSIS BLD QL: SIGNIFICANT CHANGE UP
AST SERPL-CCNC: 25 U/L — SIGNIFICANT CHANGE UP (ref 0–41)
BILIRUB SERPL-MCNC: 1 MG/DL — SIGNIFICANT CHANGE UP (ref 0.2–1.2)
BUN SERPL-MCNC: 65 MG/DL — CRITICAL HIGH (ref 10–20)
CALCIUM SERPL-MCNC: 8.7 MG/DL — SIGNIFICANT CHANGE UP (ref 8.5–10.1)
CHLORIDE SERPL-SCNC: 103 MMOL/L — SIGNIFICANT CHANGE UP (ref 98–110)
CO2 SERPL-SCNC: 31 MMOL/L — SIGNIFICANT CHANGE UP (ref 17–32)
CREAT SERPL-MCNC: 1.6 MG/DL — HIGH (ref 0.7–1.5)
DIR ANTIGLOB POLYSPECIFIC INTERPRETATION: SIGNIFICANT CHANGE UP
FOLATE SERPL-MCNC: 10.7 NG/ML — SIGNIFICANT CHANGE UP
GIANT PLATELETS BLD QL SMEAR: PRESENT — SIGNIFICANT CHANGE UP
GLUCOSE BLDC GLUCOMTR-MCNC: 116 MG/DL — HIGH (ref 70–99)
GLUCOSE SERPL-MCNC: 133 MG/DL — HIGH (ref 70–99)
HAPTOGLOB SERPL-MCNC: 131 MG/DL — SIGNIFICANT CHANGE UP (ref 34–200)
HCT VFR BLD CALC: 29.9 % — LOW (ref 42–52)
HGB BLD-MCNC: 8.7 G/DL — LOW (ref 14–18)
INR BLD: 2.08 RATIO — HIGH (ref 0.65–1.3)
LDH SERPL L TO P-CCNC: 162 U/L — SIGNIFICANT CHANGE UP (ref 50–242)
MACROCYTES BLD QL: SLIGHT — SIGNIFICANT CHANGE UP
MCHC RBC-ENTMCNC: 18.9 PG — LOW (ref 27–31)
MCHC RBC-ENTMCNC: 29.1 G/DL — LOW (ref 32–37)
MCV RBC AUTO: 65 FL — LOW (ref 80–94)
MICROCYTES BLD QL: SIGNIFICANT CHANGE UP
OVALOCYTES BLD QL SMEAR: SLIGHT — SIGNIFICANT CHANGE UP
PLAT MORPH BLD: NORMAL — SIGNIFICANT CHANGE UP
PLATELET # BLD AUTO: 74 K/UL — LOW (ref 130–400)
PLATELET CLUMP BLD QL SMEAR: ABNORMAL
PLATELET COUNT - ESTIMATE: ABNORMAL
POIKILOCYTOSIS BLD QL AUTO: SIGNIFICANT CHANGE UP
POTASSIUM SERPL-MCNC: 4.3 MMOL/L — SIGNIFICANT CHANGE UP (ref 3.5–5)
POTASSIUM SERPL-SCNC: 4.3 MMOL/L — SIGNIFICANT CHANGE UP (ref 3.5–5)
PROT SERPL-MCNC: 5.8 G/DL — LOW (ref 6–8.3)
PROT SERPL-MCNC: 5.8 G/DL — LOW (ref 6–8.3)
PROT SERPL-MCNC: 6.2 G/DL — SIGNIFICANT CHANGE UP (ref 6–8)
PROTHROM AB SERPL-ACNC: 23.8 SEC — HIGH (ref 9.95–12.87)
RBC # BLD: 4.6 M/UL — LOW (ref 4.7–6.1)
RBC # BLD: 4.6 M/UL — LOW (ref 4.7–6.1)
RBC # FLD: 22.4 % — HIGH (ref 11.5–14.5)
RBC BLD AUTO: ABNORMAL
RETICS #: 59.5 K/UL — SIGNIFICANT CHANGE UP (ref 25–125)
RETICS/RBC NFR: 1.3 % — SIGNIFICANT CHANGE UP (ref 0.5–1.5)
SODIUM SERPL-SCNC: 143 MMOL/L — SIGNIFICANT CHANGE UP (ref 135–146)
TARGETS BLD QL SMEAR: SIGNIFICANT CHANGE UP
VIT B12 SERPL-MCNC: 1189 PG/ML — SIGNIFICANT CHANGE UP (ref 232–1245)
WBC # BLD: 8.34 K/UL — SIGNIFICANT CHANGE UP (ref 4.8–10.8)
WBC # FLD AUTO: 8.34 K/UL — SIGNIFICANT CHANGE UP (ref 4.8–10.8)

## 2019-12-14 PROCEDURE — 99233 SBSQ HOSP IP/OBS HIGH 50: CPT

## 2019-12-14 RX ORDER — SODIUM CHLORIDE 9 MG/ML
1000 INJECTION, SOLUTION INTRAVENOUS
Refills: 0 | Status: DISCONTINUED | OUTPATIENT
Start: 2019-12-14 | End: 2019-12-15

## 2019-12-14 RX ADMIN — CHLORHEXIDINE GLUCONATE 1 APPLICATION(S): 213 SOLUTION TOPICAL at 06:09

## 2019-12-14 RX ADMIN — SODIUM CHLORIDE 50 MILLILITER(S): 9 INJECTION, SOLUTION INTRAVENOUS at 15:21

## 2019-12-14 RX ADMIN — CHLORHEXIDINE GLUCONATE 15 MILLILITER(S): 213 SOLUTION TOPICAL at 06:10

## 2019-12-14 RX ADMIN — Medication 2000 UNIT(S): at 11:00

## 2019-12-14 RX ADMIN — ATORVASTATIN CALCIUM 10 MILLIGRAM(S): 80 TABLET, FILM COATED ORAL at 21:22

## 2019-12-14 RX ADMIN — Medication 50 MICROGRAM(S): at 06:09

## 2019-12-14 RX ADMIN — CHLORHEXIDINE GLUCONATE 15 MILLILITER(S): 213 SOLUTION TOPICAL at 18:08

## 2019-12-14 RX ADMIN — PANTOPRAZOLE SODIUM 40 MILLIGRAM(S): 20 TABLET, DELAYED RELEASE ORAL at 06:09

## 2019-12-14 NOTE — PROGRESS NOTE ADULT - ASSESSMENT
89 year old Male with history of CHF, DLD, Afib, HTN presented after a syncopal episode at home:        #Metabolic Encephalopathy:  Resolving per prior notes.  CT head : Negative  Neurology consulted: EEG negative for seizures. No further recommendations.    cont to monitor       #Acute on Chronic diastolic CHF:  CXR show bilateral effusion, more on left  Off Lasix since 12/6.  c/w BIPAP PRN and qhs.      #Dysphagia:  SLP following: MBS on Monday.  Started on Dysphagia 2 with Nectars.      #Chronic Anemia & Thromcocytopenia:  Hgb stable at baseline. Goal >7 g/dl.  No prior use of Heparin.  Follow up work up and heme consult.  -check b12, folate, LDH, haptoglobin, PT/INR, PTT, and mae         #Hypothyroidism:  TSH wnl. On Synthroid.        #COY on CKD 3:  Creatinine trended down. Renal sono: hydronephrosis  Monitor BMP.   Nephrology following.        #AFib:  Rate controlled.   INR 2. Has been off Coumadin  -rate controlled without medication         #Hypernatremia:  #Sodium, Serum: 143 mmol/L (12.14.19 @ 09:05)  improved with D5W  -will cont D5W at 50 for one more day and plan to d/c tomorrow   -Monitor Na level.      #Vitamin D Deficiency:  Continue supplements.        Prophylaxis: None  Code status: Full code    Progress Note Handoff:  Pending: MBS on monday?  Family/Patient discussion: spoke with daughter at bedside. endorsing some hip pain but made aware by daughter that pt had total hip replacement in july and pt has not been out of bed for a couple days  Disposition: unclear at this time

## 2019-12-14 NOTE — PROGRESS NOTE ADULT - SUBJECTIVE AND OBJECTIVE BOX
RANKUSH GABRIEL  89y, Male  Allergy: levofloxacin (Swelling (Mild))  Hospital Day: 11d      Patient was seen and examined at bedside. altered but easy to redirect attempting to pull out NC. states he wants to get out of bed to chair        VITALS:  T(F): 96.7 (12-14-19 @ 13:48), Max: 97.1 (12-14-19 @ 05:00)  HR: 90 (12-14-19 @ 13:48)  BP: 118/65 (12-14-19 @ 13:48) (104/63 - 130/74)  RR: 16 (12-14-19 @ 13:48)  SpO2: 94% (12-14-19 @ 07:34)    PHYSICAL EXAM:  GENERAL: elderly male in no acute distress A&Ox1  NECK: No evidence of swelling no palpable lymphadenopathy  CHEST/LUNG: decreased breath sounds bilaterally faint wheezes, rales, or rhonchi   HEART/VASC: RRR, No murmurs, rubs, or gallops appreciated, 2+ equal bilateral radial pulse  ABDOMEN: Soft, non tender non distended +bowel sounds in all quadrants, no palpable organomegaly   EXTREMITIES:  point tenderness of left hip pt able to passively move left hip however, limited ROM left LE (hx of total hip replacement in july) MAIKEL without pain RLE    TESTS & MEASUREMENTS:  Weight (Kg):       12-13-19 @ 07:01  -  12-14-19 @ 07:00  --------------------------------------------------------  IN: 0 mL / OUT: 100 mL / NET: -100 mL                            8.7    8.34  )-----------( 74       ( 14 Dec 2019 09:05 )             29.9     PT/INR - ( 14 Dec 2019 09:05 )   PT: 23.80 sec;   INR: 2.08 ratio           12-14    143  |  103  |  65<HH>  ----------------------------<  133<H>  4.3   |  31  |  1.6<H>    Ca    8.7      14 Dec 2019 09:05    TPro  6.2  /  Alb  2.8<L>  /  TBili  1.0  /  DBili  x   /  AST  25  /  ALT  16  /  AlkPhos  106  12-14    LIVER FUNCTIONS - ( 14 Dec 2019 09:05 )  Alb: 2.8 g/dL / Pro: 6.2 g/dL / ALK PHOS: 106 U/L / ALT: 16 U/L / AST: 25 U/L / GGT: x             MEDICATIONS:  MEDICATIONS  (STANDING):  atorvastatin 10 milliGRAM(s) Oral at bedtime  chlorhexidine 0.12% Liquid 15 milliLiter(s) Swish and Spit two times a day  chlorhexidine 4% Liquid 1 Application(s) Topical <User Schedule>  chlorhexidine 4% Liquid 1 Application(s) Topical <User Schedule>  cholecalciferol 2000 Unit(s) Oral daily  dextrose 5%. 1000 milliLiter(s) (50 mL/Hr) IV Continuous <Continuous>  levothyroxine 50 MICROGram(s) Oral daily  pantoprazole    Tablet 40 milliGRAM(s) Oral before breakfast

## 2019-12-15 LAB
ALBUMIN SERPL ELPH-MCNC: 3 G/DL — LOW (ref 3.5–5.2)
ALP SERPL-CCNC: 114 U/L — SIGNIFICANT CHANGE UP (ref 30–115)
ALT FLD-CCNC: 18 U/L — SIGNIFICANT CHANGE UP (ref 0–41)
ANION GAP SERPL CALC-SCNC: 12 MMOL/L — SIGNIFICANT CHANGE UP (ref 7–14)
APTT BLD: 38.2 SEC — SIGNIFICANT CHANGE UP (ref 27–39.2)
AST SERPL-CCNC: 29 U/L — SIGNIFICANT CHANGE UP (ref 0–41)
BILIRUB SERPL-MCNC: 1 MG/DL — SIGNIFICANT CHANGE UP (ref 0.2–1.2)
BUN SERPL-MCNC: 58 MG/DL — HIGH (ref 10–20)
CALCIUM SERPL-MCNC: 8.9 MG/DL — SIGNIFICANT CHANGE UP (ref 8.5–10.1)
CHLORIDE SERPL-SCNC: 99 MMOL/L — SIGNIFICANT CHANGE UP (ref 98–110)
CO2 SERPL-SCNC: 30 MMOL/L — SIGNIFICANT CHANGE UP (ref 17–32)
CREAT SERPL-MCNC: 1.7 MG/DL — HIGH (ref 0.7–1.5)
FOLATE SERPL-MCNC: 7.4 NG/ML — SIGNIFICANT CHANGE UP
GLUCOSE SERPL-MCNC: 112 MG/DL — HIGH (ref 70–99)
HAPTOGLOB SERPL-MCNC: 145 MG/DL — SIGNIFICANT CHANGE UP (ref 34–200)
HCT VFR BLD CALC: 32.1 % — LOW (ref 42–52)
HGB BLD-MCNC: 9 G/DL — LOW (ref 14–18)
INR BLD: 1.76 RATIO — HIGH (ref 0.65–1.3)
LDH SERPL L TO P-CCNC: 168 U/L — SIGNIFICANT CHANGE UP (ref 50–242)
MAGNESIUM SERPL-MCNC: 2.1 MG/DL — SIGNIFICANT CHANGE UP (ref 1.8–2.4)
MCHC RBC-ENTMCNC: 18.9 PG — LOW (ref 27–31)
MCHC RBC-ENTMCNC: 28 G/DL — LOW (ref 32–37)
MCV RBC AUTO: 67.3 FL — LOW (ref 80–94)
NRBC # BLD: 0 /100 WBCS — SIGNIFICANT CHANGE UP (ref 0–0)
PLATELET # BLD AUTO: 86 K/UL — LOW (ref 130–400)
POTASSIUM SERPL-MCNC: 4.5 MMOL/L — SIGNIFICANT CHANGE UP (ref 3.5–5)
POTASSIUM SERPL-SCNC: 4.5 MMOL/L — SIGNIFICANT CHANGE UP (ref 3.5–5)
PROT SERPL-MCNC: 6.5 G/DL — SIGNIFICANT CHANGE UP (ref 6–8)
PROTHROM AB SERPL-ACNC: 20.1 SEC — HIGH (ref 9.95–12.87)
RBC # BLD: 4.77 M/UL — SIGNIFICANT CHANGE UP (ref 4.7–6.1)
RBC # FLD: 22.6 % — HIGH (ref 11.5–14.5)
SODIUM SERPL-SCNC: 141 MMOL/L — SIGNIFICANT CHANGE UP (ref 135–146)
VIT B12 SERPL-MCNC: 1058 PG/ML — SIGNIFICANT CHANGE UP (ref 232–1245)
WBC # BLD: 7.35 K/UL — SIGNIFICANT CHANGE UP (ref 4.8–10.8)
WBC # FLD AUTO: 7.35 K/UL — SIGNIFICANT CHANGE UP (ref 4.8–10.8)

## 2019-12-15 PROCEDURE — 99222 1ST HOSP IP/OBS MODERATE 55: CPT

## 2019-12-15 PROCEDURE — ZZZZZ: CPT

## 2019-12-15 PROCEDURE — 99233 SBSQ HOSP IP/OBS HIGH 50: CPT

## 2019-12-15 RX ORDER — WARFARIN SODIUM 2.5 MG/1
2 TABLET ORAL ONCE
Refills: 0 | Status: COMPLETED | OUTPATIENT
Start: 2019-12-15 | End: 2019-12-15

## 2019-12-15 RX ADMIN — SODIUM CHLORIDE 50 MILLILITER(S): 9 INJECTION, SOLUTION INTRAVENOUS at 05:12

## 2019-12-15 RX ADMIN — Medication 50 MICROGRAM(S): at 05:12

## 2019-12-15 RX ADMIN — CHLORHEXIDINE GLUCONATE 15 MILLILITER(S): 213 SOLUTION TOPICAL at 05:12

## 2019-12-15 RX ADMIN — ATORVASTATIN CALCIUM 10 MILLIGRAM(S): 80 TABLET, FILM COATED ORAL at 21:43

## 2019-12-15 RX ADMIN — CHLORHEXIDINE GLUCONATE 1 APPLICATION(S): 213 SOLUTION TOPICAL at 07:18

## 2019-12-15 RX ADMIN — CHLORHEXIDINE GLUCONATE 15 MILLILITER(S): 213 SOLUTION TOPICAL at 17:23

## 2019-12-15 RX ADMIN — PANTOPRAZOLE SODIUM 40 MILLIGRAM(S): 20 TABLET, DELAYED RELEASE ORAL at 05:12

## 2019-12-15 RX ADMIN — WARFARIN SODIUM 2 MILLIGRAM(S): 2.5 TABLET ORAL at 21:43

## 2019-12-15 RX ADMIN — Medication 2000 UNIT(S): at 11:04

## 2019-12-15 NOTE — SWALLOW BEDSIDE ASSESSMENT ADULT - SWALLOW EVAL: ORAL MUSCULATURE
unable to assess due to poor participation/comprehension
Not able to complete full OME due to patients poor participation./unable to assess due to poor participation/comprehension
limited direction following/unable to assess due to poor participation/comprehension/anomalies present

## 2019-12-15 NOTE — SWALLOW BEDSIDE ASSESSMENT ADULT - ORAL PHASE
Notified pt of CT order and he verbalized understanding.  Sent him number to schedule via [employer] per his request.     Within functional limits Decreased anterior-posterior movement of the bolus/Delayed oral transit time

## 2019-12-15 NOTE — SWALLOW BEDSIDE ASSESSMENT ADULT - ASR SWALLOW ASPIRATION MONITOR
oral hygiene
oral hygiene
oral hygiene/upper respiratory infection/throat clearing/fever/pneumonia/change of breathing pattern/position upright (90Y)/cough/gurgly voice
position upright (90Y)/oral hygiene/fever/gurgly voice/pneumonia/upper respiratory infection/change of breathing pattern/cough/throat clearing

## 2019-12-15 NOTE — PROGRESS NOTE ADULT - SUBJECTIVE AND OBJECTIVE BOX
INTERVAL HPI/OVERNIGHT EVENTS:    SUBJECTIVE: Patient seen and examined at bedside.     unable to obtain ros  OBJECTIVE:    VITAL SIGNS:  ICU Vital Signs Last 24 Hrs  T(C): 35.6 (15 Dec 2019 05:00), Max: 35.9 (14 Dec 2019 13:48)  T(F): 96 (15 Dec 2019 05:00), Max: 96.7 (14 Dec 2019 13:48)  HR: 98 (15 Dec 2019 07:40) (70 - 100)  BP: 136/72 (15 Dec 2019 05:00) (118/65 - 136/72)  BP(mean): --  ABP: --  ABP(mean): --  RR: 18 (15 Dec 2019 05:00) (16 - 20)  SpO2: 95% (15 Dec 2019 08:06) (95% - 95%)        12-14 @ 07:01  -  12-15 @ 07:00  --------------------------------------------------------  IN: 550 mL / OUT: 450 mL / NET: 100 mL      CAPILLARY BLOOD GLUCOSE      POCT Blood Glucose.: 116 mg/dL (14 Dec 2019 07:31)      PHYSICAL EXAM:    General: NAD  HEENT: NC/AT; PERRL, clear conjunctiva  Neck: supple  Respiratory: CTA b/l  Cardiovascular: +S1/S2; RRR  Abdomen: soft, NT/ND; +BS x4  Extremities: WWP, 2+ peripheral pulses b/l; no LE edema  Skin: normal color and turgor; no rash  Neurological:    MEDICATIONS:  MEDICATIONS  (STANDING):  atorvastatin 10 milliGRAM(s) Oral at bedtime  chlorhexidine 0.12% Liquid 15 milliLiter(s) Swish and Spit two times a day  chlorhexidine 4% Liquid 1 Application(s) Topical <User Schedule>  chlorhexidine 4% Liquid 1 Application(s) Topical <User Schedule>  cholecalciferol 2000 Unit(s) Oral daily  levothyroxine 50 MICROGram(s) Oral daily  pantoprazole    Tablet 40 milliGRAM(s) Oral before breakfast  warfarin 2 milliGRAM(s) Oral once    MEDICATIONS  (PRN):      ALLERGIES:  Allergies    levofloxacin (Swelling (Mild))    Intolerances        LABS:                        9.0    7.35  )-----------( 86       ( 15 Dec 2019 07:24 )             32.1     Hemoglobin: 9.0 g/dL (12-15 @ 07:24)  Hemoglobin: 8.7 g/dL (12-14 @ 09:05)  Hemoglobin: 8.7 g/dL (12-12 @ 11:21)  Hemoglobin: 9.2 g/dL (12-11 @ 06:18)    CBC Full  -  ( 15 Dec 2019 07:24 )  WBC Count : 7.35 K/uL  RBC Count : 4.77 M/uL  Hemoglobin : 9.0 g/dL  Hematocrit : 32.1 %  Platelet Count - Automated : 86 K/uL  Mean Cell Volume : 67.3 fL  Mean Cell Hemoglobin : 18.9 pg  Mean Cell Hemoglobin Concentration : 28.0 g/dL  Auto Neutrophil # : x  Auto Lymphocyte # : x  Auto Monocyte # : x  Auto Eosinophil # : x  Auto Basophil # : x  Auto Neutrophil % : x  Auto Lymphocyte % : x  Auto Monocyte % : x  Auto Eosinophil % : x  Auto Basophil % : x    12-15    141  |  99  |  58<H>  ----------------------------<  112<H>  4.5   |  30  |  1.7<H>    Ca    8.9      15 Dec 2019 07:24  Mg     2.1     12-15    TPro  6.5  /  Alb  3.0<L>  /  TBili  1.0  /  DBili  x   /  AST  29  /  ALT  18  /  AlkPhos  114  12-15    Creatinine Trend: 1.7<--, 1.6<--, 1.7<--, 2.0<--, 2.1<--, 2.1<--  LIVER FUNCTIONS - ( 15 Dec 2019 07:24 )  Alb: 3.0 g/dL / Pro: 6.5 g/dL / ALK PHOS: 114 U/L / ALT: 18 U/L / AST: 29 U/L / GGT: x           PT/INR - ( 15 Dec 2019 07:24 )   PT: 20.10 sec;   INR: 1.76 ratio         PTT - ( 15 Dec 2019 07:24 )  PTT:38.2 sec    hs Troponin:              CSF:                      EKG:   MICROBIOLOGY:    IMAGING:      Labs, imaging, EKG personally reviewed    RADIOLOGY & ADDITIONAL TESTS: Reviewed.

## 2019-12-15 NOTE — SWALLOW BEDSIDE ASSESSMENT ADULT - SWALLOW EVAL: DIAGNOSIS
oral dysphagia, suspected pharyngeal dysphagia for puree & thin liquids
not appropriate for PO diet at this time 2' cognitive status, pt expectorating trials
Mild oral impairment with suspected s/s of pharyngeal impairment following trials of thin liquids.
mild-moderate oral impairment for solids suspected pharyngeal impairment for thin liquids. toleration of Nectar-thickened liquids

## 2019-12-15 NOTE — PROGRESS NOTE ADULT - ASSESSMENT
89M PMHx HFpEF, COPD on home o2, AFib on coumadin, CKD III, hypothyroidism here with unresponsiveness. Dysphagia.    #Unresponsiveness, now resolved  mental status nearing baseline per daughter at bedside  unclear etiology, no focal deficits  appreciate neuro input  cth unremarkable  #Oropharyngeal dysphagia  dysphagia diet  possible mbs tm  f/u s+s  d/c d5w  #HFpEF  euvolemic  monitor off lasix  #COPD  controlled off medications  #AFib  rate controlled  coumadin  #CKD III  stable  trend scr  #Hypothyroidism  synthroid 50  #DVT ppx  coumadin  #Vit D def, suspected  replete    #Progress Note Handoff:  Pending (specify):  Consults_________, Tests________, Test Results_______, Other____possible mbs_____  Family discussion:d/w daughter at bedside re: treatment plan, primary dx  Disposition: Home___/SNF___/Other________/Unknown at this time___x_____

## 2019-12-15 NOTE — CONSULT NOTE ADULT - SUBJECTIVE AND OBJECTIVE BOX
89 yo man with  poor historian, is evaluated for microcytic anemia and thrombocytopenia.   ROS: can not assess if he c/o fatigue, dizziness, bleeding, chest pain; sob; palpitations, wt loss  PMH: RENAL failure    EXAM  ECOG 4  NAD  VSS  RACHEL  RRR   CTA B/L  no organomegaly  no edema

## 2019-12-15 NOTE — SWALLOW BEDSIDE ASSESSMENT ADULT - SWALLOW EVAL: CURRENT DIET
NPO
Dysphagia Diet II mechanical soft consistency with ground meat with nectar thickened liquids
NPO w/ NGT
NPO with alternate means of nutrition/hydration

## 2019-12-15 NOTE — SWALLOW BEDSIDE ASSESSMENT ADULT - SWALLOW EVAL: RECOMMENDED DIET
NPO w/ alternate means of nutrition/hydration
Dysphagia Diet II mechanical soft consistency with ground meat Nectar-thickened liquids
Dysphagia Diet II mechanical soft consistency with ground meat with nectar thickened liquids
NPO w/ alternate means of nutrition/hydration

## 2019-12-15 NOTE — SWALLOW BEDSIDE ASSESSMENT ADULT - SLP GENERAL OBSERVATIONS
Adm 2' unresponsive at home. Dx metabolic encephalopathy 2' sepsis & acute on chronic respiratory failure.  Currently on O2 via nasal cannula, BiPAP at night.  Hx COPD (on home O2), CKD III. CXR: CHF & b/l pleural effusions.
awake, confused, perseverative, uncooperative, b/l wrist restraints, O2 via nasal cannula.  RN removed [facility] NGT prior to assessment.
Patient awake and alert. Patient provided with repositioning prior to beginning PO trials with assistance from PCT Victorina)
in bed o2 via nasal cannula

## 2019-12-15 NOTE — PROGRESS NOTE ADULT - SUBJECTIVE AND OBJECTIVE BOX
Nephrology progress note    Patient was seen and examined, events over the last 24 h noted .  Cr improved     Allergies:  levofloxacin (Swelling (Mild))    Hospital Medications:   MEDICATIONS  (STANDING):  atorvastatin 10 milliGRAM(s) Oral at bedtime  chlorhexidine 0.12% Liquid 15 milliLiter(s) Swish and Spit two times a day  chlorhexidine 4% Liquid 1 Application(s) Topical <User Schedule>  chlorhexidine 4% Liquid 1 Application(s) Topical <User Schedule>  cholecalciferol 2000 Unit(s) Oral daily  levothyroxine 50 MICROGram(s) Oral daily  pantoprazole    Tablet 40 milliGRAM(s) Oral before breakfast  warfarin 2 milliGRAM(s) Oral once        VITALS:  T(F): 96 (12-15-19 @ 05:00), Max: 96.7 (12-14-19 @ 13:48)  HR: 98 (12-15-19 @ 07:40)  BP: 136/72 (12-15-19 @ 05:00)  RR: 18 (12-15-19 @ 05:00)  SpO2: 95% (12-15-19 @ 08:06)  Wt(kg): --    12-13 @ 07:01  -  12-14 @ 07:00  --------------------------------------------------------  IN: 0 mL / OUT: 100 mL / NET: -100 mL    12-14 @ 07:01  -  12-15 @ 07:00  --------------------------------------------------------  IN: 550 mL / OUT: 450 mL / NET: 100 mL    12-15 @ 07:01  -  12-15 @ 11:00  --------------------------------------------------------  IN: 0 mL / OUT: 200 mL / NET: -200 mL          PHYSICAL EXAM:  Constitutional: NAD  HEENT: anicteric sclera, oropharynx clear, MMM  Neck: No JVD  Respiratory: CTAB, no wheezes, rales or rhonchi  Cardiovascular: S1, S2, RRR  Gastrointestinal: BS+, soft, NT/ND  Extremities: No cyanosis or clubbing. No peripheral edema  :  No palomo.   Skin: No rashes    LABS:  12-15    141  |  99  |  58<H>  ----------------------------<  112<H>  4.5   |  30  |  1.7<H>    Ca    8.9      15 Dec 2019 07:24  Mg     2.1     12-15    TPro  6.5  /  Alb  3.0<L>  /  TBili  1.0  /  DBili      /  AST  29  /  ALT  18  /  AlkPhos  114  12-15                          9.0    7.35  )-----------( 86       ( 15 Dec 2019 07:24 )             32.1       Urine Studies:      RADIOLOGY & ADDITIONAL STUDIES:

## 2019-12-15 NOTE — SWALLOW BEDSIDE ASSESSMENT ADULT - NS ASR SWALLOW FINDINGS DISCUS
Physician/Patient/RN MD Camryn Pfeiffer aware/Nursing
MD Mark, RN Dyana, Nutritionist Dr. Rose/Nursing/Physician
RN Villa)/Nursing/Patient
daughter Tammy; left message for other daughter./Nursing/Physician/Patient/Family

## 2019-12-15 NOTE — SWALLOW BEDSIDE ASSESSMENT ADULT - PHARYNGEAL PHASE
+ toleration observed without overt symptoms of penetration/aspiration Patient showed + toleration observed without overt symptoms of penetration/aspiration of thin liquids via spoon x2, however presented with a delayed cough following trials of thin liquids via cup sip x1. Patient refused additional PO trials./Delayed cough post oral intake

## 2019-12-15 NOTE — CONSULT NOTE ADULT - REASON FOR ADMISSION
Unresponsiveness

## 2019-12-15 NOTE — CONSULT NOTE ADULT - ATTENDING COMMENTS
89 yo man with cytopenias  1. anemia is multifactorial; low rbc count, low mcv, high rdw; high ferritin; low iron saturation; low GFR; normal b12  a) anemia of renal disease  b) iron deficiency  c) need to r/o hemoglobinopathy  d) need to confirm if no vitamin b12/folate deficiency  e) no evidence of hemolysis  d) r/o MDS (anemia and unexplained thrombocytopenia)    - f/u renal recommendations  - consider GI eval  - consdier starting IV venofer 200mg x5 weekly  - send hg electropheresis  - send MMA, homocystine     2. thrombocytopenia, unclear etiology  - send MMA, homcystine to confirm no b12/folate deficiency  - hep c/ hep b/ hiv  - if pt was not getting heparin products, so HIT is not considered  - concerned about MDS: diagnosis is made by bone marrow biopsy; treatment includes chemo, which he clearly is a poor candidate for; therefore, do not recommend bone marrow bx at this time

## 2019-12-15 NOTE — PROGRESS NOTE ADULT - ASSESSMENT
COY on Stage 3 CKD.  Severe PAH. CHF. CRS. Hypotension    HyperNAtremia - due to intravascularly volume depleted.   needs free water - cont  D5W at 75cc/h     Mental status  HyperNa nad COY all improved    Please recall w/ any questions or concerns

## 2019-12-15 NOTE — SWALLOW BEDSIDE ASSESSMENT ADULT - SWALLOW EVAL: RECOMMENDED FEEDING/EATING TECHNIQUES
maintain upright posture during/after eating for 30 mins/no straws/oral hygiene/small sips/bites/position upright (90 degrees)

## 2019-12-16 LAB
% ALBUMIN: 45.5 % — SIGNIFICANT CHANGE UP
% ALPHA 1: 7.1 % — SIGNIFICANT CHANGE UP
% ALPHA 2: 9.6 % — SIGNIFICANT CHANGE UP
% BETA: 10 % — SIGNIFICANT CHANGE UP
% GAMMA: 27.8 % — SIGNIFICANT CHANGE UP
% M SPIKE: SIGNIFICANT CHANGE UP
ALBUMIN SERPL ELPH-MCNC: 2.6 G/DL — LOW (ref 3.6–5.5)
ALBUMIN/GLOB SERPL ELPH: 0.8 RATIO — SIGNIFICANT CHANGE UP
ALPHA1 GLOB SERPL ELPH-MCNC: 0.4 G/DL — SIGNIFICANT CHANGE UP (ref 0.1–0.4)
ALPHA2 GLOB SERPL ELPH-MCNC: 0.6 G/DL — SIGNIFICANT CHANGE UP (ref 0.5–1)
ANION GAP SERPL CALC-SCNC: 13 MMOL/L — SIGNIFICANT CHANGE UP (ref 7–14)
B-GLOBULIN SERPL ELPH-MCNC: 0.6 G/DL — SIGNIFICANT CHANGE UP (ref 0.5–1)
BUN SERPL-MCNC: 55 MG/DL — HIGH (ref 10–20)
CALCIUM SERPL-MCNC: 9.4 MG/DL — SIGNIFICANT CHANGE UP (ref 8.5–10.1)
CHLORIDE SERPL-SCNC: 101 MMOL/L — SIGNIFICANT CHANGE UP (ref 98–110)
CO2 SERPL-SCNC: 28 MMOL/L — SIGNIFICANT CHANGE UP (ref 17–32)
CREAT SERPL-MCNC: 1.7 MG/DL — HIGH (ref 0.7–1.5)
GAMMA GLOBULIN: 1.6 G/DL — SIGNIFICANT CHANGE UP (ref 0.6–1.6)
GLUCOSE SERPL-MCNC: 87 MG/DL — SIGNIFICANT CHANGE UP (ref 70–99)
HCT VFR BLD CALC: 32.4 % — LOW (ref 42–52)
HGB BLD-MCNC: 9.1 G/DL — LOW (ref 14–18)
INR BLD: 1.71 RATIO — HIGH (ref 0.65–1.3)
M-SPIKE: SIGNIFICANT CHANGE UP (ref 0–0)
MCHC RBC-ENTMCNC: 18.6 PG — LOW (ref 27–31)
MCHC RBC-ENTMCNC: 28.1 G/DL — LOW (ref 32–37)
MCV RBC AUTO: 66.3 FL — LOW (ref 80–94)
NRBC # BLD: 0 /100 WBCS — SIGNIFICANT CHANGE UP (ref 0–0)
PLATELET # BLD AUTO: 93 K/UL — LOW (ref 130–400)
POTASSIUM SERPL-MCNC: 4.7 MMOL/L — SIGNIFICANT CHANGE UP (ref 3.5–5)
POTASSIUM SERPL-SCNC: 4.7 MMOL/L — SIGNIFICANT CHANGE UP (ref 3.5–5)
PROT PATTERN SERPL ELPH-IMP: SIGNIFICANT CHANGE UP
PROTHROM AB SERPL-ACNC: 19.6 SEC — HIGH (ref 9.95–12.87)
RBC # BLD: 4.89 M/UL — SIGNIFICANT CHANGE UP (ref 4.7–6.1)
RBC # FLD: 23.1 % — HIGH (ref 11.5–14.5)
SODIUM SERPL-SCNC: 142 MMOL/L — SIGNIFICANT CHANGE UP (ref 135–146)
WBC # BLD: 4.64 K/UL — LOW (ref 4.8–10.8)
WBC # FLD AUTO: 4.64 K/UL — LOW (ref 4.8–10.8)

## 2019-12-16 PROCEDURE — 99233 SBSQ HOSP IP/OBS HIGH 50: CPT

## 2019-12-16 RX ORDER — WARFARIN SODIUM 2.5 MG/1
3 TABLET ORAL ONCE
Refills: 0 | Status: COMPLETED | OUTPATIENT
Start: 2019-12-16 | End: 2019-12-16

## 2019-12-16 RX ADMIN — Medication 50 MICROGRAM(S): at 05:40

## 2019-12-16 RX ADMIN — CHLORHEXIDINE GLUCONATE 15 MILLILITER(S): 213 SOLUTION TOPICAL at 05:40

## 2019-12-16 RX ADMIN — CHLORHEXIDINE GLUCONATE 1 APPLICATION(S): 213 SOLUTION TOPICAL at 08:38

## 2019-12-16 RX ADMIN — ATORVASTATIN CALCIUM 10 MILLIGRAM(S): 80 TABLET, FILM COATED ORAL at 21:26

## 2019-12-16 RX ADMIN — CHLORHEXIDINE GLUCONATE 15 MILLILITER(S): 213 SOLUTION TOPICAL at 17:17

## 2019-12-16 RX ADMIN — WARFARIN SODIUM 3 MILLIGRAM(S): 2.5 TABLET ORAL at 21:26

## 2019-12-16 RX ADMIN — PANTOPRAZOLE SODIUM 40 MILLIGRAM(S): 20 TABLET, DELAYED RELEASE ORAL at 05:40

## 2019-12-16 NOTE — PROGRESS NOTE ADULT - SUBJECTIVE AND OBJECTIVE BOX
INTERVAL HPI/OVERNIGHT EVENTS:    SUBJECTIVE: Patient seen and examined at bedside.     unable to obtain ros    OBJECTIVE:    VITAL SIGNS:  ICU Vital Signs Last 24 Hrs  T(C): 35.6 (16 Dec 2019 05:52), Max: 36.6 (15 Dec 2019 21:02)  T(F): 96 (16 Dec 2019 05:52), Max: 97.8 (15 Dec 2019 21:02)  HR: 72 (16 Dec 2019 05:52) (72 - 85)  BP: 135/68 (16 Dec 2019 05:52) (123/67 - 135/68)  BP(mean): --  ABP: --  ABP(mean): --  RR: 20 (16 Dec 2019 05:52) (20 - 20)  SpO2: --        12-15 @ 07:01  -  12-16 @ 07:00  --------------------------------------------------------  IN: 0 mL / OUT: 900 mL / NET: -900 mL    12-16 @ 07:01  -  12-16 @ 12:13  --------------------------------------------------------  IN: 0 mL / OUT: 200 mL / NET: -200 mL      CAPILLARY BLOOD GLUCOSE          PHYSICAL EXAM:    General: frail, chronically ill appearing  HEENT: NC/AT; PERRL, clear conjunctiva  Neck: supple  Respiratory: base crackles  Cardiovascular: +S1/S2; RRR  Abdomen: soft, NT/ND; +BS x4  Extremities: WWP, 2+ peripheral pulses b/l; no LE edema  Skin: normal color and turgor; no rash  Neurological:    MEDICATIONS:  MEDICATIONS  (STANDING):  atorvastatin 10 milliGRAM(s) Oral at bedtime  chlorhexidine 0.12% Liquid 15 milliLiter(s) Swish and Spit two times a day  chlorhexidine 4% Liquid 1 Application(s) Topical <User Schedule>  chlorhexidine 4% Liquid 1 Application(s) Topical <User Schedule>  cholecalciferol 2000 Unit(s) Oral daily  levothyroxine 50 MICROGram(s) Oral daily  pantoprazole    Tablet 40 milliGRAM(s) Oral before breakfast    MEDICATIONS  (PRN):      ALLERGIES:  Allergies    levofloxacin (Swelling (Mild))    Intolerances        LABS:                        9.0    7.35  )-----------( 86       ( 15 Dec 2019 07:24 )             32.1     Hemoglobin: 9.0 g/dL (12-15 @ 07:24)  Hemoglobin: 8.7 g/dL (12-14 @ 09:05)  Hemoglobin: 8.7 g/dL (12-12 @ 11:21)    CBC Full  -  ( 15 Dec 2019 07:24 )  WBC Count : 7.35 K/uL  RBC Count : 4.77 M/uL  Hemoglobin : 9.0 g/dL  Hematocrit : 32.1 %  Platelet Count - Automated : 86 K/uL  Mean Cell Volume : 67.3 fL  Mean Cell Hemoglobin : 18.9 pg  Mean Cell Hemoglobin Concentration : 28.0 g/dL  Auto Neutrophil # : x  Auto Lymphocyte # : x  Auto Monocyte # : x  Auto Eosinophil # : x  Auto Basophil # : x  Auto Neutrophil % : x  Auto Lymphocyte % : x  Auto Monocyte % : x  Auto Eosinophil % : x  Auto Basophil % : x    12-15    141  |  99  |  58<H>  ----------------------------<  112<H>  4.5   |  30  |  1.7<H>    Ca    8.9      15 Dec 2019 07:24  Mg     2.1     12-15    TPro  6.5  /  Alb  3.0<L>  /  TBili  1.0  /  DBili  x   /  AST  29  /  ALT  18  /  AlkPhos  114  12-15    Creatinine Trend: 1.7<--, 1.6<--, 1.7<--, 2.0<--, 2.1<--, 2.1<--  LIVER FUNCTIONS - ( 15 Dec 2019 07:24 )  Alb: 3.0 g/dL / Pro: 6.5 g/dL / ALK PHOS: 114 U/L / ALT: 18 U/L / AST: 29 U/L / GGT: x           PT/INR - ( 15 Dec 2019 07:24 )   PT: 20.10 sec;   INR: 1.76 ratio         PTT - ( 15 Dec 2019 07:24 )  PTT:38.2 sec    hs Troponin:              CSF:                      EKG:   MICROBIOLOGY:    IMAGING:      Labs, imaging, EKG personally reviewed    RADIOLOGY & ADDITIONAL TESTS: Reviewed.

## 2019-12-16 NOTE — PROGRESS NOTE ADULT - ASSESSMENT
89M PMHx HFpEF, COPD on home o2, AFib on coumadin, CKD III, hypothyroidism here with unresponsiveness. Dysphagia.    #Unresponsiveness, now resolved  mental status nearing baseline per daughter at bedside  unclear etiology, no focal deficits  appreciate neuro input  cth unremarkable  #Oropharyngeal dysphagia  dysphagia diet  f/u s+s for mbs  #Anemia, microcytic  iron studies c/w chronic disease  check hb electrophoresis  appreciate heme  b12, folate wnl; confirm with mma, homocystiene  #Thrombocytopenia, chronic  possible mds per heme  to d/w family; poor ps, unlikely to tolerate chemo if confirmed on bx  #HFpEF  euvolemic  monitor off lasix  #COPD  controlled off medications  #AFib  rate controlled  coumadin  #CKD III  stable  trend scr  #Hypothyroidism  synthroid 50  #DVT ppx  coumadin  #Vit D def, suspected  replete    #Progress Note Handoff:  Pending (specify):  Consults_________, Tests________, Test Results_______, Other____possible mbs_____  Family discussion:d/w daughter at bedside re: treatment plan, primary dx  Disposition: Home___/SNF___/Other________/Unknown at this time___x_____

## 2019-12-17 LAB
ANION GAP SERPL CALC-SCNC: 10 MMOL/L — SIGNIFICANT CHANGE UP (ref 7–14)
BUN SERPL-MCNC: 58 MG/DL — HIGH (ref 10–20)
CALCIUM SERPL-MCNC: 9.2 MG/DL — SIGNIFICANT CHANGE UP (ref 8.5–10.1)
CHLORIDE SERPL-SCNC: 104 MMOL/L — SIGNIFICANT CHANGE UP (ref 98–110)
CO2 SERPL-SCNC: 29 MMOL/L — SIGNIFICANT CHANGE UP (ref 17–32)
CREAT SERPL-MCNC: 1.7 MG/DL — HIGH (ref 0.7–1.5)
GLUCOSE BLDC GLUCOMTR-MCNC: 111 MG/DL — HIGH (ref 70–99)
GLUCOSE BLDC GLUCOMTR-MCNC: 146 MG/DL — HIGH (ref 70–99)
GLUCOSE BLDC GLUCOMTR-MCNC: 95 MG/DL — SIGNIFICANT CHANGE UP (ref 70–99)
GLUCOSE SERPL-MCNC: 84 MG/DL — SIGNIFICANT CHANGE UP (ref 70–99)
HCT VFR BLD CALC: 30.8 % — LOW (ref 42–52)
HGB BLD-MCNC: 8.6 G/DL — LOW (ref 14–18)
INR BLD: 2.09 RATIO — HIGH (ref 0.65–1.3)
MAGNESIUM SERPL-MCNC: 2.2 MG/DL — SIGNIFICANT CHANGE UP (ref 1.8–2.4)
MCHC RBC-ENTMCNC: 18.9 PG — LOW (ref 27–31)
MCHC RBC-ENTMCNC: 27.9 G/DL — LOW (ref 32–37)
MCV RBC AUTO: 67.7 FL — LOW (ref 80–94)
NRBC # BLD: 0 /100 WBCS — SIGNIFICANT CHANGE UP (ref 0–0)
PHOSPHATE SERPL-MCNC: 3.9 MG/DL — SIGNIFICANT CHANGE UP (ref 2.1–4.9)
PLATELET # BLD AUTO: 92 K/UL — LOW (ref 130–400)
POTASSIUM SERPL-MCNC: 4.7 MMOL/L — SIGNIFICANT CHANGE UP (ref 3.5–5)
POTASSIUM SERPL-SCNC: 4.7 MMOL/L — SIGNIFICANT CHANGE UP (ref 3.5–5)
PROTHROM AB SERPL-ACNC: 23.9 SEC — HIGH (ref 9.95–12.87)
RBC # BLD: 4.55 M/UL — LOW (ref 4.7–6.1)
RBC # FLD: 22.5 % — HIGH (ref 11.5–14.5)
SODIUM SERPL-SCNC: 143 MMOL/L — SIGNIFICANT CHANGE UP (ref 135–146)
WBC # BLD: 5.52 K/UL — SIGNIFICANT CHANGE UP (ref 4.8–10.8)
WBC # FLD AUTO: 5.52 K/UL — SIGNIFICANT CHANGE UP (ref 4.8–10.8)

## 2019-12-17 PROCEDURE — 99233 SBSQ HOSP IP/OBS HIGH 50: CPT

## 2019-12-17 PROCEDURE — 83020 HEMOGLOBIN ELECTROPHORESIS: CPT | Mod: 26

## 2019-12-17 RX ORDER — WARFARIN SODIUM 2.5 MG/1
2 TABLET ORAL ONCE
Refills: 0 | Status: COMPLETED | OUTPATIENT
Start: 2019-12-17 | End: 2019-12-17

## 2019-12-17 RX ADMIN — ATORVASTATIN CALCIUM 10 MILLIGRAM(S): 80 TABLET, FILM COATED ORAL at 21:28

## 2019-12-17 RX ADMIN — CHLORHEXIDINE GLUCONATE 15 MILLILITER(S): 213 SOLUTION TOPICAL at 06:31

## 2019-12-17 RX ADMIN — WARFARIN SODIUM 2 MILLIGRAM(S): 2.5 TABLET ORAL at 21:28

## 2019-12-17 RX ADMIN — PANTOPRAZOLE SODIUM 40 MILLIGRAM(S): 20 TABLET, DELAYED RELEASE ORAL at 06:30

## 2019-12-17 RX ADMIN — CHLORHEXIDINE GLUCONATE 1 APPLICATION(S): 213 SOLUTION TOPICAL at 06:29

## 2019-12-17 RX ADMIN — Medication 2000 UNIT(S): at 16:27

## 2019-12-17 RX ADMIN — Medication 50 MICROGRAM(S): at 06:30

## 2019-12-17 NOTE — PROGRESS NOTE ADULT - SUBJECTIVE AND OBJECTIVE BOX
INTERVAL HPI/OVERNIGHT EVENTS:    SUBJECTIVE: Patient seen and examined at bedside.     unable to obtain ros    OBJECTIVE:    VITAL SIGNS:  ICU Vital Signs Last 24 Hrs  T(C): 35.7 (17 Dec 2019 05:06), Max: 37.1 (16 Dec 2019 14:00)  T(F): 96.3 (17 Dec 2019 05:06), Max: 98.7 (16 Dec 2019 14:00)  HR: 77 (17 Dec 2019 05:06) (75 - 97)  BP: 136/67 (17 Dec 2019 05:06) (127/67 - 136/67)  BP(mean): --  ABP: --  ABP(mean): --  RR: 18 (17 Dec 2019 05:06) (18 - 20)  SpO2: 98% (17 Dec 2019 08:18) (95% - 98%)        12-16 @ 07:01  -  12-17 @ 07:00  --------------------------------------------------------  IN: 0 mL / OUT: 1100 mL / NET: -1100 mL    12-17 @ 07:01  -  12-17 @ 12:35  --------------------------------------------------------  IN: 0 mL / OUT: 100 mL / NET: -100 mL      CAPILLARY BLOOD GLUCOSE      POCT Blood Glucose.: 95 mg/dL (17 Dec 2019 07:59)      PHYSICAL EXAM:    General: frail, ill appearing   HEENT: NC/AT; PERRL, clear conjunctiva  Neck: supple  Respiratory: base crackles  Cardiovascular: +S1/S2; RRR  Abdomen: soft, NT/ND; +BS x4  Extremities: WWP, 2+ peripheral pulses b/l; no LE edema  Skin: normal color and turgor; no rash  Neurological:    MEDICATIONS:  MEDICATIONS  (STANDING):  atorvastatin 10 milliGRAM(s) Oral at bedtime  chlorhexidine 0.12% Liquid 15 milliLiter(s) Swish and Spit two times a day  chlorhexidine 4% Liquid 1 Application(s) Topical <User Schedule>  chlorhexidine 4% Liquid 1 Application(s) Topical <User Schedule>  cholecalciferol 2000 Unit(s) Oral daily  levothyroxine 50 MICROGram(s) Oral daily  pantoprazole    Tablet 40 milliGRAM(s) Oral before breakfast    MEDICATIONS  (PRN):      ALLERGIES:  Allergies    levofloxacin (Swelling (Mild))    Intolerances        LABS:                        8.6    5.52  )-----------( 92       ( 17 Dec 2019 07:33 )             30.8     Hemoglobin: 8.6 g/dL (12-17 @ 07:33)  Hemoglobin: 9.1 g/dL (12-16 @ 11:41)  Hemoglobin: 9.0 g/dL (12-15 @ 07:24)  Hemoglobin: 8.7 g/dL (12-14 @ 09:05)    CBC Full  -  ( 17 Dec 2019 07:33 )  WBC Count : 5.52 K/uL  RBC Count : 4.55 M/uL  Hemoglobin : 8.6 g/dL  Hematocrit : 30.8 %  Platelet Count - Automated : 92 K/uL  Mean Cell Volume : 67.7 fL  Mean Cell Hemoglobin : 18.9 pg  Mean Cell Hemoglobin Concentration : 27.9 g/dL  Auto Neutrophil # : x  Auto Lymphocyte # : x  Auto Monocyte # : x  Auto Eosinophil # : x  Auto Basophil # : x  Auto Neutrophil % : x  Auto Lymphocyte % : x  Auto Monocyte % : x  Auto Eosinophil % : x  Auto Basophil % : x    12-17    143  |  104  |  58<H>  ----------------------------<  84  4.7   |  29  |  1.7<H>    Ca    9.2      17 Dec 2019 07:33  Phos  3.9     12-17  Mg     2.2     12-17      Creatinine Trend: 1.7<--, 1.7<--, 1.7<--, 1.6<--, 1.7<--, 2.0<--    PT/INR - ( 17 Dec 2019 07:33 )   PT: 23.90 sec;   INR: 2.09 ratio             hs Troponin:              CSF:                      EKG:   MICROBIOLOGY:    IMAGING:      Labs, imaging, EKG personally reviewed    RADIOLOGY & ADDITIONAL TESTS: Reviewed.

## 2019-12-17 NOTE — PROGRESS NOTE ADULT - ASSESSMENT
89M PMHx HFpEF, COPD on home o2, AFib on coumadin, CKD III, hypothyroidism here with unresponsiveness. Dysphagia.    #Unresponsiveness, now resolved  mental status nearing baseline per daughter at bedside  unclear etiology, no focal deficits  appreciate neuro input  cth unremarkable  #Oropharyngeal dysphagia  dysphagia diet  f/u s+s for mbs  #Anemia, microcytic  iron studies c/w chronic disease  check hb electrophoresis  appreciate heme  b12, folate wnl; confirm with mma, homocystiene  #Thrombocytopenia, chronic  possible mds per heme  discussed with family; poor ps, unlikely to tolerate chemo if confirmed on bx; family in agreement  #HFpEF  euvolemic  monitor off lasix  #COPD  controlled off medications  #AFib  rate controlled  coumadin  #CKD III  stable  trend scr  #Hypothyroidism  synthroid 50  #DVT ppx  coumadin  #Vit D def, suspected  replete    #Progress Note Handoff:  Pending (specify):  Consults_________, Tests________, Test Results_______, Other____possible mbs_____  Family discussion:d/w daughter at bedside re: treatment plan, primary dx  Disposition: Home___/SNF___/Other________/Unknown at this time___x_____

## 2019-12-17 NOTE — CHART NOTE - NSCHARTNOTEFT_GEN_A_CORE
Registered Dietitian Follow-Up     Patient Profile Reviewed                           Yes [v]   No []     Nutrition History Previously Obtained        Yes []  No [v]       Pertinent Subjective Information: Pt is confused, observed at lunch time, requests extra drinks, states that he is thirsty.      Pertinent Medical Interventions: Unresponsiveness - now resolved. Dysphagia - pt was cleared by SLP for dysphagia (2) mechanical soft and nectar thick liquids, started on PO diet 12/13, EN d/c'd.  Anemia, microcytic. HFpEF, euvolemic per MD. CKD III - stable per MD. AFib, rate controlled, on coumadin.      Diet order dysphagia (2) mechanical soft and nectar thick liquids (12/13)      Anthropometrics:  - Ht: 63 inches   - Wt: today's weight 64.5kg compared to  62.9kg (12/9) and 69.1kg (12/8) vs. admission/dosing wt 64.9 kg (12/3)- appears stable, some weight fluctuations likely due to fluid shifts.     - %wt change:   - BMI: 25 (admission wt of 64.9 kg)   - IBW 124lbs +/-10%     Pertinent Lab Data: 12/17: H/H 8.6/30.8,  BUN 58, Cr 1.7 (BUN/Cr trending down)     Pertinent Meds: coumadin,  pantoprazole, atorvastatin, cholecalciferol, levothyroxine      Physical Findings:  - Appearance: on NC, eating lunch, confused  - GI function: no BMs today as per RN. ? last BM documented 12/2.      - Tubes: NGT is out since 12/10.    - Oral/Mouth: NGT d/c'd (difficult placement)   - Skin: nose wound      Nutrition Requirements  Weight Used: 64.9 kg- admission wt/dosing weight     Estimated Energy Needs    Continue [v]  Adjust []  MSJ x 1.2-1.3= 5054-8961 kcal/day      Estimated Protein Needs    Continue [v]  Adjust [] 1.0-1.2 gm/kg- = 65-78 g/day (monitor renal function)       Estimated Fluid Needs        Continue [v]  Adjust [] per LIP      Nutrient Intake: likely not meeting needs, documented PO intake varies 10-25%       [v] Previous Nutrition Diagnosis:  Inadequate energy intake - ongoing      Nutrition Intervention: Meal and snacks. Supplements     Goal/Expected Outcome: PO intake > 50% meals, snacks and supplements over the next 3 days. .     Indicator/Monitoring: RD will monitor diet order, energy intake, labs, nutrition focused physical findings, body composition.    Recommended:  Continue current diet - consistency per SLP. Add Ensure Compact q 12hrs and Ensure Pudding q 24hrs, provide 1:1 feed. Please document BMs.     Pt remains at risk, will f/u in 1-3 days. Registered Dietitian Follow-Up     Patient Profile Reviewed                           Yes [v]   No []     Nutrition History Previously Obtained        Yes []  No [v]  - pt is confused, family not available.      Pertinent Subjective Information: Pt is confused, observed at lunch time, requests extra drinks, states that he is thirsty (2 nectar thick 4 fl oz beverages provided). ? last BM     Pertinent Medical Interventions: Unresponsiveness - now resolved. Dysphagia - pt was cleared by SLP for dysphagia (2) mechanical soft and nectar thick liquids, started on PO diet 12/13, EN d/c'd.  Anemia, microcytic. HFpEF, euvolemic per MD. CKD III - stable per MD. AFib, rate controlled, on coumadin.      Diet order dysphagia (2) mechanical soft and nectar thick liquids (12/13)      Anthropometrics:  - Ht: 63 inches   - Wt: today's weight 64.5kg compared to  62.9kg (12/9) and 69.1kg (12/8) vs. admission/dosing wt 64.9 kg (12/3)- appears stable, some weight fluctuations likely due to fluid shifts.     - %wt change:   - BMI: 25 (admission wt of 64.9 kg)   - IBW 124lbs +/-10%     Pertinent Lab Data: 12/17: H/H 8.6/30.8,  BUN 58, Cr 1.7 (BUN/Cr trending down)     Pertinent Meds: coumadin,  pantoprazole, atorvastatin, cholecalciferol, levothyroxine      Physical Findings:  - Appearance: off BIPAP, now on NC, eating lunch, confused  - GI function: no BMs today as per RN. ? last BM documented 12/2.      - Tubes: NGT is out since 12/10.    - Oral/Mouth: NGT d/c'd (difficult placement)   - Skin: nose wound      Nutrition Requirements  Weight Used: 64.9 kg- admission wt/dosing weight     Estimated Energy Needs    Continue [v]  Adjust []  MSJ x 1.2-1.3= 1019-2799 kcal/day      Estimated Protein Needs    Continue [v]  Adjust [] 1.0-1.2 gm/kg- = 65-78 g/day (monitor renal function)       Estimated Fluid Needs        Continue [v]  Adjust [] per LIP      Nutrient Intake: likely not meeting needs, documented PO intake varies 10-25%       [v] Previous Nutrition Diagnosis:  Inadequate energy intake - ongoing      Nutrition Intervention: Meal and snacks. Supplements     Goal/Expected Outcome: PO intake > 50% meals, snacks and supplements over the next 3 days. .     Indicator/Monitoring: RD will monitor diet order, energy intake, labs, nutrition focused physical findings, body composition.    Recommended:  Continue current diet - consistency per SLP. Add Ensure Compact q 12hrs and Ensure Pudding q 24hrs, provide 1:1 feed. Please document BMs.     Pt remains at risk, will f/u in 1-3 days.

## 2019-12-18 LAB
ANION GAP SERPL CALC-SCNC: 13 MMOL/L — SIGNIFICANT CHANGE UP (ref 7–14)
BUN SERPL-MCNC: 60 MG/DL — HIGH (ref 10–20)
CALCIUM SERPL-MCNC: 9.6 MG/DL — SIGNIFICANT CHANGE UP (ref 8.5–10.1)
CHLORIDE SERPL-SCNC: 105 MMOL/L — SIGNIFICANT CHANGE UP (ref 98–110)
CO2 SERPL-SCNC: 26 MMOL/L — SIGNIFICANT CHANGE UP (ref 17–32)
CREAT SERPL-MCNC: 1.6 MG/DL — HIGH (ref 0.7–1.5)
GLUCOSE BLDC GLUCOMTR-MCNC: 102 MG/DL — HIGH (ref 70–99)
GLUCOSE SERPL-MCNC: 95 MG/DL — SIGNIFICANT CHANGE UP (ref 70–99)
HAV IGM SER-ACNC: SIGNIFICANT CHANGE UP
HBV CORE IGM SER-ACNC: SIGNIFICANT CHANGE UP
HBV SURFACE AG SER-ACNC: SIGNIFICANT CHANGE UP
HCT VFR BLD CALC: 34.3 % — LOW (ref 42–52)
HCV AB S/CO SERPL IA: 0.19 S/CO — SIGNIFICANT CHANGE UP (ref 0–0.99)
HCV AB SERPL-IMP: SIGNIFICANT CHANGE UP
HGB BLD-MCNC: 9.6 G/DL — LOW (ref 14–18)
INR BLD: 3.1 RATIO — HIGH (ref 0.65–1.3)
MAGNESIUM SERPL-MCNC: 2.2 MG/DL — SIGNIFICANT CHANGE UP (ref 1.8–2.4)
MCHC RBC-ENTMCNC: 18.8 PG — LOW (ref 27–31)
MCHC RBC-ENTMCNC: 28 G/DL — LOW (ref 32–37)
MCV RBC AUTO: 67.3 FL — LOW (ref 80–94)
NRBC # BLD: 0 /100 WBCS — SIGNIFICANT CHANGE UP (ref 0–0)
PHOSPHATE SERPL-MCNC: 3.3 MG/DL — SIGNIFICANT CHANGE UP (ref 2.1–4.9)
PLATELET # BLD AUTO: 99 K/UL — LOW (ref 130–400)
POTASSIUM SERPL-MCNC: 5.3 MMOL/L — HIGH (ref 3.5–5)
POTASSIUM SERPL-SCNC: 5.3 MMOL/L — HIGH (ref 3.5–5)
PROTHROM AB SERPL-ACNC: 35.3 SEC — HIGH (ref 9.95–12.87)
RBC # BLD: 5.1 M/UL — SIGNIFICANT CHANGE UP (ref 4.7–6.1)
RBC # FLD: 22.7 % — HIGH (ref 11.5–14.5)
SODIUM SERPL-SCNC: 144 MMOL/L — SIGNIFICANT CHANGE UP (ref 135–146)
WBC # BLD: 6.08 K/UL — SIGNIFICANT CHANGE UP (ref 4.8–10.8)
WBC # FLD AUTO: 6.08 K/UL — SIGNIFICANT CHANGE UP (ref 4.8–10.8)

## 2019-12-18 PROCEDURE — 99233 SBSQ HOSP IP/OBS HIGH 50: CPT

## 2019-12-18 PROCEDURE — 71045 X-RAY EXAM CHEST 1 VIEW: CPT | Mod: 26

## 2019-12-18 RX ADMIN — CHLORHEXIDINE GLUCONATE 1 APPLICATION(S): 213 SOLUTION TOPICAL at 05:08

## 2019-12-18 RX ADMIN — Medication 50 MICROGRAM(S): at 05:08

## 2019-12-18 RX ADMIN — CHLORHEXIDINE GLUCONATE 15 MILLILITER(S): 213 SOLUTION TOPICAL at 05:08

## 2019-12-18 RX ADMIN — ATORVASTATIN CALCIUM 10 MILLIGRAM(S): 80 TABLET, FILM COATED ORAL at 21:21

## 2019-12-18 RX ADMIN — PANTOPRAZOLE SODIUM 40 MILLIGRAM(S): 20 TABLET, DELAYED RELEASE ORAL at 05:08

## 2019-12-18 RX ADMIN — Medication 2000 UNIT(S): at 13:06

## 2019-12-18 RX ADMIN — CHLORHEXIDINE GLUCONATE 15 MILLILITER(S): 213 SOLUTION TOPICAL at 18:00

## 2019-12-18 NOTE — PROGRESS NOTE ADULT - ASSESSMENT
89M PMHx HFpEF, COPD on home o2, AFib on coumadin, CKD III, hypothyroidism, dysphagia here with acute on chronic hypoxic and acute hypercapnic respiratory failure with resultant metabolic encephalopathy (unresponsive episode)/ now improved     #Unresponsiveness, now resolved- acute hypercapnic respiratory failure  mentation better when using nippv qhs per pulm recommendations  mental status nearing baseline per daughter at bedside  appreciated neuro input  cth unremarkable  #Oropharyngeal dysphagia  dysphagia diet  discussed prospect of modified barium swallow with S+S therapist, but because of patient's tenuous respiratory status it was not recommended  will need to discuss ongoing aspiration risks further with family. goal was comfort feedings, but current advanced directives are full code- trial of intubation  #Anemia, microcytic  iron studies c/w chronic disease  check hb electrophoresis  appreciate heme  b12, folate wnl; confirm with mma, homocystiene  #Thrombocytopenia, chronic  possible mds per heme  prior hospitalist has discussed with family; poor ps, unlikely to tolerate chemo if confirmed on bx; family in agreement  #HFpEF  euvolemic  monitor off lasix  #COPD  controlled off medications  #AFib  rate controlled  coumadin  #CKD III  stable  trend scr  #Hypothyroidism  synthroid 50  #DVT ppx  coumadin  #Vit D def, suspected  replete    #Progress Note Handoff:  Pending (specify):  Consults__home hospice eval_______, Tests________, Test Results_______, Other__  Family discussion:d/w daughter at bedside regarding prognosis. she wants to take father home with home o2, and possibly bipap as it helps his mentation. she is agreeable to hospice evaluation for home services. she confirms current advanced directives of full code/ trial of intubation  Disposition: Home___/SNF___/Other________/Unknown at this time___x_____

## 2019-12-18 NOTE — PROGRESS NOTE ADULT - SUBJECTIVE AND OBJECTIVE BOX
RAN KUSH  89y  Male      Patient is a 89y old  Male who presents with a chief complaint of Unresponsiveness (17 Dec 2019 12:35)      INTERVAL HPI/OVERNIGHT EVENTS: a bit confused. says he is not sob at the moment. nursing reports he removes his nasal canula, but keeps on ventimask most of the time. daughter who later comes to bedside says patient keeps on his home o2 usually.      REVIEW OF SYSTEMS:  as above  All other review of systems negative    T(C): 36.3 (12-18-19 @ 14:05), Max: 36.5 (12-17-19 @ 21:21)  HR: 80 (12-18-19 @ 14:05) (71 - 80)  BP: 110/62 (12-18-19 @ 14:05) (110/62 - 129/60)  RR: 20 (12-18-19 @ 14:05) (16 - 22)  SpO2: 97% (12-18-19 @ 00:12) (95% - 97%)  Wt(kg): --Vital Signs Last 24 Hrs  T(C): 36.3 (18 Dec 2019 14:05), Max: 36.5 (17 Dec 2019 21:21)  T(F): 97.3 (18 Dec 2019 14:05), Max: 97.7 (17 Dec 2019 21:21)  HR: 80 (18 Dec 2019 14:05) (71 - 80)  BP: 110/62 (18 Dec 2019 14:05) (110/62 - 129/60)  BP(mean): --  RR: 20 (18 Dec 2019 14:05) (16 - 22)  SpO2: 97% (18 Dec 2019 00:12) (95% - 97%)      12-17-19 @ 07:01  -  12-18-19 @ 07:00  --------------------------------------------------------  IN: 0 mL / OUT: 100 mL / NET: -100 mL    12-18-19 @ 07:01  -  12-18-19 @ 15:50  --------------------------------------------------------  IN: 0 mL / OUT: 100 mL / NET: -100 mL        PHYSICAL EXAM:  GENERAL: frail  PSYCH: no agitation, baseline mentation a bit confused  NERVOUS SYSTEM:  Alert & Oriented X2, no new focal deficits  PULMONARY: poor basilar exchange, on ventimask/ comfortable  CARDIOVASCULAR: irregular; No murmurs, rubs, or gallops  GI: Soft, Nontender, Nondistended; Bowel sounds present  EXTREMITIES:  2+ Peripheral Pulses, No clubbing, cyanosis, or edema    Consultant(s) Notes Reviewed:  [x ] YES  [ ] NO    Discussed with Consultants/Other Providers [ x] YES     LABS                          9.6    6.08  )-----------( 99       ( 18 Dec 2019 06:09 )             34.3     12-18    144  |  105  |  60<H>  ----------------------------<  95  5.3<H>   |  26  |  1.6<H>    Ca    9.6      18 Dec 2019 06:09  Phos  3.3     12-18  Mg     2.2     12-18          PT/INR - ( 18 Dec 2019 06:09 )   PT: 35.30 sec;   INR: 3.10 ratio           Lactate Trend        CAPILLARY BLOOD GLUCOSE      POCT Blood Glucose.: 102 mg/dL (18 Dec 2019 07:51)        RADIOLOGY & ADDITIONAL TESTS:    Imaging Personally Reviewed:  [ ] YES  [ ] NO    HEALTH ISSUES - PROBLEM Dx:  Acute on chronic respiratory failure with hypercapnia: Acute on chronic respiratory failure with hypercapnia

## 2019-12-19 LAB
ALBUMIN SERPL ELPH-MCNC: 3.3 G/DL — LOW (ref 3.5–5.2)
ALP SERPL-CCNC: 137 U/L — HIGH (ref 30–115)
ALT FLD-CCNC: 16 U/L — SIGNIFICANT CHANGE UP (ref 0–41)
ANION GAP SERPL CALC-SCNC: 14 MMOL/L — SIGNIFICANT CHANGE UP (ref 7–14)
AST SERPL-CCNC: 27 U/L — SIGNIFICANT CHANGE UP (ref 0–41)
BASE EXCESS BLDA CALC-SCNC: 3.8 MMOL/L — HIGH (ref -2–2)
BILIRUB SERPL-MCNC: 0.9 MG/DL — SIGNIFICANT CHANGE UP (ref 0.2–1.2)
BUN SERPL-MCNC: 63 MG/DL — CRITICAL HIGH (ref 10–20)
CALCIUM SERPL-MCNC: 10 MG/DL — SIGNIFICANT CHANGE UP (ref 8.5–10.1)
CHLORIDE SERPL-SCNC: 105 MMOL/L — SIGNIFICANT CHANGE UP (ref 98–110)
CO2 SERPL-SCNC: 27 MMOL/L — SIGNIFICANT CHANGE UP (ref 17–32)
CREAT SERPL-MCNC: 1.7 MG/DL — HIGH (ref 0.7–1.5)
GLUCOSE BLDC GLUCOMTR-MCNC: 142 MG/DL — HIGH (ref 70–99)
GLUCOSE SERPL-MCNC: 114 MG/DL — HIGH (ref 70–99)
HCO3 BLDA-SCNC: 30 MMOL/L — HIGH (ref 23–27)
HCT VFR BLD CALC: 33.8 % — LOW (ref 42–52)
HCYS SERPL-MCNC: 17.8 UMOL/L — HIGH
HEMOGLOBIN INTERPRETATION: SIGNIFICANT CHANGE UP
HGB A MFR BLD: 95.2 % — LOW (ref 95.8–98)
HGB A2 MFR BLD: 4.8 % — HIGH (ref 2–3.2)
HGB BLD-MCNC: 9.3 G/DL — LOW (ref 14–18)
HIV 1+2 AB+HIV1 P24 AG SERPL QL IA: SIGNIFICANT CHANGE UP
HOROWITZ INDEX BLDA+IHG-RTO: 50 — SIGNIFICANT CHANGE UP
INR BLD: 4.32 RATIO — HIGH (ref 0.65–1.3)
MAGNESIUM SERPL-MCNC: 2.2 MG/DL — SIGNIFICANT CHANGE UP (ref 1.8–2.4)
MCHC RBC-ENTMCNC: 18.8 PG — LOW (ref 27–31)
MCHC RBC-ENTMCNC: 27.5 G/DL — LOW (ref 32–37)
MCV RBC AUTO: 68.3 FL — LOW (ref 80–94)
NRBC # BLD: 0 /100 WBCS — SIGNIFICANT CHANGE UP (ref 0–0)
PCO2 BLDA: 57 MMHG — HIGH (ref 38–42)
PH BLDA: 7.33 — LOW (ref 7.38–7.42)
PHOSPHATE SERPL-MCNC: 4.1 MG/DL — SIGNIFICANT CHANGE UP (ref 2.1–4.9)
PLATELET # BLD AUTO: 108 K/UL — LOW (ref 130–400)
PO2 BLDA: 153 MMHG — HIGH (ref 78–95)
POTASSIUM SERPL-MCNC: 5.4 MMOL/L — HIGH (ref 3.5–5)
POTASSIUM SERPL-SCNC: 5.4 MMOL/L — HIGH (ref 3.5–5)
PROT SERPL-MCNC: 7.2 G/DL — SIGNIFICANT CHANGE UP (ref 6–8)
PROTHROM AB SERPL-ACNC: >40 SEC — HIGH (ref 9.95–12.87)
RBC # BLD: 4.95 M/UL — SIGNIFICANT CHANGE UP (ref 4.7–6.1)
RBC # FLD: 23.2 % — HIGH (ref 11.5–14.5)
SAO2 % BLDA: 99 % — HIGH (ref 94–98)
SODIUM SERPL-SCNC: 146 MMOL/L — SIGNIFICANT CHANGE UP (ref 135–146)
WBC # BLD: 4.83 K/UL — SIGNIFICANT CHANGE UP (ref 4.8–10.8)
WBC # FLD AUTO: 4.83 K/UL — SIGNIFICANT CHANGE UP (ref 4.8–10.8)

## 2019-12-19 PROCEDURE — 99233 SBSQ HOSP IP/OBS HIGH 50: CPT

## 2019-12-19 RX ORDER — SENNA PLUS 8.6 MG/1
1 TABLET ORAL DAILY
Refills: 0 | Status: DISCONTINUED | OUTPATIENT
Start: 2019-12-19 | End: 2019-12-21

## 2019-12-19 RX ADMIN — SENNA PLUS 1 TABLET(S): 8.6 TABLET ORAL at 12:52

## 2019-12-19 RX ADMIN — Medication 50 MICROGRAM(S): at 05:05

## 2019-12-19 RX ADMIN — CHLORHEXIDINE GLUCONATE 1 APPLICATION(S): 213 SOLUTION TOPICAL at 05:06

## 2019-12-19 RX ADMIN — ATORVASTATIN CALCIUM 10 MILLIGRAM(S): 80 TABLET, FILM COATED ORAL at 21:17

## 2019-12-19 RX ADMIN — CHLORHEXIDINE GLUCONATE 15 MILLILITER(S): 213 SOLUTION TOPICAL at 05:05

## 2019-12-19 RX ADMIN — Medication 2000 UNIT(S): at 12:52

## 2019-12-19 RX ADMIN — PANTOPRAZOLE SODIUM 40 MILLIGRAM(S): 20 TABLET, DELAYED RELEASE ORAL at 05:06

## 2019-12-19 NOTE — PROGRESS NOTE ADULT - ASSESSMENT
89M PMHx HFpEF, COPD on home o2, AFib on coumadin, CKD III, hypothyroidism, dysphagia here with acute on chronic hypoxic and acute hypercapnic respiratory failure with resultant metabolic encephalopathy (unresponsive episode)/ now improved     #Unresponsiveness, now resolved- acute hypercapnic respiratory failure  mentation better when using nippv qhs per pulm recommendations  mental status nearing baseline per daughter at bedside  appreciated neuro input  cth unremarkable  #Oropharyngeal dysphagia  dysphagia diet  previously discussed prospect of modified barium swallow with S+S therapist, but because of patient's tenuous respiratory status it was not recommended  will need to discuss ongoing aspiration risks further with family. goal was comfort feedings, but current advanced directives are full code- trial of intubation  #Anemia, microcytic  iron studies c/w chronic disease  check hb electrophoresis  appreciate heme  b12, folate wnl; confirm with mma, homocystiene  #Thrombocytopenia, chronic  possible mds per heme  prior hospitalist has discussed with family; poor ps, unlikely to tolerate chemo if confirmed on bx; family in agreement  #HFpEF  euvolemic  monitor off lasix  #COPD  controlled off medications  #AFib  rate controlled  coumadin  #CKD III  stable  trend scr  #Hypothyroidism  synthroid 50  #DVT ppx  coumadin  #Vit D def, suspected  replete    #Progress Note Handoff:  Pending (specify):  Consults__home hospice eval_______, Tests________, Test Results_______, Other__  Family discussion:d/w daughter at bedside regarding prognosis. she wants to take father home with home o2, and possibly bipap as it helps his mentation. she is agreeable to hospice evaluation for home services. she confirms current advanced directives of full code/ trial of intubation; total I tried to call surrogate Leia but was unable to get through  Disposition: Home___/SNF___/Other________/Unknown at this time___x_____

## 2019-12-19 NOTE — PROGRESS NOTE ADULT - SUBJECTIVE AND OBJECTIVE BOX
RAN KUSH  89y  Male      Patient is a 89y old  Male who presents with a chief complaint of Unresponsiveness (18 Dec 2019 15:49)      INTERVAL HPI/OVERNIGHT EVENTS: a bit sob during our conversation. did bipap for a short while overnight. alert this morning. didnt eat much/ not hungry. agreeable to try to walk with help      REVIEW OF SYSTEMS:  as above, a bit confused  All other review of systems negative    T(C): 35.4 (12-19-19 @ 14:05), Max: 36.1 (12-19-19 @ 05:23)  HR: 61 (12-19-19 @ 14:05) (56 - 64)  BP: 150/67 (12-19-19 @ 14:05) (130/58 - 150/67)  RR: 17 (12-19-19 @ 14:05) (17 - 18)  SpO2: 98% (12-19-19 @ 01:07) (95% - 100%)  Wt(kg): --Vital Signs Last 24 Hrs  T(C): 35.4 (19 Dec 2019 14:05), Max: 36.1 (19 Dec 2019 05:23)  T(F): 95.8 (19 Dec 2019 14:05), Max: 97 (19 Dec 2019 05:23)  HR: 61 (19 Dec 2019 14:05) (56 - 64)  BP: 150/67 (19 Dec 2019 14:05) (130/58 - 150/67)  BP(mean): --  RR: 17 (19 Dec 2019 14:05) (17 - 18)  SpO2: 98% (19 Dec 2019 01:07) (95% - 100%)      12-18-19 @ 07:01  -  12-19-19 @ 07:00  --------------------------------------------------------  IN: 0 mL / OUT: 100 mL / NET: -100 mL    12-19-19 @ 07:01  -  12-19-19 @ 14:50  --------------------------------------------------------  IN: 0 mL / OUT: 100 mL / NET: -100 mL        PHYSICAL EXAM:  GENERAL: frail  PSYCH: no agitation, baseline mentation  NERVOUS SYSTEM:  Alert & Oriented X3, no new focal deficits  PULMONARY: on ventimask, poor basilar exchange  CARDIOVASCULAR: Regular rate and rhythm; No murmurs, rubs, or gallops  GI: Soft, Nontender, Nondistended; Bowel sounds present quite thin  EXTREMITIES:  2+ Peripheral Pulses, No clubbing, cyanosis, or edema    Consultant(s) Notes Reviewed:  [x ] YES  [ ] NO    Discussed with Consultants/Other Providers [ x] YES     LABS                          9.3    4.83  )-----------( 108      ( 19 Dec 2019 06:15 )             33.8     12-19    146  |  105  |  63<HH>  ----------------------------<  114<H>  5.4<H>   |  27  |  1.7<H>    Ca    10.0      19 Dec 2019 06:15  Phos  4.1     12-19  Mg     2.2     12-19    TPro  7.2  /  Alb  3.3<L>  /  TBili  0.9  /  DBili  x   /  AST  27  /  ALT  16  /  AlkPhos  137<H>  12-19        PT/INR - ( 19 Dec 2019 06:15 )   PT: >40.00 sec;   INR: 4.32 ratio           Lactate Trend        CAPILLARY BLOOD GLUCOSE      POCT Blood Glucose.: 142 mg/dL (19 Dec 2019 07:31)        RADIOLOGY & ADDITIONAL TESTS:    Imaging Personally Reviewed:  [ ] YES  [ ] NO    HEALTH ISSUES - PROBLEM Dx:  Acute on chronic respiratory failure with hypercapnia: Acute on chronic respiratory failure with hypercapnia

## 2019-12-20 LAB
ALBUMIN SERPL ELPH-MCNC: 3.6 G/DL — SIGNIFICANT CHANGE UP (ref 3.5–5.2)
ALP SERPL-CCNC: 120 U/L — HIGH (ref 30–115)
ALT FLD-CCNC: 14 U/L — SIGNIFICANT CHANGE UP (ref 0–41)
ANION GAP SERPL CALC-SCNC: 11 MMOL/L — SIGNIFICANT CHANGE UP (ref 7–14)
AST SERPL-CCNC: 20 U/L — SIGNIFICANT CHANGE UP (ref 0–41)
BILIRUB SERPL-MCNC: 0.9 MG/DL — SIGNIFICANT CHANGE UP (ref 0.2–1.2)
BUN SERPL-MCNC: 66 MG/DL — CRITICAL HIGH (ref 10–20)
CALCIUM SERPL-MCNC: 9.9 MG/DL — SIGNIFICANT CHANGE UP (ref 8.5–10.1)
CHLORIDE SERPL-SCNC: 103 MMOL/L — SIGNIFICANT CHANGE UP (ref 98–110)
CO2 SERPL-SCNC: 32 MMOL/L — SIGNIFICANT CHANGE UP (ref 17–32)
CREAT SERPL-MCNC: 1.8 MG/DL — HIGH (ref 0.7–1.5)
GLUCOSE SERPL-MCNC: 112 MG/DL — HIGH (ref 70–99)
HCT VFR BLD CALC: 31.8 % — LOW (ref 42–52)
HGB BLD-MCNC: 8.9 G/DL — LOW (ref 14–18)
INR BLD: 5.84 RATIO — CRITICAL HIGH (ref 0.65–1.3)
MAGNESIUM SERPL-MCNC: 2.3 MG/DL — SIGNIFICANT CHANGE UP (ref 1.8–2.4)
MCHC RBC-ENTMCNC: 18.9 PG — LOW (ref 27–31)
MCHC RBC-ENTMCNC: 28 G/DL — LOW (ref 32–37)
MCV RBC AUTO: 67.5 FL — LOW (ref 80–94)
NRBC # BLD: 0 /100 WBCS — SIGNIFICANT CHANGE UP (ref 0–0)
PHOSPHATE SERPL-MCNC: 5 MG/DL — HIGH (ref 2.1–4.9)
PLATELET # BLD AUTO: 91 K/UL — LOW (ref 130–400)
POTASSIUM SERPL-MCNC: 4.5 MMOL/L — SIGNIFICANT CHANGE UP (ref 3.5–5)
POTASSIUM SERPL-SCNC: 4.5 MMOL/L — SIGNIFICANT CHANGE UP (ref 3.5–5)
PROT SERPL-MCNC: 7.2 G/DL — SIGNIFICANT CHANGE UP (ref 6–8)
PROTHROM AB SERPL-ACNC: >40 SEC — HIGH (ref 9.95–12.87)
RBC # BLD: 4.71 M/UL — SIGNIFICANT CHANGE UP (ref 4.7–6.1)
RBC # FLD: 23.4 % — HIGH (ref 11.5–14.5)
SODIUM SERPL-SCNC: 146 MMOL/L — SIGNIFICANT CHANGE UP (ref 135–146)
WBC # BLD: 5.97 K/UL — SIGNIFICANT CHANGE UP (ref 4.8–10.8)
WBC # FLD AUTO: 5.97 K/UL — SIGNIFICANT CHANGE UP (ref 4.8–10.8)

## 2019-12-20 PROCEDURE — 99233 SBSQ HOSP IP/OBS HIGH 50: CPT

## 2019-12-20 RX ORDER — PHYTONADIONE (VIT K1) 5 MG
5 TABLET ORAL ONCE
Refills: 0 | Status: COMPLETED | OUTPATIENT
Start: 2019-12-20 | End: 2019-12-20

## 2019-12-20 RX ADMIN — CHLORHEXIDINE GLUCONATE 1 APPLICATION(S): 213 SOLUTION TOPICAL at 05:20

## 2019-12-20 RX ADMIN — CHLORHEXIDINE GLUCONATE 15 MILLILITER(S): 213 SOLUTION TOPICAL at 05:20

## 2019-12-20 RX ADMIN — CHLORHEXIDINE GLUCONATE 15 MILLILITER(S): 213 SOLUTION TOPICAL at 17:19

## 2019-12-20 RX ADMIN — Medication 101 MILLIGRAM(S): at 20:46

## 2019-12-20 RX ADMIN — PANTOPRAZOLE SODIUM 40 MILLIGRAM(S): 20 TABLET, DELAYED RELEASE ORAL at 05:20

## 2019-12-20 RX ADMIN — Medication 50 MICROGRAM(S): at 05:20

## 2019-12-20 NOTE — PROGRESS NOTE ADULT - ASSESSMENT
89M PMHx HFpEF, COPD on home o2, AFib on coumadin, CKD III, hypothyroidism, dysphagia here with acute on chronic hypoxic and acute hypercapnic respiratory failure with resultant metabolic encephalopathy (unresponsive episode)/ now improved     #Unresponsiveness, now resolved- acute hypercapnic respiratory failure  mentation better when using nippv qhs per pulm recommendations  appreciated neuro input  cth unremarkable  patient's mentation worsens during periods of hypercarbia, and improves with intermittent bipap  #Oropharyngeal dysphagia  dysphagia diet  previously discussed prospect of modified barium swallow with S+S therapist, but because of patient's tenuous respiratory status it was not recommended  will need to discuss ongoing aspiration risks further with family. goal was comfort feedings, but current advanced directives are full code- trial of intubation  #Anemia, microcytic  iron studies c/w chronic disease  check hb electrophoresis  appreciate heme  b12, folate wnl; confirm with mma, homocystiene  #Thrombocytopenia, chronic  possible mds per heme  prior hospitalist has discussed with family; poor ps, unlikely to tolerate chemo if confirmed on bx; family in agreement  #HFpEF  euvolemic  monitor off lasix  #COPD  controlled off medications  #AFib  rate controlled  coumadin  #CKD III  stable  trend scr  #Hypothyroidism  synthroid 50  #DVT ppx  coumadin  #Vit D def, suspected  replete    high risk, guarded prognosis    #Progress Note Handoff:  Pending (specify):  Consults_ hospice eval recommendations pending, pulm f/u for respiratory failure_______, Tests________, Test Results_______, Other__  Family discussion: I discuss guarded prognosis with one of two daughters daily. both aware. somewhat receptive to hospice but still want full code at the moment  Disposition: patient is deteriorating, still acute

## 2019-12-20 NOTE — CHART NOTE - NSCHARTNOTEFT_GEN_A_CORE
Registered Dietitian Follow-Up     Patient Profile Reviewed                           Yes [v]   No []     Nutrition History Previously Obtained        Yes []  No [v]  - pt remains confused      Pertinent Subjective Information: Pt alert, but confused and disoriented. As per RN  pt did not eat breakfast today due to BIPAP (now on NC).      Pertinent Medical Interventions: Unresponsiveness - now resolved. Dysphagia - pt was cleared by SLP for dysphagia (2) mechanical soft and nectar thick liquids, started on PO diet 12/13. Anemia, microcytic. HFpEF, euvolemic per MD. CKD III - stable per MD. AFib - rate controlled, on coumadin. As per MD family is agreeable to hospice evaluation for home services.     Diet order: dysphagia (2) mechanical soft and nectar thick liquids + Add Ensure Compact q 12hrs and Ensure Pudding q 24hrs (12/17)      Anthropometrics:  - Ht: 63 inches   - Wt: 66.5kg (12/20), 64.5kg (12/17), 62.9kg (12/9) and 69.1kg (12/8) vs. admission/dosing wt 64.9 kg (12/3)- wt appears stable, some weight fluctuations likely due to fluid shifts (HFpEF, off diuretics now).     - %wt change:   - BMI: 25 (admission wt of 64.9 kg)   - IBW 124lbs +/-10%     Pertinent Lab Data: 12/19: H/H 9.3/33.8,  BUN-63, Cr-1.7, K- 5.4, GLu 142-114, ALb 3.3     Pertinent Meds: coumadin, senna,  pantoprazole, atorvastatin, cholecalciferol, levothyroxine      Physical Findings:  - Appearance: alert but confused and disoriented, on NC  - GI function: last BM documented 12/18 (lg, formed per EMR).      - Tubes: NGT d/c'd 12/10.    - Oral/Mouth:   - Skin: nose wound      Nutrition Requirements  Weight Used: 64.9 kg- admission wt/dosing weight     Estimated Energy Needs    Continue [v]  Adjust []  MSJ x 1.2-1.3= 6336-0130 kcal/day      Estimated Protein Needs    Continue [v]  Adjust [] 1.0-1.2 gm/kg- = 65-78 g/day (monitor renal function)       Estimated Fluid Needs        Continue [v]  Adjust [] per LIP      Nutrient Intake: likely not meeting needs, did not eat breakfast this AM (pt was on BIPAP); documented PO intake varies 0-25%       [v] Previous Nutrition Diagnosis:  Inadequate energy intake - ongoing      Nutrition Intervention: Meal and snacks. Supplements     Goal/Expected Outcome: PO intake > 50% meals, snacks and supplements over the next 4 days. .     Indicator/Monitoring: RD will monitor diet order, energy intake, labs, nutrition focused physical findings, body composition.    Recommended:  Continue current diet, add no concentrated Potassium modifier (consistency per LIP). Add Ensure Compact q 12hrs and Ensure Pudding q 24hrs (supplements- vanilla flavor- provide 810mg Potassium) , provide 1:1 feed. Consider supplemental enteral nutrition if consistent w/ GOC.     Pt remains at risk, will f/u in 4 days.

## 2019-12-20 NOTE — PROGRESS NOTE ADULT - SUBJECTIVE AND OBJECTIVE BOX
RANKUSH GABRIEL  89y  Male      Patient is a 89y old  Male who presents with a chief complaint of Unresponsiveness (19 Dec 2019 14:50)      INTERVAL HPI/OVERNIGHT EVENTS: on and off with sob needing bipap. denies pain. a bit confused      REVIEW OF SYSTEMS:  as above  All other review of systems negative    T(C): 35.9 (12-20-19 @ 14:29), Max: 37 (12-19-19 @ 22:00)  HR: 67 (12-20-19 @ 14:29) (63 - 78)  BP: 121/64 (12-20-19 @ 14:29) (96/52 - 141/60)  RR: 18 (12-20-19 @ 12:31) (17 - 18)  SpO2: 91% (12-20-19 @ 12:30) (91% - 100%)  Wt(kg): --Vital Signs Last 24 Hrs  T(C): 35.9 (20 Dec 2019 14:29), Max: 37 (19 Dec 2019 22:00)  T(F): 96.6 (20 Dec 2019 14:29), Max: 98.6 (19 Dec 2019 22:00)  HR: 67 (20 Dec 2019 14:29) (63 - 78)  BP: 121/64 (20 Dec 2019 14:29) (96/52 - 141/60)  BP(mean): --  RR: 18 (20 Dec 2019 12:31) (17 - 18)  SpO2: 91% (20 Dec 2019 12:30) (91% - 100%)      12-19-19 @ 07:01  -  12-20-19 @ 07:00  --------------------------------------------------------  IN: 0 mL / OUT: 100 mL / NET: -100 mL    12-20-19 @ 07:01  -  12-20-19 @ 16:14  --------------------------------------------------------  IN: 120 mL / OUT: 200 mL / NET: -80 mL        PHYSICAL EXAM:  GENERAL: frail  PSYCH: no agitation, intermittently confused  NERVOUS SYSTEM:  Alert & Oriented X2 today, no new focal deficits  PULMONARY: poor basilar entry, on bipap now, more alert  CARDIOVASCULAR: irregular; No murmurs, rubs, or gallops  GI: Soft, Nontender, Nondistended; Bowel sounds present  EXTREMITIES:  2+ Peripheral Pulses, No clubbing, cyanosis, or edema    Consultant(s) Notes Reviewed:  [x ] YES  [ ] NO    Discussed with Consultants/Other Providers [ x] YES     LABS                          8.9    5.97  )-----------( 91       ( 20 Dec 2019 11:15 )             31.8     12-20    146  |  103  |  66<HH>  ----------------------------<  112<H>  4.5   |  32  |  1.8<H>    Ca    9.9      20 Dec 2019 11:15  Phos  5.0     12-20  Mg     2.3     12-20    TPro  7.2  /  Alb  3.6  /  TBili  0.9  /  DBili  x   /  AST  20  /  ALT  14  /  AlkPhos  120<H>  12-20    ABG - ( 19 Dec 2019 16:47 )  pH, Arterial: 7.33  pH, Blood: x     /  pCO2: 57    /  pO2: 153   / HCO3: 30    / Base Excess: 3.8   /  SaO2: 99                  PT/INR - ( 20 Dec 2019 11:15 )   PT: >40.00 sec;   INR: 5.84 ratio           Lactate Trend        CAPILLARY BLOOD GLUCOSE      POCT Blood Glucose.: 142 mg/dL (19 Dec 2019 07:31)        RADIOLOGY & ADDITIONAL TESTS:    Imaging Personally Reviewed:  [ ] YES  [ ] NO    HEALTH ISSUES - PROBLEM Dx:  Acute on chronic respiratory failure with hypercapnia: Acute on chronic respiratory failure with hypercapnia          #Progress Note Handoff    Pending (specify):  Consults_________, Tests________, Test Results_______, Other_________    Family discussion:    Disposition: Home___/SNF___/Other________/Unknown at this time________

## 2019-12-21 VITALS — SYSTOLIC BLOOD PRESSURE: 100 MMHG | HEART RATE: 76 BPM | DIASTOLIC BLOOD PRESSURE: 58 MMHG | RESPIRATION RATE: 16 BRPM

## 2019-12-21 LAB
GLUCOSE BLDC GLUCOMTR-MCNC: 141 MG/DL — HIGH (ref 70–99)
GLUCOSE BLDC GLUCOMTR-MCNC: 58 MG/DL — LOW (ref 70–99)

## 2019-12-21 PROCEDURE — 99233 SBSQ HOSP IP/OBS HIGH 50: CPT

## 2019-12-21 RX ORDER — DEXTROSE 50 % IN WATER 50 %
25 SYRINGE (ML) INTRAVENOUS ONCE
Refills: 0 | Status: COMPLETED | OUTPATIENT
Start: 2019-12-21 | End: 2019-12-21

## 2019-12-21 RX ADMIN — CHLORHEXIDINE GLUCONATE 1 APPLICATION(S): 213 SOLUTION TOPICAL at 05:09

## 2019-12-21 RX ADMIN — CHLORHEXIDINE GLUCONATE 15 MILLILITER(S): 213 SOLUTION TOPICAL at 05:08

## 2019-12-21 RX ADMIN — Medication 25 GRAM(S): at 12:02

## 2019-12-21 NOTE — DISCHARGE NOTE FOR THE EXPIRED PATIENT - HOSPITAL COURSE
89M PMHx HFpEF, COPD on home o2, AFib on coumadin, CKD III, hypothyroidism, dysphagia here with acute on chronic hypoxic and acute hypercapnic respiratory failure worsening, metabolic encephalopathy recurrent from hypercarbia and delirium, worsening physical debility, admitted for Acute on Chronic respiratory failure, dependent on BIPAP, found pulseless, in asystole, no ROSC achieved after 25 minutes. time of death 20:45.

## 2019-12-21 NOTE — CHART NOTE - NSCHARTNOTEFT_GEN_A_CORE
Notified by RN of patient's decreased mentation with reported noncompliance with BIPAP overnight, patient seen and examined, vital signs stable, patient currently more alert, FIO2 increased to 100%, awaiting ABG to assess for hypercarbia as patient known to have intermittent unresponsiveness secondary it. Please review goals of care with daughters in AM in light of patient's poor prognosis. Notified by RN of patient's decreased mentation with reported noncompliance with BIPAP overnight, patient seen and examined, vital signs stable, patient currently more alert, FIO2 increased to 100%, awaiting ABG to assess for hypercarbia as patient known to have intermittent unresponsiveness secondary it. Please review goals of care with daughters in AM in light of patient's poor prognosis.    Update: ABG revealed, PCO2 increased to 62 from 57, will continue BiPAP.

## 2019-12-21 NOTE — PROVIDER CONTACT NOTE (OTHER) - SITUATION
Patient has Bipap machine on but continues pulling off mask, breaking two masks. patient also has hand mittens on as ordered but continues to pull them off.

## 2019-12-21 NOTE — PROGRESS NOTE ADULT - REASON FOR ADMISSION
Unresponsiveness

## 2019-12-21 NOTE — PROGRESS NOTE ADULT - SUBJECTIVE AND OBJECTIVE BOX
KUSH TONG  89y  Male      Patient is a 89y old  Male who presents with a chief complaint of Unresponsiveness (20 Dec 2019 16:14)      INTERVAL HPI/OVERNIGHT EVENTS: patient with waxing/waning mentation again today. denies being in pain when lucid. desaturates and becomes confused or lethargic after short periods off of the bipap, then mentation improves with bipap      REVIEW OF SYSTEMS:  as above  All other review of systems negative    T(C): 35.9 (12-21-19 @ 04:23), Max: 35.9 (12-20-19 @ 14:29)  HR: 76 (12-21-19 @ 13:48) (63 - 76)  BP: 100/58 (12-21-19 @ 13:48) (100/58 - 137/65)  RR: 16 (12-21-19 @ 13:48) (16 - 18)  SpO2: 98% (12-21-19 @ 07:50) (98% - 98%)  Wt(kg): --Vital Signs Last 24 Hrs  T(C): 35.9 (21 Dec 2019 04:23), Max: 35.9 (20 Dec 2019 14:29)  T(F): 96.6 (21 Dec 2019 04:23), Max: 96.6 (20 Dec 2019 14:29)  HR: 76 (21 Dec 2019 13:48) (63 - 76)  BP: 100/58 (21 Dec 2019 13:48) (100/58 - 137/65)  BP(mean): --  RR: 16 (21 Dec 2019 13:48) (16 - 18)  SpO2: 98% (21 Dec 2019 07:50) (98% - 98%)      12-20-19 @ 07:01  -  12-21-19 @ 07:00  --------------------------------------------------------  IN: 120 mL / OUT: 200 mL / NET: -80 mL        PHYSICAL EXAM:  GENERAL: frail  PSYCH: confused  NERVOUS SYSTEM:  waxing/waning mentation, more alert when on bipap  PULMONARY: L basilar crackles, poor basilar exchange, on bipap with good seal, easily arousable when on bipap, tachypneac when off  CARDIOVASCULAR: Regular rate and rhythm; No murmurs, rubs, or gallops  GI: Soft, Nontender, Nondistended; Bowel sounds present thin  EXTREMITIES:  2+ Peripheral Pulses, No clubbing, cyanosis, or edema    Consultant(s) Notes Reviewed:  [x ] YES  [ ] NO    Discussed with Consultants/Other Providers [ x] YES     LABS                          8.9    5.97  )-----------( 91       ( 20 Dec 2019 11:15 )             31.8     12-20    146  |  103  |  66<HH>  ----------------------------<  112<H>  4.5   |  32  |  1.8<H>    Ca    9.9      20 Dec 2019 11:15  Phos  5.0     12-20  Mg     2.3     12-20    TPro  7.2  /  Alb  3.6  /  TBili  0.9  /  DBili  x   /  AST  20  /  ALT  14  /  AlkPhos  120<H>  12-20    ABG - ( 21 Dec 2019 05:47 )  pH, Arterial: 7.29  pH, Blood: x     /  pCO2: 62    /  pO2: 214   / HCO3: 30    / Base Excess: 2.6   /  SaO2: 100                 PT/INR - ( 20 Dec 2019 11:15 )   PT: >40.00 sec;   INR: 5.84 ratio           Lactate Trend        CAPILLARY BLOOD GLUCOSE      POCT Blood Glucose.: 141 mg/dL (21 Dec 2019 12:38)        RADIOLOGY & ADDITIONAL TESTS:    Imaging Personally Reviewed:  [ ] YES  [ ] NO    HEALTH ISSUES - PROBLEM Dx:  Acute on chronic respiratory failure with hypercapnia: Acute on chronic respiratory failure with hypercapnia

## 2019-12-21 NOTE — PROGRESS NOTE ADULT - ATTENDING COMMENTS
Patient seen and examined independently of PA. My addendum supersedes PA notation. Case discussed with housestaff, nursing and patient and daughter
Attending Statement: I have personally performed a face to face diagnostic evaluation on this patient. The patient is suffering from CHF diastolic,  history of COPD on home O2, PNA, COY on CKD.  I have reviewed the above note and agree with the history, exam and plan of care, except as I have noted in the text.
Attending Statement: I have personally performed a face to face diagnostic evaluation on this patient. The patient is suffering from CHF, COPD and respiratory failure. I have reviewed the above note and agree with the history, exam and plan of care, except as I have noted in the text.
Patient seen and evaluated independently medical resident note reviewed, I agree with plan and management, except as I have noted.
89 y/m with history of CHF, DLD, Afib, HTN presented after a syncopal episode at home, was hypoxic in ED and was placed in BIPAP:    # metabolic encephalopathy/ Afib / acute on chronic diastolic CHF exacerbation /   could be d/t bradycardia, had a few  episodes in unit, where he passed out with bradycardia, responded to Atropine  cardiology following  EEG as above with positive epileptiform activity  Neurology evaluation  IV Lasix drip and negative fluid balance    #AFib;  Coumadin on hold d/t supra-therapeutic INR  Monitor INR    Discussed history, physical and plan of care with resident and ICU team, agree with plan of care.
Patient seen and evaluated independently medical resident note reviewed, I agree with plan and management, except as I have noted.

## 2019-12-21 NOTE — PROVIDER CONTACT NOTE (OTHER) - ASSESSMENT
Patient pulse ox drops to 82 when pulling off bipap mask. VSS
order missing vs frequency, incorrect restraint type

## 2019-12-23 LAB — METHYLMALONATE SERPL-SCNC: 353 NMOL/L — SIGNIFICANT CHANGE UP (ref 0–378)

## 2019-12-31 DIAGNOSIS — E87.5 HYPERKALEMIA: ICD-10-CM

## 2019-12-31 DIAGNOSIS — I27.20 PULMONARY HYPERTENSION, UNSPECIFIED: ICD-10-CM

## 2019-12-31 DIAGNOSIS — B95.62 METHICILLIN RESISTANT STAPHYLOCOCCUS AUREUS INFECTION AS THE CAUSE OF DISEASES CLASSIFIED ELSEWHERE: ICD-10-CM

## 2019-12-31 DIAGNOSIS — E78.5 HYPERLIPIDEMIA, UNSPECIFIED: ICD-10-CM

## 2019-12-31 DIAGNOSIS — R00.1 BRADYCARDIA, UNSPECIFIED: ICD-10-CM

## 2019-12-31 DIAGNOSIS — E55.9 VITAMIN D DEFICIENCY, UNSPECIFIED: ICD-10-CM

## 2019-12-31 DIAGNOSIS — N13.30 UNSPECIFIED HYDRONEPHROSIS: ICD-10-CM

## 2019-12-31 DIAGNOSIS — D72.829 ELEVATED WHITE BLOOD CELL COUNT, UNSPECIFIED: ICD-10-CM

## 2019-12-31 DIAGNOSIS — D69.6 THROMBOCYTOPENIA, UNSPECIFIED: ICD-10-CM

## 2019-12-31 DIAGNOSIS — I50.33 ACUTE ON CHRONIC DIASTOLIC (CONGESTIVE) HEART FAILURE: ICD-10-CM

## 2019-12-31 DIAGNOSIS — J44.9 CHRONIC OBSTRUCTIVE PULMONARY DISEASE, UNSPECIFIED: ICD-10-CM

## 2019-12-31 DIAGNOSIS — D50.9 IRON DEFICIENCY ANEMIA, UNSPECIFIED: ICD-10-CM

## 2019-12-31 DIAGNOSIS — R41.82 ALTERED MENTAL STATUS, UNSPECIFIED: ICD-10-CM

## 2019-12-31 DIAGNOSIS — N18.3 CHRONIC KIDNEY DISEASE, STAGE 3 (MODERATE): ICD-10-CM

## 2019-12-31 DIAGNOSIS — J96.22 ACUTE AND CHRONIC RESPIRATORY FAILURE WITH HYPERCAPNIA: ICD-10-CM

## 2019-12-31 DIAGNOSIS — E87.1 HYPO-OSMOLALITY AND HYPONATREMIA: ICD-10-CM

## 2019-12-31 DIAGNOSIS — E03.9 HYPOTHYROIDISM, UNSPECIFIED: ICD-10-CM

## 2019-12-31 DIAGNOSIS — N17.9 ACUTE KIDNEY FAILURE, UNSPECIFIED: ICD-10-CM

## 2019-12-31 DIAGNOSIS — E87.0 HYPEROSMOLALITY AND HYPERNATREMIA: ICD-10-CM

## 2019-12-31 DIAGNOSIS — F05 DELIRIUM DUE TO KNOWN PHYSIOLOGICAL CONDITION: ICD-10-CM

## 2019-12-31 DIAGNOSIS — I13.0 HYPERTENSIVE HEART AND CHRONIC KIDNEY DISEASE WITH HEART FAILURE AND STAGE 1 THROUGH STAGE 4 CHRONIC KIDNEY DISEASE, OR UNSPECIFIED CHRONIC KIDNEY DISEASE: ICD-10-CM

## 2019-12-31 DIAGNOSIS — D63.1 ANEMIA IN CHRONIC KIDNEY DISEASE: ICD-10-CM

## 2019-12-31 DIAGNOSIS — Z79.01 LONG TERM (CURRENT) USE OF ANTICOAGULANTS: ICD-10-CM

## 2019-12-31 DIAGNOSIS — R13.12 DYSPHAGIA, OROPHARYNGEAL PHASE: ICD-10-CM

## 2019-12-31 DIAGNOSIS — I48.20 CHRONIC ATRIAL FIBRILLATION, UNSPECIFIED: ICD-10-CM

## 2019-12-31 DIAGNOSIS — G93.41 METABOLIC ENCEPHALOPATHY: ICD-10-CM

## 2020-04-27 NOTE — ED ADULT NURSE NOTE - RELIEVING FACTORS
none Hydroquinone Counseling:  Patient advised that medication may result in skin irritation, lightening (hypopigmentation), dryness, and burning.  In the event of skin irritation, the patient was advised to reduce the amount of the drug applied or use it less frequently.  Rarely, spots that are treated with hydroquinone can become darker (pseudoochronosis).  Should this occur, patient instructed to stop medication and call the office. The patient verbalized understanding of the proper use and possible adverse effects of hydroquinone.  All of the patient's questions and concerns were addressed.

## 2020-05-03 NOTE — PHYSICAL THERAPY INITIAL EVALUATION ADULT - GAIT PATTERN USED, PT EVAL
Date Seen


The patient was seen on 4/29/20.





Progress Note


SUBJECTIVE: 78-year-old female with past medical history of CVA, hypothyroidism,

hyperlipidemia and GERD was admitted for right lower quadrant abscess secondary 

to perforated diverticulitis. Patient underwent ultrasound guided drain 

placement by interventional radiology earlier today, seen postprocedure. Patient

denies any issues overnight, currently experiencing mild abdominal pain at drain

site, no other complaints. Patient denies any shortness of breath, chest pain, 

nausea, vomiting, diarrhea or constipation. 





10 point review of system is negative except for above 





PHYSICAL EXAMINATION:


VITAL SIGNS: Please see below.


GENERAL: No distress


HEENT: Normocephalic, atraumatic, moist mucous membranes


NECK: Supple


CARDIOVASCULAR EXAMINATION: S1, S2


RESPIRATORY EXAMINATION: Clear to auscultation, no wheezing


ABDOMINAL EXAMINATION: Soft, mild right lower quadrant and left lower quadrant t

enderness, nondistended, positive bowel sounds, abscess drain with brown fluid, 

foul-smelling, concerning for stool


EXTREMITIES: Bilateral lower extremity pitting edema


SKIN: No rash


NEUROLOGICAL EXAMINATION: no focal deficits 


PSYCHIATRIC EXAMINATION: Calm and cooperative





LABORATORY DATA, IMAGING STUDIES, MICROBIOLOGY: Please see below.





ASSESSMENT AND PLAN: 78-year-old female with multiple medical comorbidities, was

admitted for abdominal abscess secondary to perforated diverticulitis





PROBLEMS:


1. Abdominal abscess: Secondary to perforated diverticulitis, ultrasound-guided 

drain placement by interventional radiology today, cultures pending, continue 

empiric antibiotics, fluid from abscess drain is concerning for stool, if output

is high, we'll consider general surgery consultation.





2.. Hypothyroidism: Continue levothyroxine





3. GERD: Protonix





4. A. fib: Restart Eliquis, continue Atenolol for rate control





DVT prophylaxis: Eliquis


GI prophylaxis: PPI





VS, I&O, 24H, Fishbone


Vital Signs/I&O





Vital Signs








  Date Time  Temp Pulse Resp B/P (MAP) Pulse Ox O2 Delivery O2 Flow Rate FiO2


 


4/29/20 12:00 97.4 100 16 133/88 (103) 96 Room Air  














I&O- Last 24 Hours up to 6 AM 


 


 4/29/20





 06:00


 


Intake Total 2955 ml


 


Output Total 400 ml


 


Balance 2555 ml











Laboratory Data


24H LABS


Laboratory Tests 2


4/28/20 17:42: 


Urine Color YELLOW, Urine Appearance CLEAR, Urine pH 6.0, Urine Specific Gravity

>1.060H, Urine Protein NEGATIVE, Urine Glucose (UA) NEGATIVE, Urine Ketones 

TRACEH, Urine Blood NEGATIVE, Urine Nitrite NEGATIVE, Urine Bilirubin NEGATIVE, 

Urine Urobilinogen 4.0H, Urine Leukocyte Esterase NEGATIVE, Urine WBC (Auto) 0, 

Urine RBC (Auto) 0, Urine Hyaline Casts (Auto) 0, Urine Bacteria (Auto) 

NEGATIVE, Urine Squamous Epithelial Cells 0, Urine Sperm (Auto) 


4/28/20 18:36: 


Prothrombin Time 18.7H, Prothromb Time International Ratio 1.60, Activated 

Partial Thromboplast Time 39.0H, Osmolality 268L


4/28/20 18:45: Lactic Acid Followup at 4 Hours 1.5


4/28/20 20:20: Methicillin-Resist S.aureus DNA PCR NOT DETECTED


4/29/20 05:16: 


Neutrophils (%) (Auto) , Nucleated Red Blood Cells % (auto) 0.0, Neutrophils 

85H, Lymphocytes (Manual) 8L, Monocytes (Manual) 7H, Anisocytosis 1+, 

Acanthocytes 1+, Platelet Estimate NORMAL, Anion Gap 7L, Glomerular Filtration 

Rate > 60.0, Calcium Level 7.7L, Total Bilirubin 0.5, Aspartate Amino Transf 

(AST/SGOT) 22, Alanine Aminotransferase (ALT/SGPT) 22, Alkaline Phosphatase 88, 

C-Reactive Protein, Quantitative 10.70H, Total Protein 5.2#L, Albumin 1.7#L, 

Albumin/Globulin Ratio 0.49L


CBC/BMP


Laboratory Tests


4/29/20 05:16








Microbiology





Microbiology


4/29/20 Gram Stain - Final, Resulted


          


4/29/20 Abscess Culture, Resulted


          Pending


4/29/20 Anaerobic Culture, Received


          Pending


4/28/20 Blood Culture - Preliminary, Resulted


          No growth after 24 hours . All specim...


4/28/20 Blood Culture - Preliminary, Resulted


          No growth after 24 hours . All specim...











GONDAL,KHUBAIB N. MD           Apr 29, 2020 16:16
Date Seen


The patient was seen on 5/3/20.





Progress Note


SUBJECTIVE: 78-year-old female was admitted for abdominal abscess secondary to 

perforated diverticulitis. Patient had an abscess drain placement by 

interventional radiology, abscess drainage continues to decrease on a daily 

basis, patient asymptomatic in the morning, no acute events overnight, without 

complaints at this time. She denies any nausea, vomiting, abdominal pain, 

diarrhea or constipation. She is tolerating her diet without any difficulty, 

cultures grew Escherichia coli, Klebsiella and strep Anginosus.





10 point review of system is negative except for above





PHYSICAL EXAMINATION:


VITAL SIGNS: Please see below.


GENERAL: No distress


HEENT: Normocephalic, atraumatic, moist mucous membranes


NECK: Supple


CARDIOVASCULAR EXAMINATION: S1, S2, tachycardic


RESPIRATORY EXAMINATION: Clear to auscultation, no wheezing


ABDOMINAL EXAMINATION: Soft, nontender, nondistended, positive bowel sounds, 

abscess drain with small amount of purulent drainage


EXTREMITIES: Range of motion intact


SKIN: No rash


NEUROLOGICAL EXAMINATION: Alert and oriented 3, no focal deficits 


PSYCHIATRIC EXAMINATION: Calm and cooperative





LABORATORY DATA, IMAGING STUDIES, MICROBIOLOGY: Please see below.





ASSESSMENT AND PLAN: 78-year-old female was admitted for abdominal abscess 

secondary to perforated diverticulitis, status post drain placement by 

interventional radiology.





PROBLEMS:


1. Abdominal abscess: Secondary to perforated diverticulitis, status post drain 

placement, cultures growing Escherichia coli, Klebsiella and Streptococcus 

Anginosus, continue ciprofloxacin and Flagyl, plan for discharge when cleared by

physical therapy.





2. A. fib with RVR: Increase metoprolol to 50 mg twice a day, continue Eliquis 

for anticoagulation.





3. Volume overload: Echo showing elevated right-sided pressures, good clinical 

response to diuresis, continue Lasix 40 mg IV daily for now.





4. Hypothyroidism: Continue levothyroxine





5. Dementia: Continue donepezil





DVT prophylaxis: On Eliquis


GI prophylaxis: PPI





VS, I&O, 24H, Fishbone


Vital Signs/I&O





Vital Signs








  Date Time  Temp Pulse Resp B/P (MAP) Pulse Ox O2 Delivery O2 Flow Rate FiO2


 


5/3/20 08:55  95  126/90    


 


5/3/20 06:00 97.9  16  97 Room Air  














I&O- Last 24 Hours up to 6 AM0 


 


 5/3/20





 05:59


 


Intake Total 660 ml


 


Output Total 0 ml


 


Balance 660 ml











Laboratory Data


24H LABS


Laboratory Tests 2


5/3/20 06:29: 


Nucleated Red Blood Cells % (auto) 0.0, Anion Gap 6L, Glomerular Filtration Rate

> 60.0, Calcium Level 7.7L


CBC/BMP


Laboratory Tests


5/3/20 06:29








Microbiology





Microbiology


4/29/20 Gram Stain - Final, Complete


          


4/29/20 Abscess Culture - Final, Complete


          Escherichia Coli


          Klebsiella Pneumoniae


          Streptococcus Anginosus Grp


4/29/20 Anaerobic Culture, Received


          Pending


4/28/20 Blood Culture - Preliminary, Resulted


          No Growth after 72 hours. All specime...


4/28/20 Blood Culture - Preliminary, Resulted


          No Growth after 72 hours. All specime...











GONDAL,KHUBAIB N. MD            May 3, 2020 12:39
Text Note


Date of Service


The patient was seen on 4/30/20.





NOTE


SUBJECTIVE: 78-year-old female with past medical history of CVA, hypothyroidism,

hyperlipidemia and GERD was admitted for right lower quadrant abscess secondary 

to perforated diverticulitis. Patient underwent ultrasound guided drain 

placement by interventional radiology yesterday. No acute events overnight, no 

new complaints. Patient denies any shortness of breath, chest pain, nausea, v

omiting, diarrhea or constipation. 





PHYSICAL EXAMINATION:


VITAL SIGNS: Please see below.


GENERAL: No distress


HEENT: Normocephalic, atraumatic, moist mucous membranes


CARDIOVASCULAR EXAMINATION: Regular rate and irregular rhythm, normal S1, S2


RESPIRATORY EXAMINATION: Clear to auscultation, no wheezing


ABDOMINAL EXAMINATION: Soft, mild right lower quadrant and left lower quadrant 

tenderness, nondistended, positive bowel sounds, abscess drain with brown fluid,

foul-smelling, concerning for stool


EXTREMITIES: Bilateral lower extremity pitting edema


SKIN: No rash


NEUROLOGICAL EXAMINATION: no focal deficits 


PSYCHIATRIC EXAMINATION: Calm and cooperative





LABORATORY DATA, IMAGING STUDIES, MICROBIOLOGY: Please see below.





ASSESSMENT AND PLAN: 78-year-old female with multiple medical comorbidities, was

admitted for abdominal abscess secondary to perforated diverticulitis





#. Abdominal abscess


- Secondary to perforated diverticulitis, s/p IR drainage on 4/29, blood 

cultures negative after 48 hours, abscess cultures pending, initial gram stain 

showing many gram positive and gram negative rods. Continue empiric antibiotics,

fluid from abscess drain is concerning for stool, but output is decreasing so 

far, if output increases we'll consider general surgery consultation.


-Full liquids diet





#. Hypothyroidism 


- Continue levothyroxine





#. GERD


- Protonix





#. A. fib 


-Eliquis, continue Atenolol for rate control





DVT prophylaxis: Eliquis


GI prophylaxis: PPI





Attending attestation:


I evaluated and examined the patient in person; I discussed the care with 

Resident in detail and agree with the plan above.





VS,Fishbone, I+O


VS, Fishbone, I+O





Vital Signs








  Date Time  Temp Pulse Resp B/P (MAP) Pulse Ox O2 Delivery O2 Flow Rate FiO2


 


4/30/20 14:00 98.4 114 18 110/74 (86) 99 Room Air  














I&O- Last 24 Hours up to 6 AM 


 


 4/30/20





 05:59


 


Intake Total 2500 ml


 


Output Total 470 ml


 


Balance 2030 ml











GME ATTESTATION


GME ATTESTATION


My faculty preceptor for this patient encounter was physically present during 

the encounter and was fully available. All aspects of the patient interview, 

examination, medical decision making process, and medical care plan development 

were reviewed and approved by the faculty preceptor. The faculty preceptor is 

aware and concurs with the plan as stated in the body of this note and will 

attest to such by his/her cosignature.











WENDY SORENSEN DO                 Apr 30, 2020 18:15


GONDAL,KHUBAIB N. MD            May 5, 2020 19:23
Text Note


Date of Service


The patient was seen on 5/1/20.





NOTE


SUBJECTIVE: 78-year-old female with past medical history of CVA, hypothyroidism,

hyperlipidemia and GERD was admitted for right lower quadrant abscess secondary 

to perforated diverticulitis. No acute events overnight, no new complaints. 

Patient denies any shortness of breath, chest pain, nausea, vomiting, diarrhea 

or constipation. 





PHYSICAL EXAMINATION:


VITAL SIGNS: Please see below.


GENERAL: No distress


HEENT: Normocephalic, atraumatic, moist mucous membranes


CARDIOVASCULAR EXAMINATION: Regular rate and irregular rhythm, normal S1, S2


RESPIRATORY EXAMINATION: Clear to auscultation, no wheezing


ABDOMINAL EXAMINATION: Soft, mild right lower quadrant and left lower quadrant 

tenderness, nondistended, positive bowel sounds, abscess drain with brown fluid,

foul-smelling


EXTREMITIES: Bilateral lower extremity pitting edema


SKIN: No rash


NEUROLOGICAL EXAMINATION: no focal deficits 


PSYCHIATRIC EXAMINATION: Calm and cooperative





LABORATORY DATA, IMAGING STUDIES, MICROBIOLOGY: Please see below.





ASSESSMENT AND PLAN: 78-year-old female with multiple medical comorbidities, was

admitted for abdominal abscess secondary to perforated diverticulitis





#. Abdominal abscess


- Secondary to perforated diverticulitis, s/p IR drainage on 4/29, blood 

cultures negative, abscess cultures pending, initial gram stain showing many 

gram positive and gram negative rods. Continue empiric antibiotics, fluid from 

abscess drain is concerning for stool, output continues to decrease


-Low sodium diet ordered. 





#. LE edema


-Echo showing LVEF 65% with moderately elevated right ventricle systolic 

pressure, mild mitral regurg, elevated CVP


- Gave 1x dose of IV lasix to see how she responds, may titrate or schedule 

regular dosing in AM. 





#. Hypothyroidism 


- Continue levothyroxine





#. GERD


- Protonix





#. A. fib 


-Eliquis, rate control agent switched to metoprolol BID. 





DVT prophylaxis: Eliquis


GI prophylaxis: PPI





Attending attestation:


I evaluated and examined the patient in person; I discussed the care with 

Resident in detail and agree with the plan above.





VS,Fishbone, I+O


VS, Fishbone, I+O


Laboratory Tests


5/1/20 11:46











Vital Signs








  Date Time  Temp Pulse Resp B/P (MAP) Pulse Ox O2 Delivery O2 Flow Rate FiO2


 


5/1/20 14:00 97.1 107 19 102/76 (85) 96 Room Air  














I&O- Last 24 Hours up to 6 AM 


 


 5/1/20





 06:00


 


Intake Total 1140 ml


 


Output Total 650 ml


 


Balance 490 ml











GME ATTESTATION


GME ATTESTATION


My faculty preceptor for this patient encounter was physically present during 

the encounter and was fully available. All aspects of the patient interview, 

examination, medical decision making process, and medical care plan development 

were reviewed and approved by the faculty preceptor. The faculty preceptor is 

aware and concurs with the plan as stated in the body of this note and will 

attest to such by his/her cosignature.











WENDY SORENSEN DO                  May 1, 2020 20:17


GONDAL,KHUBAIB N. MD            May 5, 2020 19:32
Text Note


Date of Service


The patient was seen on 5/2/20.





NOTE


SUBJECTIVE: 78-year-old female with past medical history of CVA, hypothyroidism,

hyperlipidemia and GERD was admitted for right lower quadrant abscess secondary 

to perforated diverticulitis. No acute events overnight, no new complaints. 

Though it isnt documented how much she urinated she did urinate several times 

yesterday and reports her leg swelling is better. Patient denies any fevers, 

chills, chest pain, shortness of breath, abdominal pain, nausea, vomiting, 

diarrhea or constipation. 





PHYSICAL EXAMINATION:


VITAL SIGNS: Please see below.


GENERAL: No distress


HEENT: Normocephalic, atraumatic, moist mucous membranes


CARDIOVASCULAR EXAMINATION: Regular rate and irregular rhythm, normal S1, S2


RESPIRATORY EXAMINATION: Clear to auscultation, no wheezing


ABDOMINAL EXAMINATION: Soft, mild right lower quadrant and left lower quadrant 

tenderness, nondistended, positive bowel sounds, abscess drain with minimal 

brown fluid in the bag. 


EXTREMITIES: Bilateral lower extremity pitting edema 1+


SKIN: No rash


NEUROLOGICAL EXAMINATION: no focal deficits 


PSYCHIATRIC EXAMINATION: Calm and cooperative





LABORATORY DATA, IMAGING STUDIES, MICROBIOLOGY: Please see below.





ASSESSMENT AND PLAN: 78-year-old female with multiple medical comorbidities, was

admitted for abdominal abscess secondary to perforated diverticulitis





#. Abdominal abscess


- Secondary to perforated diverticulitis, s/p IR drainage on 4/29, blood 

cultures negative, abscess cultures pending, initial gram growing staph, e.coli,

and Klebsiella pneumonia sensitive to fluoroquinolones. Continue empiric antibi

otics.





#. LE edema


-Echo showing LVEF 65% with moderately elevated right ventricle systolic pressur

e, mild mitral regurg, elevated CVP


- Seems to be doing well with daily lasix dose, will repeat 40 mg again today as

her LE edema has improved significantly. 


-Low sodium diet ordered. 





#. Hypothyroidism 


- Continue levothyroxine





#. GERD


- Protonix





#. A. fib 


-Eliquis, rate control agent switched to metoprolol BID. 





DVT prophylaxis: Eliquis


GI prophylaxis: PPI





Attending attestation:


I evaluated and examined the patient in person; I discussed the care with 

Resident in detail and agree with the plan above.





VS,Fishbone, I+O


VS, Fishbone, I+O


Laboratory Tests


5/2/20 06:14











Vital Signs








  Date Time  Temp Pulse Resp B/P (MAP) Pulse Ox O2 Delivery O2 Flow Rate FiO2


 


5/2/20 10:29  99  105/75    


 


5/2/20 06:00 98.1  16  96 Room Air  














I&O- Last 24 Hours up to 6 AM 


 


 5/2/20





 06:00


 


Intake Total 1280 ml


 


Output Total 20 ml


 


Balance 1260 ml











GME ATTESTATION


GME ATTESTATION


My faculty preceptor for this patient encounter was physically present during 

the encounter and was fully available. All aspects of the patient interview, 

examination, medical decision making process, and medical care plan development 

were reviewed and approved by the faculty preceptor. The faculty preceptor is 

aware and concurs with the plan as stated in the body of this note and will 

attest to such by his/her cosignature.











WENDY SORENSEN DO                  May 2, 2020 12:32


GONDAL,KHUBAIB N. MD            May 5, 2020 18:34
3-point gait

## 2020-08-21 NOTE — ED ADULT TRIAGE NOTE - NS ED NURSE AMBULANCES
Future Appointments:  Future Appointments   Date Time Provider Willian Paul   8/24/2020  9:00 AM [facility] [id number] REG   8/24/2020  1:15 PM RAD ONC THERAPY [facility]   8/25/2020  6:00 AM RAD ONC THERAPY [address] [facility]   8/25/2020  7:45 AM Anitra Olea MD [facility] AMB   8/26/2020  1:15 PM RAD ONC THERAPY [facility]   8/27/2020  1:15 PM RAD ONC THERAPY [address] [facility]   8/28/2020  1:15 PM RAD ONC THERAPY [address] [facility]   8/28/2020  8:30 PM [facility] [address] SLEEP LAB [facility]   8/31/2020  9:00 AM [facility] CARDIOPULM EXERCISE [facility]   8/31/2020  1:15 PM RAD ONC THERAPY [address] [facility]   9/1/2020  1:15 PM RAD ONC THERAPY [address] [facility]   9/2/2020  1:15 PM RAD ONC THERAPY [address] [facility]   9/3/2020  1:15 PM RAD ONC THERAPY [address] [facility]   9/4/2020  1:15 PM RAD ONC THERAPY [address] [facility]   9/8/2020  9:00 AM HODA CURTIS BS AMB   9/8/2020  9:20 AM MD YOSELIN Lim BS AMB   9/8/2020  1:15 PM RAD ONC THERAPY [address] [facility]   9/9/2020  1:15 PM RAD ONC THERAPY [facility]   9/14/2020  9:00 AM [facility]ARD[facility]ULM [facility]ERCISE [facility]   9/21/2020  9:00 AM [facility]ARD[facility]ULM [facility]ERCISE [facility]   9/28/2020  9:00 AM [facility]ARD[facility]OPULM [facility]ERCISE [facility]   1/15/2021  2:00 PM MD ANTWON Auguste BS AMB        Last Appointment With Me:  5/29/2020     Last Appointment My Department:  Visit date not found    Requested Prescriptions     Pending Prescriptions Disp Refills    ferrous sulfate 325 mg (65 mg iron) tablet 30 Tab 2     Sig: Take 1 Tab by mouth daily (with breakfast) for 90 days. [employer]

## 2021-01-01 NOTE — PATIENT PROFILE ADULT - NSPROPTRIGHTNOTIFY_GEN_A_NUR
Problem: Oral Nutrition ()  Goal: Effective Oral Intake  Outcome: Improving     Problem: Infant Inpatient Plan of Care  Goal: Plan of Care Review  Outcome: Improving  Flowsheets (Taken 2021 1000)  Care Plan Reviewed With:   mother   father     Problem: Temperature Instability ([place])  Goal: Temperature Stability  Outcome: Improving  Intervention: Promote Temperature Stability  Recent Flowsheet Documentation  Taken 2021 1000 by Zelda Pulliam, RN  Warming Method:   swaddled   hat     Infant VSS and temp WDL. Infant is doing well with formula feedings. Infant was given a bath by dad. Bonding well with mother and father. Voiding and stooling appropriately. Will continue to monitor.    declines

## 2021-02-11 NOTE — DISCHARGE NOTE NURSING/CASE MANAGEMENT/SOCIAL WORK - NSDCDPATPORTLINK_GEN_ALL_CORE
Admission Medication History status for the 2/10/2021 admission is complete.  See EPIC admission navigator for Prior to Admission medications.    Medication history sources:  Patient, [employer], and note from Dolly Hartmann [facility] on 2/10/21, [place]     Medication history source reliability: Good     Changes made to PTA medication list (reason)  Added: Clonazepam  Deleted: Lorazepam  Changed: None    Additional medication history information (including reliability of information, actions taken by pharmacist):   Per Pt and [place]  fill history pt was taking clonazepam instead of lorazepam.   12/1/2020 Clonazepam 0.5 mg, #60, 30 DS    Also of note pt was unclear of her olanzapine dose. Her last fill was on 1/24/21 for olanzapine 5 mg, #60, 30 DS. Pt hasn't been taking recently.    Pt last filled protriptyline 10 mg since May of 2020. She reports not taking recently.     Time spent in this activity: 30 minutes    Medication history completed by: Sunny Ryan [facility]     Prior to Admission medications    Medication Sig Last Dose Taking? Auth Provider   clonazePAM (KLONOPIN) 0.5 MG tablet Take 0.5 mg by mouth 2 times daily as needed for anxiety Unknown at Unknown time  Unknown, Entered By History   lamoTRIgine (LAMICTAL) 150 MG tablet Take 2 tablets (300 mg) by mouth daily Unknown at Unknown time  Nissa Morris APRN CNP   OLANZapine (ZYPREXA) 5 MG tablet Take 5 mg by mouth At Bedtime Unknown at Unknown time  Unknown, Entered By History   protriptyline (VIVACTIL) 10 MG tablet Take 1 tablet (10 mg) by mouth daily (with dinner) Unknown at Unknown time  Nissa Morris APRN CNP   QUEtiapine ER (SEROQUEL XR) 200 MG 24 hr tablet Take 200 mg by mouth At Bedtime 2/6/2021  Unknown, Entered By History   ramelteon (ROZEREM) 8 MG tablet Take 1 tablet (8 mg) by mouth At Bedtime Unknown at Unknown time  Nissa Morris APRN CNP   rOPINIRole (REQUIP) 1 MG tablet Take 1 tablet (1 mg) by mouth At Bedtime Unknown  at Unknown time  Nissa Morris APRN CNP   venlafaxine (EFFEXOR-XR) 150 MG 24 hr capsule Take 150 mg by mouth daily Unknown at Unknown time  Unknown, Entered By History        You can access the [facility] Patient Portal, offered by [facility], by registering with the following website: [email or web address]

## 2021-09-15 NOTE — PHYSICAL THERAPY INITIAL EVALUATION ADULT - LEVEL OF INDEPENDENCE: STAND/SIT, REHAB EVAL
Test COVID+ about 2 weeks ago, wants another test to see if he is now negative. maximum assist (25% patients effort)

## 2022-01-24 NOTE — H&P ADULT - NSHPLABSRESULTS_GEN_ALL_CORE
Stelara Counseling:  I discussed with the patient the risks of ustekinumab including but not limited to immunosuppression, malignancy, posterior leukoencephalopathy syndrome, and serious infections.  The patient understands that monitoring is required including a PPD at baseline and must alert us or the primary physician if symptoms of infection or other concerning signs are noted. CT Cervical Spine No Cont (06.28.19 @ 00:37) >  IMPRESSION:  Osteopenia without acute displaced fracture    CT Head No Cont (06.28.19 @ 00:36) >  IMPRESSION:  No CT evidence for acute intracranial pathology.    : CT Chest No Cont (06.27.19 @ 22:21) >  IMPRESSION:     Mildly displaced left femur subcapital fracture.    Small bilateral pleural effusions, interstitial edema, and cardiomegaly   compatible with CHF.    Unchanged nonspecific mesenteric edema surrounding the pancreas;   nonspecific    Xray Femur 2 Views, Left (06.27.19 @ 20:52) >  IMPRESSION:  Minimally impacted fracture of the left femoral neck. There is also seen   on the contemporaneous CT.    Xray Chest 1 View AP/PA (06.27.19 @ 20:29) >  Impression:    Worsening left base opacity and scattered other opacities.                              8.0    9.31  )-----------( 193      ( 27 Jun 2019 20:30 )             26.1     06-27    136  |  98  |  87<HH>  ----------------------------<  106<H>  4.8   |  26  |  1.5    Ca    9.1      27 Jun 2019 20:30    TPro  6.4  /  Alb  3.2<L>  /  TBili  0.7  /  DBili  x   /  AST  15  /  ALT  13  /  AlkPhos  123<H>  06-27

## 2022-07-28 NOTE — PATIENT PROFILE ADULT - FAMILY NEEDS
PCP: Steve Phan DO    Last appt: 4/12/2022  Future Appointments   Date Time Provider Faye Lester   12/8/2022  1:30 PM Steve Phan DO [facility] BS AMB       Requested Prescriptions     Pending Prescriptions Disp Refills    bumetanide (BUMEX) 1 mg tablet 90 Tablet 3     Sig: Take 1 Tablet by mouth in the morning.        Prior labs and Blood pressures:  BP Readings from Last 3 Encounters:   04/12/22 131/62   12/02/21 (!) 164/79   11/24/20 136/79     Lab Results   Component Value Date/Time    Sodium 142 12/02/2021 03:54 PM    Potassium 4.7 12/02/2021 03:54 PM    Chloride 108 12/02/2021 03:54 PM    CO2 28 12/02/2021 03:54 PM    Anion gap 6 12/02/2021 03:54 PM    Glucose 96 12/02/2021 03:54 PM    BUN 32 (H) 12/02/2021 03:54 PM    Creatinine 1.14 (H) 12/02/2021 03:54 PM    BUN/Creatinine ratio 28 (H) 12/02/2021 03:54 PM    GFR est AA 57 (L) 12/02/2021 03:54 PM    GFR est non-AA 47 (L) 12/02/2021 03:54 PM    Calcium 10.0 12/02/2021 03:54 PM     Lab Results   Component Value Date/Time    Hemoglobin A1c 5.3 12/02/2021 03:54 PM    Hemoglobin A1c (POC) 5.2 09/28/2018 12:00 PM     Lab Results   Component Value Date/Time    Cholesterol, total 197 12/02/2021 03:54 PM    Cholesterol (POC) 151.0 09/28/2018 12:00 PM    HDL Cholesterol 84 12/02/2021 03:54 PM    HDL Cholesterol (POC) 67.0 09/28/2018 12:00 PM    LDL Cholesterol (POC) 52.6 09/28/2018 12:00 PM    LDL, calculated 88.2 12/02/2021 03:54 PM    VLDL, calculated 24.8 12/02/2021 03:54 PM    Triglyceride 124 12/02/2021 03:54 PM    Triglycerides (POC) 157.0 09/28/2018 12:00 PM    CHOL/HDL Ratio 2.3 12/02/2021 03:54 PM     Lab Results   Component Value Date/Time    VITAMIN D, 25-HYDROXY 59 11/18/2019 02:40 PM       Lab Results   Component Value Date/Time    TSH 2.04 12/02/2021 03:54 PM    TSH, 3rd generation 2.08 11/18/2019 02:40 PM no

## 2022-12-29 NOTE — PHYSICAL THERAPY INITIAL EVALUATION ADULT - HEALTH SCREEN CRITERIA
yes

## 2023-04-18 NOTE — PHYSICAL THERAPY INITIAL EVALUATION ADULT - PLANNED THERAPY INTERVENTIONS, PT EVAL
Quality 93: Acute Otitis Externa (Aoe): Systemic Antimicrobial Therapy - Avoidance Or Inappropriate Use: Physician did NOT prescribing a systemic antibiotic for AOE Quality 111:Pneumonia Vaccination Status For Older Adults: Pneumococcal vaccine (PPSV23) administered on or after patient’s 60th birthday and before the end of the measurement period Quality 226: Preventive Care And Screening: Tobacco Use: Screening And Cessation Intervention: Tobacco Screening not Performed Quality 130: Documentation Of Current Medications In The Medical Record: Current Medications Documented Detail Level: Detailed Quality 402: Tobacco Use And Help With Quitting Among Adolescents: Patient screened for tobacco and never smoked Quality 110: Preventive Care And Screening: Influenza Immunization: Influenza Immunization Administered during Influenza season gait training/balance training/bed mobility training/strengthening/transfer training

## 2023-05-02 NOTE — DISCHARGE NOTE NURSING/CASE MANAGEMENT/SOCIAL WORK - NSTRANSFERBELONGINGSRESP_GEN_A_NUR
[de-identified] : 5/2/2023–bilateral knee Injection - hyaluronic acid\par The risks, benefits, and alternatives of the injection were reviewed/discussed with the patient today in office and all of their questions were answered. The patient consented to the procedure. The inferolateral injection site was prepped with chloroprep.  Cold spray was utilized to numb the skin. Utilizing sterile technique, the knee was injected with [Euflexxa]. A sterile bandage was applied.  This was repeated for the contralateral extremity the patient tolerated the procedure well and there were no complications.\par Lot: [id number] / [id number]\par Exp: 2024-4-28 / 2024-4-14\par B&B\par \par 4/18/2023–bilateral knee Injection -Euflexxa
yes

## 2024-01-07 NOTE — CONSULT NOTE ADULT - ASSESSMENT
Patients episodes unresponsive and bradycardic. Then change MS. He needs echo. May need neuro w/u. Continue monitor. Prognosis guarded room air

## 2024-02-01 NOTE — PROGRESS NOTE ADULT - ASSESSMENT

## 2024-02-23 NOTE — DIETITIAN INITIAL EVALUATION ADULT. - FACTORS AFF FOOD INTAKE
per pt. RN no GI distress or chew/swallow difficulty, last BM 3/27/none
Time-based billing (NON-critical care)

## 2024-07-06 NOTE — PHYSICAL THERAPY INITIAL EVALUATION ADULT - STANDING BALANCE: DYNAMIC, REHAB EVAL
Pt is calling back saying he was transferred around.     Writer warm transferred to paging  Sona to speak with on call for Dr. Brando Davis. Advised pt to call 911 if he has any severe weakness or other severe symptoms.     Advised pt to call back if needing further assistance.     Pt verbalizes understanding and agrees to plan.    fair minus

## 2024-10-31 NOTE — DISCHARGE NOTE PROVIDER - NSDCHHPTASSISTHOME_GEN_ALL_CORE
Are Labs Available For Review?: Yes Dosing Month 2 (Required For Cumulative Dosing): 50mg Daily Patient Weight (Optional But Required For Cumulative Dose-Numbers And Decimals Only): 145 Months Of Therapy Completed: 1 Pounds Preamble Statement (Weight Entered In Details Tab): Reported Weight in pounds: Male Completion Statement: After discussing his treatment course we decided to discontinue isotretinoin therapy at this time. He shouldn't donate blood for one month after the last dose. He should call with any new symptoms of depression. Completed Therapy?: No Kilograms Preamble Statement (Weight Entered In Details Tab): Reported Weight in kilograms: Dosing Month 1 (Required For Cumulative Dosing): 40mg Daily Detail Level: Zone Female Completion Statement: After discussing her treatment course we decided to discontinue isotretinoin therapy at this time. I explained that she would need to continue her birth control methods for at least one month after the last dosage. She should also get a pregnancy test one month after the last dose. She shouldn't donate blood for one month after the last dose. She should call with any new symptoms of depression. Ipledge Number (Optional): [phone] Weight Units: pounds Myalgia Monitoring: I explained this is common when taking isotretinoin. If this worsens they will contact us. Use Therapeutic Ranged Or Therapeutic Target: please select Range or Target Hypercholesterolemia Monitoring: I explained this is common when taking isotretinoin. We will monitor closely. Is Xerosis Present?: Yes - Normal Treatment Retinoid Dermatitis Aggressive Treatment: I recommended more frequent application of Cetaphil or CeraVe to the areas of dermatitis. I also prescribed a topical steroid for twice daily use until the dermatitis resolves. Myalgia Treatment: I explained this is common when taking isotretinoin. If this worsens they will contact us. They may try OTC ibuprofen. Cheilitis Normal Treatment: I recommended application of Vaseline or Aquaphor numerous times a day (as often as every hour) and before going to bed. Lower Range (In Mg/Kg): 120 Nosebleeds Normal Treatment: I explained this is common when taking isotretinoin. I recommended saline mist in each nostril multiple times a day. If this worsens they will contact us. Next Month's Dosage: Continue Current Dosage What Is The Patient's Gender: Male Xerosis Normal Treatment: I recommended application of Cetaphil or CeraVe numerous times a day and before going to bed to all dry areas. Upper Range (In Mg/Kg): 150 Cheilitis Aggressive Treatment: I recommended application of Vaseline or Aquaphor numerous times a day (as often as every hour) and before going to bed. I also prescribed a topical steroid for twice daily use. Counseling Text: I reviewed the side effect in detail. Patient should get monthly blood tests, not donate blood, not drive at night if vision affected, and not share medication. Xerosis Aggressive Treatment: I recommended application of Cetaphil or CeraVe numerous times a day and before going to bed to all dry areas. I also prescribed a topical steroid for twice daily use. Target Cumulative Dosage (In Mg/Kg): 135 Female Pregnancy Counseling Text: Female patients should also be on two forms of birth control while taking this medication and for one month after their last dose. Retinoid Dermatitis Normal Treatment: I recommended more frequent application of Cetaphil or CeraVe to the areas of dermatitis. Headache Monitoring: I recommended monitoring the headaches for now. There is no evidence of increased intracranial pressure. They were instructed to call if the headaches are worsening. Patient Needs Assistance to Leave Residence... Xerosis Normal Treatment: I recommended application of Cetaphil or CeraVe numerous times a day going to bed to all dry areas. Xerosis Aggressive Treatment: I recommended application of Cetaphil or CeraVe numerous times a day going to bed to all dry areas. I also prescribed a topical steroid for twice daily use.

## 2024-11-10 NOTE — PROGRESS NOTE ADULT - SUBJECTIVE AND OBJECTIVE BOX
KUSH TONG 89y Male  MRN#: [id number]    SUBJECTIVE  Patient is a 89y old Male who presents with a chief complaint of Weakness and abdominal pain (04 Feb 2019 12:37)  Currently admitted to medicine with the primary diagnosis of Hypocalcemia    OBJECTIVE  PAST MEDICAL & SURGICAL HISTORY  Hyperlipidemia  CHF (congestive heart failure)  Afib  Hypertension  S/P AAA (abdominal aortic aneurysm) repair    ALLERGIES:  No Known Allergies    MEDICATIONS:  STANDING MEDICATIONS  atorvastatin 10 milliGRAM(s) Oral at bedtime  ferrous    sulfate 325 milliGRAM(s) Oral daily  furosemide    Tablet 20 milliGRAM(s) Oral daily  isosorbide   mononitrate ER Tablet (IMDUR) 30 milliGRAM(s) Oral daily  metoprolol succinate ER 50 milliGRAM(s) Oral daily    PRN MEDICATIONS  ALBUTerol   0.042% 1.25 milliGRAM(s) Nebulizer every 4 hours PRN      VITAL SIGNS: Last 24 Hours  T(C): 35.7 (05 Feb 2019 15:34), Max: 35.9 (05 Feb 2019 12:44)  T(F): 96.2 (05 Feb 2019 15:34), Max: 96.6 (05 Feb 2019 12:44)  HR: 61 (05 Feb 2019 15:34) (61 - 74)  BP: 107/60 (05 Feb 2019 15:34) (107/60 - 141/76)  BP(mean): --  RR: 19 (05 Feb 2019 15:34) (19 - 24)  SpO2: 86% (05 Feb 2019 15:23) (86% - 97%)    LABS:                        8.8    8.28  )-----------( 199      ( 05 Feb 2019 05:09 )             28.2     02-05    137  |  99  |  30<H>  ----------------------------<  95  5.4<H>   |  25  |  1.2    Ca    9.0      05 Feb 2019 05:09  Phos  3.0     02-04  Mg     2.1     02-04      PT/INR - ( 05 Feb 2019 05:09 )   PT: 27.40 sec;   INR: 2.40 ratio    PTT - ( 05 Feb 2019 05:09 )  PTT:37.5 sec    PHYSICAL EXAM:  GEN: No acute distress  LUNGS: Clear to auscultation bilaterally   HEART: S1/S2 present. RRR.   ABD: Soft, non-tender, non-distended. Bowel sounds present  EXT: NC/NC/NE/2+PP/LANE/Skin Intact.   NEURO: AAOX3    ASSESSMENT & PLAN  89 y/o male with past medical history of CHF, CAD, DLD, HTN, AAA repair and anemia. He came to hospital with chief complaint of lethargy for past 1 week. Patient was found to have hypomagnesemia and hypocalcemia.     Assessment and plan     1- CKD3b/ Symptomatic hypocalcemia secondary to hypomagnesemia: improving  - S/P 3gm Calcium gluconate and 4gm Mg  - Patient asymptomatic now  - Nephrology on board, rec > hypocalcemia likely due to CKD plus hypomagnesemia 2/2 GI losses plus ?lasix. Improving on repeat labs  - serum cr. stable around baseline  - 25 OH-vitamin D 51, 1,25 OH-vitamin D 73.8 1/31  - PTH elevated 261, secondary hyperparathyroidism    2- CHF/CAD/DLD/HTN  - TTE 2/1 EF 40-45%  - Stable- Continue home medications     -Patient was treated with iv lasix and now is oxygen saturation is 86 percent on room air while resting.  -He was tested in a chronic stable state and he is aware that he is going home with oxygen.    3- Afib  - Rate controlled  - On coumadin   - Check INR in AM     4- Anemia (Microcytic anemia)  - secondary to iron deficiency   - iron 52, 5 saturation 28, TIBC 188 1/31  - Continue Feso4    5- TSH 7.41 and T4 1 would repeat TFT's in 2-3 weeks and assess the need for levothyroxine     DVT prophylaxis; coumadin 2.5 mg, fu INR AM   GI ppx: not indicated  Diet: DASH  ACTIVITY: as tolerated    Prepare for d/c tomorrow in am. KUSH TONG 89y Male  MRN#: [id number]    SUBJECTIVE  Patient is a 89y old Male who presents with a chief complaint of Weakness and abdominal pain (04 Feb 2019 12:37)  Currently admitted to medicine with the primary diagnosis of Hypocalcemia    OBJECTIVE  PAST MEDICAL & SURGICAL HISTORY  Hyperlipidemia  CHF (congestive heart failure)  Afib  Hypertension  S/P AAA (abdominal aortic aneurysm) repair    ALLERGIES:  No Known Allergies    MEDICATIONS:  STANDING MEDICATIONS  atorvastatin 10 milliGRAM(s) Oral at bedtime  ferrous    sulfate 325 milliGRAM(s) Oral daily  furosemide    Tablet 20 milliGRAM(s) Oral daily  isosorbide   mononitrate ER Tablet (IMDUR) 30 milliGRAM(s) Oral daily  metoprolol succinate ER 50 milliGRAM(s) Oral daily    PRN MEDICATIONS  ALBUTerol   0.042% 1.25 milliGRAM(s) Nebulizer every 4 hours PRN      VITAL SIGNS: Last 24 Hours  T(C): 35.7 (05 Feb 2019 15:34), Max: 35.9 (05 Feb 2019 12:44)  T(F): 96.2 (05 Feb 2019 15:34), Max: 96.6 (05 Feb 2019 12:44)  HR: 61 (05 Feb 2019 15:34) (61 - 74)  BP: 107/60 (05 Feb 2019 15:34) (107/60 - 141/76)  BP(mean): --  RR: 19 (05 Feb 2019 15:34) (19 - 24)  SpO2: 86% (05 Feb 2019 15:23) (86% - 97%)    LABS:                        8.8    8.28  )-----------( 199      ( 05 Feb 2019 05:09 )             28.2     02-05    137  |  99  |  30<H>  ----------------------------<  95  5.4<H>   |  25  |  1.2    Ca    9.0      05 Feb 2019 05:09  Phos  3.0     02-04  Mg     2.1     02-04      PT/INR - ( 05 Feb 2019 05:09 )   PT: 27.40 sec;   INR: 2.40 ratio    PTT - ( 05 Feb 2019 05:09 )  PTT:37.5 sec    PHYSICAL EXAM:  GEN: No acute distress  LUNGS: Clear to auscultation bilaterally   HEART: S1/S2 present. RRR.   ABD: Soft, non-tender, non-distended. Bowel sounds present  EXT: NC/NC/NE/2+PP/LANE/Skin Intact.   NEURO: AAOX3    ASSESSMENT & PLAN  89 y/o male with past medical history of CHF, CAD, DLD, HTN, AAA repair and anemia. He came to hospital with chief complaint of lethargy for past 1 week. Patient was found to have hypomagnesemia and hypocalcemia.     Assessment and plan     1- CKD3b/ Symptomatic hypocalcemia secondary to hypomagnesemia: improving  - S/P 3gm Calcium gluconate and 4gm Mg  - Patient asymptomatic now  - Nephrology on board, rec > hypocalcemia likely due to CKD plus hypomagnesemia 2/2 GI losses plus ?lasix. Improving on repeat labs  - serum cr. stable around baseline  - 25 OH-vitamin D 51, 1,25 OH-vitamin D 73.8 1/31  - PTH elevated 261, secondary hyperparathyroidism    2- CHF/CAD/DLD/HTN  - TTE 2/1 EF 40-45%  - Stable- Continue home medications     -Patient was treated with iv lasix and now is oxygen saturation is 86 percent on room air while resting.  -He was tested in a chronic stable state and he is aware that he is going home with oxygen.    3- Afib  - Rate controlled  - On coumadin   - Check INR in AM     4- Anemia (Microcytic anemia)  - secondary to iron deficiency   - iron 52, 5 saturation 28, TIBC 188 1/31  - Continue Feso4    5- TSH 7.41 and T4 1 would repeat TFT's in 2-3 weeks and assess the need for levothyroxine     DVT prophylaxis; coumadin 2.5 mg, fu INR AM   GI ppx: not indicated  Diet: DASH  ACTIVITY: as tolerated    Prepare for d/c tomorrow in am.. Xray Cervical Spine AP, Lat, Dens Max 3 View

## 2025-05-06 NOTE — H&P ADULT - NSHPREVIEWOFSYSTEMS_GEN_ALL_CORE
- Observation, no need for telemetry  - MRI cervical spine reviewed again with patient and wife  - Neurosurgery is consulted, appreciate their assistance  - Continue with steroid burst, may slightly extend  - Continue gabapentin, uptitrate to 600 mg every 8 hours  - Cotninue lidocaine patch to cervical spine and scheduled acetaminophen  - Multimodal pain regimen, bowel regimen   unable to obtain due to patients AMS